# Patient Record
Sex: FEMALE | Race: WHITE | NOT HISPANIC OR LATINO | Employment: OTHER | URBAN - METROPOLITAN AREA
[De-identification: names, ages, dates, MRNs, and addresses within clinical notes are randomized per-mention and may not be internally consistent; named-entity substitution may affect disease eponyms.]

---

## 2022-06-01 ENCOUNTER — TELEPHONE (OUTPATIENT)
Dept: SCHEDULING | Facility: IMAGING CENTER | Age: 67
End: 2022-06-01

## 2022-06-16 ENCOUNTER — OFFICE VISIT (OUTPATIENT)
Dept: MEDICAL GROUP | Facility: PHYSICIAN GROUP | Age: 67
End: 2022-06-16
Payer: MEDICARE

## 2022-06-16 VITALS
RESPIRATION RATE: 17 BRPM | HEART RATE: 94 BPM | DIASTOLIC BLOOD PRESSURE: 68 MMHG | HEIGHT: 60 IN | TEMPERATURE: 98.5 F | WEIGHT: 153 LBS | OXYGEN SATURATION: 96 % | SYSTOLIC BLOOD PRESSURE: 110 MMHG | BODY MASS INDEX: 30.04 KG/M2

## 2022-06-16 DIAGNOSIS — M54.40 CHRONIC MIDLINE LOW BACK PAIN WITH SCIATICA, SCIATICA LATERALITY UNSPECIFIED: ICD-10-CM

## 2022-06-16 DIAGNOSIS — G89.29 CHRONIC MIDLINE LOW BACK PAIN WITH SCIATICA, SCIATICA LATERALITY UNSPECIFIED: ICD-10-CM

## 2022-06-16 DIAGNOSIS — F41.9 ANXIETY: ICD-10-CM

## 2022-06-16 DIAGNOSIS — M54.50 CHRONIC LOW BACK PAIN, UNSPECIFIED BACK PAIN LATERALITY, UNSPECIFIED WHETHER SCIATICA PRESENT: ICD-10-CM

## 2022-06-16 DIAGNOSIS — Z85.850 HISTORY OF THYROID CANCER: ICD-10-CM

## 2022-06-16 DIAGNOSIS — Z13.6 SCREENING FOR CARDIOVASCULAR CONDITION: ICD-10-CM

## 2022-06-16 DIAGNOSIS — F33.0 MILD EPISODE OF RECURRENT MAJOR DEPRESSIVE DISORDER (HCC): ICD-10-CM

## 2022-06-16 DIAGNOSIS — G89.29 CHRONIC LOW BACK PAIN, UNSPECIFIED BACK PAIN LATERALITY, UNSPECIFIED WHETHER SCIATICA PRESENT: ICD-10-CM

## 2022-06-16 DIAGNOSIS — E03.9 ACQUIRED HYPOTHYROIDISM: ICD-10-CM

## 2022-06-16 DIAGNOSIS — I10 ESSENTIAL HYPERTENSION: ICD-10-CM

## 2022-06-16 DIAGNOSIS — Z12.31 ENCOUNTER FOR SCREENING MAMMOGRAM FOR BREAST CANCER: ICD-10-CM

## 2022-06-16 DIAGNOSIS — Z12.11 COLON CANCER SCREENING: ICD-10-CM

## 2022-06-16 DIAGNOSIS — Z23 NEED FOR VACCINATION: ICD-10-CM

## 2022-06-16 DIAGNOSIS — K21.9 GASTROESOPHAGEAL REFLUX DISEASE WITHOUT ESOPHAGITIS: ICD-10-CM

## 2022-06-16 DIAGNOSIS — Z98.890 STATUS POST THYROID SURGERY: ICD-10-CM

## 2022-06-16 DIAGNOSIS — E55.9 VITAMIN D DEFICIENCY: ICD-10-CM

## 2022-06-16 PROBLEM — F33.9 EPISODE OF RECURRENT MAJOR DEPRESSIVE DISORDER (HCC): Status: ACTIVE | Noted: 2022-06-16

## 2022-06-16 PROCEDURE — 99204 OFFICE O/P NEW MOD 45 MIN: CPT | Mod: 25 | Performed by: INTERNAL MEDICINE

## 2022-06-16 PROCEDURE — 90715 TDAP VACCINE 7 YRS/> IM: CPT | Performed by: INTERNAL MEDICINE

## 2022-06-16 PROCEDURE — G0009 ADMIN PNEUMOCOCCAL VACCINE: HCPCS | Performed by: INTERNAL MEDICINE

## 2022-06-16 PROCEDURE — 90732 PPSV23 VACC 2 YRS+ SUBQ/IM: CPT | Performed by: INTERNAL MEDICINE

## 2022-06-16 PROCEDURE — 90472 IMMUNIZATION ADMIN EACH ADD: CPT | Performed by: INTERNAL MEDICINE

## 2022-06-16 RX ORDER — LOSARTAN POTASSIUM 100 MG/1
100 TABLET ORAL DAILY
COMMUNITY
End: 2023-04-17 | Stop reason: SDUPTHER

## 2022-06-16 RX ORDER — AMLODIPINE BESYLATE 10 MG/1
10 TABLET ORAL DAILY
COMMUNITY
End: 2022-08-11 | Stop reason: SDUPTHER

## 2022-06-16 RX ORDER — LORAZEPAM 1 MG/1
1 TABLET ORAL EVERY 4 HOURS PRN
COMMUNITY
End: 2022-06-16

## 2022-06-16 RX ORDER — GABAPENTIN 300 MG/1
300 CAPSULE ORAL 3 TIMES DAILY
COMMUNITY
End: 2023-01-03 | Stop reason: SDUPTHER

## 2022-06-16 RX ORDER — OMEPRAZOLE 20 MG/1
40 CAPSULE, DELAYED RELEASE ORAL DAILY
COMMUNITY
End: 2022-07-13 | Stop reason: SDUPTHER

## 2022-06-16 RX ORDER — BUPROPION HYDROCHLORIDE 300 MG/1
300 TABLET ORAL EVERY MORNING
COMMUNITY
End: 2022-07-13 | Stop reason: SDUPTHER

## 2022-06-16 RX ORDER — NAPROXEN 500 MG/1
500 TABLET ORAL 2 TIMES DAILY WITH MEALS
Qty: 60 TABLET | Refills: 5 | Status: SHIPPED | OUTPATIENT
Start: 2022-06-16 | End: 2022-11-14

## 2022-06-16 RX ORDER — POTASSIUM CHLORIDE 14.9 MG/ML
20 INJECTION INTRAVENOUS ONCE
COMMUNITY
End: 2022-10-10

## 2022-06-16 RX ORDER — LEVOTHYROXINE SODIUM 100 MCG
TABLET ORAL
COMMUNITY
Start: 2022-06-03 | End: 2022-12-20

## 2022-06-16 RX ORDER — LORAZEPAM 0.5 MG/1
0.5 TABLET ORAL
COMMUNITY
End: 2022-11-04 | Stop reason: SDUPTHER

## 2022-06-16 RX ORDER — NAPROXEN 500 MG/1
500 TABLET ORAL 2 TIMES DAILY WITH MEALS
COMMUNITY
End: 2022-06-16 | Stop reason: SDUPTHER

## 2022-06-16 RX ORDER — IBUPROFEN 200 MG
950 CAPSULE ORAL DAILY
COMMUNITY

## 2022-06-16 ASSESSMENT — PATIENT HEALTH QUESTIONNAIRE - PHQ9: CLINICAL INTERPRETATION OF PHQ2 SCORE: 0

## 2022-06-16 NOTE — ASSESSMENT & PLAN NOTE
Chronic condition.  The patient is currently taking omeprazole.  The patient denies nausea vomiting dysphagia or unexplained weight loss

## 2022-06-16 NOTE — ASSESSMENT & PLAN NOTE
This is a chronic condition.  Patient takes lorazepam as needed.  Currently she is asymptomatic.  Patient declined mental health referral

## 2022-06-16 NOTE — ASSESSMENT & PLAN NOTE
This is a chronic condition.  The patient status post spine surgery.  She takes Naprosyn as needed.

## 2022-06-16 NOTE — PROGRESS NOTES
PRIMARY CARE CLINIC VISIT  Chief Complaint   Patient presents with   • New Patient     Establish care  Discuss her medical conditions    History of Present Illness     History of thyroid cancer  Patient is status post thyroidectomy.  Patient currently taking levothyroxine.  Patient requests referral to establish with endocrinologist.    Episode of recurrent major depressive disorder (HCC)  Chronic condition.  The patient is now taking Wellbutrin.  Patient denies SI.    Essential hypertension  Chronic condition.  Patient is taking losartan.  Her blood pressure has been well controlled.     Gastroesophageal reflux disease without esophagitis  Chronic condition.  The patient is currently taking omeprazole.  The patient denies nausea vomiting dysphagia or unexplained weight loss    Chronic midline low back pain with sciatica  This is a chronic condition.  The patient status post spine surgery.  She takes Naprosyn as needed.    Anxiety  This is a chronic condition.  Patient takes lorazepam as needed.  Currently she is asymptomatic.  Patient declined mental health referral      Current Outpatient Medications on File Prior to Visit   Medication Sig Dispense Refill   • SYNTHROID 100 MCG Tab      • omeprazole (PRILOSEC) 20 MG delayed-release capsule Take 40 mg by mouth every day.     • potassium chloride in water (KCL) 20 MEQ/100ML Solution Infuse 20 mEq into a venous catheter one time.     • amLODIPine (NORVASC) 10 MG Tab Take 10 mg by mouth every day.     • losartan (COZAAR) 100 MG Tab Take 100 mg by mouth every day.     • buPROPion (WELLBUTRIN XL) 300 MG XL tablet Take 300 mg by mouth every morning.     • gabapentin (NEURONTIN) 300 MG Cap Take 300 mg by mouth 3 times a day.     • LORazepam (ATIVAN) 0.5 MG Tab Take 0.5 mg by mouth 2 times daily with meals as needed.     • calcium citrate (CALCITRATE) 950 (200 Ca) MG Tab Take 950 mg by mouth every day.     • Multiple Vitamin (MULTI-VITAMINS PO) Take  by mouth.     • aspirin  EC (ECOTRIN) 81 MG Tablet Delayed Response Take 81 mg by mouth every day.     • Riboflavin (VITAMIN B-2 PO) Take  by mouth.       No current facility-administered medications on file prior to visit.        Allergies: Patient has no known allergies.    ROS  As per HPI above. All other systems reviewed and negative.      Past Medical, Social, and Family history reviewed and updated in EPIC     Objective     /68 (BP Location: Left arm, Patient Position: Sitting, BP Cuff Size: Adult)   Pulse 94   Temp 36.9 °C (98.5 °F) (Temporal)   Resp 17   Ht 1.524 m (5')   Wt 69.4 kg (153 lb)   SpO2 96%    Body mass index is 29.88 kg/m².    General: alert and oriented  Cardiovascular: regular rate and rhythm  Pulmonary: lungs : no wheezing   Gastrointestinal: BS present. No obvious mass noted    Neuro nonfocal cranial nerve II to XII grossly intact      Assessment and Plan     1. Essential hypertension  - Basic Metabolic Panel; Future  - CBC WITHOUT DIFFERENTIAL; Future  Chronic condition.  Continue current management.  2. Gastroesophageal reflux disease without esophagitis  Continue with omeprazole.  3. Acquired hypothyroidism  - TSH; Future  Lab tests ordered.  Continue with levothyroxine.  4. Encounter for screening mammogram for breast cancer  - MA-SCREENING MAMMO BILAT W/CAD; Future    5. Need for vaccination  - Tdap Vaccine =>6YO IM  - Pneumovax Vaccine (PPSV23)    6. Vitamin D deficiency  - VITAMIN D,25 HYDROXY; Future    7. Screening for cardiovascular condition  - Lipid Profile; Future    8. Colon cancer screening  - Referral to GI for Colonoscopy    9. Mild episode of recurrent major depressive disorder (HCC)  Chronic stable condition.  Continue with Wellbutrin.  10. Anxiety  Patient currently asymptomatic.  She takes lorazepam as needed  11. Status post thyroid surgery  - Referral to Endocrinology    12. Chronic low back pain, unspecified back pain laterality, unspecified whether sciatica present    13. History  of thyroid cancer  - Referral to Endocrinology    14. Chronic midline low back pain with sciatica, sciatica laterality unspecified  Continue take Naprosyn as needed.  Stable continue to monitor.  Other orders  - SYNTHROID 100 MCG Tab  - omeprazole (PRILOSEC) 20 MG delayed-release capsule; Take 40 mg by mouth every day.  - potassium chloride in water (KCL) 20 MEQ/100ML Solution; Infuse 20 mEq into a venous catheter one time.  - amLODIPine (NORVASC) 10 MG Tab; Take 10 mg by mouth every day.  - losartan (COZAAR) 100 MG Tab; Take 100 mg by mouth every day.  - buPROPion (WELLBUTRIN XL) 300 MG XL tablet; Take 300 mg by mouth every morning.  - calcium citrate (CALCITRATE) 950 (200 Ca) MG Tab; Take 950 mg by mouth every day.  - Multiple Vitamin (MULTI-VITAMINS PO); Take  by mouth.  - gabapentin (NEURONTIN) 300 MG Cap; Take 300 mg by mouth 3 times a day.  - aspirin EC (ECOTRIN) 81 MG Tablet Delayed Response; Take 81 mg by mouth every day.  - Riboflavin (VITAMIN B-2 PO); Take  by mouth.  - LORazepam (ATIVAN) 0.5 MG Tab; Take 0.5 mg by mouth 2 times daily with meals as needed.  - naproxen (NAPROSYN) 500 MG Tab; Take 1 Tablet by mouth 2 times a day with meals.  Dispense: 60 Tablet; Refill: 5                  Commend follow-up in 6 months           Please note that this dictation was created using voice recognition software. I have made every reasonable attempt to correct obvious errors but there may be errors of grammar and content that I may have overlooked prior to finalization of this note.      Reese Pretty MD  Internal Medicine  [facility]

## 2022-06-16 NOTE — ASSESSMENT & PLAN NOTE
Patient is status post thyroidectomy.  Patient currently taking levothyroxine.  Patient requests referral to establish with endocrinologist.

## 2022-06-24 ENCOUNTER — HOSPITAL ENCOUNTER (OUTPATIENT)
Dept: LAB | Facility: MEDICAL CENTER | Age: 67
End: 2022-06-24
Attending: INTERNAL MEDICINE
Payer: MEDICARE

## 2022-06-24 DIAGNOSIS — Z13.6 SCREENING FOR CARDIOVASCULAR CONDITION: ICD-10-CM

## 2022-06-24 DIAGNOSIS — I10 ESSENTIAL HYPERTENSION: ICD-10-CM

## 2022-06-24 DIAGNOSIS — E03.9 ACQUIRED HYPOTHYROIDISM: ICD-10-CM

## 2022-06-24 DIAGNOSIS — E55.9 VITAMIN D DEFICIENCY: ICD-10-CM

## 2022-06-24 LAB
25(OH)D3 SERPL-MCNC: 55 NG/ML (ref 30–100)
ANION GAP SERPL CALC-SCNC: 15 MMOL/L (ref 7–16)
BUN SERPL-MCNC: 19 MG/DL (ref 8–22)
CALCIUM SERPL-MCNC: 10.1 MG/DL (ref 8.5–10.5)
CHLORIDE SERPL-SCNC: 103 MMOL/L (ref 96–112)
CHOLEST SERPL-MCNC: 186 MG/DL (ref 100–199)
CO2 SERPL-SCNC: 23 MMOL/L (ref 20–33)
CREAT SERPL-MCNC: 0.76 MG/DL (ref 0.5–1.4)
ERYTHROCYTE [DISTWIDTH] IN BLOOD BY AUTOMATED COUNT: 46.4 FL (ref 35.9–50)
FASTING STATUS PATIENT QL REPORTED: NORMAL
GFR SERPLBLD CREATININE-BSD FMLA CKD-EPI: 86 ML/MIN/1.73 M 2
GLUCOSE SERPL-MCNC: 85 MG/DL (ref 65–99)
HCT VFR BLD AUTO: 43.4 % (ref 37–47)
HDLC SERPL-MCNC: 43 MG/DL
HGB BLD-MCNC: 14.3 G/DL (ref 12–16)
LDLC SERPL CALC-MCNC: 120 MG/DL
MCH RBC QN AUTO: 29.8 PG (ref 27–33)
MCHC RBC AUTO-ENTMCNC: 32.9 G/DL (ref 33.6–35)
MCV RBC AUTO: 90.4 FL (ref 81.4–97.8)
PLATELET # BLD AUTO: 393 K/UL (ref 164–446)
PMV BLD AUTO: 9 FL (ref 9–12.9)
POTASSIUM SERPL-SCNC: 4.3 MMOL/L (ref 3.6–5.5)
RBC # BLD AUTO: 4.8 M/UL (ref 4.2–5.4)
SODIUM SERPL-SCNC: 141 MMOL/L (ref 135–145)
TRIGL SERPL-MCNC: 113 MG/DL (ref 0–149)
TSH SERPL DL<=0.005 MIU/L-ACNC: 0.61 UIU/ML (ref 0.38–5.33)
WBC # BLD AUTO: 8.9 K/UL (ref 4.8–10.8)

## 2022-06-24 PROCEDURE — 84443 ASSAY THYROID STIM HORMONE: CPT

## 2022-06-24 PROCEDURE — 80061 LIPID PANEL: CPT

## 2022-06-24 PROCEDURE — 82306 VITAMIN D 25 HYDROXY: CPT

## 2022-06-24 PROCEDURE — 85027 COMPLETE CBC AUTOMATED: CPT

## 2022-06-24 PROCEDURE — 80048 BASIC METABOLIC PNL TOTAL CA: CPT

## 2022-06-24 PROCEDURE — 36415 COLL VENOUS BLD VENIPUNCTURE: CPT

## 2022-06-24 SDOH — ECONOMIC STABILITY: FOOD INSECURITY: WITHIN THE PAST 12 MONTHS, THE FOOD YOU BOUGHT JUST DIDN'T LAST AND YOU DIDN'T HAVE MONEY TO GET MORE.: NEVER TRUE

## 2022-06-24 SDOH — HEALTH STABILITY: PHYSICAL HEALTH: ON AVERAGE, HOW MANY MINUTES DO YOU ENGAGE IN EXERCISE AT THIS LEVEL?: 10 MIN

## 2022-06-24 SDOH — HEALTH STABILITY: MENTAL HEALTH
STRESS IS WHEN SOMEONE FEELS TENSE, NERVOUS, ANXIOUS, OR CAN'T SLEEP AT NIGHT BECAUSE THEIR MIND IS TROUBLED. HOW STRESSED ARE YOU?: TO SOME EXTENT

## 2022-06-24 SDOH — ECONOMIC STABILITY: HOUSING INSECURITY: IN THE LAST 12 MONTHS, HOW MANY PLACES HAVE YOU LIVED?: 2

## 2022-06-24 SDOH — ECONOMIC STABILITY: HOUSING INSECURITY
IN THE LAST 12 MONTHS, WAS THERE A TIME WHEN YOU DID NOT HAVE A STEADY PLACE TO SLEEP OR SLEPT IN A SHELTER (INCLUDING NOW)?: NO

## 2022-06-24 SDOH — ECONOMIC STABILITY: INCOME INSECURITY: IN THE LAST 12 MONTHS, WAS THERE A TIME WHEN YOU WERE NOT ABLE TO PAY THE MORTGAGE OR RENT ON TIME?: NO

## 2022-06-24 SDOH — HEALTH STABILITY: PHYSICAL HEALTH: ON AVERAGE, HOW MANY DAYS PER WEEK DO YOU ENGAGE IN MODERATE TO STRENUOUS EXERCISE (LIKE A BRISK WALK)?: 7 DAYS

## 2022-06-24 SDOH — ECONOMIC STABILITY: TRANSPORTATION INSECURITY
IN THE PAST 12 MONTHS, HAS THE LACK OF TRANSPORTATION KEPT YOU FROM MEDICAL APPOINTMENTS OR FROM GETTING MEDICATIONS?: NO

## 2022-06-24 SDOH — ECONOMIC STABILITY: FOOD INSECURITY: WITHIN THE PAST 12 MONTHS, YOU WORRIED THAT YOUR FOOD WOULD RUN OUT BEFORE YOU GOT MONEY TO BUY MORE.: NEVER TRUE

## 2022-06-24 SDOH — ECONOMIC STABILITY: TRANSPORTATION INSECURITY
IN THE PAST 12 MONTHS, HAS LACK OF RELIABLE TRANSPORTATION KEPT YOU FROM MEDICAL APPOINTMENTS, MEETINGS, WORK OR FROM GETTING THINGS NEEDED FOR DAILY LIVING?: NO

## 2022-06-24 SDOH — ECONOMIC STABILITY: INCOME INSECURITY: HOW HARD IS IT FOR YOU TO PAY FOR THE VERY BASICS LIKE FOOD, HOUSING, MEDICAL CARE, AND HEATING?: NOT VERY HARD

## 2022-06-24 SDOH — ECONOMIC STABILITY: TRANSPORTATION INSECURITY
IN THE PAST 12 MONTHS, HAS LACK OF TRANSPORTATION KEPT YOU FROM MEETINGS, WORK, OR FROM GETTING THINGS NEEDED FOR DAILY LIVING?: NO

## 2022-06-24 ASSESSMENT — SOCIAL DETERMINANTS OF HEALTH (SDOH)
HOW OFTEN DO YOU ATTEND CHURCH OR RELIGIOUS SERVICES?: MORE THAN 4 TIMES PER YEAR
HOW OFTEN DO YOU ATTEND CHURCH OR RELIGIOUS SERVICES?: MORE THAN 4 TIMES PER YEAR
HOW MANY DRINKS CONTAINING ALCOHOL DO YOU HAVE ON A TYPICAL DAY WHEN YOU ARE DRINKING: PATIENT DOES NOT DRINK
DO YOU BELONG TO ANY CLUBS OR ORGANIZATIONS SUCH AS CHURCH GROUPS UNIONS, FRATERNAL OR ATHLETIC GROUPS, OR SCHOOL GROUPS?: NO
IN A TYPICAL WEEK, HOW MANY TIMES DO YOU TALK ON THE PHONE WITH FAMILY, FRIENDS, OR NEIGHBORS?: ONCE A WEEK
HOW OFTEN DO YOU HAVE A DRINK CONTAINING ALCOHOL: NEVER
DO YOU BELONG TO ANY CLUBS OR ORGANIZATIONS SUCH AS CHURCH GROUPS UNIONS, FRATERNAL OR ATHLETIC GROUPS, OR SCHOOL GROUPS?: NO
HOW OFTEN DO YOU ATTENT MEETINGS OF THE CLUB OR ORGANIZATION YOU BELONG TO?: PATIENT DECLINED
HOW HARD IS IT FOR YOU TO PAY FOR THE VERY BASICS LIKE FOOD, HOUSING, MEDICAL CARE, AND HEATING?: NOT VERY HARD
HOW OFTEN DO YOU GET TOGETHER WITH FRIENDS OR RELATIVES?: NEVER
IN A TYPICAL WEEK, HOW MANY TIMES DO YOU TALK ON THE PHONE WITH FAMILY, FRIENDS, OR NEIGHBORS?: ONCE A WEEK
HOW OFTEN DO YOU ATTENT MEETINGS OF THE CLUB OR ORGANIZATION YOU BELONG TO?: PATIENT DECLINED
HOW OFTEN DO YOU GET TOGETHER WITH FRIENDS OR RELATIVES?: NEVER
WITHIN THE PAST 12 MONTHS, YOU WORRIED THAT YOUR FOOD WOULD RUN OUT BEFORE YOU GOT THE MONEY TO BUY MORE: NEVER TRUE
HOW OFTEN DO YOU HAVE SIX OR MORE DRINKS ON ONE OCCASION: NEVER

## 2022-06-24 ASSESSMENT — LIFESTYLE VARIABLES
HOW MANY STANDARD DRINKS CONTAINING ALCOHOL DO YOU HAVE ON A TYPICAL DAY: PATIENT DOES NOT DRINK
HOW OFTEN DO YOU HAVE A DRINK CONTAINING ALCOHOL: NEVER
HOW OFTEN DO YOU HAVE SIX OR MORE DRINKS ON ONE OCCASION: NEVER
AUDIT-C TOTAL SCORE: 0
SKIP TO QUESTIONS 9-10: 1

## 2022-06-28 ENCOUNTER — OFFICE VISIT (OUTPATIENT)
Dept: MEDICAL GROUP | Facility: PHYSICIAN GROUP | Age: 67
End: 2022-06-28
Payer: MEDICARE

## 2022-06-28 VITALS
WEIGHT: 154.6 LBS | RESPIRATION RATE: 18 BRPM | OXYGEN SATURATION: 96 % | HEIGHT: 60 IN | HEART RATE: 69 BPM | SYSTOLIC BLOOD PRESSURE: 128 MMHG | BODY MASS INDEX: 30.35 KG/M2 | TEMPERATURE: 97.9 F | DIASTOLIC BLOOD PRESSURE: 76 MMHG

## 2022-06-28 DIAGNOSIS — J44.89 CHRONIC OBSTRUCTIVE AIRWAY DISEASE WITH ASTHMA (HCC): ICD-10-CM

## 2022-06-28 DIAGNOSIS — E66.9 OBESITY (BMI 30.0-34.9): ICD-10-CM

## 2022-06-28 DIAGNOSIS — M54.16 LUMBAR RADICULOPATHY: ICD-10-CM

## 2022-06-28 DIAGNOSIS — M54.41 CHRONIC MIDLINE LOW BACK PAIN WITH RIGHT-SIDED SCIATICA: ICD-10-CM

## 2022-06-28 DIAGNOSIS — G89.29 CHRONIC MIDLINE LOW BACK PAIN WITH RIGHT-SIDED SCIATICA: ICD-10-CM

## 2022-06-28 DIAGNOSIS — Z76.89 ENCOUNTER TO ESTABLISH CARE: ICD-10-CM

## 2022-06-28 DIAGNOSIS — I10 ESSENTIAL HYPERTENSION: Chronic | ICD-10-CM

## 2022-06-28 DIAGNOSIS — M54.2 CERVICAL PAIN: ICD-10-CM

## 2022-06-28 PROBLEM — M75.41 IMPINGEMENT SYNDROME OF RIGHT SHOULDER: Status: ACTIVE | Noted: 2021-07-19

## 2022-06-28 PROBLEM — M85.80 OSTEOPENIA: Status: ACTIVE | Noted: 2021-01-29

## 2022-06-28 PROBLEM — E66.811 OBESITY (BMI 30.0-34.9): Status: ACTIVE | Noted: 2022-06-28

## 2022-06-28 PROBLEM — Z98.890 STATUS POST THYROID SURGERY: Status: RESOLVED | Noted: 2022-06-16 | Resolved: 2022-06-28

## 2022-06-28 PROCEDURE — 99214 OFFICE O/P EST MOD 30 MIN: CPT | Performed by: FAMILY MEDICINE

## 2022-06-28 ASSESSMENT — PATIENT HEALTH QUESTIONNAIRE - PHQ9
6. FEELING BAD ABOUT YOURSELF - OR THAT YOU ARE A FAILURE OR HAVE LET YOURSELF OR YOUR FAMILY DOWN: NOT AL ALL
7. TROUBLE CONCENTRATING ON THINGS, SUCH AS READING THE NEWSPAPER OR WATCHING TELEVISION: SEVERAL DAYS
9. THOUGHTS THAT YOU WOULD BE BETTER OFF DEAD, OR OF HURTING YOURSELF: NOT AT ALL
1. LITTLE INTEREST OR PLEASURE IN DOING THINGS: NOT AT ALL
SUM OF ALL RESPONSES TO PHQ9 QUESTIONS 1 AND 2: 1
4. FEELING TIRED OR HAVING LITTLE ENERGY: SEVERAL DAYS
5. POOR APPETITE OR OVEREATING: NOT AT ALL
3. TROUBLE FALLING OR STAYING ASLEEP OR SLEEPING TOO MUCH: NEARLY EVERY DAY
8. MOVING OR SPEAKING SO SLOWLY THAT OTHER PEOPLE COULD HAVE NOTICED. OR THE OPPOSITE, BEING SO FIGETY OR RESTLESS THAT YOU HAVE BEEN MOVING AROUND A LOT MORE THAN USUAL: NOT AT ALL
2. FEELING DOWN, DEPRESSED, IRRITABLE, OR HOPELESS: SEVERAL DAYS
SUM OF ALL RESPONSES TO PHQ QUESTIONS 1-9: 6

## 2022-06-28 NOTE — ASSESSMENT & PLAN NOTE
This is a chronic problem.  Patient's had previous laminectomy.  However following that she developed a large herniated disc at L1-2.  It is causing pain down on the right side.  She states last year she was to be referred for an injection but due to scheduling issues that never happened.  She like to see about having that done now.

## 2022-06-28 NOTE — PROGRESS NOTES
Subjective:     CC: Here to establish care and discuss her issues.     HPI:   Mathew presents today with the following medical concerns:    Encounter to establish care  Patient is here today to establish care.  She was just recently seen and had an exam done and labs.  Also had some referrals made.  Her main problems now are pain down her right leg and also from the right neck down the right arm.    Chronic midline low back pain with sciatica  This is a chronic problem.  Patient's had previous laminectomy.  However following that she developed a large herniated disc at L1-2.  It is causing pain down on the right side.  She states last year she was to be referred for an injection but due to scheduling issues that never happened.  She like to see about having that done now.    Cervical pain  This is a chronic problem.  Patient has periodic troubles with pain down her right neck.  She has had previous laminectomy   from C4-7.    Essential hypertension  This is a chronic problem under good control.  Continue to follow.    Obesity (BMI 30.0-34.9)  This is a chronic problem.  Patient does try to watch her diet.      History reviewed. No pertinent past medical history.    Social History     Tobacco Use   • Smoking status: Former Smoker     Types: Cigarettes   • Smokeless tobacco: Never Used   Vaping Use   • Vaping Use: Some days   • Substances: Nicotine, CBD   • Devices: Pre-filled or refillable cartridge, Refillable tank   Substance Use Topics   • Alcohol use: Not Currently   • Drug use: Yes     Types: Marijuana     Comment: once in a while       Current Outpatient Medications Ordered in Epic   Medication Sig Dispense Refill   • SYNTHROID 100 MCG Tab      • omeprazole (PRILOSEC) 20 MG delayed-release capsule Take 40 mg by mouth every day.     • potassium chloride in water (KCL) 20 MEQ/100ML Solution Infuse 20 mEq into a venous catheter one time.     • amLODIPine (NORVASC) 10 MG Tab Take 10 mg by mouth every day.     •  losartan (COZAAR) 100 MG Tab Take 100 mg by mouth every day.     • buPROPion (WELLBUTRIN XL) 300 MG XL tablet Take 300 mg by mouth every morning.     • calcium citrate (CALCITRATE) 950 (200 Ca) MG Tab Take 950 mg by mouth every day.     • Multiple Vitamin (MULTI-VITAMINS PO) Take  by mouth.     • gabapentin (NEURONTIN) 300 MG Cap Take 300 mg by mouth 3 times a day.     • aspirin EC (ECOTRIN) 81 MG Tablet Delayed Response Take 81 mg by mouth every day.     • Riboflavin (VITAMIN B-2 PO) Take  by mouth.     • LORazepam (ATIVAN) 0.5 MG Tab Take 0.5 mg by mouth 2 times daily with meals as needed.     • naproxen (NAPROSYN) 500 MG Tab Take 1 Tablet by mouth 2 times a day with meals. 60 Tablet 5     No current Epic-ordered facility-administered medications on file.       Allergies:  Ace inhibitors, Chlorhexidine, Hydrocodone, and Triamterene    Health Maintenance: Completed    ROS:  Gen: no fevers/chills, no changes in weight  Eyes: no changes in vision  ENT: no sore throat, no hearing loss, no bloody nose  Pulm: no sob, no cough  CV: no chest pain, no palpitations  GI: no nausea/vomiting, no diarrhea  : no dysuria  MSk: no myalgias  Skin: no rash  Neuro: no headaches,   Heme/Lymph: no easy bruising      Objective:       Exam:  /76 (BP Location: Right arm, Patient Position: Sitting, BP Cuff Size: Adult)   Pulse 69   Temp 36.6 °C (97.9 °F) (Temporal)   Resp 18   Ht 1.524 m (5')   Wt 70.1 kg (154 lb 9.6 oz)   SpO2 96%   BMI 30.19 kg/m²  Body mass index is 30.19 kg/m².    Gen: Alert and oriented, No apparent distress.  Neck: Neck is supple without lymphadenopathy.  Lungs: Normal effort, CTA bilaterally, no wheezes, rhonchi, or rales  CV: Regular rate and rhythm. No murmurs, rubs, or gallops.  Ext: No clubbing, cyanosis, edema.  Gait is normal        Labs: Reviewed    Assessment & Plan:     67 y.o. female with the following -     1. Lumbar radiculopathy  This is a chronic problem.  Referral to pain management  clinic made.  - Referral to Pain Clinic    2. Chronic midline low back pain with right-sided sciatica  This is a chronic problem.  Referral to pain clinic made.    3. Cervical pain  This is a chronic problem.  Referral to pain clinic made.    4. Encounter to establish care  Patient establish care with me today.  We will recheck her again in about 3 months.  History and labs reviewed.    5. Chronic obstructive airway disease with asthma (HCC)  This is a chronic problem.  Patient uses inhalers as needed.  Currently asymptomatic.    6. Essential hypertension  This is a chronic problem under good control.  Continue to follow.    7. Obesity (BMI 30.0-34.9)  This is a chronic problem.  Continue encourage weight reduction.  - Patient identified as having weight management issue.  Appropriate orders and counseling given.      Return in about 3 months (around 9/28/2022) for Long.  38 minutes spent with the patient.  Please note that this dictation was created using voice recognition software. I have made every reasonable attempt to correct obvious errors, but I expect that there are errors of grammar and possibly content that I did not discover before finalizing the note.

## 2022-06-28 NOTE — ASSESSMENT & PLAN NOTE
Patient is here today to establish care.  She was just recently seen and had an exam done and labs.  Also had some referrals made.  Her main problems now are pain down her right leg and also from the right neck down the right arm.

## 2022-06-28 NOTE — ASSESSMENT & PLAN NOTE
This is a chronic problem.  Patient has periodic troubles with pain down her right neck.  She has had previous laminectomy   from C4-7.

## 2022-06-30 ENCOUNTER — HOSPITAL ENCOUNTER (OUTPATIENT)
Dept: RADIOLOGY | Facility: MEDICAL CENTER | Age: 67
End: 2022-06-30
Payer: MEDICARE

## 2022-07-13 ENCOUNTER — HOSPITAL ENCOUNTER (OUTPATIENT)
Dept: RADIOLOGY | Facility: MEDICAL CENTER | Age: 67
End: 2022-07-13
Attending: FAMILY MEDICINE
Payer: MEDICARE

## 2022-07-13 ENCOUNTER — PATIENT MESSAGE (OUTPATIENT)
Dept: MEDICAL GROUP | Facility: PHYSICIAN GROUP | Age: 67
End: 2022-07-13
Payer: MEDICARE

## 2022-07-13 DIAGNOSIS — Z12.31 VISIT FOR SCREENING MAMMOGRAM: ICD-10-CM

## 2022-07-13 PROCEDURE — 77063 BREAST TOMOSYNTHESIS BI: CPT

## 2022-07-13 RX ORDER — BUPROPION HYDROCHLORIDE 300 MG/1
300 TABLET ORAL EVERY MORNING
Qty: 30 TABLET | Refills: 11 | Status: SHIPPED | OUTPATIENT
Start: 2022-07-13 | End: 2022-08-08

## 2022-07-13 RX ORDER — OMEPRAZOLE 40 MG/1
40 CAPSULE, DELAYED RELEASE ORAL DAILY
Qty: 30 CAPSULE | Refills: 11 | Status: SHIPPED | OUTPATIENT
Start: 2022-07-13 | End: 2022-08-08

## 2022-07-20 ENCOUNTER — APPOINTMENT (OUTPATIENT)
Dept: PHYSICAL MEDICINE AND REHAB | Facility: MEDICAL CENTER | Age: 67
End: 2022-07-20
Payer: MEDICARE

## 2022-08-08 RX ORDER — BUPROPION HYDROCHLORIDE 300 MG/1
TABLET ORAL
Qty: 90 TABLET | Refills: 3 | Status: SHIPPED | OUTPATIENT
Start: 2022-08-08 | End: 2023-08-12 | Stop reason: SDUPTHER

## 2022-08-08 RX ORDER — OMEPRAZOLE 40 MG/1
40 CAPSULE, DELAYED RELEASE ORAL DAILY
Qty: 90 CAPSULE | Refills: 3 | Status: SHIPPED | OUTPATIENT
Start: 2022-08-08 | End: 2023-08-19 | Stop reason: SDUPTHER

## 2022-08-11 RX ORDER — AMLODIPINE BESYLATE 10 MG/1
10 TABLET ORAL DAILY
Qty: 90 TABLET | Refills: 3 | Status: SHIPPED | OUTPATIENT
Start: 2022-08-11 | End: 2023-08-19 | Stop reason: SDUPTHER

## 2022-08-18 NOTE — PROGRESS NOTES
New Patient Note    Interventional Pain and Spine  Physiatry (Physical Medicine and Rehabilitation)     Patient Name: Robin Lynn Zielesch  : 1955  Date of Service: 2022  PCP: Jose Rafael Josue III, M.D.  Referring Provider: Jose Rafael Josue III, M.*    Chief Complaint:   Chief Complaint   Patient presents with    New Patient     Back pain       HPI  HISTORY (2022):  Robin Lynn Zielesch is a 67 y.o. female who presents today with pain radiating from her right posterolateral glute down her right anterolateral and posterior thigh accompanied by numbness/tingling/weakness in this area. This started in Sep 2021 while recovering from a L2-pelvis posterior spinal fusion with TLIF/ PLIF on 21 with Dr. Bates ([facility] which she states she had done for similar radiating pain down her left leg.  Her radiating left leg pain resolved after surgery.    Her pain at its best-worse level during the course of the day is 5-9/10, respectively. Pain right now is 7/10 on the numeric pain scale. Pain worsens with sitting, standing, walking, bending backwards, side bending or twisting, walking upstairs, and walking downstairs and improves with nothing. Her pain interferes somewhat with ADLs. The patient otherwise denies new weakness, numbness, or bladder/bowel incontinence. States she has fallen a few times secondary to pain and possibly weakness. Moved from [facility] in May 2022.    The patient has done physical therapy for this problem with worsening pain.    Patient has tried the following medications with varied success (current meds in bold): Hayes back and body  Gabapentin 300mg TID - no relief. Used to help with left sided pain  Naproxen BID - significant relief    Therapeutic modalities and interventional therapies to date include:  -No injections    Medical history includes HTN, cervical laminectomy, depression, anxiety, thyroid cancer, BMI 30, L2-pelvis posterior spinal fusion with TLIF/ PLIF on  6-4-21    Psychological testing for pain as depression and pain commonly coexist and need to both be treated.     Opioid Risk Score: 8      Interpretation of Opioid Risk Score   Score 0-3 = Low risk of abuse. Do UDS at least once per year.  Score 4-7 = Moderate risk of abuse. Do UDS 1-4 times per year.  Score 8+ = High risk of abuse. Refer to specialist.    PHQ  Depression Screen (PHQ-2/PHQ-9) 6/16/2022 6/28/2022 8/19/2022   PHQ-2 Total Score - 1 -   PHQ-2 Total Score 0 - 1   PHQ-9 Total Score - 6 -   PHQ-9 Total Score - - 8       Interpretation of PHQ-9 Total Score   Score Severity   1-4 No Depression   5-9 Mild Depression   10-14 Moderate Depression   15-19 Moderately Severe Depression   20-27 Severe Depression      Medical records review:  I reviewed the note from the referring provider Jose Rafael Josue III, M.* including the note dated 6/28/22.    ROS:   Red Flags ROS:   Fever, Chills, Sweats: Denies  Involuntary Weight Loss: Denies  Bladder Incontinence: Denies  Bowel Incontinence: denies  Saddle Anesthesia: Denies    All other systems reviewed and negative.     PMHx:   History reviewed. No pertinent past medical history.    PSHx:   Past Surgical History:   Procedure Laterality Date    ABDOMINAL HYSTERECTOMY TOTAL      FOOT SURGERY      [id number]      L tka    LAMINOTOMY      THYROIDECTOMY TOTAL      2019  thyroid cancer       Family Hx:   Family History   Problem Relation Age of Onset    COPD Mother     Cancer Father         pancreatic       Social Hx:  Social History     Socioeconomic History    Marital status:      Spouse name: Not on file    Number of children: Not on file    Years of education: Not on file    Highest education level: Some college, no degree   Occupational History    Not on file   Tobacco Use    Smoking status: Former     Types: Cigarettes    Smokeless tobacco: Never   Vaping Use    Vaping Use: Some days    Substances: Nicotine, CBD    Devices: Pre-filled or refillable cartridge,  [facility]   Substance and Sexual Activity    Alcohol use: Not Currently    Drug use: Yes     Types: Marijuana     Comment: once in a while    Sexual activity: Not on file   Other Topics Concern    Not on file   Social History Narrative    Not on file     Social Determinants of Health     Financial Resource Strain: Low Risk     Difficulty of Paying Living Expenses: Not very hard   Food Insecurity: No Food Insecurity    Worried About Running Out of Food in the Last Year: Never true    Ran Out of Food in the Last Year: Never true   Transportation Needs: No Transportation Needs    Lack of Transportation (Medical): No    Lack of Transportation (Non-Medical): No   Physical Activity: Insufficiently Active    Days of Exercise per Week: 7 days    Minutes of Exercise per Session: 10 min   Stress: Stress Concern Present    Feeling of Stress : To some extent   Social Connections: Moderately Isolated    Frequency of Communication with Friends and Family: Once a week    Frequency of Social Gatherings with Friends and Family: Never    Attends Islam Services: More than 4 times per year    Active Member of Clubs or Organizations: No    Attends Club or Organization Meetings: Patient refused    Marital Status:    Intimate Partner Violence: Not on file   Housing Stability: Low Risk     Unable to Pay for Housing in the Last Year: No    Number of Places Lived in the Last Year: 2    Unstable Housing in the Last Year: No       Allergies:  Allergies   Allergen Reactions    Ace Inhibitors Cough    Chlorhexidine Rash    Hydrocodone Vomiting    Triamterene      Other reaction(s): Nephrotoxicity       Medications: reviewed on epic.   Outpatient Medications Marked as Taking for the 8/19/22 encounter (Office Visit) with Kendal Jeffers M.D.   Medication Sig Dispense Refill    amLODIPine (NORVASC) 10 MG Tab Take 1 Tablet by mouth every day. 90 Tablet 3    omeprazole (PRILOSEC) 40 MG delayed-release capsule TAKE 1 CAPSULE BY MOUTH  EVERY DAY 90 Capsule 3    buPROPion (WELLBUTRIN XL) 300 MG XL tablet TAKE 1 TABLET BY MOUTH EVERY DAY IN THE MORNING 90 Tablet 3    SYNTHROID 100 MCG Tab       potassium chloride in water (KCL) 20 MEQ/100ML Solution Infuse 20 mEq into a venous catheter one time.      losartan (COZAAR) 100 MG Tab Take 100 mg by mouth every day.      calcium citrate (CALCITRATE) 950 (200 Ca) MG Tab Take 950 mg by mouth every day.      Multiple Vitamin (MULTI-VITAMINS PO) Take  by mouth.      gabapentin (NEURONTIN) 300 MG Cap Take 300 mg by mouth 3 times a day.      aspirin EC (ECOTRIN) 81 MG Tablet Delayed Response Take 81 mg by mouth every day.      Riboflavin (VITAMIN B-2 PO) Take  by mouth.      LORazepam (ATIVAN) 0.5 MG Tab Take 0.5 mg by mouth 2 times daily with meals as needed.      naproxen (NAPROSYN) 500 MG Tab Take 1 Tablet by mouth 2 times a day with meals. 60 Tablet 5        Current Outpatient Medications on File Prior to Visit   Medication Sig Dispense Refill    amLODIPine (NORVASC) 10 MG Tab Take 1 Tablet by mouth every day. 90 Tablet 3    omeprazole (PRILOSEC) 40 MG delayed-release capsule TAKE 1 CAPSULE BY MOUTH EVERY DAY 90 Capsule 3    buPROPion (WELLBUTRIN XL) 300 MG XL tablet TAKE 1 TABLET BY MOUTH EVERY DAY IN THE MORNING 90 Tablet 3    SYNTHROID 100 MCG Tab       potassium chloride in water (KCL) 20 MEQ/100ML Solution Infuse 20 mEq into a venous catheter one time.      losartan (COZAAR) 100 MG Tab Take 100 mg by mouth every day.      calcium citrate (CALCITRATE) 950 (200 Ca) MG Tab Take 950 mg by mouth every day.      Multiple Vitamin (MULTI-VITAMINS PO) Take  by mouth.      gabapentin (NEURONTIN) 300 MG Cap Take 300 mg by mouth 3 times a day.      aspirin EC (ECOTRIN) 81 MG Tablet Delayed Response Take 81 mg by mouth every day.      Riboflavin (VITAMIN B-2 PO) Take  by mouth.      LORazepam (ATIVAN) 0.5 MG Tab Take 0.5 mg by mouth 2 times daily with meals as needed.      naproxen (NAPROSYN) 500 MG Tab Take 1  Tablet by mouth 2 times a day with meals. 60 Tablet 5     No current facility-administered medications on file prior to visit.         EXAMINATION     Physical Exam:   /74 (BP Location: Right arm, Patient Position: Sitting, BP Cuff Size: Adult)   Pulse 79   Temp 36.2 °C (97.1 °F) (Temporal)   Ht 1.524 m (5')   Wt 70.9 kg (156 lb 4.9 oz)   SpO2 95%     Constitutional:   Body Habitus: Body mass index is 30.53 kg/m².  Cooperation: Fully cooperates with exam  Appearance: Well-groomed, well-nourished.    Eyes: No scleral icterus to suggest severe liver disease, no proptosis to suggest severe hyperthyroidism    ENT -no obvious auditory deficits, no noticeable facial droop     Skin -no rashes or lesions noted     Respiratory-  breathing comfortably on room air, no audible wheezing    Cardiovascular-distal extremities warm and well perfused.  No lower extremity edema is noted.     Gastrointestinal - no obvious abdominal masses, non-distended    Psychiatric- alert and oriented ×3. Normal affect.     Gait - normal gait, no use of ambulatory device, nonantalgic. Heel walking and toe walking intact.    Musculoskeletal and Neuro -       Thoracic/Lumbar Spine/Sacral Spine/Hips   Inspection: No evidence of atrophy in bilateral lower extremities throughout   There is full active range of motion with lumbar extension    Facet loading maneuver negative bilaterally    Palpation:   Tenderness to palpation over the  right posterolateral glute . No tenderness to palpation elsewhere in the low back/hips including midline of lumbosacral spine, paraspinal muscles bilaterally, lumbar facets bilaterally, sacroiliac joints bilaterally, PSIS bilaterally, and greater trochanters bilaterally.    Lumbar spine /hip provocative exam maneuvers  Straight leg raise negative bilaterally  Slump-sit test negative bilaterally  FADIR test negative bilaterally  Femoral stretch test positive on right, negative on left    SI joint tests  EBEN test  negative bilaterally  Thigh thrust test negative bilaterally    Key points for the international standards for neurological classification of spinal cord injury (ISNCSCI) to light touch.   Dermatome R L   L2 1 2   L3 1 2   L4 1 2   L5 2 2   S1 2 2   S2 2 2       Motor Exam Lower Extremities  ? Myotome R L   Hip flexion L2 5 5   Knee extension L3 5 5   Ankle dorsiflexion L4 5 5   Toe extension L5 5 5   Ankle plantarflexion S1 5 5       Reflexes  ?  R L   Patella  2+ 2+   Achilles   2+ 2+     Clonus of the ankle negative bilaterally       MEDICAL DECISION MAKING    Medical records review: see under HPI section.     DATA    Labs: Personally reviewed at today's visit:     Lab Results   Component Value Date/Time    SODIUM 141 06/24/2022 07:46 AM    POTASSIUM 4.3 06/24/2022 07:46 AM    CHLORIDE 103 06/24/2022 07:46 AM    CO2 23 06/24/2022 07:46 AM    ANION 15.0 06/24/2022 07:46 AM    GLUCOSE 85 06/24/2022 07:46 AM    BUN 19 06/24/2022 07:46 AM    CREATININE 0.76 06/24/2022 07:46 AM    CALCIUM 10.1 06/24/2022 07:46 AM       No results found for: PROTHROMBTM, INR     Lab Results   Component Value Date/Time    WBC 8.9 06/24/2022 07:46 AM    RBC 4.80 06/24/2022 07:46 AM    HEMOGLOBIN 14.3 06/24/2022 07:46 AM    HEMATOCRIT 43.4 06/24/2022 07:46 AM    MCV 90.4 06/24/2022 07:46 AM    MCH 29.8 06/24/2022 07:46 AM    MCHC 32.9 (L) 06/24/2022 07:46 AM    MPV 9.0 06/24/2022 07:46 AM        Lab Results   Component Value Date/Time    HBA1C 5.8 (H) 04/28/2022 10:39 AM        Imaging:   I personally reviewed following images, these are my reads  No pertinent imaging available for review at the time of today's visit        IMAGING radiology reads. I reviewed the following radiology reads   MRI lumbar spine 10/20/21  Lumbar spine:    Alignment: Grade 1 L3-L4 anterolisthesis. Previously seen L4-L5  anterolisthesis is improved compared to MRI prior to surgery.    Vertebral body heights and marrow: Postsurgical changes from L3-S1  posterior  fusion. Multilevel lumbar spondylosis with osteophytes, facet  arthropathy, and endplate marrow degenerative changes are seen. Otherwise  the vertebral body heights and marrow signal are unremarkable.    Conus medullaris: Normal, terminating at L1/L2.    Soft tissues: There is a fluid collection in the paraspinal soft tissues  posterior to the L3-L5 laminectomy sites, likely postoperative seroma  measuring 63 x 28 mm (series 17, image 7). Small right renal cyst.    L1-L2: Persistent disc bulge with worsening superimposed central disc  extrusion. Bilateral facet arthropathy and dorsal epidural fat. The  constellation of findings produces moderate to severe spinal canal  stenosis. Mild to moderate left neural foraminal stenosis is improved  compared to prior.  Ligamentum flavum thickening. Facet hypertrophy, mild.    L2-L3: Disc bulge, ligamentum flavum thickening, facet hypertrophy  resulting in mild spinal canal stenosis. Mild left neural foraminal  stenosis.    L3-L4: Partially uncovered disc. Mild to moderate right neural foraminal  stenosis. Limited evaluation of the left neural foramen. Laminectomy.    L4-L5: No spinal canal stenosis. Limited evaluation of neural foramina due  to spinal hardware. Laminectomy.    L5-S1: Disc bulge without spinal canal stenosis. Limited evaluation of  neural foramina due to spinal hardware.     IMPRESSION:    1. Worsening disc protrusion at L1-L2, resulting in worsening spinal  canal stenosis, now moderate to severe.   2. Multilevel degenerative changes of the cervical spine, similar  compared to prior.  3. Multilevel degenerative changes in thoracic spine, with up to mild  to moderate spinal canal stenosis at T8-T9 secondary to disc bulge.  4. Postsurgical changes . Small postoperative seroma in L3-L5  laminectomy sites.                                                    Diagnosis  Visit Diagnoses     ICD-10-CM   1. Chronic right-sided low back pain with right-sided sciatica   "M54.41    G89.29   2. History of lumbar fusion  Z98.1   3. Numbness and tingling of right leg  R20.0    R20.2   4. At risk for falls  Z91.81         ASSESSMENT AND PLAN:  Robin Lynn Zielesch ( 1955) is a female with history of HTN, cervical laminectomy, depression, anxiety, thyroid cancer, BMI 30, L2-pelvis posterior spinal fusion with TLIF/ PLIF on 21 who presents with pain radiating down her right low back to her right thigh and primarily L2-L4 dermatomal distribution since 2021 which is significantly impacting her ability to perform ADLs.  Pain reproduced with positive right femoral stretch test.  Imaging report 10/2021 findings include \"moderate to severe spinal canal stenosis at L2-3\" and \"mild to moderate right neural foraminal  Stenosis at L3-4.\"  Images unable to be viewed at the time of today's visit. She denies significant change in pain since 10/2021.      Mathew was seen today for new patient.    Diagnoses and all orders for this visit:    Chronic right-sided low back pain with right-sided sciatica  -     MR-LUMBAR SPINE-W/O; Future    History of lumbar fusion  -     MR-LUMBAR SPINE-W/O; Future    Numbness and tingling of right leg  -     MR-LUMBAR SPINE-W/O; Future    At risk for falls  -     Patient identified as fall risk.  Appropriate orders and counseling given.        PLAN  Physical Therapy: Patient has completed formal physical therapy for this issue.  Continue home exercise program as able.    Diagnostic workup: Discussed that I would like to review her images from her MRI 10/2021 if possible.  She is unsure if she will be able to bring MRI CD.  Discussed that I will order a new MRI lumbar spine today which she may schedule if she is unable to find the CD.    Medications:   -Continue gabapentin, naproxen as per PCP  -  reviewed, records do not demonstrate increased risk of opioid abuse.    Interventions:   - discussed possible epidural pending MRI review    Follow-up: " after MRI. Pt to drop off imaging CD if she is able to find it.    Orders Placed This Encounter    MR-LUMBAR SPINE-W/O    Patient identified as fall risk.  Appropriate orders and counseling given.       Kendal Jeffers MD  Interventional Pain and Spine  Physical Medicine and Rehabilitation  [facility]    CC Jose Rafael Josue III, M.*     The above note documents my personal evaluation of this patient. In addition, I have reviewed and confirmed with the patient and MA the supportive information documented in today's Patient Health Questionnaire and Office Note.     Please note that this dictation was created using voice recognition software. I have made every reasonable attempt to correct obvious errors, but I expect that there are errors of grammar and possibly content that I did not discover before finalizing the note.

## 2022-08-19 ENCOUNTER — OFFICE VISIT (OUTPATIENT)
Dept: PHYSICAL MEDICINE AND REHAB | Facility: MEDICAL CENTER | Age: 67
End: 2022-08-19
Payer: MEDICARE

## 2022-08-19 VITALS
OXYGEN SATURATION: 95 % | HEIGHT: 60 IN | WEIGHT: 156.31 LBS | TEMPERATURE: 97.1 F | DIASTOLIC BLOOD PRESSURE: 74 MMHG | BODY MASS INDEX: 30.69 KG/M2 | SYSTOLIC BLOOD PRESSURE: 124 MMHG | HEART RATE: 79 BPM

## 2022-08-19 DIAGNOSIS — M54.41 CHRONIC RIGHT-SIDED LOW BACK PAIN WITH RIGHT-SIDED SCIATICA: ICD-10-CM

## 2022-08-19 DIAGNOSIS — R20.2 NUMBNESS AND TINGLING OF RIGHT LEG: ICD-10-CM

## 2022-08-19 DIAGNOSIS — G89.29 CHRONIC RIGHT-SIDED LOW BACK PAIN WITH RIGHT-SIDED SCIATICA: ICD-10-CM

## 2022-08-19 DIAGNOSIS — Z98.1 HISTORY OF LUMBAR FUSION: ICD-10-CM

## 2022-08-19 DIAGNOSIS — R20.0 NUMBNESS AND TINGLING OF RIGHT LEG: ICD-10-CM

## 2022-08-19 DIAGNOSIS — Z91.81 AT RISK FOR FALLS: ICD-10-CM

## 2022-08-19 PROCEDURE — 99204 OFFICE O/P NEW MOD 45 MIN: CPT | Performed by: STUDENT IN AN ORGANIZED HEALTH CARE EDUCATION/TRAINING PROGRAM

## 2022-08-19 ASSESSMENT — PATIENT HEALTH QUESTIONNAIRE - PHQ9
5. POOR APPETITE OR OVEREATING: 1 - SEVERAL DAYS
SUM OF ALL RESPONSES TO PHQ QUESTIONS 1-9: 8
CLINICAL INTERPRETATION OF PHQ2 SCORE: 1

## 2022-08-19 ASSESSMENT — PAIN SCALES - GENERAL: PAINLEVEL: 7=MODERATE-SEVERE PAIN

## 2022-09-02 ENCOUNTER — HOSPITAL ENCOUNTER (OUTPATIENT)
Dept: RADIOLOGY | Facility: MEDICAL CENTER | Age: 67
End: 2022-09-02
Attending: STUDENT IN AN ORGANIZED HEALTH CARE EDUCATION/TRAINING PROGRAM
Payer: MEDICARE

## 2022-09-02 DIAGNOSIS — R20.0 NUMBNESS AND TINGLING OF RIGHT LEG: ICD-10-CM

## 2022-09-02 DIAGNOSIS — M54.41 CHRONIC RIGHT-SIDED LOW BACK PAIN WITH RIGHT-SIDED SCIATICA: ICD-10-CM

## 2022-09-02 DIAGNOSIS — R20.2 NUMBNESS AND TINGLING OF RIGHT LEG: ICD-10-CM

## 2022-09-02 DIAGNOSIS — Z98.1 HISTORY OF LUMBAR FUSION: ICD-10-CM

## 2022-09-02 DIAGNOSIS — G89.29 CHRONIC RIGHT-SIDED LOW BACK PAIN WITH RIGHT-SIDED SCIATICA: ICD-10-CM

## 2022-09-02 PROCEDURE — 72148 MRI LUMBAR SPINE W/O DYE: CPT

## 2022-09-06 ENCOUNTER — OFFICE VISIT (OUTPATIENT)
Dept: PHYSICAL MEDICINE AND REHAB | Facility: MEDICAL CENTER | Age: 67
End: 2022-09-06
Payer: MEDICARE

## 2022-09-06 VITALS
HEART RATE: 94 BPM | HEIGHT: 60 IN | SYSTOLIC BLOOD PRESSURE: 124 MMHG | WEIGHT: 154.98 LBS | BODY MASS INDEX: 30.43 KG/M2 | TEMPERATURE: 97.8 F | DIASTOLIC BLOOD PRESSURE: 74 MMHG

## 2022-09-06 DIAGNOSIS — Z98.1 HISTORY OF LUMBAR FUSION: ICD-10-CM

## 2022-09-06 DIAGNOSIS — M54.41 CHRONIC RIGHT-SIDED LOW BACK PAIN WITH RIGHT-SIDED SCIATICA: ICD-10-CM

## 2022-09-06 DIAGNOSIS — G89.29 CHRONIC RIGHT-SIDED LOW BACK PAIN WITH RIGHT-SIDED SCIATICA: ICD-10-CM

## 2022-09-06 DIAGNOSIS — M51.26 LUMBAR DISC HERNIATION: ICD-10-CM

## 2022-09-06 DIAGNOSIS — R20.0 NUMBNESS AND TINGLING OF RIGHT LEG: ICD-10-CM

## 2022-09-06 DIAGNOSIS — M54.16 LUMBAR RADICULITIS: ICD-10-CM

## 2022-09-06 DIAGNOSIS — R20.2 NUMBNESS AND TINGLING OF RIGHT LEG: ICD-10-CM

## 2022-09-06 DIAGNOSIS — Z91.81 AT RISK FOR FALLS: ICD-10-CM

## 2022-09-06 PROCEDURE — 99214 OFFICE O/P EST MOD 30 MIN: CPT | Performed by: STUDENT IN AN ORGANIZED HEALTH CARE EDUCATION/TRAINING PROGRAM

## 2022-09-06 ASSESSMENT — PATIENT HEALTH QUESTIONNAIRE - PHQ9
SUM OF ALL RESPONSES TO PHQ QUESTIONS 1-9: 5
5. POOR APPETITE OR OVEREATING: 0 - NOT AT ALL
CLINICAL INTERPRETATION OF PHQ2 SCORE: 2

## 2022-09-06 ASSESSMENT — PAIN SCALES - GENERAL: PAINLEVEL: 7=MODERATE-SEVERE PAIN

## 2022-09-06 NOTE — PROGRESS NOTES
Follow-up patient Note    Interventional Pain and Spine  Physiatry (Physical Medicine and Rehabilitation)     Patient Name: Robin Lynn Zielesch  : 1955  Date of service: 2022    Chief Complaint:   Chief Complaint   Patient presents with    Follow-Up     Chronic right sided low back pain with right-sided sciatica       HISTORY (2022):  Robin Lynn Zielesch is a 67 y.o. female who presents today with pain radiating from her right posterolateral glute down her right anterolateral and posterior thigh accompanied by numbness/tingling/weakness in this area. This started in Sep 2021 while recovering from a L2-pelvis posterior spinal fusion with TLIF/ PLIF on 21 with Dr. Bates ([facility] which she states she had done for similar radiating pain down her left leg.  Her radiating left leg pain resolved after surgery.     Her pain at its best-worse level during the course of the day is 5-9/10, respectively. Pain right now is 7/10 on the numeric pain scale. Pain worsens with sitting, standing, walking, bending backwards, side bending or twisting, walking upstairs, and walking downstairs and improves with nothing. Her pain interferes somewhat with ADLs. The patient otherwise denies new weakness, numbness, or bladder/bowel incontinence. States she has fallen a few times secondary to pain and possibly weakness. Moved from [facility] in May 2022.     The patient has done physical therapy for this problem with worsening pain.     Patient has tried the following medications with varied success (current meds in bold): Haeys back and body  Gabapentin 300mg TID - no relief. Used to help with left sided pain  Naproxen BID - significant relief     Therapeutic modalities and interventional therapies to date include:  -No injections     Medical history includes HTN, cervical laminectomy, depression, anxiety, thyroid cancer, BMI 30, L2-pelvis posterior spinal fusion with TLIF/ PLIF on 21    HPI  Today's visit   Mathew  Lynn Zielesch ( 1955) is a female with Diagnoses of Chronic right-sided low back pain with right-sided sciatica, History of lumbar fusion, Numbness and tingling of right leg, At risk for falls, Lumbar disc herniation, and Lumbar radiculitis were pertinent to this visit.    Ongoing tingling pain radiating from right posterolateral glute down right lateral thigh, not past knee.  Pain feels electric.  Pain limits her ability to perform her ADLs and walk for prolonged period of time.  Currently taking gabapentin for pain.    Pain severity 7/10 currently  Pt denies new numbness, tingling, or weakness.      ROS:   Red Flags ROS:   Fever, Chills, Sweats: Denies  Involuntary Weight Loss: Denies  Bladder Incontinence: Denies  Bowel Incontinence: denies  Saddle Anesthesia: Denies    All other systems reviewed and negative.     PMHx:   History reviewed. No pertinent past medical history.    PSHx:   Past Surgical History:   Procedure Laterality Date    ABDOMINAL HYSTERECTOMY TOTAL      FOOT SURGERY      [id number]      L tka    LAMINOTOMY      THYROIDECTOMY TOTAL      2019  thyroid cancer       Family Hx:   Family History   Problem Relation Age of Onset    COPD Mother     Cancer Father         pancreatic       Social Hx:  Social History     Socioeconomic History    Marital status:      Spouse name: Not on file    Number of children: Not on file    Years of education: Not on file    Highest education level: Some college, no degree   Occupational History    Not on file   Tobacco Use    Smoking status: Former     Types: Cigarettes    Smokeless tobacco: Never   Vaping Use    Vaping Use: Some days    Substances: Nicotine, CBD    Devices: Pre-filled or refillable cartridge, Refillable tank   Substance and Sexual Activity    Alcohol use: Not Currently    Drug use: Yes     Types: Marijuana     Comment: once in a while    Sexual activity: Not on file   Other Topics Concern    Not on file   Social History Narrative    Not on  file     Social Determinants of Health     Financial Resource Strain: Low Risk     Difficulty of Paying Living Expenses: Not very hard   Food Insecurity: No Food Insecurity    Worried About Running Out of Food in the Last Year: Never true    Ran Out of Food in the Last Year: Never true   Transportation Needs: No Transportation Needs    Lack of Transportation (Medical): No    Lack of Transportation (Non-Medical): No   Physical Activity: Insufficiently Active    Days of Exercise per Week: 7 days    Minutes of Exercise per Session: 10 min   Stress: Stress Concern Present    Feeling of Stress : To some extent   Social Connections: Moderately Isolated    Frequency of Communication with Friends and Family: Once a week    Frequency of Social Gatherings with Friends and Family: Never    Attends L[employer] Services: More than 4 times per year    Active Member of Clubs or Organizations: No    Attends Club or Organization Meetings: Patient refused    Marital Status:    Intimate Partner Violence: Not on file   Housing Stability: Low Risk     Unable to Pay for Housing in the Last Year: No    Number of Places Lived in the Last Year: 2    Unstable Housing in the Last Year: No       Allergies:  Allergies   Allergen Reactions    Ace Inhibitors Cough    Chlorhexidine Rash    Hydrocodone Vomiting    Triamterene      Other reaction(s): Nephrotoxicity       Medications: reviewed on epic.   Outpatient Medications Marked as Taking for the 9/6/22 encounter (Office Visit) with Kendal Jeffers M.D.   Medication Sig Dispense Refill    amLODIPine (NORVASC) 10 MG Tab Take 1 Tablet by mouth every day. 90 Tablet 3    omeprazole (PRILOSEC) 40 MG delayed-release capsule TAKE 1 CAPSULE BY MOUTH EVERY DAY 90 Capsule 3    buPROPion (WELLBUTRIN XL) 300 MG XL tablet TAKE 1 TABLET BY MOUTH EVERY DAY IN THE MORNING 90 Tablet 3    SYNTHROID 100 MCG Tab       potassium chloride in water (KCL) 20 MEQ/100ML Solution Infuse 20 mEq into a venous catheter  one time.      losartan (COZAAR) 100 MG Tab Take 100 mg by mouth every day.      calcium citrate (CALCITRATE) 950 (200 Ca) MG Tab Take 950 mg by mouth every day.      Multiple Vitamin (MULTI-VITAMINS PO) Take  by mouth.      gabapentin (NEURONTIN) 300 MG Cap Take 300 mg by mouth 3 times a day.      aspirin EC (ECOTRIN) 81 MG Tablet Delayed Response Take 81 mg by mouth every day.      Riboflavin (VITAMIN B-2 PO) Take  by mouth.      LORazepam (ATIVAN) 0.5 MG Tab Take 0.5 mg by mouth 2 times daily with meals as needed.      naproxen (NAPROSYN) 500 MG Tab Take 1 Tablet by mouth 2 times a day with meals. 60 Tablet 5        Current Outpatient Medications on File Prior to Visit   Medication Sig Dispense Refill    amLODIPine (NORVASC) 10 MG Tab Take 1 Tablet by mouth every day. 90 Tablet 3    omeprazole (PRILOSEC) 40 MG delayed-release capsule TAKE 1 CAPSULE BY MOUTH EVERY DAY 90 Capsule 3    buPROPion (WELLBUTRIN XL) 300 MG XL tablet TAKE 1 TABLET BY MOUTH EVERY DAY IN THE MORNING 90 Tablet 3    SYNTHROID 100 MCG Tab       potassium chloride in water (KCL) 20 MEQ/100ML Solution Infuse 20 mEq into a venous catheter one time.      losartan (COZAAR) 100 MG Tab Take 100 mg by mouth every day.      calcium citrate (CALCITRATE) 950 (200 Ca) MG Tab Take 950 mg by mouth every day.      Multiple Vitamin (MULTI-VITAMINS PO) Take  by mouth.      gabapentin (NEURONTIN) 300 MG Cap Take 300 mg by mouth 3 times a day.      aspirin EC (ECOTRIN) 81 MG Tablet Delayed Response Take 81 mg by mouth every day.      Riboflavin (VITAMIN B-2 PO) Take  by mouth.      LORazepam (ATIVAN) 0.5 MG Tab Take 0.5 mg by mouth 2 times daily with meals as needed.      naproxen (NAPROSYN) 500 MG Tab Take 1 Tablet by mouth 2 times a day with meals. 60 Tablet 5     No current facility-administered medications on file prior to visit.         EXAMINATION     Physical Exam:   /74 (BP Location: Right arm, Patient Position: Sitting, BP Cuff Size: Adult)    Pulse 94   Temp 36.6 °C (97.8 °F) (Temporal)   Ht 1.524 m (5')   Wt 70.3 kg (154 lb 15.7 oz)     Constitutional:   Body Habitus: Body mass index is 30.27 kg/m².  Cooperation: Fully cooperates with exam  Appearance: Well-groomed, well-nourished.    Eyes: No scleral icterus to suggest severe liver disease, no proptosis to suggest severe hyperthyroidism    ENT -no obvious auditory deficits, no noticeable facial droop     Skin -no rashes or lesions noted     Respiratory-  breathing comfortably on room air, no audible wheezing    Cardiovascular-distal extremities warm and well perfused.  No lower extremity edema is noted.     Gastrointestinal - no obvious abdominal masses, non-distended    Psychiatric- alert and oriented ×3. Normal affect.     Gait - normal gait, no use of ambulatory device, nonantalgic.   Musculoskeletal and Neuro -         Thoracic/Lumbar Spine/Sacral Spine/Hips   Inspection: No evidence of atrophy in bilateral lower extremities throughout   There is full active range of motion with lumbar extension     Facet loading maneuver negative bilaterally     Palpation:   Tenderness to palpation over the  right posterolateral glute . No tenderness to palpation elsewhere in the low back/hips including midline of lumbosacral spine, paraspinal muscles bilaterally, lumbar facets bilaterally, sacroiliac joints bilaterally, PSIS bilaterally, and greater trochanters bilaterally.     Lumbar spine /hip provocative exam maneuvers  Straight leg raise positive on right, negative on left  FADIR test negative bilaterally  Femoral stretch test positive on right, negative on left     SI joint tests  EBEN test negative bilaterally  Thigh thrust test negative bilaterally     Key points for the international standards for neurological classification of spinal cord injury (ISNCSCI) to light touch.   Dermatome R L   L2 1 2   L3 1 2   L4 1 2   L5 2 2   S1 2 2   S2 2 2         Motor Exam Lower Extremities  ? Myotome R L   Hip  flexion L2 5 5   Knee extension L3 5 5   Ankle dorsiflexion L4 5 5   Toe extension L5 5 5   Ankle plantarflexion S1 5 5      Previous exam  Heel walking and toe walking intact.       Reflexes  ?   R L   Patella   2+ 2+   Achilles    2+ 2+      Clonus of the ankle negative bilaterally         MEDICAL DECISION MAKING     Medical records review: see under HPI section.       MEDICAL DECISION MAKING    Medical records review: see under HPI section.     DATA    Labs: No new labs available for review since last visit.    Lab Results   Component Value Date/Time    SODIUM 141 06/24/2022 07:46 AM    POTASSIUM 4.3 06/24/2022 07:46 AM    CHLORIDE 103 06/24/2022 07:46 AM    CO2 23 06/24/2022 07:46 AM    ANION 15.0 06/24/2022 07:46 AM    GLUCOSE 85 06/24/2022 07:46 AM    BUN 19 06/24/2022 07:46 AM    CREATININE 0.76 06/24/2022 07:46 AM    CALCIUM 10.1 06/24/2022 07:46 AM       No results found for: PROTHROMBTM, INR     Lab Results   Component Value Date/Time    WBC 8.9 06/24/2022 07:46 AM    RBC 4.80 06/24/2022 07:46 AM    HEMOGLOBIN 14.3 06/24/2022 07:46 AM    HEMATOCRIT 43.4 06/24/2022 07:46 AM    MCV 90.4 06/24/2022 07:46 AM    MCH 29.8 06/24/2022 07:46 AM    MCHC 32.9 (L) 06/24/2022 07:46 AM    MPV 9.0 06/24/2022 07:46 AM        Lab Results   Component Value Date/Time    HBA1C 5.8 (H) 04/28/2022 10:39 AM        Imaging:   I personally reviewed following images, these are my reads  MRI lumbar spine 9/2/2022  Evidence of lumbar laminectomy at L4-S1, interbody fusion and posterior fusion at L3-S1.  Broad-based disc bulge at L1-L2 resulting in severe central canal stenosis and compression of descending bilateral L2 nerve roots and possibly bilateral L3 nerve roots and mild impingement of exiting L1 nerve roots bilaterally.  Mild right neuroforaminal stenosis at T12-L1.  Possible mild bilateral neuroforaminal stenosis at L5-S1, quality of images impaired due to metallic artifact. Appearance of chronic postoperative seroma  posterior to L4 and L5 vertebral bodies.      IMAGING radiology reads. I reviewed the following radiology reads                      Results for orders placed during the hospital encounter of 09/02/22    MR-LUMBAR SPINE-W/O    Impression  1.  L3-4 slight anterolisthesis and L5-S1 slight retrolisthesis.  2.  Postoperative changes with lumbar laminectomy L4-L5-S1, interbody fusion L3-L4, L4-L5, L5-S1, and posterior fusion with transpedicular screw fixation at L3-L4-L5-S1.  3.  Chronic postoperative seroma occupying the laminectomy interval without dorsal epidural mass effect.  4.  The study is most notable for L1-L2 large disc protrusion-extrusion resulting in severe central stenosis.  5.  Additional degenerative changes detailed for each level above in the body of report.        MRI lumbar spine 10/20/21  Lumbar spine:    Alignment: Grade 1 L3-L4 anterolisthesis. Previously seen L4-L5  anterolisthesis is improved compared to MRI prior to surgery.    Vertebral body heights and marrow: Postsurgical changes from L3-S1  posterior fusion. Multilevel lumbar spondylosis with osteophytes, facet  arthropathy, and endplate marrow degenerative changes are seen. Otherwise  the vertebral body heights and marrow signal are unremarkable.    Conus medullaris: Normal, terminating at L1/L2.    Soft tissues: There is a fluid collection in the paraspinal soft tissues  posterior to the L3-L5 laminectomy sites, likely postoperative seroma  measuring 63 x 28 mm (series 17, image 7). Small right renal cyst.    L1-L2: Persistent disc bulge with worsening superimposed central disc  extrusion. Bilateral facet arthropathy and dorsal epidural fat. The  constellation of findings produces moderate to severe spinal canal  stenosis. Mild to moderate left neural foraminal stenosis is improved  compared to prior.  Ligamentum flavum thickening. Facet hypertrophy, mild.    L2-L3: Disc bulge, ligamentum flavum thickening, facet hypertrophy  resulting in  mild spinal canal stenosis. Mild left neural foraminal  stenosis.    L3-L4: Partially uncovered disc. Mild to moderate right neural foraminal  stenosis. Limited evaluation of the left neural foramen. Laminectomy.    L4-L5: No spinal canal stenosis. Limited evaluation of neural foramina due  to spinal hardware. Laminectomy.    L5-S1: Disc bulge without spinal canal stenosis. Limited evaluation of  neural foramina due to spinal hardware.     IMPRESSION:    1. Worsening disc protrusion at L1-L2, resulting in worsening spinal  canal stenosis, now moderate to severe.   2. Multilevel degenerative changes of the cervical spine, similar  compared to prior.  3. Multilevel degenerative changes in thoracic spine, with up to mild  to moderate spinal canal stenosis at T8-T9 secondary to disc bulge.  4. Postsurgical changes . Small postoperative seroma in L3-L5  laminectomy sites.                                                             Diagnosis  Visit Diagnoses     ICD-10-CM   1. Chronic right-sided low back pain with right-sided sciatica  M54.41    G89.29   2. History of lumbar fusion  Z98.1   3. Numbness and tingling of right leg  R20.0    R20.2   4. At risk for falls  Z91.81   5. Lumbar disc herniation  M51.26   6. Lumbar radiculitis  M54.16         ASSESSMENT AND PLAN:  Robin Lynn Zielesch (: 1955) is a female with history of HTN, cervical laminectomy, depression, anxiety, thyroid cancer, BMI 30, L3-pelvis posterior spinal fusion with TLIF/ PLIF on 21 who presents with pain radiating down her right low back to her right thigh in primarily L2-L4 dermatomal distribution since 2021 which is significantly impacting her ability to perform ADLs.  Pain reproduced with positive right femoral stretch test.  MRI shows severe central canal stenosis at L1-2 from broad-based disc bulge at this level which is likely a main etiology of patient's symptoms.           Mathew was seen today for follow-up.    Diagnoses  and all orders for this visit:    Chronic right-sided low back pain with right-sided sciatica    History of lumbar fusion    Numbness and tingling of right leg    At risk for falls    Lumbar disc herniation    Lumbar radiculitis  -     Referral to Pain Clinic        PLAN  Physical Therapy: Patient has completed formal physical therapy for this issue.  Continue home exercise program as able.     Diagnostic workup: Personally reviewed at today's visit:  MRI lumbar spine 9/2/2022     Medications:   -Continue gabapentin, naproxen as per PCP  -  reviewed, records do not demonstrate increased risk of opioid abuse.     Interventions:   - discussed right L2-3 interlaminar epidural steroid injection. The risks, benefits, and alternatives to this procedure were discussed and the patient wishes to proceed with the procedure. Risks include but are not limited to damage to surrounding structures, infection, bleeding, worsening of pain which can be permanent, and weakness which can be permanent. Benefits include pain relief and improved function. Alternatives include not doing the procedure.    -Discussed that the patient will have to hold naproxen for 5 days prior to the procedure for safety reasons to minimize excess bleeding which could contribute to nerve compression after the injection    Other  -Discussed possible neurosurgical referral for evaluation and management of disc herniation at L1-2 that appears to be causing severe central canal stenosis and symptoms of lumbar radiculopathy.  Patient would like to start with conservative management first prior to considering a surgical evaluation which I think is reasonable.    Follow-up: 3-4 weeks after procedure above    Orders Placed This Encounter    Referral to Pain Clinic         Kendal Jeffers MD  Interventional Pain and Spine  Physical Medicine and Rehabilitation  [facility]      The above note documents my personal evaluation of this patient. In addition, I  have reviewed and confirmed with the patient and MA the supportive information documented in today's Patient Health Questionnaire and Office Note.     Please note that this dictation was created using voice recognition software. I have made every reasonable attempt to correct obvious errors, but I expect that there are errors of grammar and possibly content that I did not discover before finalizing the note.

## 2022-09-07 NOTE — PATIENT INSTRUCTIONS
Your procedure will be at the [facility] special procedure suite.    [facility] [place] [address]       PRE-PROCEDURE INSTRUCTIONS  You may take your regular medications except:   No Anti-inflammatories 5 days prior to your procedure. Anti-inflammatories include medicines such as  ibuprofen (Motrin, Advil), Excedrin, Naproxen (Aleve, Anaprox, Naprelan, Naprosyn), Celecoxib (Celebrex), Diclofenac (Voltaren-XR tab), and Meloxicam (Mobic).   You can take the remainder of your pain medications as prescribed.   If you are having a diagnostic procedure such as a medial branch block, do not use her pain meds on the day of the procedure  No Glucophage or Metformin 24 hours before your procedure. You may resume next day after your procedure.  Call the physiatry office if you are taking or prescribed anti-biotics within five days of procedure.  Please ask provider if you are taking any new diabetes medication.  CONTINUE TAKING BLOOD PRESSURE MEDICATIONS AS PRESCRIBED.  Pain medications will not be prescribed on the procedure day. Procedural pain medication may be used by your provider   Call your doctor's office performing the procedure if you have a fever, chills, rash or new illness prior to your procedure    Anticoagulation/antiplatelet medications  No Blood thinning medications such as Coumadin, Xarelto, aspirin or Plavix 5 days prior to procedure unless your doctor said to continue these medications. Call your doctor if a new medication is prescribed in this class.     Restrictions for eating before procedure:   If you are getting procedural sedation, then do not eat to for 8 hours prior to procedure appointment time. Do not drink fluids for four hours prior to your procedure time.   If you are not having procedural sedation, then Skip the meal prior to your procedure. If you have a morning procedure then skip breakfast. If you have an afternoon procedure then skip lunch.   You may drink clear liquids  up to 2 hours prior to your procedure  You must have a  the day of procedure to accompany you home.      POST PROCEDURE INSTRUCTIONS   No heavy lifting, strenuous bending or strenuous exercise for 3 days after your procedure.  No hot tubs, baths, swimming for 3 days after your procedure  You can remove the bandage the day after the procedure.  IF YOU RECEIVED A STEROID INJECTION. PLEASE NOTE THAT THERE MAY BE A DELAY FOR THE INJECTION TO START WORKING, THE DELAY MAY BE UP TO TWO WEEKS. IF YOU HAVE DIABETES, PLEASE NOTE THAT YOUR SUGAR LEVELS MAY BE ELEVATED FOR 1-2 DAYS AFTER A STEROID INJECTION.  THE STEROID MAY CAUSE TEMPORARY SYMPTOMS WHICH USUALLY RESOLVE ON THEIR OWN WITHIN 1 TO 2 DAYS INCLUDING FACIAL FLUSHING OR A FEELING OF WARMTH ON THE FACE, TEMPORARY INCREASES IN BLOOD SUGAR, INSOMNIA, INCREASED HUNGER  IF YOU EXPERIENCE PROLONGED WEAKNESS LONGER THAN ONE DAY, BOWEL OR BLADDER INCONTINENCE THEN PLEASE CALL THE PHYSIATRY OFFICE.  Your leg may feel heavy, weak and numb for up to 1-2 days. Be very careful walking.    You may resume normal activities 3 days after procedure.     no

## 2022-09-13 ENCOUNTER — HOSPITAL ENCOUNTER (OUTPATIENT)
Facility: REHABILITATION | Age: 67
End: 2022-09-13
Attending: STUDENT IN AN ORGANIZED HEALTH CARE EDUCATION/TRAINING PROGRAM | Admitting: STUDENT IN AN ORGANIZED HEALTH CARE EDUCATION/TRAINING PROGRAM
Payer: MEDICARE

## 2022-10-03 ENCOUNTER — PATIENT MESSAGE (OUTPATIENT)
Dept: PHYSICAL MEDICINE AND REHAB | Facility: MEDICAL CENTER | Age: 67
End: 2022-10-03
Payer: MEDICARE

## 2022-10-09 ENCOUNTER — PATIENT MESSAGE (OUTPATIENT)
Dept: MEDICAL GROUP | Facility: PHYSICIAN GROUP | Age: 67
End: 2022-10-09
Payer: MEDICARE

## 2022-10-10 RX ORDER — POTASSIUM CHLORIDE 20 MEQ/1
20 TABLET, EXTENDED RELEASE ORAL DAILY
COMMUNITY
End: 2022-10-10 | Stop reason: SDUPTHER

## 2022-10-10 RX ORDER — POTASSIUM CHLORIDE 20 MEQ/1
20 TABLET, EXTENDED RELEASE ORAL DAILY
Qty: 90 TABLET | Refills: 1 | Status: SHIPPED | OUTPATIENT
Start: 2022-10-10 | End: 2023-03-30

## 2022-10-10 RX ORDER — POTASSIUM CHLORIDE 750 MG/1
10 TABLET, FILM COATED, EXTENDED RELEASE ORAL 2 TIMES DAILY
COMMUNITY
End: 2022-10-10

## 2022-11-01 ENCOUNTER — APPOINTMENT (OUTPATIENT)
Dept: RADIOLOGY | Facility: REHABILITATION | Age: 67
End: 2022-11-01
Attending: STUDENT IN AN ORGANIZED HEALTH CARE EDUCATION/TRAINING PROGRAM
Payer: MEDICARE

## 2022-11-01 ENCOUNTER — HOSPITAL ENCOUNTER (OUTPATIENT)
Facility: REHABILITATION | Age: 67
End: 2022-11-01
Attending: STUDENT IN AN ORGANIZED HEALTH CARE EDUCATION/TRAINING PROGRAM | Admitting: STUDENT IN AN ORGANIZED HEALTH CARE EDUCATION/TRAINING PROGRAM
Payer: MEDICARE

## 2022-11-01 VITALS
RESPIRATION RATE: 16 BRPM | HEART RATE: 78 BPM | HEIGHT: 60 IN | SYSTOLIC BLOOD PRESSURE: 131 MMHG | TEMPERATURE: 97.2 F | BODY MASS INDEX: 30.6 KG/M2 | OXYGEN SATURATION: 94 % | WEIGHT: 155.87 LBS | DIASTOLIC BLOOD PRESSURE: 83 MMHG

## 2022-11-01 PROCEDURE — 700111 HCHG RX REV CODE 636 W/ 250 OVERRIDE (IP)

## 2022-11-01 PROCEDURE — 62323 NJX INTERLAMINAR LMBR/SAC: CPT

## 2022-11-01 PROCEDURE — 700117 HCHG RX CONTRAST REV CODE 255

## 2022-11-01 RX ORDER — LIDOCAINE HYDROCHLORIDE 10 MG/ML
INJECTION, SOLUTION EPIDURAL; INFILTRATION; INTRACAUDAL; PERINEURAL
Status: COMPLETED
Start: 2022-11-01 | End: 2022-11-01

## 2022-11-01 RX ORDER — METHYLPREDNISOLONE ACETATE 80 MG/ML
INJECTION, SUSPENSION INTRA-ARTICULAR; INTRALESIONAL; INTRAMUSCULAR; SOFT TISSUE
Status: COMPLETED
Start: 2022-11-01 | End: 2022-11-01

## 2022-11-01 RX ADMIN — LIDOCAINE HYDROCHLORIDE 10 ML: 10 INJECTION, SOLUTION EPIDURAL; INFILTRATION; INTRACAUDAL; PERINEURAL at 15:08

## 2022-11-01 RX ADMIN — IOHEXOL 10 ML: 240 INJECTION, SOLUTION INTRATHECAL; INTRAVASCULAR; INTRAVENOUS; ORAL at 15:07

## 2022-11-01 RX ADMIN — METHYLPREDNISOLONE ACETATE 80 MG: 80 INJECTION, SUSPENSION INTRA-ARTICULAR; INTRALESIONAL; INTRAMUSCULAR; SOFT TISSUE at 15:08

## 2022-11-01 ASSESSMENT — PAIN DESCRIPTION - PAIN TYPE: TYPE: CHRONIC PAIN

## 2022-11-01 NOTE — H&P
Physical Medicine & Rehab History & Physical Note    Date  11/1/2022    Primary Care Physician  Jose Rafael Josue III, M.D.    CC  Pre-Op Diagnosis Codes:     * Lumbar radiculitis [M54.16]    HPI  This is a 67 y.o. female who presented with pain radiating from right glute down right leg. Same location as at last evaluation in clinic. No new numbness, tingling, or weakness.       History reviewed. No pertinent past medical history.    Past Surgical History:   Procedure Laterality Date    ABDOMINAL HYSTERECTOMY TOTAL      FOOT SURGERY      [id number]      L tka    LAMINOTOMY      THYROIDECTOMY TOTAL      2019  thyroid cancer       Current Facility-Administered Medications   Medication Dose Route Frequency Provider Last Rate Last Admin    METHYLPREDNISOLONE ACETATE 80 MG/ML INJ SUSP             IOHEXOL 240 MG/ML INJ SOLN             LIDOCAINE HCL (PF) 1 % INJ SOLN                Social History     Socioeconomic History    Marital status:      Spouse name: Not on file    Number of children: Not on file    Years of education: Not on file    Highest education level: Some college, no degree   Occupational History    Not on file   Tobacco Use    Smoking status: Former     Types: Cigarettes    Smokeless tobacco: Never   Vaping Use    Vaping Use: Some days    Substances: Nicotine, CBD    Devices: Pre-filled or refillable cartridge, Refillable tank   Substance and Sexual Activity    Alcohol use: Not Currently    Drug use: Yes     Types: Marijuana     Comment: once in a while    Sexual activity: Not on file   Other Topics Concern    Not on file   Social History Narrative    Not on file     Social Determinants of Health     Financial Resource Strain: Low Risk     Difficulty of Paying Living Expenses: Not very hard   Food Insecurity: No Food Insecurity    Worried About Running Out of Food in the Last Year: Never true    Ran Out of Food in the Last Year: Never true   Transportation Needs: No Transportation Needs    Lack of  Transportation (Medical): No    Lack of Transportation (Non-Medical): No   Physical Activity: Insufficiently Active    Days of Exercise per Week: 7 days    Minutes of Exercise per Session: 10 min   Stress: Stress Concern Present    Feeling of Stress : To some extent   Social Connections: Moderately Isolated    Frequency of Communication with Friends and Family: Once a week    Frequency of Social Gatherings with Friends and Family: Never    Attends [employer] Services: More than 4 times per year    Active Member of Clubs or Organizations: No    Attends Club or Organization Meetings: Patient refused    Marital Status:    Intimate Partner Violence: Not on file   Housing Stability: Low Risk     Unable to Pay for Housing in the Last Year: No    Number of Places Lived in the Last Year: 2    Unstable Housing in the Last Year: No       Family History   Problem Relation Age of Onset    COPD Mother     Cancer Father         pancreatic       Allergies  Ace inhibitors, Chlorhexidine, and Triamterene    Review of Systems  Negative    Physical Exam  Gen: no acute distress  HEENT: NCAT, EOMI  Resp: breathing comfortably on RA  CV: Distal extremities warm and well perfused  Ab: nondistended  Neuro: A+O x 4  MSK: moving all extremities spontaneously    Vital Signs  Blood Pressure : 121/80   Temperature: 36.2 °C (97.2 °F)   Pulse: 88   Respiration: 16   Pulse Oximetry: 93 %       Labs:                    Radiology:  DX-PORTABLE FLUOROSCOPY < 1 HOUR    (Results Pending)         Assessment/Plan:  Pre-Op Diagnosis Codes:     * Lumbar radiculitis [M54.16]  Procedure(s):  RIGHT lumbar L2-3 interlaminar epidural steroid injection    Robin Lynn Zielesch is a 67 y.o. female with pain radiating from right glute down right leg. No new numbness, tingling, or weakness. OK to proceed with procedure above.    Kendal Jeffers MD  Interventional Pain and Spine  Physical Medicine and Rehabilitation  [facility]

## 2022-11-01 NOTE — PROGRESS NOTES
1531: Rec'd pt from procedure room, pt ambulatory to chair, steady on feet, tolerating liquids. Dressing CDI.  Ice pack placed to incision site.    1535: Dr. Jeffers in to see patient, meets D/C criteria.    1543: Patient d/c'd to designated , placed in passenger seat.

## 2022-11-01 NOTE — OP REPORT
Date of Service: 11/1/2022     Patient: Robin Lynn Zielesch 67 y.o. female     MRN: [id number]     Physician/s: Kendal Jeffers MD    Pre-operative Diagnosis: Lumbar radiculopathy    Post-operative Diagnosis: Lumbar radiculopathy    Procedure: interlaminar Lumbar Epidural Steroid Injection at the right L2-L3 level.     Description of procedure:    The risks, benefits, and alternatives of the procedure were reviewed and discussed with the patient.  Written informed consent was freely obtained. A pre-procedural time-out was conducted by the physician verifying patient’s identity, procedure to be performed, procedure site and side, and allergy verification. Appropriate equipment was determined to be in place for the procedure.     The patient's vital signs were carefully monitored before, throughout, and after the procedure.     The patient was placed in the prone position, and fluoroscopy was used to identify the L2-L3 level.  The lumbar area was prepped with chlorhexidine solution and draped with sterile drape.  Sterile technique was used throughout.  At the needle entry point, the skin and subcutaneous tissues were infiltrated with 1% lidocaine.  A 20-gauge Tuohy needle was advanced towards the epidural space, using the loss of resistance technique. A contralateral oblique view and lateral view were obtained but the needle tip was obscured by the presence of hardware. The needle was removed intact and a separate target for entry was identified. At the needle entry point, the skin and subcutaneous tissues were infiltrated with 1% lidocaine.  A 20-gauge Tuohy needle was advanced to the epidural space, using the loss of resistance technique in a contralateral oblique fluoroscopic view with the needle tip well visualized. In the AP and lateral views, contrast dye was used to highlight the epidural space spread while the fluoroscope was running live. With the needle in the epidural space, aspiration was negative for blood or  other fluid.  Methylprednisolone, 80 mg., lidocaine, 1mg, and 1cc of 0.9% normal saline (total volume 3 ml.) were injected through the Tuohy needle.  The injected local anesthetic and steroid resulted in dispersion of the previously injected contrast. The needle was removed intact. The patient's back was covered with a 4x4 gauze, the area was cleansed with sterile normal saline, and a dressing was applied. There were no complications noted.     The procedure was well tolerated, and there were no apparent complications.      The patient was then evaluated post-procedure, and was hemodynamically stable prior to leaving the post-operative care unit.     Follow-up as scheduled    Kendal Jeffers MD  Interventional Pain and Spine  Physical Medicine and Rehabilitation  [facility]      CPT codes  Interlaminar epidural injection - lumbar or sacral (caudal): 21676

## 2022-11-01 NOTE — PROGRESS NOTES
1450: : Dr. Jeffers into see patient, questions answered, d/c instructions given with understanding.

## 2022-11-03 ENCOUNTER — PATIENT MESSAGE (OUTPATIENT)
Dept: HEALTH INFORMATION MANAGEMENT | Facility: OTHER | Age: 67
End: 2022-11-03

## 2022-11-04 ENCOUNTER — PATIENT MESSAGE (OUTPATIENT)
Dept: MEDICAL GROUP | Facility: PHYSICIAN GROUP | Age: 67
End: 2022-11-04
Payer: MEDICARE

## 2022-11-04 DIAGNOSIS — F41.9 ANXIETY: ICD-10-CM

## 2022-11-04 RX ORDER — LORAZEPAM 0.5 MG/1
0.5 TABLET ORAL
Qty: 7 TABLET | Refills: 0 | Status: SHIPPED | OUTPATIENT
Start: 2022-11-04 | End: 2022-11-11

## 2022-11-14 ENCOUNTER — OFFICE VISIT (OUTPATIENT)
Dept: MEDICAL GROUP | Facility: PHYSICIAN GROUP | Age: 67
End: 2022-11-14
Payer: MEDICARE

## 2022-11-14 VITALS
BODY MASS INDEX: 30.94 KG/M2 | WEIGHT: 157.6 LBS | TEMPERATURE: 97.8 F | SYSTOLIC BLOOD PRESSURE: 132 MMHG | HEIGHT: 60 IN | DIASTOLIC BLOOD PRESSURE: 74 MMHG | OXYGEN SATURATION: 96 % | HEART RATE: 64 BPM | RESPIRATION RATE: 18 BRPM

## 2022-11-14 DIAGNOSIS — Z23 NEED FOR VACCINATION: ICD-10-CM

## 2022-11-14 DIAGNOSIS — F41.9 ANXIETY: ICD-10-CM

## 2022-11-14 PROCEDURE — G0008 ADMIN INFLUENZA VIRUS VAC: HCPCS | Performed by: FAMILY MEDICINE

## 2022-11-14 PROCEDURE — 90662 IIV NO PRSV INCREASED AG IM: CPT | Performed by: FAMILY MEDICINE

## 2022-11-14 PROCEDURE — 99213 OFFICE O/P EST LOW 20 MIN: CPT | Mod: 25 | Performed by: FAMILY MEDICINE

## 2022-11-14 RX ORDER — MELOXICAM 15 MG/1
15 TABLET ORAL DAILY
Qty: 30 TABLET | Refills: 1 | Status: SHIPPED | OUTPATIENT
Start: 2022-11-14 | End: 2023-01-09 | Stop reason: SDUPTHER

## 2022-11-15 NOTE — ASSESSMENT & PLAN NOTE
This is a chronic problem.  Patient uses lorazepam sparingly due to stress in her home.  Her  has a lot of health issues and at times she needs something for anxiety attacks.  She is here today to sign a substance agreement.

## 2022-11-15 NOTE — PROGRESS NOTES
Subjective:     CC: Here for follow-up on her anxiety.    HPI:   Mathew presents today with the following medical concerns:    Anxiety  This is a chronic problem.  Patient uses lorazepam sparingly due to stress in her home.  Her  has a lot of health issues and at times she needs something for anxiety attacks.  She is here today to sign a substance agreement.    History reviewed. No pertinent past medical history.    Social History     Tobacco Use    Smoking status: Former     Types: Cigarettes    Smokeless tobacco: Never   Vaping Use    Vaping Use: Some days    Substances: Nicotine, CBD    Devices: Pre-filled or refillable cartridge, Refillable tank   Substance Use Topics    Alcohol use: Not Currently    Drug use: Yes     Types: Marijuana, Inhaled     Comment: once in a while       Current Outpatient Medications Ordered in Epic   Medication Sig Dispense Refill    meloxicam (MOBIC) 15 MG tablet Take 1 Tablet by mouth every day. 30 Tablet 1    potassium chloride SA (KDUR) 20 MEQ Tab CR Take 1 Tablet by mouth every day. 90 Tablet 1    amLODIPine (NORVASC) 10 MG Tab Take 1 Tablet by mouth every day. 90 Tablet 3    omeprazole (PRILOSEC) 40 MG delayed-release capsule TAKE 1 CAPSULE BY MOUTH EVERY DAY 90 Capsule 3    buPROPion (WELLBUTRIN XL) 300 MG XL tablet TAKE 1 TABLET BY MOUTH EVERY DAY IN THE MORNING 90 Tablet 3    SYNTHROID 100 MCG Tab       losartan (COZAAR) 100 MG Tab Take 1 Tablet by mouth every day.      calcium citrate (CALCITRATE) 950 (200 Ca) MG Tab Take 1 Tablet by mouth every day.      Multiple Vitamin (MULTI-VITAMINS PO) Take  by mouth.      gabapentin (NEURONTIN) 300 MG Cap Take 1 Capsule by mouth 3 times a day.      aspirin EC (ECOTRIN) 81 MG Tablet Delayed Response Take 1 Tablet by mouth every day.      Riboflavin (VITAMIN B-2 PO) Take  by mouth.       No current Epic-ordered facility-administered medications on file.       Allergies:  Ace inhibitors, Chlorhexidine, and Triamterene    Health  Maintenance: Completed    ROS:  Gen: no fevers/chills, no changes in weight  Eyes: no changes in vision  ENT: no sore throat, no hearing loss, no bloody nose  Pulm: no sob, no cough  CV: no chest pain, no palpitations  GI: no nausea/vomiting, no diarrhea  : no dysuria  MSk: no myalgias  Skin: no rash  Neuro: no headaches, no numbness/tingling  Heme/Lymph: no easy bruising      Objective:       Exam:  /74 (BP Location: Left arm, Patient Position: Sitting, BP Cuff Size: Adult)   Pulse 64   Temp 36.6 °C (97.8 °F) (Temporal)   Resp 18   Ht 1.524 m (5')   Wt 71.5 kg (157 lb 9.6 oz)   SpO2 96%   BMI 30.78 kg/m²  Body mass index is 30.78 kg/m².    Gen: Alert and oriented, No apparent distress.  Psych: Patient is alert and oriented x3.  No unusual thought Anatoly expressed.  Insight and judgment is good.        Assessment & Plan:     67 y.o. female with the following -     1. Need for vaccination  Flu vaccine given today.  - Influenza Vaccine, High Dose (65+ Only)    2. Anxiety  This is a chronic problem.  We discussed the agreement and it was signed.  I did give her a recent small prescription and she states that she last a little while.  She was told in 90 days if she needs more she will have to come back in for an appointment to discuss her issues.  - Controlled Substance Treatment Agreement      Return in about 6 months (around 5/14/2023) for Long.    Please note that this dictation was created using voice recognition software. I have made every reasonable attempt to correct obvious errors, but I expect that there are errors of grammar and possibly content that I did not discover before finalizing the note.

## 2022-11-22 ENCOUNTER — OFFICE VISIT (OUTPATIENT)
Dept: PHYSICAL MEDICINE AND REHAB | Facility: MEDICAL CENTER | Age: 67
End: 2022-11-22
Payer: MEDICARE

## 2022-11-22 ENCOUNTER — HOSPITAL ENCOUNTER (OUTPATIENT)
Dept: RADIOLOGY | Facility: MEDICAL CENTER | Age: 67
End: 2022-11-22
Attending: STUDENT IN AN ORGANIZED HEALTH CARE EDUCATION/TRAINING PROGRAM
Payer: MEDICARE

## 2022-11-22 VITALS
TEMPERATURE: 97.4 F | HEART RATE: 88 BPM | HEIGHT: 60 IN | BODY MASS INDEX: 30.51 KG/M2 | OXYGEN SATURATION: 95 % | DIASTOLIC BLOOD PRESSURE: 90 MMHG | SYSTOLIC BLOOD PRESSURE: 132 MMHG | WEIGHT: 155.42 LBS

## 2022-11-22 DIAGNOSIS — M54.16 LUMBAR RADICULITIS: ICD-10-CM

## 2022-11-22 DIAGNOSIS — M18.0 OSTEOARTHRITIS OF CARPOMETACARPAL (CMC) JOINTS OF BOTH THUMBS, UNSPECIFIED OSTEOARTHRITIS TYPE: Primary | ICD-10-CM

## 2022-11-22 DIAGNOSIS — M51.26 LUMBAR DISC HERNIATION: ICD-10-CM

## 2022-11-22 DIAGNOSIS — M79.645 BILATERAL THUMB PAIN: ICD-10-CM

## 2022-11-22 DIAGNOSIS — M54.41 CHRONIC RIGHT-SIDED LOW BACK PAIN WITH RIGHT-SIDED SCIATICA: ICD-10-CM

## 2022-11-22 DIAGNOSIS — R20.2 NUMBNESS AND TINGLING OF RIGHT LEG: ICD-10-CM

## 2022-11-22 DIAGNOSIS — Z98.1 HISTORY OF LUMBAR FUSION: ICD-10-CM

## 2022-11-22 DIAGNOSIS — G89.29 CHRONIC RIGHT-SIDED LOW BACK PAIN WITH RIGHT-SIDED SCIATICA: ICD-10-CM

## 2022-11-22 DIAGNOSIS — M79.644 BILATERAL THUMB PAIN: ICD-10-CM

## 2022-11-22 DIAGNOSIS — R20.0 NUMBNESS AND TINGLING OF RIGHT LEG: ICD-10-CM

## 2022-11-22 DIAGNOSIS — Z91.81 AT RISK FOR FALLS: ICD-10-CM

## 2022-11-22 PROCEDURE — 99214 OFFICE O/P EST MOD 30 MIN: CPT | Performed by: STUDENT IN AN ORGANIZED HEALTH CARE EDUCATION/TRAINING PROGRAM

## 2022-11-22 ASSESSMENT — PATIENT HEALTH QUESTIONNAIRE - PHQ9: CLINICAL INTERPRETATION OF PHQ2 SCORE: 0

## 2022-11-22 ASSESSMENT — PAIN SCALES - GENERAL: PAINLEVEL: 3=SLIGHT PAIN

## 2022-11-22 NOTE — PROGRESS NOTES
Follow-up patient Note    Interventional Pain and Spine  Physiatry (Physical Medicine and Rehabilitation)     Patient Name: Robin Lynn Zielesch  : 1955  Date of service: 2022    Chief Complaint:   Chief Complaint   Patient presents with    Follow-Up     Chronic right-sided low back pain with right-sided sciatica         HISTORY (2022):  Robin Lynn Zielesch is a 67 y.o. female who presents today with pain radiating from her right posterolateral glute down her right anterolateral and posterior thigh accompanied by numbness/tingling/weakness in this area. This started in Sep 2021 while recovering from a L2-pelvis posterior spinal fusion with TLIF/ PLIF on 21 with Dr. Bates ([facility] which she states she had done for similar radiating pain down her left leg.  Her radiating left leg pain resolved after surgery.     Her pain at its best-worse level during the course of the day is 5-9/10, respectively. Pain right now is 7/10 on the numeric pain scale. Pain worsens with sitting, standing, walking, bending backwards, side bending or twisting, walking upstairs, and walking downstairs and improves with nothing. Her pain interferes somewhat with ADLs. The patient otherwise denies new weakness, numbness, or bladder/bowel incontinence. States she has fallen a few times secondary to pain and possibly weakness. Moved from [facility] in May 2022.     The patient has done physical therapy for this problem with worsening pain.     Patient has tried the following medications with varied success (current meds in bold): Hayes back and body  Gabapentin 300mg TID - no relief. Used to help with left sided pain  Naproxen BID - significant relief     Therapeutic modalities and interventional therapies to date include:  -No injections     Medical history includes HTN, cervical laminectomy, depression, anxiety, thyroid cancer, BMI 30, L2-pelvis posterior spinal fusion with TLIF/ PLIF on 21    HPI  Today's visit  "  Robin Lynn Zielesch ( 1955) is a female with The primary encounter diagnosis was Osteoarthritis of carpometacarpal (CMC) joints of both thumbs, unspecified osteoarthritis type. Diagnoses of Bilateral thumb pain, Chronic right-sided low back pain with right-sided sciatica, History of lumbar fusion, Numbness and tingling of right leg, At risk for falls, Lumbar disc herniation, and Lumbar radiculitis were also pertinent to this visit.    Presents today after 22 right L2-3 interlaminar epidural steroid injection - 90% improvement.  Radiating pain has resolved.  After the injection she noticed that \"all her other aches and pains came back.\" Notes pain at her right shoulder, right knee, right foot, and bilateral thumbs.  Bilateral thumb pain is worst.  Notes that she previously received bilateral thumb injections with significant provement, which appear to be CMC joint injections at [facility].  She would like to repeat these injections if possible, as her thumb pain has been impairing her ability to perform her ADLs.    Recently changed naproxen to meloxicam as per PCP recommendation.  Notes this does not seem to be helping as well.    Pain severity 3/10 currently  Pt denies new numbness, tingling, or weakness.    Procedure history:  - 22 right L2-3 interlaminar epidural steroid injection - 90% improvement in pain, resolution of radiating pain.      ROS:   Red Flags ROS:   Fever, Chills, Sweats: Denies  Involuntary Weight Loss: Denies  Bladder Incontinence: Denies  Bowel Incontinence: denies  Saddle Anesthesia: Denies    All other systems reviewed and negative.     PMHx:   History reviewed. No pertinent past medical history.    PSHx:   Past Surgical History:   Procedure Laterality Date    [place] INJ LUMBAR/SACRAL,W/ IMAGING Right 2022    Procedure: RIGHT lumbar L2-3 interlaminar epidural steroid injection;  Surgeon: Kendal Jeffers M.D.;  [facility];  Service: Pain " Management    ABDOMINAL HYSTERECTOMY TOTAL      FOOT SURGERY      [id number]      L tka    LAMINOTOMY      THYROIDECTOMY TOTAL      2019  thyroid cancer       Family Hx:   Family History   Problem Relation Age of Onset    COPD Mother     Cancer Father         pancreatic       Social Hx:  Social History     Socioeconomic History    Marital status:      Spouse name: Not on file    Number of children: Not on file    Years of education: Not on file    Highest education level: Some college, no degree   Occupational History    Not on file   Tobacco Use    Smoking status: Former     Types: Cigarettes    Smokeless tobacco: Never   Vaping Use    Vaping Use: Some days    Substances: Nicotine, CBD    Devices: Pre-filled or refillable cartridge, Refillable tank   Substance and Sexual Activity    Alcohol use: Not Currently    Drug use: Yes     Types: Marijuana, Inhaled     Comment: once in a while    Sexual activity: Not on file   Other Topics Concern    Not on file   Social History Narrative    Not on file     Social Determinants of Health     Financial Resource Strain: Low Risk     Difficulty of Paying Living Expenses: Not very hard   Food Insecurity: No Food Insecurity    Worried About Running Out of Food in the Last Year: Never true    Ran Out of Food in the Last Year: Never true   Transportation Needs: No Transportation Needs    Lack of Transportation (Medical): No    Lack of Transportation (Non-Medical): No   Physical Activity: Insufficiently Active    Days of Exercise per Week: 7 days    Minutes of Exercise per Session: 10 min   Stress: Stress Concern Present    Feeling of Stress : To some extent   Social Connections: Moderately Isolated    Frequency of Communication with Friends and Family: Once a week    Frequency of Social Gatherings with Friends and Family: Never    Attends Hindu Services: More than 4 times per year    Active Member of Clubs or Organizations: No    Attends Club or Organization Meetings:  Patient refused    Marital Status:    Intimate Partner Violence: Not on file   Housing Stability: Low Risk     Unable to Pay for Housing in the Last Year: No    Number of Places Lived in the Last Year: 2    Unstable Housing in the Last Year: No       Allergies:  Allergies   Allergen Reactions    Ace Inhibitors Cough    Chlorhexidine Rash    Triamterene      Other reaction(s): Nephrotoxicity       Medications: reviewed on epic.   Outpatient Medications Marked as Taking for the 11/22/22 encounter (Office Visit) with Kendal Jeffers M.D.   Medication Sig Dispense Refill    meloxicam (MOBIC) 15 MG tablet Take 1 Tablet by mouth every day. 30 Tablet 1    potassium chloride SA (KDUR) 20 MEQ Tab CR Take 1 Tablet by mouth every day. 90 Tablet 1    amLODIPine (NORVASC) 10 MG Tab Take 1 Tablet by mouth every day. 90 Tablet 3    omeprazole (PRILOSEC) 40 MG delayed-release capsule TAKE 1 CAPSULE BY MOUTH EVERY DAY 90 Capsule 3    buPROPion (WELLBUTRIN XL) 300 MG XL tablet TAKE 1 TABLET BY MOUTH EVERY DAY IN THE MORNING 90 Tablet 3    SYNTHROID 100 MCG Tab       losartan (COZAAR) 100 MG Tab Take 1 Tablet by mouth every day.      calcium citrate (CALCITRATE) 950 (200 Ca) MG Tab Take 1 Tablet by mouth every day.      Multiple Vitamin (MULTI-VITAMINS PO) Take  by mouth.      gabapentin (NEURONTIN) 300 MG Cap Take 1 Capsule by mouth 3 times a day.      aspirin EC (ECOTRIN) 81 MG Tablet Delayed Response Take 1 Tablet by mouth every day.      Riboflavin (VITAMIN B-2 PO) Take  by mouth.          Current Outpatient Medications on File Prior to Visit   Medication Sig Dispense Refill    meloxicam (MOBIC) 15 MG tablet Take 1 Tablet by mouth every day. 30 Tablet 1    potassium chloride SA (KDUR) 20 MEQ Tab CR Take 1 Tablet by mouth every day. 90 Tablet 1    amLODIPine (NORVASC) 10 MG Tab Take 1 Tablet by mouth every day. 90 Tablet 3    omeprazole (PRILOSEC) 40 MG delayed-release capsule TAKE 1 CAPSULE BY MOUTH EVERY DAY 90 Capsule 3     buPROPion (WELLBUTRIN XL) 300 MG XL tablet TAKE 1 TABLET BY MOUTH EVERY DAY IN THE MORNING 90 Tablet 3    SYNTHROID 100 MCG Tab       losartan (COZAAR) 100 MG Tab Take 1 Tablet by mouth every day.      calcium citrate (CALCITRATE) 950 (200 Ca) MG Tab Take 1 Tablet by mouth every day.      Multiple Vitamin (MULTI-VITAMINS PO) Take  by mouth.      gabapentin (NEURONTIN) 300 MG Cap Take 1 Capsule by mouth 3 times a day.      aspirin EC (ECOTRIN) 81 MG Tablet Delayed Response Take 1 Tablet by mouth every day.      Riboflavin (VITAMIN B-2 PO) Take  by mouth.       No current facility-administered medications on file prior to visit.         EXAMINATION     Physical Exam:   BP (!) 132/90 (BP Location: Left arm, Patient Position: Sitting, BP Cuff Size: Adult)   Pulse 88   Temp 36.3 °C (97.4 °F) (Temporal)   Ht 1.524 m (5')   Wt 70.5 kg (155 lb 6.8 oz)   SpO2 95%     Constitutional:   Body Habitus: Body mass index is 30.35 kg/m².  Cooperation: Fully cooperates with exam  Appearance: Well-groomed, well-nourished.    Eyes: No scleral icterus to suggest severe liver disease, no proptosis to suggest severe hyperthyroidism    ENT -no obvious auditory deficits, no noticeable facial droop     Skin -no rashes or lesions noted     Respiratory-  breathing comfortably on room air, no audible wheezing    Cardiovascular-distal extremities warm and well perfused.  No lower extremity edema is noted.     Gastrointestinal - no obvious abdominal masses, non-distended    Psychiatric- alert and oriented ×3. Normal affect.     Gait - normal gait, no use of ambulatory device, nonantalgic.     Positive bilateral CMC grind test    Musculoskeletal and Neuro -         Thoracic/Lumbar Spine/Sacral Spine/Hips   Inspection: No evidence of atrophy in bilateral lower extremities throughout   There is full active range of motion with lumbar extension     Facet loading maneuver negative bilaterally     Palpation:   No tenderness to palpation in the  low back/hips including midline of lumbosacral spine, paraspinal muscles bilaterally, lumbar facets bilaterally, sacroiliac joints bilaterally, PSIS bilaterally, and greater trochanters bilaterally.     Lumbar spine /hip provocative exam maneuvers  Straight leg raise negative bilaterally  FADIR test negative bilaterally  Femoral stretch test negative bilaterally     SI joint tests  EBEN test negative bilaterally  Thigh thrust test negative bilaterally     Key points for the international standards for neurological classification of spinal cord injury (ISNCSCI) to light touch.   Dermatome R L   L2 2 2   L3 2 2   L4 2 2   L5 2 2   S1 2 2   S2 2 2         Motor Exam Lower Extremities  ? Myotome R L   Hip flexion L2 5 5   Knee extension L3 5 5   Ankle dorsiflexion L4 5 5   Toe extension L5 5 5   Ankle plantarflexion S1 5 5      Previous exam  Heel walking and toe walking intact.       Reflexes  ?   R L   Patella   2+ 2+   Achilles    2+ 2+      Clonus of the ankle negative bilaterally         MEDICAL DECISION MAKING     Medical records review: see under HPI section.       MEDICAL DECISION MAKING    Medical records review: see under HPI section.     DATA    Labs: No new labs available for review since last visit.    Lab Results   Component Value Date/Time    SODIUM 141 06/24/2022 07:46 AM    POTASSIUM 4.3 06/24/2022 07:46 AM    CHLORIDE 103 06/24/2022 07:46 AM    CO2 23 06/24/2022 07:46 AM    ANION 15.0 06/24/2022 07:46 AM    GLUCOSE 85 06/24/2022 07:46 AM    BUN 19 06/24/2022 07:46 AM    CREATININE 0.76 06/24/2022 07:46 AM    CALCIUM 10.1 06/24/2022 07:46 AM       No results found for: PROTHROMBTM, INR     Lab Results   Component Value Date/Time    WBC 8.9 06/24/2022 07:46 AM    RBC 4.80 06/24/2022 07:46 AM    HEMOGLOBIN 14.3 06/24/2022 07:46 AM    HEMATOCRIT 43.4 06/24/2022 07:46 AM    MCV 90.4 06/24/2022 07:46 AM    MCH 29.8 06/24/2022 07:46 AM    MCHC 32.9 (L) 06/24/2022 07:46 AM    MPV 9.0 06/24/2022 07:46 AM         Lab Results   Component Value Date/Time    HBA1C 5.8 (H) 04/28/2022 10:39 AM        Imaging:   I personally reviewed following images, these are my reads  MRI lumbar spine 9/2/2022  Evidence of lumbar laminectomy at L4-S1, interbody fusion and posterior fusion at L3-S1.  Broad-based disc bulge at L1-L2 resulting in severe central canal stenosis and compression of descending bilateral L2 nerve roots and possibly bilateral L3 nerve roots and mild impingement of exiting L1 nerve roots bilaterally.  Mild right neuroforaminal stenosis at T12-L1.  Possible mild bilateral neuroforaminal stenosis at L5-S1, quality of images impaired due to metallic artifact. Appearance of chronic postoperative seroma posterior to L4 and L5 vertebral bodies.      IMAGING radiology reads. I reviewed the following radiology reads                      Results for orders placed during the hospital encounter of 09/02/22    MR-LUMBAR SPINE-W/O    Impression  1.  L3-4 slight anterolisthesis and L5-S1 slight retrolisthesis.  2.  Postoperative changes with lumbar laminectomy L4-L5-S1, interbody fusion L3-L4, L4-L5, L5-S1, and posterior fusion with transpedicular screw fixation at L3-L4-L5-S1.  3.  Chronic postoperative seroma occupying the laminectomy interval without dorsal epidural mass effect.  4.  The study is most notable for L1-L2 large disc protrusion-extrusion resulting in severe central stenosis.  5.  Additional degenerative changes detailed for each level above in the body of report.        MRI lumbar spine 10/20/21  Lumbar spine:    Alignment: Grade 1 L3-L4 anterolisthesis. Previously seen L4-L5  anterolisthesis is improved compared to MRI prior to surgery.    Vertebral body heights and marrow: Postsurgical changes from L3-S1  posterior fusion. Multilevel lumbar spondylosis with osteophytes, facet  arthropathy, and endplate marrow degenerative changes are seen. Otherwise  the vertebral body heights and marrow signal are  unremarkable.    Conus medullaris: Normal, terminating at L1/L2.    Soft tissues: There is a fluid collection in the paraspinal soft tissues  posterior to the L3-L5 laminectomy sites, likely postoperative seroma  measuring 63 x 28 mm (series 17, image 7). Small right renal cyst.    L1-L2: Persistent disc bulge with worsening superimposed central disc  extrusion. Bilateral facet arthropathy and dorsal epidural fat. The  constellation of findings produces moderate to severe spinal canal  stenosis. Mild to moderate left neural foraminal stenosis is improved  compared to prior.  Ligamentum flavum thickening. Facet hypertrophy, mild.    L2-L3: Disc bulge, ligamentum flavum thickening, facet hypertrophy  resulting in mild spinal canal stenosis. Mild left neural foraminal  stenosis.    L3-L4: Partially uncovered disc. Mild to moderate right neural foraminal  stenosis. Limited evaluation of the left neural foramen. Laminectomy.    L4-L5: No spinal canal stenosis. Limited evaluation of neural foramina due  to spinal hardware. Laminectomy.    L5-S1: Disc bulge without spinal canal stenosis. Limited evaluation of  neural foramina due to spinal hardware.     IMPRESSION:    1. Worsening disc protrusion at L1-L2, resulting in worsening spinal  canal stenosis, now moderate to severe.   2. Multilevel degenerative changes of the cervical spine, similar  compared to prior.  3. Multilevel degenerative changes in thoracic spine, with up to mild  to moderate spinal canal stenosis at T8-T9 secondary to disc bulge.  4. Postsurgical changes . Small postoperative seroma in L3-L5  laminectomy sites.        X-ray left hand 3/19/2019  FINDINGS:   There is no acute fracture or dislocation.   Advanced osteoarthrosis of the first CMC joint, characterized by joint   space narrowing, subchondral sclerosis, osteophyte formation, and radial   subluxation. Mild osteoarthrosis of the STT joint. Osteoarthrosis of   scattered IP joints. No focal soft  tissue swelling.     IMPRESSION:   Advanced osteoarthrosis of the first CMC joint.                                                     Diagnosis  Visit Diagnoses     ICD-10-CM   1. Osteoarthritis of carpometacarpal (CMC) joints of both thumbs, unspecified osteoarthritis type  M18.0   2. Bilateral thumb pain  M79.644    M79.645   3. Chronic right-sided low back pain with right-sided sciatica  M54.41    G89.29   4. History of lumbar fusion  Z98.1   5. Numbness and tingling of right leg  R20.0    R20.2   6. At risk for falls  Z91.81   7. Lumbar disc herniation  M51.26   8. Lumbar radiculitis  M54.16           ASSESSMENT AND PLAN:  Robin Lynn Zielesch (: 1955) is a female with history of HTN, cervical laminectomy, depression, anxiety, thyroid cancer, BMI 30, L3-pelvis posterior spinal fusion with TLIF/ PLIF on 21 who presents with significant improvement in pain radiating down her right low back to her right thigh in primarily L2-L4 dermatomal distribution after L2-3 interlaminar epidural steroid injection.     Presenting with worsening pain at bilateral thumbs likely 2/2 CMC OA     Mathew was seen today for follow-up.    Diagnoses and all orders for this visit:    Osteoarthritis of carpometacarpal (CMC) joints of both thumbs, unspecified osteoarthritis type  -     Referral to Pain Clinic  -     DX-HAND 3+ RIGHT    Bilateral thumb pain  -     Referral to Pain Clinic  -     DX-HAND 3+ RIGHT    Chronic right-sided low back pain with right-sided sciatica    History of lumbar fusion    Numbness and tingling of right leg    At risk for falls    Lumbar disc herniation    Lumbar radiculitis          PLAN  Physical Therapy: Patient has completed formal physical therapy for this issue.  Continue home exercise program as able.  Advised her to wear CMC splints as needed, which she has at home     Diagnostic workup: XR Right hand to assess for CMC joint OA.  Per chart review, x-ray of left hand showed severe CMC OA on  3/19/2019    Medications:   -Continue gabapentin, naproxen as per PCP  -  reviewed, records do not demonstrate increased risk of opioid abuse.     Interventions:   - right L2-3 interlaminar epidural steroid injection PRN given significant improvement in pain with this procedure in the past  -Bilateral CMC joint steroid injections under ultrasound guidance. The risks, benefits, and alternatives to this procedure were discussed and the patient wishes to proceed with the procedure. Risks include but are not limited to damage to surrounding structures, infection, bleeding, worsening of pain which can be permanent, and weakness which can be permanent. Benefits include pain relief and improved function. Alternatives include not doing the procedure.      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  Given significant improvement in pain after our epidural steroid injection, I believe it is reasonable to defer this at a neurosurgery referral at this time    Follow-up: Next available for injections above    Orders Placed This Encounter    DX-HAND 3+ RIGHT    Referral to Pain Clinic           Kendal Jeffers MD  Interventional Pain and Spine  Physical Medicine and Rehabilitation  [facility]      The above note documents my personal evaluation of this patient. In addition, I have reviewed and confirmed with the patient and MA the supportive information documented in today's Patient Health Questionnaire and Office Note.     Please note that this dictation was created using voice recognition software. I have made every reasonable attempt to correct obvious errors, but I expect that there are errors of grammar and possibly content that I did not discover before finalizing the note.

## 2022-11-23 ENCOUNTER — HOSPITAL ENCOUNTER (OUTPATIENT)
Dept: RADIOLOGY | Facility: MEDICAL CENTER | Age: 67
End: 2022-11-23
Attending: STUDENT IN AN ORGANIZED HEALTH CARE EDUCATION/TRAINING PROGRAM
Payer: MEDICARE

## 2022-11-23 PROCEDURE — 73130 X-RAY EXAM OF HAND: CPT | Mod: RT

## 2022-11-28 ENCOUNTER — OFFICE VISIT (OUTPATIENT)
Dept: PHYSICAL MEDICINE AND REHAB | Facility: MEDICAL CENTER | Age: 67
End: 2022-11-28
Payer: MEDICARE

## 2022-11-28 VITALS
DIASTOLIC BLOOD PRESSURE: 84 MMHG | HEIGHT: 60 IN | TEMPERATURE: 96.7 F | HEART RATE: 95 BPM | BODY MASS INDEX: 30.69 KG/M2 | OXYGEN SATURATION: 95 % | SYSTOLIC BLOOD PRESSURE: 128 MMHG | WEIGHT: 156.31 LBS

## 2022-11-28 DIAGNOSIS — M18.0 OSTEOARTHRITIS OF CARPOMETACARPAL (CMC) JOINTS OF BOTH THUMBS, UNSPECIFIED OSTEOARTHRITIS TYPE: Primary | ICD-10-CM

## 2022-11-28 DIAGNOSIS — M79.645 BILATERAL THUMB PAIN: ICD-10-CM

## 2022-11-28 DIAGNOSIS — M79.644 BILATERAL THUMB PAIN: ICD-10-CM

## 2022-11-28 PROCEDURE — 20604 DRAIN/INJ JOINT/BURSA W/US: CPT | Mod: 50 | Performed by: STUDENT IN AN ORGANIZED HEALTH CARE EDUCATION/TRAINING PROGRAM

## 2022-11-28 RX ORDER — DEXAMETHASONE SODIUM PHOSPHATE 4 MG/ML
4 INJECTION, SOLUTION INTRA-ARTICULAR; INTRALESIONAL; INTRAMUSCULAR; INTRAVENOUS; SOFT TISSUE ONCE
Status: COMPLETED | OUTPATIENT
Start: 2022-11-28 | End: 2022-11-28

## 2022-11-28 RX ADMIN — DEXAMETHASONE SODIUM PHOSPHATE 4 MG: 4 INJECTION, SOLUTION INTRA-ARTICULAR; INTRALESIONAL; INTRAMUSCULAR; INTRAVENOUS; SOFT TISSUE at 11:19

## 2022-11-28 RX ADMIN — DEXAMETHASONE SODIUM PHOSPHATE 4 MG: 4 INJECTION, SOLUTION INTRA-ARTICULAR; INTRALESIONAL; INTRAMUSCULAR; INTRAVENOUS; SOFT TISSUE at 11:18

## 2022-11-28 ASSESSMENT — PATIENT HEALTH QUESTIONNAIRE - PHQ9: CLINICAL INTERPRETATION OF PHQ2 SCORE: 0

## 2022-11-28 ASSESSMENT — PAIN SCALES - GENERAL: PAINLEVEL: 6=MODERATE PAIN

## 2022-11-28 NOTE — PROCEDURES
Patient Name: Robin Lynn Zielesch  : 1955  Date of Service: 2022    Physician/s: Kendal Jeffers MD    Pre-operative Diagnosis: BILATERAL 1st carpometacarpal arthritis    Post-operative Diagnosis: BILATERAL 1st carpometacarpal arthritis      Procedure: BILATERAL ultrasound-guided 1st carpometacarpal joint injection    Description of procedure:    The risks, benefits, and alternatives of the procedure were reviewed and discussed with the patient.  Written informed consent was freely obtained. A pre-procedural time-out was conducted by the physician verifying patient’s identity, procedure to be performed, procedure site and side, and allergy verification. Appropriate equipment was determined to be in place for the procedure.     No sedation was used for this procedure.     In the office suite the patient was placed in a supine position, and her RIGHT hand was rested with the radial side of the palm up, and the skin was prepped and draped in the usual sterile fashion. The 1st carpometacarpal joint was identified under ultrasound guidance over the area of the anatomic snuffbox. The area was also investigated for adjacent nerves and vessels to confirm that the needle path and injection would not be in a nerve or vessel. With an out of plane approach, a 27g 1.5 inch needle was placed into skin and advanced until the needle tip was seen within the carpometacarpal joint. Following negative aspiration, a total of 1 mL 1% lidocaine and 1 mL of 4 mg/mL dexamethasone was injected under live ultrasound guidance and joint distension was confirmed. The patient's skin was wiped with a 4x4 gauze, the area was cleansed with alcohol prep, and a bandaid was applied. There were no complications noted.     Then attention was turned to the left hand. The hand was rested with the radial side of the palm up, and the skin was prepped and draped in the usual sterile fashion. The 1st carpometacarpal joint was identified under  ultrasound guidance over the area of the anatomic snuffbox. The area was also investigated for adjacent nerves and vessels to confirm that the needle path and injection would not be in a nerve or vessel. With an out of plane approach, a 27g 1.5 inch needle was placed into skin and advanced until the needle tip was seen within the carpometacarpal joint. Following negative aspiration, a total of 1 mL 1% lidocaine and 1 mL of 4 mg/mL dexamethasone was injected under live ultrasound guidance and joint distension was confirmed. The patient's skin was wiped with a 4x4 gauze, the area was cleansed with alcohol prep, and a bandaid was applied. There were no complications noted.     The images were uploaded to our secure system for permanent storage.     Kendal Jeffers MD  Interventional Pain and Spine  Physical Medicine and Rehabilitation  [facility]

## 2022-11-28 NOTE — PROGRESS NOTES
Follow-up patient Note    Interventional Pain and Spine  Physiatry (Physical Medicine and Rehabilitation)     Patient Name: Robin Lynn Zielesch  : 1955  Date of service: 2022    Chief Complaint:   Chief Complaint   Patient presents with    Follow-Up     Osteoarthritis of carpometacarpal (CMC) joints of both thumbs, unspecified osteoarthritis type         HISTORY (2022):  Robin Lynn Zielesch is a 67 y.o. female who presents today with pain radiating from her right posterolateral glute down her right anterolateral and posterior thigh accompanied by numbness/tingling/weakness in this area. This started in Sep 2021 while recovering from a L2-pelvis posterior spinal fusion with TLIF/ PLIF on 21 with Dr. Bates ([facility] which she states she had done for similar radiating pain down her left leg.  Her radiating left leg pain resolved after surgery.     Her pain at its best-worse level during the course of the day is 5-9/10, respectively. Pain right now is 7/10 on the numeric pain scale. Pain worsens with sitting, standing, walking, bending backwards, side bending or twisting, walking upstairs, and walking downstairs and improves with nothing. Her pain interferes somewhat with ADLs. The patient otherwise denies new weakness, numbness, or bladder/bowel incontinence. States she has fallen a few times secondary to pain and possibly weakness. Moved from [facility] in May 2022.     The patient has done physical therapy for this problem with worsening pain.     Patient has tried the following medications with varied success (current meds in bold): Hayes back and body  Gabapentin 300mg TID - no relief. Used to help with left sided pain  Naproxen BID - significant relief     Therapeutic modalities and interventional therapies to date include:  -No injections     Medical history includes HTN, cervical laminectomy, depression, anxiety, thyroid cancer, BMI 30, L2-pelvis posterior spinal fusion with TLIF/ PLIF  on 21    HPI  Today's visit   Robin Lynn Zielesch ( 1955) is a female with The primary encounter diagnosis was Osteoarthritis of carpometacarpal (CMC) joints of both thumbs, unspecified osteoarthritis type. A diagnosis of Bilateral thumb pain was also pertinent to this visit.    Presents today for bilateral CMC joint injections. Ongoing pain at bilateral CMC joints. XR right hand showed mod-severe OA of CMC joint.    Notes that she previously received bilateral thumb injections with significant improvement, which appear to be CMC joint injections at [facility].  She would like to repeat these injections if possible, as her thumb pain has been impairing her ability to perform her ADLs.    Recently changed naproxen to meloxicam as per PCP recommendation.  Notes this does not seem to be helping as well.    Pain severity 10 currently  Pt denies new numbness, tingling, or weakness.    Procedure history:  - 22 right L2-3 interlaminar epidural steroid injection - 90% improvement in pain, resolution of radiating pain.  - 22 bilateral CMC joint injections    ROS:   Red Flags ROS:   Fever, Chills, Sweats: Denies  Involuntary Weight Loss: Denies  Bladder Incontinence: Denies  Bowel Incontinence: denies  Saddle Anesthesia: Denies    All other systems reviewed and negative.     PMHx:   History reviewed. No pertinent past medical history.    PSHx:   Past Surgical History:   Procedure Laterality Date    [place] INJ LUMBAR/SACRAL,W/ IMAGING Right 2022    Procedure: RIGHT lumbar L2-3 interlaminar epidural steroid injection;  Surgeon: Kendal Jeffers M.D.;  Location[facility];  Service: Pain Management    ABDOMINAL HYSTERECTOMY TOTAL      FOOT SURGERY      [id number]      L tka    LAMINOTOMY      THYROIDECTOMY TOTAL      2019  thyroid cancer       Family Hx:   Family History   Problem Relation Age of Onset    COPD Mother     Cancer Father         pancreatic       Social Hx:  Social History      Socioeconomic History    Marital status:      Spouse name: Not on file    Number of children: Not on file    Years of education: Not on file    Highest education level: Some college, no degree   Occupational History    Not on file   Tobacco Use    Smoking status: Former     Types: Cigarettes    Smokeless tobacco: Never   Vaping Use    Vaping Use: Some days    Substances: Nicotine, CBD    Devices: Pre-filled or refillable cartridge, Refillable tank   Substance and Sexual Activity    Alcohol use: Not Currently    Drug use: Yes     Types: Marijuana, Inhaled     Comment: once in a while    Sexual activity: Not on file   Other Topics Concern    Not on file   Social History Narrative    Not on file     Social Determinants of Health     Financial Resource Strain: Low Risk     Difficulty of Paying Living Expenses: Not very hard   Food Insecurity: No Food Insecurity    Worried About Running Out of Food in the Last Year: Never true    Ran Out of Food in the Last Year: Never true   Transportation Needs: No Transportation Needs    Lack of Transportation (Medical): No    Lack of Transportation (Non-Medical): No   Physical Activity: Insufficiently Active    Days of Exercise per Week: 7 days    Minutes of Exercise per Session: 10 min   Stress: Stress Concern Present    Feeling of Stress : To some extent   Social Connections: Moderately Isolated    Frequency of Communication with Friends and Family: Once a week    Frequency of Social Gatherings with Friends and Family: Never    Attends Buddhist Services: More than 4 times per year    Active Member of Clubs or Organizations: No    Attends Club or Organization Meetings: Patient refused    Marital Status:    Intimate Partner Violence: Not on file   Housing Stability: Low Risk     Unable to Pay for Housing in the Last Year: No    Number of Places Lived in the Last Year: 2    Unstable Housing in the Last Year: No       Allergies:  Allergies   Allergen Reactions    Ace  Inhibitors Cough    Chlorhexidine Rash    Triamterene      Other reaction(s): Nephrotoxicity       Medications: reviewed on epic.   Outpatient Medications Marked as Taking for the 11/28/22 encounter (Office Visit) with Kendal Jeffers M.D.   Medication Sig Dispense Refill    meloxicam (MOBIC) 15 MG tablet Take 1 Tablet by mouth every day. 30 Tablet 1    potassium chloride SA (KDUR) 20 MEQ Tab CR Take 1 Tablet by mouth every day. 90 Tablet 1    amLODIPine (NORVASC) 10 MG Tab Take 1 Tablet by mouth every day. 90 Tablet 3    omeprazole (PRILOSEC) 40 MG delayed-release capsule TAKE 1 CAPSULE BY MOUTH EVERY DAY 90 Capsule 3    buPROPion (WELLBUTRIN XL) 300 MG XL tablet TAKE 1 TABLET BY MOUTH EVERY DAY IN THE MORNING 90 Tablet 3    SYNTHROID 100 MCG Tab       losartan (COZAAR) 100 MG Tab Take 1 Tablet by mouth every day.      calcium citrate (CALCITRATE) 950 (200 Ca) MG Tab Take 1 Tablet by mouth every day.      Multiple Vitamin (MULTI-VITAMINS PO) Take  by mouth.      gabapentin (NEURONTIN) 300 MG Cap Take 1 Capsule by mouth 3 times a day.      aspirin EC (ECOTRIN) 81 MG Tablet Delayed Response Take 1 Tablet by mouth every day.      Riboflavin (VITAMIN B-2 PO) Take  by mouth.       Current Facility-Administered Medications for the 11/28/22 encounter (Office Visit) with Kendal Jeffers M.D.   Medication Dose Route Frequency Provider Last Rate Last Admin    dexamethasone (DECADRON) injection 4 mg  4 mg Other Once Kendal Jeffers M.D.        dexamethasone (DECADRON) injection 4 mg  4 mg Other Once Kendal Jeffers M.D.            Current Outpatient Medications on File Prior to Visit   Medication Sig Dispense Refill    meloxicam (MOBIC) 15 MG tablet Take 1 Tablet by mouth every day. 30 Tablet 1    potassium chloride SA (KDUR) 20 MEQ Tab CR Take 1 Tablet by mouth every day. 90 Tablet 1    amLODIPine (NORVASC) 10 MG Tab Take 1 Tablet by mouth every day. 90 Tablet 3    omeprazole (PRILOSEC) 40 MG delayed-release capsule TAKE 1  CAPSULE BY MOUTH EVERY DAY 90 Capsule 3    buPROPion (WELLBUTRIN XL) 300 MG XL tablet TAKE 1 TABLET BY MOUTH EVERY DAY IN THE MORNING 90 Tablet 3    SYNTHROID 100 MCG Tab       losartan (COZAAR) 100 MG Tab Take 1 Tablet by mouth every day.      calcium citrate (CALCITRATE) 950 (200 Ca) MG Tab Take 1 Tablet by mouth every day.      Multiple Vitamin (MULTI-VITAMINS PO) Take  by mouth.      gabapentin (NEURONTIN) 300 MG Cap Take 1 Capsule by mouth 3 times a day.      aspirin EC (ECOTRIN) 81 MG Tablet Delayed Response Take 1 Tablet by mouth every day.      Riboflavin (VITAMIN B-2 PO) Take  by mouth.       No current facility-administered medications on file prior to visit.         EXAMINATION     Physical Exam:   /84 (BP Location: Right arm, Patient Position: Sitting, BP Cuff Size: Adult)   Pulse 95   Temp 35.9 °C (96.7 °F) (Temporal)   Ht 1.524 m (5')   Wt 70.9 kg (156 lb 4.9 oz)   SpO2 95%     Constitutional:   Body Habitus: Body mass index is 30.53 kg/m².  Cooperation: Fully cooperates with exam  Appearance: Well-groomed, well-nourished.    Eyes: No scleral icterus to suggest severe liver disease, no proptosis to suggest severe hyperthyroidism    ENT -no obvious auditory deficits, no noticeable facial droop     Skin -no rashes or lesions noted     Respiratory-  breathing comfortably on room air, no audible wheezing    Cardiovascular-distal extremities warm and well perfused.  No lower extremity edema is noted.     Gastrointestinal - no obvious abdominal masses, non-distended    Psychiatric- alert and oriented ×3. Normal affect.     Gait - normal gait, no use of ambulatory device, nonantalgic.     Positive bilateral CMC grind test. tenderness to palpation at bilateral CMC joints.    Sensation intact to LT and at least antigravity strength in bilateral UE    Musculoskeletal and Neuro -      Previous exam     Thoracic/Lumbar Spine/Sacral Spine/Hips   Inspection: No evidence of atrophy in bilateral lower  extremities throughout   There is full active range of motion with lumbar extension     Facet loading maneuver negative bilaterally     Palpation:   No tenderness to palpation in the low back/hips including midline of lumbosacral spine, paraspinal muscles bilaterally, lumbar facets bilaterally, sacroiliac joints bilaterally, PSIS bilaterally, and greater trochanters bilaterally.     Lumbar spine /hip provocative exam maneuvers  Straight leg raise negative bilaterally  FADIR test negative bilaterally  Femoral stretch test negative bilaterally     SI joint tests  EBEN test negative bilaterally  Thigh thrust test negative bilaterally     Key points for the international standards for neurological classification of spinal cord injury (ISNCSCI) to light touch.   Dermatome R L   L2 2 2   L3 2 2   L4 2 2   L5 2 2   S1 2 2   S2 2 2         Motor Exam Lower Extremities  ? Myotome R L   Hip flexion L2 5 5   Knee extension L3 5 5   Ankle dorsiflexion L4 5 5   Toe extension L5 5 5   Ankle plantarflexion S1 5 5      Heel walking and toe walking intact.       Reflexes  ?   R L   Patella   2+ 2+   Achilles    2+ 2+      Clonus of the ankle negative bilaterally         MEDICAL DECISION MAKING     Medical records review: see under HPI section.       MEDICAL DECISION MAKING    Medical records review: see under HPI section.     DATA    Labs: No new labs available for review since last visit.    Lab Results   Component Value Date/Time    SODIUM 141 06/24/2022 07:46 AM    POTASSIUM 4.3 06/24/2022 07:46 AM    CHLORIDE 103 06/24/2022 07:46 AM    CO2 23 06/24/2022 07:46 AM    ANION 15.0 06/24/2022 07:46 AM    GLUCOSE 85 06/24/2022 07:46 AM    BUN 19 06/24/2022 07:46 AM    CREATININE 0.76 06/24/2022 07:46 AM    CALCIUM 10.1 06/24/2022 07:46 AM       No results found for: PROTHROMBTM, INR     Lab Results   Component Value Date/Time    WBC 8.9 06/24/2022 07:46 AM    RBC 4.80 06/24/2022 07:46 AM    HEMOGLOBIN 14.3 06/24/2022 07:46 AM     HEMATOCRIT 43.4 06/24/2022 07:46 AM    MCV 90.4 06/24/2022 07:46 AM    MCH 29.8 06/24/2022 07:46 AM    MCHC 32.9 (L) 06/24/2022 07:46 AM    MPV 9.0 06/24/2022 07:46 AM        Lab Results   Component Value Date/Time    HBA1C 5.8 (H) 04/28/2022 10:39 AM        Imaging:   I personally reviewed following images, these are my reads  MRI lumbar spine 9/2/2022  Evidence of lumbar laminectomy at L4-S1, interbody fusion and posterior fusion at L3-S1.  Broad-based disc bulge at L1-L2 resulting in severe central canal stenosis and compression of descending bilateral L2 nerve roots and possibly bilateral L3 nerve roots and mild impingement of exiting L1 nerve roots bilaterally.  Mild right neuroforaminal stenosis at T12-L1.  Possible mild bilateral neuroforaminal stenosis at L5-S1, quality of images impaired due to metallic artifact. Appearance of chronic postoperative seroma posterior to L4 and L5 vertebral bodies.    XR Right hand 11/22/22  Moderate to severe CMC joint OA. See formal radiology report for further details.    IMAGING radiology reads. I reviewed the following radiology reads                      Results for orders placed during the hospital encounter of 09/02/22    MR-LUMBAR SPINE-W/O    Impression  1.  L3-4 slight anterolisthesis and L5-S1 slight retrolisthesis.  2.  Postoperative changes with lumbar laminectomy L4-L5-S1, interbody fusion L3-L4, L4-L5, L5-S1, and posterior fusion with transpedicular screw fixation at L3-L4-L5-S1.  3.  Chronic postoperative seroma occupying the laminectomy interval without dorsal epidural mass effect.  4.  The study is most notable for L1-L2 large disc protrusion-extrusion resulting in severe central stenosis.  5.  Additional degenerative changes detailed for each level above in the body of report.        MRI lumbar spine 10/20/21  Lumbar spine:    Alignment: Grade 1 L3-L4 anterolisthesis. Previously seen L4-L5  anterolisthesis is improved compared to MRI prior to  surgery.    Vertebral body heights and marrow: Postsurgical changes from L3-S1  posterior fusion. Multilevel lumbar spondylosis with osteophytes, facet  arthropathy, and endplate marrow degenerative changes are seen. Otherwise  the vertebral body heights and marrow signal are unremarkable.    Conus medullaris: Normal, terminating at L1/L2.    Soft tissues: There is a fluid collection in the paraspinal soft tissues  posterior to the L3-L5 laminectomy sites, likely postoperative seroma  measuring 63 x 28 mm (series 17, image 7). Small right renal cyst.    L1-L2: Persistent disc bulge with worsening superimposed central disc  extrusion. Bilateral facet arthropathy and dorsal epidural fat. The  constellation of findings produces moderate to severe spinal canal  stenosis. Mild to moderate left neural foraminal stenosis is improved  compared to prior.  Ligamentum flavum thickening. Facet hypertrophy, mild.    L2-L3: Disc bulge, ligamentum flavum thickening, facet hypertrophy  resulting in mild spinal canal stenosis. Mild left neural foraminal  stenosis.    L3-L4: Partially uncovered disc. Mild to moderate right neural foraminal  stenosis. Limited evaluation of the left neural foramen. Laminectomy.    L4-L5: No spinal canal stenosis. Limited evaluation of neural foramina due  to spinal hardware. Laminectomy.    L5-S1: Disc bulge without spinal canal stenosis. Limited evaluation of  neural foramina due to spinal hardware.     IMPRESSION:    1. Worsening disc protrusion at L1-L2, resulting in worsening spinal  canal stenosis, now moderate to severe.   2. Multilevel degenerative changes of the cervical spine, similar  compared to prior.  3. Multilevel degenerative changes in thoracic spine, with up to mild  to moderate spinal canal stenosis at T8-T9 secondary to disc bulge.  4. Postsurgical changes . Small postoperative seroma in L3-L5  laminectomy sites.        X-ray left hand 3/19/2019  FINDINGS:   There is no acute  fracture or dislocation.   Advanced osteoarthrosis of the first CMC joint, characterized by joint   space narrowing, subchondral sclerosis, osteophyte formation, and radial   subluxation. Mild osteoarthrosis of the STT joint. Osteoarthrosis of   scattered IP joints. No focal soft tissue swelling.     IMPRESSION:   Advanced osteoarthrosis of the first CMC joint.     XR Right hand 22  FINDINGS:     BONE MINERALIZATION: Normal.  JOINTS: Moderate to severe first carpometacarpal joint osteoarthrosis. Mild triscaphe joint osteoarthrosis. Mild multifocal osteoarthrosis otherwise. No erosions.  FRACTURE: None.  DISLOCATION: None.  SOFT TISSUES: No mass.     IMPRESSION:     1.  Moderate to severe first carpometacarpal joint osteoarthrosis.  2.  Mild multifocal osteoarthrosis otherwise.                                                  Diagnosis  Visit Diagnoses     ICD-10-CM   1. Osteoarthritis of carpometacarpal (CMC) joints of both thumbs, unspecified osteoarthritis type  M18.0   2. Bilateral thumb pain  M79.644    M79.645           ASSESSMENT AND PLAN:  Robin Lynn Zielesch (: 1955) is a female with history of HTN, cervical laminectomy, depression, anxiety, thyroid cancer, BMI 30, L3-pelvis posterior spinal fusion with TLIF/ PLIF on 21 who presents with significant improvement in pain radiating down her right low back to her right thigh in primarily L2-L4 dermatomal distribution after L2-3 interlaminar epidural steroid injection.     Presenting with worsening pain at bilateral thumbs likely 2/2 CMC OA     Mathew was seen today for follow-up.    Diagnoses and all orders for this visit:    Osteoarthritis of carpometacarpal (CMC) joints of both thumbs, unspecified osteoarthritis type  -     Consent for all Surgical, Special Diagnostic or Therapeutic Procedures    Bilateral thumb pain    Other orders  -     dexamethasone (DECADRON) injection 4 mg  -     dexamethasone (DECADRON) injection 4  mg          PLAN  Physical Therapy: Patient has completed formal physical therapy for this issue.  Continue home exercise program as able.  Advised her to wear CMC splints as needed, which she has at home     Diagnostic workup: Personally reviewed at today's visit:  XR Right hand 11/22/22    Medications:   -Continue gabapentin, naproxen as per PCP  -  reviewed, records do not demonstrate increased risk of opioid abuse.     Interventions:   - right L2-3 interlaminar epidural steroid injection PRN given significant improvement in pain with this procedure in the past  -Bilateral CMC joint steroid injections under ultrasound guidance today. The risks, benefits, and alternatives to this procedure were discussed and the patient wishes to proceed with the procedure. Risks include but are not limited to damage to surrounding structures, infection, bleeding, worsening of pain which can be permanent, and weakness which can be permanent. Benefits include pain relief and improved function. Alternatives include not doing the procedure.      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  Given significant improvement in pain after our epidural steroid injection, I believe it is reasonable to defer this at a neurosurgery referral at this time    Follow-up: as needed    Orders Placed This Encounter    dexamethasone (DECADRON) injection 4 mg    dexamethasone (DECADRON) injection 4 mg    Consent for all Surgical, Special Diagnostic or Therapeutic Procedures           Kendal Jeffers MD  Interventional Pain and Spine  Physical Medicine and Rehabilitation  [facility]      The above note documents my personal evaluation of this patient. In addition, I have reviewed and confirmed with the patient and MA the supportive information documented in today's Patient Health  Questionnaire and Office Note.     Please note that this dictation was created using voice recognition software. I have made every reasonable attempt to correct obvious errors, but I expect that there are errors of grammar and possibly content that I did not discover before finalizing the note.

## 2022-12-05 ENCOUNTER — OFFICE VISIT (OUTPATIENT)
Dept: MEDICAL GROUP | Facility: PHYSICIAN GROUP | Age: 67
End: 2022-12-05
Payer: MEDICARE

## 2022-12-05 VITALS
HEART RATE: 86 BPM | RESPIRATION RATE: 18 BRPM | OXYGEN SATURATION: 96 % | TEMPERATURE: 98.3 F | DIASTOLIC BLOOD PRESSURE: 82 MMHG | BODY MASS INDEX: 30.55 KG/M2 | WEIGHT: 155.6 LBS | HEIGHT: 60 IN | SYSTOLIC BLOOD PRESSURE: 126 MMHG

## 2022-12-05 DIAGNOSIS — F41.9 ANXIETY: ICD-10-CM

## 2022-12-05 DIAGNOSIS — M15.9 PRIMARY OSTEOARTHRITIS INVOLVING MULTIPLE JOINTS: ICD-10-CM

## 2022-12-05 PROBLEM — M19.90 OSTEOARTHRITIS: Status: ACTIVE | Noted: 2022-12-05

## 2022-12-05 PROBLEM — Z76.89 ENCOUNTER TO ESTABLISH CARE: Status: RESOLVED | Noted: 2022-06-28 | Resolved: 2022-12-05

## 2022-12-05 PROCEDURE — 99213 OFFICE O/P EST LOW 20 MIN: CPT | Performed by: FAMILY MEDICINE

## 2022-12-05 RX ORDER — LORAZEPAM 0.5 MG/1
0.5 TABLET ORAL EVERY 8 HOURS PRN
Qty: 30 TABLET | Refills: 0 | Status: SHIPPED | OUTPATIENT
Start: 2022-12-05 | End: 2023-04-17 | Stop reason: SDUPTHER

## 2022-12-05 NOTE — ASSESSMENT & PLAN NOTE
This is a chronic problem.  Patient is here for follow-up on her anxiety.  She is almost out of her medication and is asking for refill.  She is working to deal with her  who has lung cancer and is going through a variety of treatments.

## 2022-12-05 NOTE — PROGRESS NOTES
Subjective:     CC: Here for follow-up on several things.    HPI:   Mathew presents today with the following medical concerns:    Anxiety  This is a chronic problem.  Patient is here for follow-up on her anxiety.  She is almost out of her medication and is asking for refill.  She is working to deal with her  who has lung cancer and is going through a variety of treatments.    Osteoarthritis  This is a chronic problem.  Patient's recently had treatment for her back and her thumbs.  She states she is feeling much better and able to be much more active.      History reviewed. No pertinent past medical history.    Social History     Tobacco Use    Smoking status: Former     Types: Cigarettes    Smokeless tobacco: Never   Vaping Use    Vaping Use: Some days    Substances: Nicotine, CBD    Devices: Pre-filled or refillable cartridge, Refillable tank   Substance Use Topics    Alcohol use: Not Currently    Drug use: Yes     Types: Marijuana, Inhaled     Comment: once in a while       Current Outpatient Medications Ordered in Epic   Medication Sig Dispense Refill    LORazepam (ATIVAN) 0.5 MG Tab Take 1 Tablet by mouth every 8 hours as needed for Anxiety for up to 30 days. 30 Tablet 0    meloxicam (MOBIC) 15 MG tablet Take 1 Tablet by mouth every day. 30 Tablet 1    potassium chloride SA (KDUR) 20 MEQ Tab CR Take 1 Tablet by mouth every day. 90 Tablet 1    amLODIPine (NORVASC) 10 MG Tab Take 1 Tablet by mouth every day. 90 Tablet 3    omeprazole (PRILOSEC) 40 MG delayed-release capsule TAKE 1 CAPSULE BY MOUTH EVERY DAY 90 Capsule 3    buPROPion (WELLBUTRIN XL) 300 MG XL tablet TAKE 1 TABLET BY MOUTH EVERY DAY IN THE MORNING 90 Tablet 3    SYNTHROID 100 MCG Tab       losartan (COZAAR) 100 MG Tab Take 1 Tablet by mouth every day.      calcium citrate (CALCITRATE) 950 (200 Ca) MG Tab Take 1 Tablet by mouth every day.      Multiple Vitamin (MULTI-VITAMINS PO) Take  by mouth.      gabapentin (NEURONTIN) 300 MG Cap Take 1  Capsule by mouth 3 times a day.      aspirin EC (ECOTRIN) 81 MG Tablet Delayed Response Take 1 Tablet by mouth every day.      Riboflavin (VITAMIN B-2 PO) Take  by mouth.       No current Epic-ordered facility-administered medications on file.       Allergies:  Ace inhibitors, Chlorhexidine, and Triamterene    Health Maintenance: Completed    ROS:  Gen: no fevers/chills, no changes in weight  Eyes: no changes in vision  ENT: no sore throat, no hearing loss, no bloody nose  Pulm: no sob, no cough  CV: no chest pain, no palpitations  GI: no nausea/vomiting, no diarrhea  : no dysuria  MSk: no myalgias  Skin: no rash  Neuro: no headaches, no numbness/tingling  Heme/Lymph: no easy bruising      Objective:       Exam:  /82 (BP Location: Left arm, Patient Position: Sitting, BP Cuff Size: Adult)   Pulse 86   Temp 36.8 °C (98.3 °F) (Temporal)   Resp 18   Ht 1.524 m (5')   Wt 70.6 kg (155 lb 9.6 oz)   SpO2 96%   BMI 30.39 kg/m²  Body mass index is 30.39 kg/m².    Gen: Alert and oriented, No apparent distress.  Ext: No clubbing, cyanosis, edema.  Psych: Patient is alert and oriented x3.  No unusual topics expressed.  Insight and judgment is good.  She does not appear to overtly depressed or anxious on today's visit.        Assessment & Plan:     67 y.o. female with the following -     1. Anxiety  This is a chronic problem.  Patient does use her medication judiciously.  A renewal was given.  Continue to follow.  We discussed her 's issues.  - LORazepam (ATIVAN) 0.5 MG Tab; Take 1 Tablet by mouth every 8 hours as needed for Anxiety for up to 30 days.  Dispense: 30 Tablet; Refill: 0    2. Primary osteoarthritis involving multiple joints  This is a chronic problem.  Is much improved with current treatment.  Continue to follow.      Return in about 3 months (around 3/5/2023).    Please note that this dictation was created using voice recognition software. I have made every reasonable attempt to correct obvious  errors, but I expect that there are errors of grammar and possibly content that I did not discover before finalizing the note.

## 2022-12-05 NOTE — ASSESSMENT & PLAN NOTE
This is a chronic problem.  Patient's recently had treatment for her back and her thumbs.  She states she is feeling much better and able to be much more active.

## 2022-12-19 DIAGNOSIS — R23.2 HOT FLASHES: ICD-10-CM

## 2022-12-20 ENCOUNTER — HOSPITAL ENCOUNTER (OUTPATIENT)
Dept: LAB | Facility: MEDICAL CENTER | Age: 67
End: 2022-12-20
Attending: FAMILY MEDICINE
Payer: MEDICARE

## 2022-12-20 DIAGNOSIS — R74.8 ALKALINE PHOSPHATASE ELEVATION: ICD-10-CM

## 2022-12-20 DIAGNOSIS — R23.2 HOT FLASHES: ICD-10-CM

## 2022-12-20 DIAGNOSIS — E03.9 ACQUIRED HYPOTHYROIDISM: ICD-10-CM

## 2022-12-20 LAB
ALBUMIN SERPL BCP-MCNC: 4.5 G/DL (ref 3.2–4.9)
ALBUMIN/GLOB SERPL: 1.7 G/DL
ALP SERPL-CCNC: 146 U/L (ref 30–99)
ALT SERPL-CCNC: 15 U/L (ref 2–50)
ANION GAP SERPL CALC-SCNC: 12 MMOL/L (ref 7–16)
AST SERPL-CCNC: 14 U/L (ref 12–45)
BASOPHILS # BLD AUTO: 0.6 % (ref 0–1.8)
BASOPHILS # BLD: 0.05 K/UL (ref 0–0.12)
BILIRUB SERPL-MCNC: 0.5 MG/DL (ref 0.1–1.5)
BUN SERPL-MCNC: 20 MG/DL (ref 8–22)
CALCIUM ALBUM COR SERPL-MCNC: 9.9 MG/DL (ref 8.5–10.5)
CALCIUM SERPL-MCNC: 10.3 MG/DL (ref 8.5–10.5)
CHLORIDE SERPL-SCNC: 103 MMOL/L (ref 96–112)
CO2 SERPL-SCNC: 26 MMOL/L (ref 20–33)
CREAT SERPL-MCNC: 0.75 MG/DL (ref 0.5–1.4)
EOSINOPHIL # BLD AUTO: 0.16 K/UL (ref 0–0.51)
EOSINOPHIL NFR BLD: 2 % (ref 0–6.9)
ERYTHROCYTE [DISTWIDTH] IN BLOOD BY AUTOMATED COUNT: 44.4 FL (ref 35.9–50)
GFR SERPLBLD CREATININE-BSD FMLA CKD-EPI: 87 ML/MIN/1.73 M 2
GLOBULIN SER CALC-MCNC: 2.7 G/DL (ref 1.9–3.5)
GLUCOSE SERPL-MCNC: 109 MG/DL (ref 65–99)
HCT VFR BLD AUTO: 45.9 % (ref 37–47)
HGB BLD-MCNC: 15.3 G/DL (ref 12–16)
IMM GRANULOCYTES # BLD AUTO: 0.03 K/UL (ref 0–0.11)
IMM GRANULOCYTES NFR BLD AUTO: 0.4 % (ref 0–0.9)
LYMPHOCYTES # BLD AUTO: 1.63 K/UL (ref 1–4.8)
LYMPHOCYTES NFR BLD: 20.8 % (ref 22–41)
MCH RBC QN AUTO: 29.6 PG (ref 27–33)
MCHC RBC AUTO-ENTMCNC: 33.3 G/DL (ref 33.6–35)
MCV RBC AUTO: 88.8 FL (ref 81.4–97.8)
MONOCYTES # BLD AUTO: 0.82 K/UL (ref 0–0.85)
MONOCYTES NFR BLD AUTO: 10.5 % (ref 0–13.4)
NEUTROPHILS # BLD AUTO: 5.15 K/UL (ref 2–7.15)
NEUTROPHILS NFR BLD: 65.7 % (ref 44–72)
NRBC # BLD AUTO: 0 K/UL
NRBC BLD-RTO: 0 /100 WBC
PLATELET # BLD AUTO: 331 K/UL (ref 164–446)
PMV BLD AUTO: 9 FL (ref 9–12.9)
POTASSIUM SERPL-SCNC: 3.9 MMOL/L (ref 3.6–5.5)
PROT SERPL-MCNC: 7.2 G/DL (ref 6–8.2)
RBC # BLD AUTO: 5.17 M/UL (ref 4.2–5.4)
SODIUM SERPL-SCNC: 141 MMOL/L (ref 135–145)
T4 FREE SERPL-MCNC: 1.56 NG/DL (ref 0.93–1.7)
TSH SERPL DL<=0.005 MIU/L-ACNC: 0.12 UIU/ML (ref 0.38–5.33)
WBC # BLD AUTO: 7.8 K/UL (ref 4.8–10.8)

## 2022-12-20 PROCEDURE — 36415 COLL VENOUS BLD VENIPUNCTURE: CPT

## 2022-12-20 PROCEDURE — 80053 COMPREHEN METABOLIC PANEL: CPT

## 2022-12-20 PROCEDURE — 85025 COMPLETE CBC W/AUTO DIFF WBC: CPT

## 2022-12-20 PROCEDURE — 84439 ASSAY OF FREE THYROXINE: CPT

## 2022-12-20 PROCEDURE — 84443 ASSAY THYROID STIM HORMONE: CPT | Mod: GA

## 2022-12-20 RX ORDER — LEVOTHYROXINE SODIUM 88 MCG
88 TABLET ORAL
Qty: 30 TABLET | Refills: 2 | Status: SHIPPED | OUTPATIENT
Start: 2022-12-20 | End: 2023-03-17

## 2023-01-03 RX ORDER — GABAPENTIN 300 MG/1
300 CAPSULE ORAL 3 TIMES DAILY
Qty: 270 CAPSULE | Refills: 1 | Status: SHIPPED | OUTPATIENT
Start: 2023-01-03 | End: 2023-01-09 | Stop reason: SDUPTHER

## 2023-01-03 NOTE — TELEPHONE ENCOUNTER
Received request via: Patient    Was the patient seen in the last year in this department? Yes    Does the patient have an active prescription (recently filled or refills available) for medication(s) requested? No    Does the patient have assisted Plus and need 100 day supply (blood pressure, diabetes and cholesterol meds only)? Patient does not have SCP

## 2023-01-09 RX ORDER — MELOXICAM 15 MG/1
15 TABLET ORAL DAILY
Qty: 90 TABLET | Refills: 3 | Status: SHIPPED | OUTPATIENT
Start: 2023-01-09 | End: 2023-07-25 | Stop reason: SDUPTHER

## 2023-01-09 RX ORDER — GABAPENTIN 300 MG/1
900 CAPSULE ORAL 3 TIMES DAILY
Qty: 810 CAPSULE | Refills: 1 | Status: SHIPPED | OUTPATIENT
Start: 2023-01-09 | End: 2023-08-12 | Stop reason: SDUPTHER

## 2023-01-30 ENCOUNTER — APPOINTMENT (OUTPATIENT)
Dept: LAB | Facility: MEDICAL CENTER | Age: 68
End: 2023-01-30
Payer: COMMERCIAL

## 2023-02-25 NOTE — H&P (VIEW-ONLY)
Follow-up patient Note    Interventional Pain and Spine  Physiatry (Physical Medicine and Rehabilitation)     Patient Name: Robin Lynn Zielesch  : 1955  Date of service: 2023    Chief Complaint:   Chief Complaint   Patient presents with    Follow-Up     Osteoarthritis of carpometacarpal (CMC) joints of both thumbs         HISTORY (2022):  Robin Lynn Zielesch is a 67 y.o. female who presents today with pain radiating from her right posterolateral glute down her right anterolateral and posterior thigh accompanied by numbness/tingling/weakness in this area. This started in Sep 2021 while recovering from a L2-pelvis posterior spinal fusion with TLIF/ PLIF on 21 with Dr. Bates ([facility] which she states she had done for similar radiating pain down her left leg.  Her radiating left leg pain resolved after surgery.     Her pain at its best-worse level during the course of the day is 5-9/10, respectively. Pain right now is 7/10 on the numeric pain scale. Pain worsens with sitting, standing, walking, bending backwards, side bending or twisting, walking upstairs, and walking downstairs and improves with nothing. Her pain interferes somewhat with ADLs. The patient otherwise denies new weakness, numbness, or bladder/bowel incontinence. States she has fallen a few times secondary to pain and possibly weakness. Moved from [facility] in May 2022.     The patient has done physical therapy for this problem with worsening pain.     Patient has tried the following medications with varied success (current meds in bold): Hayes back and body  Gabapentin 300mg TID - no relief. Used to help with left sided pain  Naproxen BID - significant relief     Therapeutic modalities and interventional therapies to date include:  -No injections     Medical history includes HTN, cervical laminectomy, depression, anxiety, thyroid cancer, BMI 30, L2-pelvis posterior spinal fusion with TLIF/ PLIF on 21    HPI  Today's visit    Robin Lynn Zielesch ( 1955) is a female with Diagnoses of Chronic right-sided low back pain with right-sided sciatica, History of lumbar fusion, Numbness and tingling of right leg, Lumbar disc herniation, Lumbar radiculitis, Osteoarthritis of carpometacarpal (CMC) joints of both thumbs, unspecified osteoarthritis type, Bilateral thumb pain, and Risk for falls were pertinent to this visit.    Today she notes pain at her right low back radiating down her anteromedial thigh feeling like a burning tension, also with numbness/tingling and weakness in this distribution. Impairs her ability to walk and do ADLs. Started about 2-3 weeks ago. Pain is constant. Feels similar to the pain that resolved after right L2-3 interlaminar epidural steroid injection. This injection ultimately lasted for 3 months.    Notes resolution of thumb pain after 22 bilateral CMC joint injections     Taking Hayes back and body and ASA for the pain. Also taking meloxicam and gabapentin as prescribed by PCP.    Pain severity 8/10 currently    Procedure history:  - 22 right L2-3 interlaminar epidural steroid injection - 90% improvement in pain, resolution of radiating pain.  - 22 bilateral CMC joint injections - 100% improvement in thumb pain bilaterally    ROS:   Red Flags ROS:   Fever, Chills, Sweats: Denies  Involuntary Weight Loss: Denies  Bladder Incontinence: Denies  Bowel Incontinence: denies  Saddle Anesthesia: Denies    All other systems reviewed and negative.     PMHx:   History reviewed. No pertinent past medical history.    PSHx:   Past Surgical History:   Procedure Laterality Date    NM INJ LUMBAR/SACRAL,W/ IMAGING Right 2022    Procedure: RIGHT lumbar L2-3 interlaminar epidural steroid injection;  Surgeon: Kendal Jeffers M.D.;  [facility];  Service: Pain Management    ABDOMINAL HYSTERECTOMY TOTAL      FOOT SURGERY      [id number]      L tka    LAMINOTOMY      THYROIDECTOMY  TOTAL      2019  thyroid cancer       Family Hx:   Family History   Problem Relation Age of Onset    COPD Mother     Cancer Father         pancreatic       Social Hx:  Social History     Socioeconomic History    Marital status:      Spouse name: Not on file    Number of children: Not on file    Years of education: Not on file    Highest education level: Some college, no degree   Occupational History    Not on file   Tobacco Use    Smoking status: Former     Types: Cigarettes    Smokeless tobacco: Never   Vaping Use    Vaping Use: Some days    Substances: Nicotine, CBD    Devices: Pre-filled or refillable cartridge, Refillable tank   Substance and Sexual Activity    Alcohol use: Not Currently    Drug use: Yes     Types: Marijuana, Inhaled     Comment: once in a while    Sexual activity: Not on file   Other Topics Concern    Not on file   Social History Narrative    Not on file     Social Determinants of Health     Financial Resource Strain: Low Risk     Difficulty of Paying Living Expenses: Not very hard   Food Insecurity: No Food Insecurity    Worried About Running Out of Food in the Last Year: Never true    Ran Out of Food in the Last Year: Never true   Transportation Needs: No Transportation Needs    Lack of Transportation (Medical): No    Lack of Transportation (Non-Medical): No   Physical Activity: Insufficiently Active    Days of Exercise per Week: 7 days    Minutes of Exercise per Session: 10 min   Stress: Stress Concern Present    Feeling of Stress : To some extent   Social Connections: Moderately Isolated    Frequency of Communication with Friends and Family: Once a week    Frequency of Social Gatherings with Friends and Family: Never    Attends Hinduism Services: More than 4 times per year    Active Member of Clubs or Organizations: No    Attends Club or Organization Meetings: Patient refused    Marital Status:    Intimate Partner Violence: Not on file   Housing Stability: Low Risk     Unable  to Pay for Housing in the Last Year: No    Number of Places Lived in the Last Year: 2    Unstable Housing in the Last Year: No       Allergies:  Allergies   Allergen Reactions    Ace Inhibitors Cough    Chlorhexidine Rash    Triamterene      Other reaction(s): Nephrotoxicity       Medications: reviewed on epic.   Outpatient Medications Marked as Taking for the 2/27/23 encounter (Office Visit) with Kendal Jeffers M.D.   Medication Sig Dispense Refill    gabapentin (NEURONTIN) 300 MG Cap Take 3 Capsules by mouth 3 times a day. 810 Capsule 1    meloxicam (MOBIC) 15 MG tablet Take 1 Tablet by mouth every day. 90 Tablet 3    SYNTHROID 88 MCG Tab Take 1 Tablet by mouth every morning on an empty stomach. 30 Tablet 2    potassium chloride SA (KDUR) 20 MEQ Tab CR Take 1 Tablet by mouth every day. 90 Tablet 1    amLODIPine (NORVASC) 10 MG Tab Take 1 Tablet by mouth every day. 90 Tablet 3    omeprazole (PRILOSEC) 40 MG delayed-release capsule TAKE 1 CAPSULE BY MOUTH EVERY DAY 90 Capsule 3    buPROPion (WELLBUTRIN XL) 300 MG XL tablet TAKE 1 TABLET BY MOUTH EVERY DAY IN THE MORNING 90 Tablet 3    losartan (COZAAR) 100 MG Tab Take 1 Tablet by mouth every day.      calcium citrate (CALCITRATE) 950 (200 Ca) MG Tab Take 1 Tablet by mouth every day.      Multiple Vitamin (MULTI-VITAMINS PO) Take  by mouth.      aspirin EC (ECOTRIN) 81 MG Tablet Delayed Response Take 1 Tablet by mouth every day.      Riboflavin (VITAMIN B-2 PO) Take  by mouth.          Current Outpatient Medications on File Prior to Visit   Medication Sig Dispense Refill    gabapentin (NEURONTIN) 300 MG Cap Take 3 Capsules by mouth 3 times a day. 810 Capsule 1    meloxicam (MOBIC) 15 MG tablet Take 1 Tablet by mouth every day. 90 Tablet 3    SYNTHROID 88 MCG Tab Take 1 Tablet by mouth every morning on an empty stomach. 30 Tablet 2    potassium chloride SA (KDUR) 20 MEQ Tab CR Take 1 Tablet by mouth every day. 90 Tablet 1    amLODIPine (NORVASC) 10 MG Tab Take 1  Tablet by mouth every day. 90 Tablet 3    omeprazole (PRILOSEC) 40 MG delayed-release capsule TAKE 1 CAPSULE BY MOUTH EVERY DAY 90 Capsule 3    buPROPion (WELLBUTRIN XL) 300 MG XL tablet TAKE 1 TABLET BY MOUTH EVERY DAY IN THE MORNING 90 Tablet 3    losartan (COZAAR) 100 MG Tab Take 1 Tablet by mouth every day.      calcium citrate (CALCITRATE) 950 (200 Ca) MG Tab Take 1 Tablet by mouth every day.      Multiple Vitamin (MULTI-VITAMINS PO) Take  by mouth.      aspirin EC (ECOTRIN) 81 MG Tablet Delayed Response Take 1 Tablet by mouth every day.      Riboflavin (VITAMIN B-2 PO) Take  by mouth.       No current facility-administered medications on file prior to visit.         EXAMINATION     Physical Exam:   BP (!) 132/90 (BP Location: Left arm, Patient Position: Sitting, BP Cuff Size: Adult)   Pulse 88   Temp 36.1 °C (97 °F) (Temporal)   Ht 1.524 m (5')   Wt 72.7 kg (160 lb 4.4 oz)   SpO2 95%     Constitutional:   Body Habitus: Body mass index is 31.3 kg/m².  Cooperation: Fully cooperates with exam  Appearance: Well-groomed, well-nourished.    Eyes: No scleral icterus to suggest severe liver disease, no proptosis to suggest severe hyperthyroidism    ENT -no obvious auditory deficits, no noticeable facial droop     Skin -no rashes or lesions noted     Respiratory-  breathing comfortably on room air, no audible wheezing    Cardiovascular-distal extremities warm and well perfused.  No lower extremity edema is noted.     Gastrointestinal - no obvious abdominal masses, non-distended    Psychiatric- alert and oriented ×3. Normal affect.     Gait - normal gait, no use of ambulatory device, nonantalgic.     Musculoskeletal and Neuro -       Thoracic/Lumbar Spine/Sacral Spine/Hips   Inspection: No evidence of atrophy in bilateral lower extremities throughout        Lumbar spine /hip provocative exam maneuvers  Straight leg raise positive on right, negative on left  FADIR test negative bilaterally  Femoral stretch test  positive on right, negative on left     SI joint tests  EBEN test negative bilaterally  Thigh thrust test negative bilaterally     Key points for the international standards for neurological classification of spinal cord injury (ISNCSCI) to light touch.   Dermatome R L   L2 1 2   L3 1 2   L4 1 2   L5 2 2   S1 2 2   S2 2 2         Motor Exam Lower Extremities  ? Myotome R L   Hip flexion L2 5 5   Knee extension L3 5 5   Ankle dorsiflexion L4 5 5   Toe extension L5 5 5   Ankle plantarflexion S1 5 5      Heel walking and toe walking intact.       Reflexes  ?   R L   Patella   2+ 2+   Achilles    2+ 2+      Clonus of the ankle negative bilaterally      Previous exam  There is full active range of motion with lumbar extension     Facet loading maneuver negative bilaterally     Palpation:   No tenderness to palpation in the low back/hips including midline of lumbosacral spine, paraspinal muscles bilaterally, lumbar facets bilaterally, sacroiliac joints bilaterally, PSIS bilaterally, and greater trochanters bilaterally.     MEDICAL DECISION MAKING     Medical records review: see under HPI section.       MEDICAL DECISION MAKING    Medical records review: see under HPI section.     DATA    Labs: No new labs available for review since last visit.    Lab Results   Component Value Date/Time    SODIUM 141 12/20/2022 10:30 AM    POTASSIUM 3.9 12/20/2022 10:30 AM    CHLORIDE 103 12/20/2022 10:30 AM    CO2 26 12/20/2022 10:30 AM    ANION 12.0 12/20/2022 10:30 AM    GLUCOSE 109 (H) 12/20/2022 10:30 AM    BUN 20 12/20/2022 10:30 AM    CREATININE 0.75 12/20/2022 10:30 AM    CALCIUM 10.3 12/20/2022 10:30 AM    ASTSGOT 14 12/20/2022 10:30 AM    ALTSGPT 15 12/20/2022 10:30 AM    TBILIRUBIN 0.5 12/20/2022 10:30 AM    ALBUMIN 4.5 12/20/2022 10:30 AM    TOTPROTEIN 7.2 12/20/2022 10:30 AM    GLOBULIN 2.7 12/20/2022 10:30 AM    AGRATIO 1.7 12/20/2022 10:30 AM       No results found for: PROTHROMBTM, INR     Lab Results   Component Value  Date/Time    WBC 7.8 12/20/2022 10:30 AM    RBC 5.17 12/20/2022 10:30 AM    HEMOGLOBIN 15.3 12/20/2022 10:30 AM    HEMATOCRIT 45.9 12/20/2022 10:30 AM    MCV 88.8 12/20/2022 10:30 AM    MCH 29.6 12/20/2022 10:30 AM    MCHC 33.3 (L) 12/20/2022 10:30 AM    MPV 9.0 12/20/2022 10:30 AM    NEUTSPOLYS 65.70 12/20/2022 10:30 AM    LYMPHOCYTES 20.80 (L) 12/20/2022 10:30 AM    MONOCYTES 10.50 12/20/2022 10:30 AM    EOSINOPHILS 2.00 12/20/2022 10:30 AM    BASOPHILS 0.60 12/20/2022 10:30 AM        Lab Results   Component Value Date/Time    HBA1C 5.8 (H) 04/28/2022 10:39 AM        Imaging:   I personally reviewed following images, these are my reads  MRI lumbar spine 9/2/2022  Evidence of lumbar laminectomy at L4-S1, interbody fusion and posterior fusion at L3-S1.  Broad-based disc bulge at L1-L2 resulting in severe central canal stenosis and compression of descending bilateral L2 nerve roots and possibly bilateral L3 nerve roots and mild impingement of exiting L1 nerve roots bilaterally.  Mild right neuroforaminal stenosis at T12-L1.  Possible mild bilateral neuroforaminal stenosis at L5-S1, quality of images impaired due to metallic artifact. Appearance of chronic postoperative seroma posterior to L4 and L5 vertebral bodies.    XR Right hand 11/22/22  Moderate to severe CMC joint OA. See formal radiology report for further details.    IMAGING radiology reads. I reviewed the following radiology reads                      Results for orders placed during the hospital encounter of 09/02/22    MR-LUMBAR SPINE-W/O    Impression  1.  L3-4 slight anterolisthesis and L5-S1 slight retrolisthesis.  2.  Postoperative changes with lumbar laminectomy L4-L5-S1, interbody fusion L3-L4, L4-L5, L5-S1, and posterior fusion with transpedicular screw fixation at L3-L4-L5-S1.  3.  Chronic postoperative seroma occupying the laminectomy interval without dorsal epidural mass effect.  4.  The study is most notable for L1-L2 large disc  protrusion-extrusion resulting in severe central stenosis.  5.  Additional degenerative changes detailed for each level above in the body of report.        MRI lumbar spine 10/20/21  Lumbar spine:    Alignment: Grade 1 L3-L4 anterolisthesis. Previously seen L4-L5  anterolisthesis is improved compared to MRI prior to surgery.    Vertebral body heights and marrow: Postsurgical changes from L3-S1  posterior fusion. Multilevel lumbar spondylosis with osteophytes, facet  arthropathy, and endplate marrow degenerative changes are seen. Otherwise  the vertebral body heights and marrow signal are unremarkable.    Conus medullaris: Normal, terminating at L1/L2.    Soft tissues: There is a fluid collection in the paraspinal soft tissues  posterior to the L3-L5 laminectomy sites, likely postoperative seroma  measuring 63 x 28 mm (series 17, image 7). Small right renal cyst.    L1-L2: Persistent disc bulge with worsening superimposed central disc  extrusion. Bilateral facet arthropathy and dorsal epidural fat. The  constellation of findings produces moderate to severe spinal canal  stenosis. Mild to moderate left neural foraminal stenosis is improved  compared to prior.  Ligamentum flavum thickening. Facet hypertrophy, mild.    L2-L3: Disc bulge, ligamentum flavum thickening, facet hypertrophy  resulting in mild spinal canal stenosis. Mild left neural foraminal  stenosis.    L3-L4: Partially uncovered disc. Mild to moderate right neural foraminal  stenosis. Limited evaluation of the left neural foramen. Laminectomy.    L4-L5: No spinal canal stenosis. Limited evaluation of neural foramina due  to spinal hardware. Laminectomy.    L5-S1: Disc bulge without spinal canal stenosis. Limited evaluation of  neural foramina due to spinal hardware.     IMPRESSION:    1. Worsening disc protrusion at L1-L2, resulting in worsening spinal  canal stenosis, now moderate to severe.   2. Multilevel degenerative changes of the cervical spine,  similar  compared to prior.  3. Multilevel degenerative changes in thoracic spine, with up to mild  to moderate spinal canal stenosis at T8-T9 secondary to disc bulge.  4. Postsurgical changes . Small postoperative seroma in L3-L5  laminectomy sites.        X-ray left hand 3/19/2019  FINDINGS:   There is no acute fracture or dislocation.   Advanced osteoarthrosis of the first CMC joint, characterized by joint   space narrowing, subchondral sclerosis, osteophyte formation, and radial   subluxation. Mild osteoarthrosis of the STT joint. Osteoarthrosis of   scattered IP joints. No focal soft tissue swelling.     IMPRESSION:   Advanced osteoarthrosis of the first CMC joint.     XR Right hand 22  FINDINGS:     BONE MINERALIZATION: Normal.  JOINTS: Moderate to severe first carpometacarpal joint osteoarthrosis. Mild triscaphe joint osteoarthrosis. Mild multifocal osteoarthrosis otherwise. No erosions.  FRACTURE: None.  DISLOCATION: None.  SOFT TISSUES: No mass.     IMPRESSION:     1.  Moderate to severe first carpometacarpal joint osteoarthrosis.  2.  Mild multifocal osteoarthrosis otherwise.                                                  Diagnosis  Visit Diagnoses     ICD-10-CM   1. Chronic right-sided low back pain with right-sided sciatica  M54.41    G89.29   2. History of lumbar fusion  Z98.1   3. Numbness and tingling of right leg  R20.0    R20.2   4. Lumbar disc herniation  M51.26   5. Lumbar radiculitis  M54.16   6. Osteoarthritis of carpometacarpal (CMC) joints of both thumbs, unspecified osteoarthritis type  M18.0   7. Bilateral thumb pain  M79.644    M79.645   8. Risk for falls  Z91.81             ASSESSMENT AND PLAN:  Robin Lynn Zielesch (: 1955) is a female with history of HTN, cervical laminectomy, depression, anxiety, thyroid cancer, BMI 30, L3-pelvis posterior spinal fusion with TLIF/ PLIF on 21 who presents with recurrence of pain radiating down her right low back to her right thigh in  primarily L2-L4 dermatomal distribution that had previously resolved after L2-3 interlaminar epidural steroid injection.      Mathew was seen today for follow-up.    Diagnoses and all orders for this visit:    Chronic right-sided low back pain with right-sided sciatica    History of lumbar fusion    Numbness and tingling of right leg    Lumbar disc herniation    Lumbar radiculitis  -     Referral to Pain Clinic    Osteoarthritis of carpometacarpal (CMC) joints of both thumbs, unspecified osteoarthritis type    Bilateral thumb pain    Risk for falls  -     Patient identified as fall risk.  Appropriate orders and counseling given.              PLAN  Physical Therapy: Patient has completed formal physical therapy for this issue.  Continue home exercise program as able.  I have advised her to wear CMC splints as needed, which she has at home     Diagnostic workup: no new imaging needed at this time    Medications:   -Continue gabapentin, Mobic as per PCP  -  reviewed, records do not demonstrate increased risk of opioid abuse.     Interventions:   -Repeat right L2-3 interlaminar epidural steroid injection given significant improvement in pain with this procedure in the past. The risks, benefits, and alternatives to this procedure were discussed and the patient wishes to proceed with the procedure. Risks include but are not limited to damage to surrounding structures, infection, bleeding, worsening of pain which can be permanent, and weakness which can be permanent. Benefits include pain relief and improved function. Alternatives include not doing the procedure.    -Discussed increased risk of complications including epidural hematoma if patient continues ASA/meloxicam.  Discussed that to minimize this risk, pt needs to hold ASA/meloxicam for 5 days prior to procedure.    -Bilateral CMC joint steroid injections under ultrasound guidance as needed given significant improvement in pain with this procedure.     Other  -I  previously discussed neurosurgical referral for evaluation and management of disc herniation at L1-2 that appears to be causing severe central canal stenosis and symptoms of lumbar radiculopathy.  Given significant improvement in pain after our previous epidural steroid injection and no neurologic deficits on exam today, I believe it is reasonable to defer a neurosurgery referral at this time    Follow-up: 1 month after procedure above    Orders Placed This Encounter    Referral to Pain Clinic    Patient identified as fall risk.  Appropriate orders and counseling given.       Kendal Jeffers MD  Interventional Pain and Spine  Physical Medicine and Rehabilitation  [facility] of the [facility]      The above note documents my personal evaluation of this patient. In addition, I have reviewed and confirmed with the patient and MA the supportive information documented in today's Patient Health Questionnaire and Office Note.     Please note that this dictation was created using voice recognition software. I have made every reasonable attempt to correct obvious errors, but I expect that there are errors of grammar and possibly content that I did not discover before finalizing the note.

## 2023-02-25 NOTE — PROGRESS NOTES
Follow-up patient Note    Interventional Pain and Spine  Physiatry (Physical Medicine and Rehabilitation)     Patient Name: Robin Lynn Zielesch  : 1955  Date of service: 2023    Chief Complaint:   Chief Complaint   Patient presents with    Follow-Up     Osteoarthritis of carpometacarpal (CMC) joints of both thumbs         HISTORY (2022):  Robin Lynn Zielesch is a 67 y.o. female who presents today with pain radiating from her right posterolateral glute down her right anterolateral and posterior thigh accompanied by numbness/tingling/weakness in this area. This started in Sep 2021 while recovering from a L2-pelvis posterior spinal fusion with TLIF/ PLIF on 21 with Dr. Bates ([facility] which she states she had done for similar radiating pain down her left leg.  Her radiating left leg pain resolved after surgery.     Her pain at its best-worse level during the course of the day is 5-9/10, respectively. Pain right now is 7/10 on the numeric pain scale. Pain worsens with sitting, standing, walking, bending backwards, side bending or twisting, walking upstairs, and walking downstairs and improves with nothing. Her pain interferes somewhat with ADLs. The patient otherwise denies new weakness, numbness, or bladder/bowel incontinence. States she has fallen a few times secondary to pain and possibly weakness. Moved from [facility] in May 2022.     The patient has done physical therapy for this problem with worsening pain.     Patient has tried the following medications with varied success (current meds in bold): Hayes back and body  Gabapentin 300mg TID - no relief. Used to help with left sided pain  Naproxen BID - significant relief     Therapeutic modalities and interventional therapies to date include:  -No injections     Medical history includes HTN, cervical laminectomy, depression, anxiety, thyroid cancer, BMI 30, L2-pelvis posterior spinal fusion with TLIF/ PLIF on 21    HPI  Today's visit    Robin Lynn Zielesch ( 1955) is a female with Diagnoses of Chronic right-sided low back pain with right-sided sciatica, History of lumbar fusion, Numbness and tingling of right leg, Lumbar disc herniation, Lumbar radiculitis, Osteoarthritis of carpometacarpal (CMC) joints of both thumbs, unspecified osteoarthritis type, Bilateral thumb pain, and Risk for falls were pertinent to this visit.    Today she notes pain at her right low back radiating down her anteromedial thigh feeling like a burning tension, also with numbness/tingling and weakness in this distribution. Impairs her ability to walk and do ADLs. Started about 2-3 weeks ago. Pain is constant. Feels similar to the pain that resolved after right L2-3 interlaminar epidural steroid injection. This injection ultimately lasted for 3 months.    Notes resolution of thumb pain after 22 bilateral CMC joint injections     Taking Hayes back and body and ASA for the pain. Also taking meloxicam and gabapentin as prescribed by PCP.    Pain severity 8/10 currently    Procedure history:  - 22 right L2-3 interlaminar epidural steroid injection - 90% improvement in pain, resolution of radiating pain.  - 22 bilateral CMC joint injections - 100% improvement in thumb pain bilaterally    ROS:   Red Flags ROS:   Fever, Chills, Sweats: Denies  Involuntary Weight Loss: Denies  Bladder Incontinence: Denies  Bowel Incontinence: denies  Saddle Anesthesia: Denies    All other systems reviewed and negative.     PMHx:   History reviewed. No pertinent past medical history.    PSHx:   Past Surgical History:   Procedure Laterality Date    AR INJ LUMBAR/SACRAL,W/ IMAGING Right 2022    Procedure: RIGHT lumbar L2-3 interlaminar epidural steroid injection;  Surgeon: Kendal Jeffers M.D.;  [facility];  Service: Pain Management    ABDOMINAL HYSTERECTOMY TOTAL      FOOT SURGERY      [id number]      L tka    LAMINOTOMY      THYROIDECTOMY  TOTAL      2019  thyroid cancer       Family Hx:   Family History   Problem Relation Age of Onset    COPD Mother     Cancer Father         pancreatic       Social Hx:  Social History     Socioeconomic History    Marital status:      Spouse name: Not on file    Number of children: Not on file    Years of education: Not on file    Highest education level: Some college, no degree   Occupational History    Not on file   Tobacco Use    Smoking status: Former     Types: Cigarettes    Smokeless tobacco: Never   Vaping Use    Vaping Use: Some days    Substances: Nicotine, CBD    Devices: Pre-filled or refillable cartridge, Refillable tank   Substance and Sexual Activity    Alcohol use: Not Currently    Drug use: Yes     Types: Marijuana, Inhaled     Comment: once in a while    Sexual activity: Not on file   Other Topics Concern    Not on file   Social History Narrative    Not on file     Social Determinants of Health     Financial Resource Strain: Low Risk     Difficulty of Paying Living Expenses: Not very hard   Food Insecurity: No Food Insecurity    Worried About Running Out of Food in the Last Year: Never true    Ran Out of Food in the Last Year: Never true   Transportation Needs: No Transportation Needs    Lack of Transportation (Medical): No    Lack of Transportation (Non-Medical): No   Physical Activity: Insufficiently Active    Days of Exercise per Week: 7 days    Minutes of Exercise per Session: 10 min   Stress: Stress Concern Present    Feeling of Stress : To some extent   Social Connections: Moderately Isolated    Frequency of Communication with Friends and Family: Once a week    Frequency of Social Gatherings with Friends and Family: Never    Attends Jehovah's witness Services: More than 4 times per year    Active Member of Clubs or Organizations: No    Attends Club or Organization Meetings: Patient refused    Marital Status:    Intimate Partner Violence: Not on file   Housing Stability: Low Risk     Unable  to Pay for Housing in the Last Year: No    Number of Places Lived in the Last Year: 2    Unstable Housing in the Last Year: No       Allergies:  Allergies   Allergen Reactions    Ace Inhibitors Cough    Chlorhexidine Rash    Triamterene      Other reaction(s): Nephrotoxicity       Medications: reviewed on epic.   Outpatient Medications Marked as Taking for the 2/27/23 encounter (Office Visit) with Kendal Jeffers M.D.   Medication Sig Dispense Refill    gabapentin (NEURONTIN) 300 MG Cap Take 3 Capsules by mouth 3 times a day. 810 Capsule 1    meloxicam (MOBIC) 15 MG tablet Take 1 Tablet by mouth every day. 90 Tablet 3    SYNTHROID 88 MCG Tab Take 1 Tablet by mouth every morning on an empty stomach. 30 Tablet 2    potassium chloride SA (KDUR) 20 MEQ Tab CR Take 1 Tablet by mouth every day. 90 Tablet 1    amLODIPine (NORVASC) 10 MG Tab Take 1 Tablet by mouth every day. 90 Tablet 3    omeprazole (PRILOSEC) 40 MG delayed-release capsule TAKE 1 CAPSULE BY MOUTH EVERY DAY 90 Capsule 3    buPROPion (WELLBUTRIN XL) 300 MG XL tablet TAKE 1 TABLET BY MOUTH EVERY DAY IN THE MORNING 90 Tablet 3    losartan (COZAAR) 100 MG Tab Take 1 Tablet by mouth every day.      calcium citrate (CALCITRATE) 950 (200 Ca) MG Tab Take 1 Tablet by mouth every day.      Multiple Vitamin (MULTI-VITAMINS PO) Take  by mouth.      aspirin EC (ECOTRIN) 81 MG Tablet Delayed Response Take 1 Tablet by mouth every day.      Riboflavin (VITAMIN B-2 PO) Take  by mouth.          Current Outpatient Medications on File Prior to Visit   Medication Sig Dispense Refill    gabapentin (NEURONTIN) 300 MG Cap Take 3 Capsules by mouth 3 times a day. 810 Capsule 1    meloxicam (MOBIC) 15 MG tablet Take 1 Tablet by mouth every day. 90 Tablet 3    SYNTHROID 88 MCG Tab Take 1 Tablet by mouth every morning on an empty stomach. 30 Tablet 2    potassium chloride SA (KDUR) 20 MEQ Tab CR Take 1 Tablet by mouth every day. 90 Tablet 1    amLODIPine (NORVASC) 10 MG Tab Take 1  Tablet by mouth every day. 90 Tablet 3    omeprazole (PRILOSEC) 40 MG delayed-release capsule TAKE 1 CAPSULE BY MOUTH EVERY DAY 90 Capsule 3    buPROPion (WELLBUTRIN XL) 300 MG XL tablet TAKE 1 TABLET BY MOUTH EVERY DAY IN THE MORNING 90 Tablet 3    losartan (COZAAR) 100 MG Tab Take 1 Tablet by mouth every day.      calcium citrate (CALCITRATE) 950 (200 Ca) MG Tab Take 1 Tablet by mouth every day.      Multiple Vitamin (MULTI-VITAMINS PO) Take  by mouth.      aspirin EC (ECOTRIN) 81 MG Tablet Delayed Response Take 1 Tablet by mouth every day.      Riboflavin (VITAMIN B-2 PO) Take  by mouth.       No current facility-administered medications on file prior to visit.         EXAMINATION     Physical Exam:   BP (!) 132/90 (BP Location: Left arm, Patient Position: Sitting, BP Cuff Size: Adult)   Pulse 88   Temp 36.1 °C (97 °F) (Temporal)   Ht 1.524 m (5')   Wt 72.7 kg (160 lb 4.4 oz)   SpO2 95%     Constitutional:   Body Habitus: Body mass index is 31.3 kg/m².  Cooperation: Fully cooperates with exam  Appearance: Well-groomed, well-nourished.    Eyes: No scleral icterus to suggest severe liver disease, no proptosis to suggest severe hyperthyroidism    ENT -no obvious auditory deficits, no noticeable facial droop     Skin -no rashes or lesions noted     Respiratory-  breathing comfortably on room air, no audible wheezing    Cardiovascular-distal extremities warm and well perfused.  No lower extremity edema is noted.     Gastrointestinal - no obvious abdominal masses, non-distended    Psychiatric- alert and oriented ×3. Normal affect.     Gait - normal gait, no use of ambulatory device, nonantalgic.     Musculoskeletal and Neuro -       Thoracic/Lumbar Spine/Sacral Spine/Hips   Inspection: No evidence of atrophy in bilateral lower extremities throughout        Lumbar spine /hip provocative exam maneuvers  Straight leg raise positive on right, negative on left  FADIR test negative bilaterally  Femoral stretch test  positive on right, negative on left     SI joint tests  EBEN test negative bilaterally  Thigh thrust test negative bilaterally     Key points for the international standards for neurological classification of spinal cord injury (ISNCSCI) to light touch.   Dermatome R L   L2 1 2   L3 1 2   L4 1 2   L5 2 2   S1 2 2   S2 2 2         Motor Exam Lower Extremities  ? Myotome R L   Hip flexion L2 5 5   Knee extension L3 5 5   Ankle dorsiflexion L4 5 5   Toe extension L5 5 5   Ankle plantarflexion S1 5 5      Heel walking and toe walking intact.       Reflexes  ?   R L   Patella   2+ 2+   Achilles    2+ 2+      Clonus of the ankle negative bilaterally      Previous exam  There is full active range of motion with lumbar extension     Facet loading maneuver negative bilaterally     Palpation:   No tenderness to palpation in the low back/hips including midline of lumbosacral spine, paraspinal muscles bilaterally, lumbar facets bilaterally, sacroiliac joints bilaterally, PSIS bilaterally, and greater trochanters bilaterally.     MEDICAL DECISION MAKING     Medical records review: see under HPI section.       MEDICAL DECISION MAKING    Medical records review: see under HPI section.     DATA    Labs: No new labs available for review since last visit.    Lab Results   Component Value Date/Time    SODIUM 141 12/20/2022 10:30 AM    POTASSIUM 3.9 12/20/2022 10:30 AM    CHLORIDE 103 12/20/2022 10:30 AM    CO2 26 12/20/2022 10:30 AM    ANION 12.0 12/20/2022 10:30 AM    GLUCOSE 109 (H) 12/20/2022 10:30 AM    BUN 20 12/20/2022 10:30 AM    CREATININE 0.75 12/20/2022 10:30 AM    CALCIUM 10.3 12/20/2022 10:30 AM    ASTSGOT 14 12/20/2022 10:30 AM    ALTSGPT 15 12/20/2022 10:30 AM    TBILIRUBIN 0.5 12/20/2022 10:30 AM    ALBUMIN 4.5 12/20/2022 10:30 AM    TOTPROTEIN 7.2 12/20/2022 10:30 AM    GLOBULIN 2.7 12/20/2022 10:30 AM    AGRATIO 1.7 12/20/2022 10:30 AM       No results found for: PROTHROMBTM, INR     Lab Results   Component Value  Date/Time    WBC 7.8 12/20/2022 10:30 AM    RBC 5.17 12/20/2022 10:30 AM    HEMOGLOBIN 15.3 12/20/2022 10:30 AM    HEMATOCRIT 45.9 12/20/2022 10:30 AM    MCV 88.8 12/20/2022 10:30 AM    MCH 29.6 12/20/2022 10:30 AM    MCHC 33.3 (L) 12/20/2022 10:30 AM    MPV 9.0 12/20/2022 10:30 AM    NEUTSPOLYS 65.70 12/20/2022 10:30 AM    LYMPHOCYTES 20.80 (L) 12/20/2022 10:30 AM    MONOCYTES 10.50 12/20/2022 10:30 AM    EOSINOPHILS 2.00 12/20/2022 10:30 AM    BASOPHILS 0.60 12/20/2022 10:30 AM        Lab Results   Component Value Date/Time    HBA1C 5.8 (H) 04/28/2022 10:39 AM        Imaging:   I personally reviewed following images, these are my reads  MRI lumbar spine 9/2/2022  Evidence of lumbar laminectomy at L4-S1, interbody fusion and posterior fusion at L3-S1.  Broad-based disc bulge at L1-L2 resulting in severe central canal stenosis and compression of descending bilateral L2 nerve roots and possibly bilateral L3 nerve roots and mild impingement of exiting L1 nerve roots bilaterally.  Mild right neuroforaminal stenosis at T12-L1.  Possible mild bilateral neuroforaminal stenosis at L5-S1, quality of images impaired due to metallic artifact. Appearance of chronic postoperative seroma posterior to L4 and L5 vertebral bodies.    XR Right hand 11/22/22  Moderate to severe CMC joint OA. See formal radiology report for further details.    IMAGING radiology reads. I reviewed the following radiology reads                      Results for orders placed during the hospital encounter of 09/02/22    MR-LUMBAR SPINE-W/O    Impression  1.  L3-4 slight anterolisthesis and L5-S1 slight retrolisthesis.  2.  Postoperative changes with lumbar laminectomy L4-L5-S1, interbody fusion L3-L4, L4-L5, L5-S1, and posterior fusion with transpedicular screw fixation at L3-L4-L5-S1.  3.  Chronic postoperative seroma occupying the laminectomy interval without dorsal epidural mass effect.  4.  The study is most notable for L1-L2 large disc  protrusion-extrusion resulting in severe central stenosis.  5.  Additional degenerative changes detailed for each level above in the body of report.        MRI lumbar spine 10/20/21  Lumbar spine:    Alignment: Grade 1 L3-L4 anterolisthesis. Previously seen L4-L5  anterolisthesis is improved compared to MRI prior to surgery.    Vertebral body heights and marrow: Postsurgical changes from L3-S1  posterior fusion. Multilevel lumbar spondylosis with osteophytes, facet  arthropathy, and endplate marrow degenerative changes are seen. Otherwise  the vertebral body heights and marrow signal are unremarkable.    Conus medullaris: Normal, terminating at L1/L2.    Soft tissues: There is a fluid collection in the paraspinal soft tissues  posterior to the L3-L5 laminectomy sites, likely postoperative seroma  measuring 63 x 28 mm (series 17, image 7). Small right renal cyst.    L1-L2: Persistent disc bulge with worsening superimposed central disc  extrusion. Bilateral facet arthropathy and dorsal epidural fat. The  constellation of findings produces moderate to severe spinal canal  stenosis. Mild to moderate left neural foraminal stenosis is improved  compared to prior.  Ligamentum flavum thickening. Facet hypertrophy, mild.    L2-L3: Disc bulge, ligamentum flavum thickening, facet hypertrophy  resulting in mild spinal canal stenosis. Mild left neural foraminal  stenosis.    L3-L4: Partially uncovered disc. Mild to moderate right neural foraminal  stenosis. Limited evaluation of the left neural foramen. Laminectomy.    L4-L5: No spinal canal stenosis. Limited evaluation of neural foramina due  to spinal hardware. Laminectomy.    L5-S1: Disc bulge without spinal canal stenosis. Limited evaluation of  neural foramina due to spinal hardware.     IMPRESSION:    1. Worsening disc protrusion at L1-L2, resulting in worsening spinal  canal stenosis, now moderate to severe.   2. Multilevel degenerative changes of the cervical spine,  similar  compared to prior.  3. Multilevel degenerative changes in thoracic spine, with up to mild  to moderate spinal canal stenosis at T8-T9 secondary to disc bulge.  4. Postsurgical changes . Small postoperative seroma in L3-L5  laminectomy sites.        X-ray left hand 3/19/2019  FINDINGS:   There is no acute fracture or dislocation.   Advanced osteoarthrosis of the first CMC joint, characterized by joint   space narrowing, subchondral sclerosis, osteophyte formation, and radial   subluxation. Mild osteoarthrosis of the STT joint. Osteoarthrosis of   scattered IP joints. No focal soft tissue swelling.     IMPRESSION:   Advanced osteoarthrosis of the first CMC joint.     XR Right hand 22  FINDINGS:     BONE MINERALIZATION: Normal.  JOINTS: Moderate to severe first carpometacarpal joint osteoarthrosis. Mild triscaphe joint osteoarthrosis. Mild multifocal osteoarthrosis otherwise. No erosions.  FRACTURE: None.  DISLOCATION: None.  SOFT TISSUES: No mass.     IMPRESSION:     1.  Moderate to severe first carpometacarpal joint osteoarthrosis.  2.  Mild multifocal osteoarthrosis otherwise.                                                  Diagnosis  Visit Diagnoses     ICD-10-CM   1. Chronic right-sided low back pain with right-sided sciatica  M54.41    G89.29   2. History of lumbar fusion  Z98.1   3. Numbness and tingling of right leg  R20.0    R20.2   4. Lumbar disc herniation  M51.26   5. Lumbar radiculitis  M54.16   6. Osteoarthritis of carpometacarpal (CMC) joints of both thumbs, unspecified osteoarthritis type  M18.0   7. Bilateral thumb pain  M79.644    M79.645   8. Risk for falls  Z91.81             ASSESSMENT AND PLAN:  Robin Lynn Zielesch (: 1955) is a female with history of HTN, cervical laminectomy, depression, anxiety, thyroid cancer, BMI 30, L3-pelvis posterior spinal fusion with TLIF/ PLIF on 21 who presents with recurrence of pain radiating down her right low back to her right thigh in  primarily L2-L4 dermatomal distribution that had previously resolved after L2-3 interlaminar epidural steroid injection.      Mathew was seen today for follow-up.    Diagnoses and all orders for this visit:    Chronic right-sided low back pain with right-sided sciatica    History of lumbar fusion    Numbness and tingling of right leg    Lumbar disc herniation    Lumbar radiculitis  -     Referral to Pain Clinic    Osteoarthritis of carpometacarpal (CMC) joints of both thumbs, unspecified osteoarthritis type    Bilateral thumb pain    Risk for falls  -     Patient identified as fall risk.  Appropriate orders and counseling given.              PLAN  Physical Therapy: Patient has completed formal physical therapy for this issue.  Continue home exercise program as able.  I have advised her to wear CMC splints as needed, which she has at home     Diagnostic workup: no new imaging needed at this time    Medications:   -Continue gabapentin, Mobic as per PCP  -  reviewed, records do not demonstrate increased risk of opioid abuse.     Interventions:   -Repeat right L2-3 interlaminar epidural steroid injection given significant improvement in pain with this procedure in the past. The risks, benefits, and alternatives to this procedure were discussed and the patient wishes to proceed with the procedure. Risks include but are not limited to damage to surrounding structures, infection, bleeding, worsening of pain which can be permanent, and weakness which can be permanent. Benefits include pain relief and improved function. Alternatives include not doing the procedure.    -Discussed increased risk of complications including epidural hematoma if patient continues ASA/meloxicam.  Discussed that to minimize this risk, pt needs to hold ASA/meloxicam for 5 days prior to procedure.    -Bilateral CMC joint steroid injections under ultrasound guidance as needed given significant improvement in pain with this procedure.     Other  -I  previously discussed neurosurgical referral for evaluation and management of disc herniation at L1-2 that appears to be causing severe central canal stenosis and symptoms of lumbar radiculopathy.  Given significant improvement in pain after our previous epidural steroid injection and no neurologic deficits on exam today, I believe it is reasonable to defer a neurosurgery referral at this time    Follow-up: 1 month after procedure above    Orders Placed This Encounter    Referral to Pain Clinic    Patient identified as fall risk.  Appropriate orders and counseling given.       Kendal Jeffers MD  Interventional Pain and Spine  Physical Medicine and Rehabilitation  [facility]      The above note documents my personal evaluation of this patient. In addition, I have reviewed and confirmed with the patient and MA the supportive information documented in today's Patient Health Questionnaire and Office Note.     Please note that this dictation was created using voice recognition software. I have made every reasonable attempt to correct obvious errors, but I expect that there are errors of grammar and possibly content that I did not discover before finalizing the note.

## 2023-02-27 ENCOUNTER — OFFICE VISIT (OUTPATIENT)
Dept: PHYSICAL MEDICINE AND REHAB | Facility: MEDICAL CENTER | Age: 68
End: 2023-02-27
Payer: MEDICARE

## 2023-02-27 VITALS
TEMPERATURE: 97 F | SYSTOLIC BLOOD PRESSURE: 132 MMHG | DIASTOLIC BLOOD PRESSURE: 90 MMHG | BODY MASS INDEX: 31.47 KG/M2 | HEIGHT: 60 IN | WEIGHT: 160.27 LBS | HEART RATE: 88 BPM | OXYGEN SATURATION: 95 %

## 2023-02-27 DIAGNOSIS — Z98.1 HISTORY OF LUMBAR FUSION: ICD-10-CM

## 2023-02-27 DIAGNOSIS — M79.645 BILATERAL THUMB PAIN: ICD-10-CM

## 2023-02-27 DIAGNOSIS — M51.26 LUMBAR DISC HERNIATION: ICD-10-CM

## 2023-02-27 DIAGNOSIS — R20.2 NUMBNESS AND TINGLING OF RIGHT LEG: ICD-10-CM

## 2023-02-27 DIAGNOSIS — R20.0 NUMBNESS AND TINGLING OF RIGHT LEG: ICD-10-CM

## 2023-02-27 DIAGNOSIS — M54.16 LUMBAR RADICULITIS: ICD-10-CM

## 2023-02-27 DIAGNOSIS — G89.29 CHRONIC RIGHT-SIDED LOW BACK PAIN WITH RIGHT-SIDED SCIATICA: ICD-10-CM

## 2023-02-27 DIAGNOSIS — M54.41 CHRONIC RIGHT-SIDED LOW BACK PAIN WITH RIGHT-SIDED SCIATICA: ICD-10-CM

## 2023-02-27 DIAGNOSIS — Z91.81 RISK FOR FALLS: ICD-10-CM

## 2023-02-27 DIAGNOSIS — M18.0 OSTEOARTHRITIS OF CARPOMETACARPAL (CMC) JOINTS OF BOTH THUMBS, UNSPECIFIED OSTEOARTHRITIS TYPE: ICD-10-CM

## 2023-02-27 DIAGNOSIS — M79.644 BILATERAL THUMB PAIN: ICD-10-CM

## 2023-02-27 PROCEDURE — 99214 OFFICE O/P EST MOD 30 MIN: CPT | Performed by: STUDENT IN AN ORGANIZED HEALTH CARE EDUCATION/TRAINING PROGRAM

## 2023-02-27 ASSESSMENT — PATIENT HEALTH QUESTIONNAIRE - PHQ9
5. POOR APPETITE OR OVEREATING: 0 - NOT AT ALL
CLINICAL INTERPRETATION OF PHQ2 SCORE: 1
SUM OF ALL RESPONSES TO PHQ QUESTIONS 1-9: 2

## 2023-02-27 ASSESSMENT — FIBROSIS 4 INDEX: FIB4 SCORE: 0.73

## 2023-02-27 ASSESSMENT — PAIN SCALES - GENERAL: PAINLEVEL: 8=MODERATE-SEVERE PAIN

## 2023-03-01 ENCOUNTER — HOSPITAL ENCOUNTER (OUTPATIENT)
Dept: LAB | Facility: MEDICAL CENTER | Age: 68
End: 2023-03-01
Attending: FAMILY MEDICINE
Payer: MEDICARE

## 2023-03-01 DIAGNOSIS — R74.8 ALKALINE PHOSPHATASE ELEVATION: ICD-10-CM

## 2023-03-01 DIAGNOSIS — E03.9 ACQUIRED HYPOTHYROIDISM: ICD-10-CM

## 2023-03-01 LAB
ALBUMIN SERPL BCP-MCNC: 4.5 G/DL (ref 3.2–4.9)
ALP SERPL-CCNC: 138 U/L (ref 30–99)
ALT SERPL-CCNC: 11 U/L (ref 2–50)
AST SERPL-CCNC: 16 U/L (ref 12–45)
BILIRUB CONJ SERPL-MCNC: <0.2 MG/DL (ref 0.1–0.5)
BILIRUB INDIRECT SERPL-MCNC: ABNORMAL MG/DL (ref 0–1)
BILIRUB SERPL-MCNC: 0.5 MG/DL (ref 0.1–1.5)
PROT SERPL-MCNC: 7 G/DL (ref 6–8.2)
TSH SERPL DL<=0.005 MIU/L-ACNC: 0.61 UIU/ML (ref 0.38–5.33)

## 2023-03-01 PROCEDURE — 36415 COLL VENOUS BLD VENIPUNCTURE: CPT

## 2023-03-01 PROCEDURE — 84443 ASSAY THYROID STIM HORMONE: CPT

## 2023-03-01 PROCEDURE — 80076 HEPATIC FUNCTION PANEL: CPT

## 2023-03-07 ENCOUNTER — HOSPITAL ENCOUNTER (OUTPATIENT)
Facility: REHABILITATION | Age: 68
End: 2023-03-07
Attending: STUDENT IN AN ORGANIZED HEALTH CARE EDUCATION/TRAINING PROGRAM | Admitting: STUDENT IN AN ORGANIZED HEALTH CARE EDUCATION/TRAINING PROGRAM
Payer: MEDICARE

## 2023-03-07 ENCOUNTER — APPOINTMENT (OUTPATIENT)
Dept: RADIOLOGY | Facility: REHABILITATION | Age: 68
End: 2023-03-07
Attending: STUDENT IN AN ORGANIZED HEALTH CARE EDUCATION/TRAINING PROGRAM
Payer: MEDICARE

## 2023-03-07 VITALS
WEIGHT: 160.05 LBS | HEART RATE: 84 BPM | BODY MASS INDEX: 31.42 KG/M2 | HEIGHT: 60 IN | RESPIRATION RATE: 16 BRPM | TEMPERATURE: 97.6 F | DIASTOLIC BLOOD PRESSURE: 88 MMHG | SYSTOLIC BLOOD PRESSURE: 143 MMHG | OXYGEN SATURATION: 94 %

## 2023-03-07 PROCEDURE — 700117 HCHG RX CONTRAST REV CODE 255

## 2023-03-07 PROCEDURE — 700111 HCHG RX REV CODE 636 W/ 250 OVERRIDE (IP)

## 2023-03-07 PROCEDURE — 62323 NJX INTERLAMINAR LMBR/SAC: CPT

## 2023-03-07 RX ORDER — DEXAMETHASONE SODIUM PHOSPHATE 10 MG/ML
INJECTION, SOLUTION INTRAMUSCULAR; INTRAVENOUS
Status: COMPLETED
Start: 2023-03-07 | End: 2023-03-07

## 2023-03-07 RX ORDER — LIDOCAINE HYDROCHLORIDE 10 MG/ML
INJECTION, SOLUTION EPIDURAL; INFILTRATION; INTRACAUDAL; PERINEURAL
Status: COMPLETED
Start: 2023-03-07 | End: 2023-03-07

## 2023-03-07 RX ADMIN — LIDOCAINE HYDROCHLORIDE 10 ML: 10 INJECTION, SOLUTION EPIDURAL; INFILTRATION; INTRACAUDAL; PERINEURAL at 14:42

## 2023-03-07 RX ADMIN — DEXAMETHASONE SODIUM PHOSPHATE 10 MG: 10 INJECTION INTRAMUSCULAR; INTRAVENOUS at 14:44

## 2023-03-07 RX ADMIN — IOHEXOL 10 ML: 240 INJECTION, SOLUTION INTRATHECAL; INTRAVASCULAR; INTRAVENOUS; ORAL at 14:43

## 2023-03-07 ASSESSMENT — FIBROSIS 4 INDEX: FIB4 SCORE: 0.98

## 2023-03-07 ASSESSMENT — PAIN DESCRIPTION - PAIN TYPE: TYPE: CHRONIC PAIN

## 2023-03-07 NOTE — OP REPORT
Date of Service: 03/07/23    Patient: Robin Lynn Zielesch 68 y.o. female     MRN: [id number]     Physician/s: Kendal Jeffers MD    Pre-operative Diagnosis: Lumbar radiculopathy    Post-operative Diagnosis: Lumbar radiculopathy    Procedure: interlaminar Lumbar Epidural Steroid Injection at the right L2-L3 level.     Description of procedure:    The risks, benefits, and alternatives of the procedure were reviewed and discussed with the patient.  Written informed consent was freely obtained. A pre-procedural time-out was conducted by the physician verifying patient’s identity, procedure to be performed, procedure site and side, and allergy verification. Appropriate equipment was determined to be in place for the procedure.     The patient's vital signs were carefully monitored before, throughout, and after the procedure.     The patient was placed in the prone position, and fluoroscopy was used to identify the L2-L3 level.  For annotation purposes, the L2-3 level was defined as the L2-L3 level noted on MRI which had also noted transpedicular screws at L3, L4, L5, and S1.     The lumbar area was prepped with iodine solution and draped with sterile drape.  Sterile technique was used throughout.  At the needle entry point, the skin and subcutaneous tissues were infiltrated with 1% lidocaine.  A 20-gauge Tuohy needle was advanced towards the epidural space, using the loss of resistance technique. A contralateral oblique view and lateral view were obtained but the needle tip was obscured by the presence of hardware. The needle was removed intact and a separate target for entry was identified. At the needle entry point, the skin and subcutaneous tissues were infiltrated with 1% lidocaine.  A 20-gauge Tuohy needle was advanced to the epidural space, using the loss of resistance technique in a contralateral oblique fluoroscopic view with the needle tip well visualized. In the AP and lateral views, contrast dye was used to  highlight the epidural space spread while the fluoroscope was running live. With the needle in the epidural space, aspiration was negative for blood or other fluid.  Dexamethasone, 10 mg., lidocaine, 1mg, and 1cc of 0.9% normal saline (total volume 3 ml.) were injected through the Tuohy needle.  The injected local anesthetic and steroid resulted in dispersion of the previously injected contrast. The needle was removed intact. The patient's back was covered with a 4x4 gauze, the area was cleansed with sterile normal saline, and a dressing was applied. There were no complications noted.     The procedure was well tolerated, and there were no apparent complications.      The patient was then evaluated post-procedure, and was hemodynamically stable prior to leaving the post-operative care unit.     Follow-up as scheduled    Kendal Jeffers MD  Interventional Pain and Spine  Physical Medicine and Rehabilitation  [facility]      CPT codes  Interlaminar epidural injection - lumbar or sacral (caudal): 44783

## 2023-03-07 NOTE — INTERVAL H&P NOTE
H&P reviewed. The patient was examined and there are no changes to the H&P    Kendal Jeffers MD  Interventional Pain and Spine  Physical Medicine and Rehabilitation  [facility]

## 2023-03-07 NOTE — PROGRESS NOTES
1318 Pt admitted to Pre Procedure area.  Consent signed and post op instructions reviewed with pt, all questions answered.   1345 Dr. Jeffers in to see pt.      1459 Pt received to Post Procedure area with updates from procedure RN.  Snack and drink tolerated without C/O N/V.  Dressing clear, dry, no swelling noted.    1508 Pt seen by Dr. Jeffers for post procedure evaluation.     1515 Pt ambulated without difficulty & meets criteria for DC, accompanied to car and placed in passenger seat.  Discharged to designated .

## 2023-03-10 ENCOUNTER — TELEPHONE (OUTPATIENT)
Dept: PHYSICAL MEDICINE AND REHAB | Facility: MEDICAL CENTER | Age: 68
End: 2023-03-10
Payer: MEDICARE

## 2023-03-11 NOTE — TELEPHONE ENCOUNTER
"Called for post-sp check-up. Pt reported the following regarding the procedure site: Right L2-3 interlaminar epidural     Change in pain?: \"feels a lot better\"    Concerns?: none    Confirmed [facility] appt?: yes, 4/6  "

## 2023-03-17 RX ORDER — LEVOTHYROXINE SODIUM 88 MCG
TABLET ORAL
Qty: 90 TABLET | Refills: 1 | Status: SHIPPED | OUTPATIENT
Start: 2023-03-17 | End: 2023-09-16 | Stop reason: SDUPTHER

## 2023-03-17 NOTE — TELEPHONE ENCOUNTER
Received request via: Pharmacy    Was the patient seen in the last year in this department? Yes    Does the patient have an active prescription (recently filled or refills available) for medication(s) requested? Yes. Pharacy requests for 90 day rx  Does the patient have group home Plus and need 100 day supply (blood pressure, diabetes and cholesterol meds only)? Patient does not have SCP

## 2023-03-30 RX ORDER — POTASSIUM CHLORIDE 20 MEQ/1
20 TABLET, EXTENDED RELEASE ORAL DAILY
Qty: 90 TABLET | Refills: 1 | Status: SHIPPED | OUTPATIENT
Start: 2023-03-30 | End: 2023-07-08 | Stop reason: SDUPTHER

## 2023-04-06 ENCOUNTER — OFFICE VISIT (OUTPATIENT)
Dept: PHYSICAL MEDICINE AND REHAB | Facility: MEDICAL CENTER | Age: 68
End: 2023-04-06
Payer: MEDICARE

## 2023-04-06 VITALS
HEIGHT: 60 IN | RESPIRATION RATE: 18 BRPM | BODY MASS INDEX: 32.12 KG/M2 | HEART RATE: 85 BPM | TEMPERATURE: 96.8 F | WEIGHT: 163.58 LBS | SYSTOLIC BLOOD PRESSURE: 118 MMHG | OXYGEN SATURATION: 97 % | DIASTOLIC BLOOD PRESSURE: 82 MMHG

## 2023-04-06 DIAGNOSIS — Z98.1 HISTORY OF LUMBAR FUSION: ICD-10-CM

## 2023-04-06 DIAGNOSIS — M54.16 LUMBAR RADICULITIS: ICD-10-CM

## 2023-04-06 DIAGNOSIS — R20.2 NUMBNESS AND TINGLING OF RIGHT LEG: ICD-10-CM

## 2023-04-06 DIAGNOSIS — R20.0 NUMBNESS AND TINGLING OF RIGHT LEG: ICD-10-CM

## 2023-04-06 DIAGNOSIS — M51.26 LUMBAR DISC HERNIATION: ICD-10-CM

## 2023-04-06 DIAGNOSIS — G89.29 CHRONIC RIGHT-SIDED LOW BACK PAIN WITH RIGHT-SIDED SCIATICA: ICD-10-CM

## 2023-04-06 DIAGNOSIS — M54.41 CHRONIC RIGHT-SIDED LOW BACK PAIN WITH RIGHT-SIDED SCIATICA: ICD-10-CM

## 2023-04-06 PROCEDURE — 99213 OFFICE O/P EST LOW 20 MIN: CPT | Performed by: STUDENT IN AN ORGANIZED HEALTH CARE EDUCATION/TRAINING PROGRAM

## 2023-04-06 ASSESSMENT — PAIN SCALES - GENERAL: PAINLEVEL: 6=MODERATE PAIN

## 2023-04-06 ASSESSMENT — FIBROSIS 4 INDEX: FIB4 SCORE: 0.98

## 2023-04-06 NOTE — PROGRESS NOTES
Follow-up patient Note    Interventional Pain and Spine  Physiatry (Physical Medicine and Rehabilitation)     Patient Name: Robin Lynn Zielesch  : 1955  Date of service: 2023    Chief Complaint:   Chief Complaint   Patient presents with    Follow-Up     4 week F/V SP         HISTORY (2022):  Robin Lynn Zielesch is a 67 y.o. female who presents today with pain radiating from her right posterolateral glute down her right anterolateral and posterior thigh accompanied by numbness/tingling/weakness in this area. This started in Sep 2021 while recovering from a L2-pelvis posterior spinal fusion with TLIF/ PLIF on 21 with Dr. Bates ([facility] which she states she had done for similar radiating pain down her left leg.  Her radiating left leg pain resolved after surgery.     Her pain at its best-worse level during the course of the day is 5-9/10, respectively. Pain right now is 7/10 on the numeric pain scale. Pain worsens with sitting, standing, walking, bending backwards, side bending or twisting, walking upstairs, and walking downstairs and improves with nothing. Her pain interferes somewhat with ADLs. The patient otherwise denies new weakness, numbness, or bladder/bowel incontinence. States she has fallen a few times secondary to pain and possibly weakness. Moved from [facility] in May 2022.     The patient has done physical therapy for this problem with worsening pain.     Patient has tried the following medications with varied success (current meds in bold): Hayes back and body  Gabapentin 300mg TID - no relief. Used to help with left sided pain  Naproxen BID - significant relief     Therapeutic modalities and interventional therapies to date include:  -No injections     Medical history includes HTN, cervical laminectomy, depression, anxiety, thyroid cancer, BMI 30, L2-pelvis posterior spinal fusion with TLIF/ PLIF on 21    HPI  Today's visit   Robin Lynn Zielesch ( 1955) is a female  with Diagnoses of Chronic right-sided low back pain with right-sided sciatica, History of lumbar fusion, Numbness and tingling of right leg, Lumbar disc herniation, and Lumbar radiculitis were pertinent to this visit.    Presents today after  3/7/23 right L2-3 interlaminar epidural steroid injection - resolution of back pain and radiating pain.  Occasionally has rare twinges of radiating pain that are manageable.  Ongoing tightness in her right thigh. Doing deep myofascial release techniques for her right thigh. Denies numbness or tingling or weakness in her right thigh.      Notes ongoing resolution of thumb pain after 11/28/22 bilateral CMC joint injections     Taking Hayes back and body and ASA for the pain. Also taking meloxicam and gabapentin as prescribed by PCP.    Pain severity 5/10 currently    Procedure history:  - 11/1/22 right L2-3 interlaminar epidural steroid injection - 90% improvement in pain, resolution of radiating pain.  - 11/28/22 bilateral CMC joint injections - 100% improvement in thumb pain bilaterally  - 3/7/23 right L2-3 interlaminar epidural steroid injection -100% improvement in back pain and radiating pain, ongoing tightness in her right thigh    ROS:   Red Flags ROS:   Fever, Chills, Sweats: Denies  Involuntary Weight Loss: Denies  Bladder Incontinence: Denies  Bowel Incontinence: denies  Saddle Anesthesia: Denies    All other systems reviewed and negative.     PMHx:   No past medical history on file.    PSHx:   Past Surgical History:   Procedure Laterality Date    TN INJ LUMBAR/SACRAL,W/ IMAGING Right 3/7/2023    Procedure: RIGHT Lumbar L2-3 interlaminar epidural steroid injection;  Surgeon: Kendal Jeffers M.D.;  [facility];  Service: Pain Management    TN INJ LUMBAR/SACRAL,W/ IMAGING Right 11/1/2022    Procedure: RIGHT lumbar L2-3 interlaminar epidural steroid injection;  Surgeon: Kendal Jeffers M.D.;  Location: [facility];  Service:  Pain Management    ABDOMINAL HYSTERECTOMY TOTAL      FOOT SURGERY      [id number]      L tka    LAMINOTOMY      THYROIDECTOMY TOTAL      2019  thyroid cancer       Family Hx:   Family History   Problem Relation Age of Onset    COPD Mother     Cancer Father         pancreatic       Social Hx:  Social History     Socioeconomic History    Marital status:      Spouse name: Not on file    Number of children: Not on file    Years of education: Not on file    Highest education level: Some college, no degree   Occupational History    Not on file   Tobacco Use    Smoking status: Former     Types: Cigarettes    Smokeless tobacco: Never   Vaping Use    Vaping Use: Some days    Substances: Nicotine, CBD    Devices: Pre-filled or refillable cartridge, Refillable tank   Substance and Sexual Activity    Alcohol use: Not Currently    Drug use: Yes     Types: Marijuana, Inhaled     Comment: once in a while    Sexual activity: Not on file   Other Topics Concern    Not on file   Social History Narrative    Not on file     Social Determinants of Health     Financial Resource Strain: Low Risk     Difficulty of Paying Living Expenses: Not very hard   Food Insecurity: No Food Insecurity    Worried About Running Out of Food in the Last Year: Never true    Ran Out of Food in the Last Year: Never true   Transportation Needs: No Transportation Needs    Lack of Transportation (Medical): No    Lack of Transportation (Non-Medical): No   Physical Activity: Insufficiently Active    Days of Exercise per Week: 7 days    Minutes of Exercise per Session: 10 min   Stress: Stress Concern Present    Feeling of Stress : To some extent   Social Connections: Moderately Isolated    Frequency of Communication with Friends and Family: Once a week    Frequency of Social Gatherings with Friends and Family: Never    Attends Bahai Services: More than 4 times per year    Active Member of Clubs or Organizations: No    Attends Club or Organization Meetings:  Patient refused    Marital Status:    Intimate Partner Violence: Not on file   Housing Stability: Low Risk     Unable to Pay for Housing in the Last Year: No    Number of Places Lived in the Last Year: 2    Unstable Housing in the Last Year: No       Allergies:  Allergies   Allergen Reactions    Ace Inhibitors Cough    Chlorhexidine Rash    Triamterene      Other reaction(s): Nephrotoxicity       Medications: reviewed on epic.   Outpatient Medications Marked as Taking for the 4/6/23 encounter (Office Visit) with Kendal Jeffers M.D.   Medication Sig Dispense Refill    potassium chloride SA (KDUR) 20 MEQ Tab CR TAKE 1 TABLET BY MOUTH EVERY DAY 90 Tablet 1    SYNTHROID 88 MCG Tab TAKE 1 TABLET BY MOUTH EVERY DAY IN THE MORNING ON AN EMPTY STOMACH 90 Tablet 1    gabapentin (NEURONTIN) 300 MG Cap Take 3 Capsules by mouth 3 times a day. 810 Capsule 1    meloxicam (MOBIC) 15 MG tablet Take 1 Tablet by mouth every day. 90 Tablet 3    amLODIPine (NORVASC) 10 MG Tab Take 1 Tablet by mouth every day. 90 Tablet 3    omeprazole (PRILOSEC) 40 MG delayed-release capsule TAKE 1 CAPSULE BY MOUTH EVERY DAY 90 Capsule 3    buPROPion (WELLBUTRIN XL) 300 MG XL tablet TAKE 1 TABLET BY MOUTH EVERY DAY IN THE MORNING 90 Tablet 3    losartan (COZAAR) 100 MG Tab Take 1 Tablet by mouth every day.      calcium citrate (CALCITRATE) 950 (200 Ca) MG Tab Take 1 Tablet by mouth every day.      Multiple Vitamin (MULTI-VITAMINS PO) Take  by mouth.      aspirin EC (ECOTRIN) 81 MG Tablet Delayed Response Take 1 Tablet by mouth every day.      Riboflavin (VITAMIN B-2 PO) Take  by mouth.          Current Outpatient Medications on File Prior to Visit   Medication Sig Dispense Refill    potassium chloride SA (KDUR) 20 MEQ Tab CR TAKE 1 TABLET BY MOUTH EVERY DAY 90 Tablet 1    SYNTHROID 88 MCG Tab TAKE 1 TABLET BY MOUTH EVERY DAY IN THE MORNING ON AN EMPTY STOMACH 90 Tablet 1    gabapentin (NEURONTIN) 300 MG Cap Take 3 Capsules by mouth 3 times a  day. 810 Capsule 1    meloxicam (MOBIC) 15 MG tablet Take 1 Tablet by mouth every day. 90 Tablet 3    amLODIPine (NORVASC) 10 MG Tab Take 1 Tablet by mouth every day. 90 Tablet 3    omeprazole (PRILOSEC) 40 MG delayed-release capsule TAKE 1 CAPSULE BY MOUTH EVERY DAY 90 Capsule 3    buPROPion (WELLBUTRIN XL) 300 MG XL tablet TAKE 1 TABLET BY MOUTH EVERY DAY IN THE MORNING 90 Tablet 3    losartan (COZAAR) 100 MG Tab Take 1 Tablet by mouth every day.      calcium citrate (CALCITRATE) 950 (200 Ca) MG Tab Take 1 Tablet by mouth every day.      Multiple Vitamin (MULTI-VITAMINS PO) Take  by mouth.      aspirin EC (ECOTRIN) 81 MG Tablet Delayed Response Take 1 Tablet by mouth every day.      Riboflavin (VITAMIN B-2 PO) Take  by mouth.       No current facility-administered medications on file prior to visit.         EXAMINATION     Physical Exam:   /82 (BP Location: Right arm, Patient Position: Sitting, BP Cuff Size: Adult)   Pulse 85   Temp 36 °C (96.8 °F) (Temporal)   Resp 18   Ht 1.524 m (5')   Wt 74.2 kg (163 lb 9.3 oz)   SpO2 97%     Constitutional:   Body Habitus: Body mass index is 31.95 kg/m².  Cooperation: Fully cooperates with exam  Appearance: Well-groomed, well-nourished.    Eyes: No scleral icterus to suggest severe liver disease, no proptosis to suggest severe hyperthyroidism    ENT -no obvious auditory deficits, no noticeable facial droop     Skin -no rashes or lesions noted     Respiratory-  breathing comfortably on room air, no audible wheezing    Cardiovascular-distal extremities warm and well perfused.  No lower extremity edema is noted.     Gastrointestinal - no obvious abdominal masses, non-distended    Psychiatric- alert and oriented ×3. Normal affect.     Gait - normal gait, no use of ambulatory device, nonantalgic.     Musculoskeletal and Neuro -       Thoracic/Lumbar Spine/Sacral Spine/Hips   Inspection: No evidence of atrophy in bilateral lower extremities throughout      Lumbar spine  /hip provocative exam maneuvers  Straight leg raise positive on right for right thigh tightness, negative on left  FADIR test negative bilaterally  Femoral stretch test positive on right for right thigh tightness, negative on left     SI joint tests  EBEN test negative bilaterally  Thigh thrust test negative bilaterally     Key points for the international standards for neurological classification of spinal cord injury (ISNCSCI) to light touch.   Dermatome R L   L2 2 2   L3 2 2   L4 2 2   L5 2 2   S1 2 2   S2 2 2         Motor Exam Lower Extremities  ? Myotome R L   Hip flexion L2 5 5   Knee extension L3 5 5   Ankle dorsiflexion L4 5 5   Toe extension L5 5 5   Ankle plantarflexion S1 5 5        There is full active range of motion with lumbar extension     Facet loading maneuver negative bilaterally     Palpation:   No tenderness to palpation in the low back/hips including midline of lumbosacral spine, paraspinal muscles bilaterally, lumbar facets bilaterally, sacroiliac joints bilaterally, PSIS bilaterally, and greater trochanters bilaterally.    Previous exam  Heel walking and toe walking intact.       Reflexes  ?   R L   Patella   2+ 2+   Achilles    2+ 2+      Clonus of the ankle negative bilaterally        MEDICAL DECISION MAKING     Medical records review: see under HPI section.       MEDICAL DECISION MAKING    Medical records review: see under HPI section.     DATA    Labs: No new labs available for review since last visit.    Lab Results   Component Value Date/Time    SODIUM 141 12/20/2022 10:30 AM    POTASSIUM 3.9 12/20/2022 10:30 AM    CHLORIDE 103 12/20/2022 10:30 AM    CO2 26 12/20/2022 10:30 AM    ANION 12.0 12/20/2022 10:30 AM    GLUCOSE 109 (H) 12/20/2022 10:30 AM    BUN 20 12/20/2022 10:30 AM    CREATININE 0.75 12/20/2022 10:30 AM    CALCIUM 10.3 12/20/2022 10:30 AM    ASTSGOT 16 03/01/2023 01:09 PM    ALTSGPT 11 03/01/2023 01:09 PM    TBILIRUBIN 0.5 03/01/2023 01:09 PM    ALBUMIN 4.5 03/01/2023  01:09 PM    TOTPROTEIN 7.0 03/01/2023 01:09 PM    GLOBULIN 2.7 12/20/2022 10:30 AM    AGRATIO 1.7 12/20/2022 10:30 AM       No results found for: PROTHROMBTM, INR     Lab Results   Component Value Date/Time    WBC 7.8 12/20/2022 10:30 AM    RBC 5.17 12/20/2022 10:30 AM    HEMOGLOBIN 15.3 12/20/2022 10:30 AM    HEMATOCRIT 45.9 12/20/2022 10:30 AM    MCV 88.8 12/20/2022 10:30 AM    MCH 29.6 12/20/2022 10:30 AM    MCHC 33.3 (L) 12/20/2022 10:30 AM    MPV 9.0 12/20/2022 10:30 AM    NEUTSPOLYS 65.70 12/20/2022 10:30 AM    LYMPHOCYTES 20.80 (L) 12/20/2022 10:30 AM    MONOCYTES 10.50 12/20/2022 10:30 AM    EOSINOPHILS 2.00 12/20/2022 10:30 AM    BASOPHILS 0.60 12/20/2022 10:30 AM        Lab Results   Component Value Date/Time    HBA1C 5.8 (H) 04/28/2022 10:39 AM        Imaging:   I personally reviewed following images, these are my reads  MRI lumbar spine 9/2/2022  Evidence of lumbar laminectomy at L4-S1, interbody fusion and posterior fusion at L3-S1.  Broad-based disc bulge at L1-L2 resulting in severe central canal stenosis and compression of descending bilateral L2 nerve roots and possibly bilateral L3 nerve roots and mild impingement of exiting L1 nerve roots bilaterally.  Mild right neuroforaminal stenosis at T12-L1.  Possible mild bilateral neuroforaminal stenosis at L5-S1, quality of images impaired due to metallic artifact. Appearance of chronic postoperative seroma posterior to L4 and L5 vertebral bodies.    XR Right hand 11/22/22  Moderate to severe CMC joint OA. See formal radiology report for further details.    IMAGING radiology reads. I reviewed the following radiology reads                      Results for orders placed during the hospital encounter of 09/02/22    MR-LUMBAR SPINE-W/O    Impression  1.  L3-4 slight anterolisthesis and L5-S1 slight retrolisthesis.  2.  Postoperative changes with lumbar laminectomy L4-L5-S1, interbody fusion L3-L4, L4-L5, L5-S1, and posterior fusion with transpedicular screw  fixation at L3-L4-L5-S1.  3.  Chronic postoperative seroma occupying the laminectomy interval without dorsal epidural mass effect.  4.  The study is most notable for L1-L2 large disc protrusion-extrusion resulting in severe central stenosis.  5.  Additional degenerative changes detailed for each level above in the body of report.        MRI lumbar spine 10/20/21  Lumbar spine:    Alignment: Grade 1 L3-L4 anterolisthesis. Previously seen L4-L5  anterolisthesis is improved compared to MRI prior to surgery.    Vertebral body heights and marrow: Postsurgical changes from L3-S1  posterior fusion. Multilevel lumbar spondylosis with osteophytes, facet  arthropathy, and endplate marrow degenerative changes are seen. Otherwise  the vertebral body heights and marrow signal are unremarkable.    Conus medullaris: Normal, terminating at L1/L2.    Soft tissues: There is a fluid collection in the paraspinal soft tissues  posterior to the L3-L5 laminectomy sites, likely postoperative seroma  measuring 63 x 28 mm (series 17, image 7). Small right renal cyst.    L1-L2: Persistent disc bulge with worsening superimposed central disc  extrusion. Bilateral facet arthropathy and dorsal epidural fat. The  constellation of findings produces moderate to severe spinal canal  stenosis. Mild to moderate left neural foraminal stenosis is improved  compared to prior.  Ligamentum flavum thickening. Facet hypertrophy, mild.    L2-L3: Disc bulge, ligamentum flavum thickening, facet hypertrophy  resulting in mild spinal canal stenosis. Mild left neural foraminal  stenosis.    L3-L4: Partially uncovered disc. Mild to moderate right neural foraminal  stenosis. Limited evaluation of the left neural foramen. Laminectomy.    L4-L5: No spinal canal stenosis. Limited evaluation of neural foramina due  to spinal hardware. Laminectomy.    L5-S1: Disc bulge without spinal canal stenosis. Limited evaluation of  neural foramina due to spinal hardware.      IMPRESSION:    1. Worsening disc protrusion at L1-L2, resulting in worsening spinal  canal stenosis, now moderate to severe.   2. Multilevel degenerative changes of the cervical spine, similar  compared to prior.  3. Multilevel degenerative changes in thoracic spine, with up to mild  to moderate spinal canal stenosis at T8-T9 secondary to disc bulge.  4. Postsurgical changes . Small postoperative seroma in L3-L5  laminectomy sites.        X-ray left hand 3/19/2019  FINDINGS:   There is no acute fracture or dislocation.   Advanced osteoarthrosis of the first CMC joint, characterized by joint   space narrowing, subchondral sclerosis, osteophyte formation, and radial   subluxation. Mild osteoarthrosis of the STT joint. Osteoarthrosis of   scattered IP joints. No focal soft tissue swelling.     IMPRESSION:   Advanced osteoarthrosis of the first CMC joint.     XR Right hand 22  FINDINGS:     BONE MINERALIZATION: Normal.  JOINTS: Moderate to severe first carpometacarpal joint osteoarthrosis. Mild triscaphe joint osteoarthrosis. Mild multifocal osteoarthrosis otherwise. No erosions.  FRACTURE: None.  DISLOCATION: None.  SOFT TISSUES: No mass.     IMPRESSION:     1.  Moderate to severe first carpometacarpal joint osteoarthrosis.  2.  Mild multifocal osteoarthrosis otherwise.                                                  Diagnosis  Visit Diagnoses     ICD-10-CM   1. Chronic right-sided low back pain with right-sided sciatica  M54.41    G89.29   2. History of lumbar fusion  Z98.1   3. Numbness and tingling of right leg  R20.0    R20.2   4. Lumbar disc herniation  M51.26   5. Lumbar radiculitis  M54.16               ASSESSMENT AND PLAN:  Robin Lynn Zielesch (: 1955) is a female with history of HTN, cervical laminectomy, depression, anxiety, thyroid cancer, BMI 30, L3-pelvis posterior spinal fusion with TLIF/ PLIF on 21 who presents with resolution of pain radiating down her right low back to her right  thigh in primarily L2-L4 dermatomal distribution after L2-3 interlaminar epidural steroid injection.      Mathew was seen today for follow-up.    Diagnoses and all orders for this visit:    Chronic right-sided low back pain with right-sided sciatica    History of lumbar fusion    Numbness and tingling of right leg    Lumbar disc herniation    Lumbar radiculitis            PLAN  Physical Therapy: Discussed possible referral to physical therapy to evaluate and manage right thigh tightness.  It seems her right thigh tightness is primarily muscular in etiology with possible component of nerve impingement from her low back.  She declined a physical therapy referral today as she has done extensive physical therapy in the past which worsened her symptoms.  Continue home exercise program as able.  I have advised her to wear CMC splints as needed, which she has at home     Diagnostic workup: no new imaging needed at this time    Medications:   -Continue gabapentin, Mobic as per PCP  -  reviewed, records do not demonstrate increased risk of opioid abuse.     Interventions:   -right L2-3 interlaminar epidural steroid injection PRN given significant improvement in pain with this procedure in the past.   -Discussed increased risk of complications including epidural hematoma if patient continues ASA/meloxicam.  Discussed that to minimize this risk, pt needs to hold ASA/meloxicam for 5 days prior to procedure.    -Bilateral CMC joint steroid injections under ultrasound guidance as needed given significant improvement in pain with this procedure.     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  Given significant improvement in pain after our previous epidural steroid injection and no neurologic deficits on exam today, I believe it is reasonable to defer a neurosurgery  referral at this time  -Discussed that she could consider deep myofascial release techniques including a foam roller for her right thigh    Follow-up: as needed    No orders of the defined types were placed in this encounter.      Kendal Jeffers MD  Interventional Pain and Spine  Physical Medicine and Rehabilitation  [facility]      The above note documents my personal evaluation of this patient. In addition, I have reviewed and confirmed with the patient and MA the supportive information documented in today's Patient Health Questionnaire and Office Note.     Please note that this dictation was created using voice recognition software. I have made every reasonable attempt to correct obvious errors, but I expect that there are errors of grammar and possibly content that I did not discover before finalizing the note.

## 2023-04-15 ENCOUNTER — PATIENT MESSAGE (OUTPATIENT)
Dept: MEDICAL GROUP | Facility: PHYSICIAN GROUP | Age: 68
End: 2023-04-15
Payer: MEDICARE

## 2023-04-15 DIAGNOSIS — F41.9 ANXIETY: ICD-10-CM

## 2023-04-18 RX ORDER — LORAZEPAM 0.5 MG/1
0.5 TABLET ORAL EVERY 8 HOURS PRN
Qty: 30 TABLET | Refills: 0 | Status: SHIPPED | OUTPATIENT
Start: 2023-04-18 | End: 2023-07-05 | Stop reason: SDUPTHER

## 2023-04-18 RX ORDER — LOSARTAN POTASSIUM 100 MG/1
100 TABLET ORAL DAILY
Qty: 90 TABLET | Refills: 0 | Status: SHIPPED | OUTPATIENT
Start: 2023-04-18 | End: 2023-07-18

## 2023-05-18 ENCOUNTER — PHARMACY VISIT (OUTPATIENT)
Dept: PHARMACY | Facility: MEDICAL CENTER | Age: 68
End: 2023-05-18
Payer: COMMERCIAL

## 2023-05-18 ENCOUNTER — OFFICE VISIT (OUTPATIENT)
Dept: MEDICAL GROUP | Facility: PHYSICIAN GROUP | Age: 68
End: 2023-05-18
Payer: MEDICARE

## 2023-05-18 VITALS
WEIGHT: 164.8 LBS | RESPIRATION RATE: 16 BRPM | OXYGEN SATURATION: 95 % | SYSTOLIC BLOOD PRESSURE: 130 MMHG | HEART RATE: 86 BPM | BODY MASS INDEX: 32.35 KG/M2 | DIASTOLIC BLOOD PRESSURE: 74 MMHG | HEIGHT: 60 IN | TEMPERATURE: 97.6 F

## 2023-05-18 DIAGNOSIS — H61.21 RIGHT EAR IMPACTED CERUMEN: ICD-10-CM

## 2023-05-18 DIAGNOSIS — E66.9 OBESITY (BMI 30.0-34.9): ICD-10-CM

## 2023-05-18 DIAGNOSIS — M75.51 ACUTE BURSITIS OF RIGHT SHOULDER: ICD-10-CM

## 2023-05-18 DIAGNOSIS — M79.651 RIGHT THIGH PAIN: ICD-10-CM

## 2023-05-18 PROCEDURE — RXMED WILLOW AMBULATORY MEDICATION CHARGE: Performed by: FAMILY MEDICINE

## 2023-05-18 PROCEDURE — 99214 OFFICE O/P EST MOD 30 MIN: CPT | Performed by: FAMILY MEDICINE

## 2023-05-18 PROCEDURE — 3075F SYST BP GE 130 - 139MM HG: CPT | Performed by: FAMILY MEDICINE

## 2023-05-18 PROCEDURE — 3078F DIAST BP <80 MM HG: CPT | Performed by: FAMILY MEDICINE

## 2023-05-18 RX ORDER — METHYLPREDNISOLONE 4 MG/1
TABLET ORAL
Qty: 21 TABLET | Refills: 0 | Status: SHIPPED | OUTPATIENT
Start: 2023-05-18 | End: 2023-07-13

## 2023-05-18 ASSESSMENT — FIBROSIS 4 INDEX: FIB4 SCORE: 0.98

## 2023-05-18 NOTE — PROGRESS NOTES
Subjective:     CC: Here for several issues.    HPI:   Mathew presents today with the following medical concerns:    Acute bursitis of right shoulder  This is a new problem.  Patient been having to do a lot more work around the house as well as lift her 's water and oxygen tanks.  As result she has been getting pain in her right shoulder.  There are times when she cannot lift it very high either.  No known injury to it.  It does not radiate down her arm.    Right ear impacted cerumen  This is a new problem.  Patient's been feeling like her right ear is plugged.  No pain.    Right thigh pain  This is a chronic problem.  Patient's been having pain in her right anterior thigh.  She did have a lumbar epidural injection which helped a lot of her sciatic pain but still feels the discomfort in her thigh.  Her other physician recommended physical therapy but she does not want to do that.  She is already on an NSAID.    Obesity (BMI 30.0-34.9)  This is a chronic problem.  Continue to encourage weight reduction.    History reviewed. No pertinent past medical history.    Social History     Tobacco Use    Smoking status: Former     Types: Cigarettes    Smokeless tobacco: Never   Vaping Use    Vaping Use: Some days    Substances: Nicotine, CBD    Devices: Pre-filled or refillable cartridge, Refillable tank   Substance Use Topics    Alcohol use: Not Currently    Drug use: Yes     Types: Marijuana, Inhaled     Comment: once in a while       Current Outpatient Medications Ordered in Epic   Medication Sig Dispense Refill    methylPREDNISolone (MEDROL DOSEPAK) 4 MG Tablet Therapy Pack As directed on the packaging label. 21 Tablet 0    losartan (COZAAR) 100 MG Tab Take 1 Tablet by mouth every day. 90 Tablet 0    LORazepam (ATIVAN) 0.5 MG Tab Take 1 Tablet by mouth every 8 hours as needed for Anxiety for up to 30 days. 30 Tablet 0    potassium chloride SA (KDUR) 20 MEQ Tab CR TAKE 1 TABLET BY MOUTH EVERY DAY 90 Tablet 1     SYNTHROID 88 MCG Tab TAKE 1 TABLET BY MOUTH EVERY DAY IN THE MORNING ON AN EMPTY STOMACH 90 Tablet 1    gabapentin (NEURONTIN) 300 MG Cap Take 3 Capsules by mouth 3 times a day. 810 Capsule 1    meloxicam (MOBIC) 15 MG tablet Take 1 Tablet by mouth every day. 90 Tablet 3    amLODIPine (NORVASC) 10 MG Tab Take 1 Tablet by mouth every day. 90 Tablet 3    omeprazole (PRILOSEC) 40 MG delayed-release capsule TAKE 1 CAPSULE BY MOUTH EVERY DAY 90 Capsule 3    buPROPion (WELLBUTRIN XL) 300 MG XL tablet TAKE 1 TABLET BY MOUTH EVERY DAY IN THE MORNING 90 Tablet 3    calcium citrate (CALCITRATE) 950 (200 Ca) MG Tab Take 1 Tablet by mouth every day.      Multiple Vitamin (MULTI-VITAMINS PO) Take  by mouth.      aspirin EC (ECOTRIN) 81 MG Tablet Delayed Response Take 1 Tablet by mouth every day.      Riboflavin (VITAMIN B-2 PO) Take  by mouth.       No current Epic-ordered facility-administered medications on file.       Allergies:  Ace inhibitors, Chlorhexidine, Flexeril [cyclobenzaprine], and Triamterene    Health Maintenance: Completed    ROS:  Gen: no fevers/chills, no changes in weight  Eyes: no changes in vision  ENT: no sore throat, no hearing loss, no bloody nose  Pulm: no sob, no cough  CV: no chest pain, no palpitations  GI: no nausea/vomiting, no diarrhea  : no dysuria  Skin: no rash  Neuro: no headaches, no numbness/tingling  Heme/Lymph: no easy bruising      Objective:       Exam:  /74 (BP Location: Left arm, Patient Position: Sitting, BP Cuff Size: Adult)   Pulse 86   Temp 36.4 °C (97.6 °F) (Temporal)   Resp 16   Ht 1.524 m (5')   Wt 74.8 kg (164 lb 12.8 oz)   SpO2 95%   BMI 32.19 kg/m²  Body mass index is 32.19 kg/m².    Gen: Alert and oriented, No apparent distress.  Ears:   Right ear canal is occluded with cerumen.  It was removed by the medical assistant using water irrigation.  Left ear canal has a slight amount of cerumen but not enough to clean.  TM is normal.  Neck: Neck is supple without  lymphadenopathy.  Ext: No clubbing, cyanosis, edema.  Right arm has pain with abduction past 80 degrees.  I can lift it higher with passive movement but it is very painful for her to hold it in that position.  Normal forward and backward range of motion.  Examination of the right thigh reveals mild discomfort on palpation.  No spasms felt.  No abnormalities or lumps felt.  Gait is normal.      Assessment & Plan:     68 y.o. female with the following -     1. Right ear impacted cerumen  This is an acute resolved problem.  Ear was cleaned by the medical assistant.  - Ear Cerumen Removal    2. Acute bursitis of right shoulder  This is an acute problem.  Patient told she most likely has bursitis and tendinitis.  We talked about referring her for an injection but she wants to try pills first.  A prescription for Medrol Dosepak will be sent in.  She also should use heat to the area and try to avoid lifting heavy objects as much as possible.    3. Right thigh pain  This is a chronic problem.  Is under the physician noted it is probably partially related to musculoskeletal issues along with the lumbar spinal stenosis she has.  We will see if the Medrol does any good for this.    4. Obesity (BMI 30.0-34.9)  This is a chronic problem.  Continue to work on weight reduction.  - Patient identified as having weight management issue.  Appropriate orders and counseling given.      Return if symptoms worsen or fail to improve.    Please note that this dictation was created using voice recognition software. I have made every reasonable attempt to correct obvious errors, but I expect that there are errors of grammar and possibly content that I did not discover before finalizing the note.

## 2023-05-18 NOTE — ASSESSMENT & PLAN NOTE
This is a chronic problem.  Patient's been having pain in her right anterior thigh.  She did have a lumbar epidural injection which helped a lot of her sciatic pain but still feels the discomfort in her thigh.  Her other physician recommended physical therapy but she does not want to do that.  She is already on an NSAID.

## 2023-05-18 NOTE — ASSESSMENT & PLAN NOTE
This is a new problem.  Patient been having to do a lot more work around the house as well as lift her 's water and oxygen tanks.  As result she has been getting pain in her right shoulder.  There are times when she cannot lift it very high either.  No known injury to it.  It does not radiate down her arm.

## 2023-06-27 ENCOUNTER — OFFICE VISIT (OUTPATIENT)
Dept: PHYSICAL MEDICINE AND REHAB | Facility: MEDICAL CENTER | Age: 68
End: 2023-06-27
Payer: MEDICARE

## 2023-06-27 VITALS
BODY MASS INDEX: 31.42 KG/M2 | TEMPERATURE: 97.3 F | SYSTOLIC BLOOD PRESSURE: 126 MMHG | DIASTOLIC BLOOD PRESSURE: 82 MMHG | WEIGHT: 160.05 LBS | HEART RATE: 88 BPM | HEIGHT: 60 IN | OXYGEN SATURATION: 93 %

## 2023-06-27 DIAGNOSIS — M54.16 LUMBAR RADICULITIS: ICD-10-CM

## 2023-06-27 DIAGNOSIS — M79.10 MYALGIA: ICD-10-CM

## 2023-06-27 DIAGNOSIS — M79.645 BILATERAL THUMB PAIN: ICD-10-CM

## 2023-06-27 DIAGNOSIS — Z91.81 RISK FOR FALLS: ICD-10-CM

## 2023-06-27 DIAGNOSIS — M79.644 BILATERAL THUMB PAIN: ICD-10-CM

## 2023-06-27 DIAGNOSIS — R20.0 NUMBNESS AND TINGLING OF RIGHT LEG: ICD-10-CM

## 2023-06-27 DIAGNOSIS — M51.26 LUMBAR DISC HERNIATION: ICD-10-CM

## 2023-06-27 DIAGNOSIS — G89.29 CHRONIC RIGHT-SIDED LOW BACK PAIN WITH RIGHT-SIDED SCIATICA: ICD-10-CM

## 2023-06-27 DIAGNOSIS — M18.0 OSTEOARTHRITIS OF CARPOMETACARPAL (CMC) JOINTS OF BOTH THUMBS, UNSPECIFIED OSTEOARTHRITIS TYPE: ICD-10-CM

## 2023-06-27 DIAGNOSIS — Z98.1 HISTORY OF LUMBAR FUSION: ICD-10-CM

## 2023-06-27 DIAGNOSIS — R20.2 NUMBNESS AND TINGLING OF RIGHT LEG: ICD-10-CM

## 2023-06-27 DIAGNOSIS — M54.41 CHRONIC RIGHT-SIDED LOW BACK PAIN WITH RIGHT-SIDED SCIATICA: ICD-10-CM

## 2023-06-27 PROCEDURE — 1125F AMNT PAIN NOTED PAIN PRSNT: CPT | Performed by: STUDENT IN AN ORGANIZED HEALTH CARE EDUCATION/TRAINING PROGRAM

## 2023-06-27 PROCEDURE — 76942 ECHO GUIDE FOR BIOPSY: CPT | Performed by: STUDENT IN AN ORGANIZED HEALTH CARE EDUCATION/TRAINING PROGRAM

## 2023-06-27 PROCEDURE — 99214 OFFICE O/P EST MOD 30 MIN: CPT | Mod: 25 | Performed by: STUDENT IN AN ORGANIZED HEALTH CARE EDUCATION/TRAINING PROGRAM

## 2023-06-27 PROCEDURE — 20552 NJX 1/MLT TRIGGER POINT 1/2: CPT | Performed by: STUDENT IN AN ORGANIZED HEALTH CARE EDUCATION/TRAINING PROGRAM

## 2023-06-27 PROCEDURE — 3074F SYST BP LT 130 MM HG: CPT | Performed by: STUDENT IN AN ORGANIZED HEALTH CARE EDUCATION/TRAINING PROGRAM

## 2023-06-27 PROCEDURE — 3079F DIAST BP 80-89 MM HG: CPT | Performed by: STUDENT IN AN ORGANIZED HEALTH CARE EDUCATION/TRAINING PROGRAM

## 2023-06-27 ASSESSMENT — FIBROSIS 4 INDEX: FIB4 SCORE: 0.98

## 2023-06-27 ASSESSMENT — PAIN SCALES - GENERAL: PAINLEVEL: 7=MODERATE-SEVERE PAIN

## 2023-06-27 ASSESSMENT — PATIENT HEALTH QUESTIONNAIRE - PHQ9: CLINICAL INTERPRETATION OF PHQ2 SCORE: 0

## 2023-06-27 NOTE — PROGRESS NOTES
Follow-up patient Note    Interventional Pain and Spine  Physiatry (Physical Medicine and Rehabilitation)     Patient Name: Robin Lynn Zielesch  : 1955  Date of service: 2023    Chief Complaint:   Chief Complaint   Patient presents with    Follow-Up     Chronic right-sided low back pain with right sided sciatica         HISTORY (2022):  Robin Lynn Zielesch is a 67 y.o. female who presents today with pain radiating from her right posterolateral glute down her right anterolateral and posterior thigh accompanied by numbness/tingling/weakness in this area. This started in Sep 2021 while recovering from a L2-pelvis posterior spinal fusion with TLIF/ PLIF on 21 with Dr. Bates ([facility] which she states she had done for similar radiating pain down her left leg.  Her radiating left leg pain resolved after surgery.     Her pain at its best-worse level during the course of the day is 5-9/10, respectively. Pain right now is 7/10 on the numeric pain scale. Pain worsens with sitting, standing, walking, bending backwards, side bending or twisting, walking upstairs, and walking downstairs and improves with nothing. Her pain interferes somewhat with ADLs. The patient otherwise denies new weakness, numbness, or bladder/bowel incontinence. States she has fallen a few times secondary to pain and possibly weakness. Moved from [facility] in May 2022.     The patient has done physical therapy for this problem with worsening pain.     Patient has tried the following medications with varied success (current meds in bold): Hayes back and body  Gabapentin 300mg TID - no relief. Used to help with left sided pain  Naproxen BID - significant relief     Therapeutic modalities and interventional therapies to date include:  -No injections     Medical history includes HTN, cervical laminectomy, depression, anxiety, thyroid cancer, BMI 30, L2-pelvis posterior spinal fusion with TLIF/ PLIF on 21    HPI  Today's visit    Robin Lynn Zielesch ( 1955) is a female with Diagnoses of Myalgia, Chronic right-sided low back pain with right-sided sciatica, History of lumbar fusion, Numbness and tingling of right leg, Lumbar disc herniation, Lumbar radiculitis, Osteoarthritis of carpometacarpal (CMC) joints of both thumbs, unspecified osteoarthritis type, Bilateral thumb pain, and Risk for falls were pertinent to this visit.    Presents today with worsened pain across the bilateral upper glutes that she attributes to lifting her 's heavy oxygen tanks and his walker. Doesn't radiate. Denies muscle spasms. Notes pain limited weakness in her right leg. Denies numbness or tingling.      Notes ongoing resolution of thumb pain after 22 bilateral CMC joint injections     Taking Hayes back and body for the pain.     Pain severity 7/10 currently    Procedure history:  - 22 right L2-3 interlaminar epidural steroid injection - 90% improvement in pain, resolution of radiating pain.  - 22 bilateral CMC joint injections - 100% improvement in thumb pain bilaterally  - 3/7/23 right L2-3 interlaminar epidural steroid injection -100% improvement in back pain and radiating pain, ongoing tightness in her right thigh  - 23 trigger point injections     ROS:   Red Flags ROS:   Fever, Chills, Sweats: Denies  Involuntary Weight Loss: Denies  Bladder Incontinence: Denies  Bowel Incontinence: denies  Saddle Anesthesia: Denies    All other systems reviewed and negative.     PMHx:   History reviewed. No pertinent past medical history.    PSHx:   Past Surgical History:   Procedure Laterality Date    OH INJ LUMBAR/SACRAL,W/ IMAGING Right 3/7/2023    Procedure: RIGHT Lumbar L2-3 interlaminar epidural steroid injection;  Surgeon: Kendal Jeffers M.D.;  [facility];  Service: Pain Management    OH INJ LUMBAR/SACRAL,W/ IMAGING Right 2022    Procedure: RIGHT lumbar L2-3 interlaminar epidural steroid  injection;  Surgeon: Kendal Jeffers M.D.;  Location: [facility];  Service: Pain Management    ABDOMINAL HYSTERECTOMY TOTAL      FOOT SURGERY      [id number]      L tka    LAMINOTOMY      THYROIDECTOMY TOTAL      2019  thyroid cancer       Family Hx:   Family History   Problem Relation Age of Onset    COPD Mother     Cancer Father         pancreatic       Social Hx:  Social History     Socioeconomic History    Marital status:      Spouse name: Not on file    Number of children: Not on file    Years of education: Not on file    Highest education level: Some college, no degree   Occupational History    Not on file   Tobacco Use    Smoking status: Former     Types: Cigarettes    Smokeless tobacco: Never   Vaping Use    Vaping Use: Some days    Substances: Nicotine, CBD    Devices: Pre-filled or refillable cartridge, Refillable tank   Substance and Sexual Activity    Alcohol use: Not Currently    Drug use: Yes     Types: Marijuana, Inhaled     Comment: once in a while    Sexual activity: Not on file   Other Topics Concern    Not on file   Social History Narrative    Not on file     Social Determinants of Health     Financial Resource Strain: Low Risk  (6/24/2022)    Overall Financial Resource Strain (CARDIA)     Difficulty of Paying Living Expenses: Not very hard   Food Insecurity: No Food Insecurity (6/24/2022)    Hunger Vital Sign     Worried About Running Out of Food in the Last Year: Never true     Ran Out of Food in the Last Year: Never true   Transportation Needs: No Transportation Needs (6/24/2022)    PRAPARE - Transportation     Lack of Transportation (Medical): No     Lack of Transportation (Non-Medical): No   Physical Activity: Insufficiently Active (6/24/2022)    Exercise Vital Sign     Days of Exercise per Week: 7 days     Minutes of Exercise per Session: 10 min   Stress: Stress Concern Present (6/24/2022)    [employer] - Occupational Stress Questionnaire      Feeling of Stress : To some extent   Social Connections: Moderately Isolated (6/24/2022)    Social Connection and Isolation Panel [NHANES]     Frequency of Communication with Friends and Family: Once a week     Frequency of Social Gatherings with Friends and Family: Never     Attends Gnosticism Services: More than 4 times per year     Active Member of Clubs or Organizations: No     Attends Club or Organization Meetings: Patient refused     Marital Status:    Intimate Partner Violence: Not on file   Housing Stability: Low Risk  (6/24/2022)    Housing Stability Vital Sign     Unable to Pay for Housing in the Last Year: No     Number of Places Lived in the Last Year: 2     Unstable Housing in the Last Year: No       Allergies:  Allergies   Allergen Reactions    Ace Inhibitors Cough    Chlorhexidine Rash    Flexeril [Cyclobenzaprine] Unspecified     Irritability     Triamterene      Other reaction(s): Nephrotoxicity       Medications: reviewed on epic.   Outpatient Medications Marked as Taking for the 6/27/23 encounter (Office Visit) with Kendal Jeffers M.D.   Medication Sig Dispense Refill    losartan (COZAAR) 100 MG Tab Take 1 Tablet by mouth every day. 90 Tablet 0    potassium chloride SA (KDUR) 20 MEQ Tab CR TAKE 1 TABLET BY MOUTH EVERY DAY 90 Tablet 1    SYNTHROID 88 MCG Tab TAKE 1 TABLET BY MOUTH EVERY DAY IN THE MORNING ON AN EMPTY STOMACH 90 Tablet 1    gabapentin (NEURONTIN) 300 MG Cap Take 3 Capsules by mouth 3 times a day. 810 Capsule 1    meloxicam (MOBIC) 15 MG tablet Take 1 Tablet by mouth every day. 90 Tablet 3    amLODIPine (NORVASC) 10 MG Tab Take 1 Tablet by mouth every day. 90 Tablet 3    omeprazole (PRILOSEC) 40 MG delayed-release capsule TAKE 1 CAPSULE BY MOUTH EVERY DAY 90 Capsule 3    buPROPion (WELLBUTRIN XL) 300 MG XL tablet TAKE 1 TABLET BY MOUTH EVERY DAY IN THE MORNING 90 Tablet 3    calcium citrate (CALCITRATE) 950 (200 Ca) MG Tab Take 1 Tablet by mouth every day.      Multiple  Vitamin (MULTI-VITAMINS PO) Take  by mouth.      aspirin EC (ECOTRIN) 81 MG Tablet Delayed Response Take 1 Tablet by mouth every day.      Riboflavin (VITAMIN B-2 PO) Take  by mouth.          Current Outpatient Medications on File Prior to Visit   Medication Sig Dispense Refill    losartan (COZAAR) 100 MG Tab Take 1 Tablet by mouth every day. 90 Tablet 0    potassium chloride SA (KDUR) 20 MEQ Tab CR TAKE 1 TABLET BY MOUTH EVERY DAY 90 Tablet 1    SYNTHROID 88 MCG Tab TAKE 1 TABLET BY MOUTH EVERY DAY IN THE MORNING ON AN EMPTY STOMACH 90 Tablet 1    gabapentin (NEURONTIN) 300 MG Cap Take 3 Capsules by mouth 3 times a day. 810 Capsule 1    meloxicam (MOBIC) 15 MG tablet Take 1 Tablet by mouth every day. 90 Tablet 3    amLODIPine (NORVASC) 10 MG Tab Take 1 Tablet by mouth every day. 90 Tablet 3    omeprazole (PRILOSEC) 40 MG delayed-release capsule TAKE 1 CAPSULE BY MOUTH EVERY DAY 90 Capsule 3    buPROPion (WELLBUTRIN XL) 300 MG XL tablet TAKE 1 TABLET BY MOUTH EVERY DAY IN THE MORNING 90 Tablet 3    calcium citrate (CALCITRATE) 950 (200 Ca) MG Tab Take 1 Tablet by mouth every day.      Multiple Vitamin (MULTI-VITAMINS PO) Take  by mouth.      aspirin EC (ECOTRIN) 81 MG Tablet Delayed Response Take 1 Tablet by mouth every day.      Riboflavin (VITAMIN B-2 PO) Take  by mouth.      methylPREDNISolone (MEDROL DOSEPAK) 4 MG Tablet Therapy Pack As directed on the packaging label. (Patient not taking: Reported on 6/27/2023) 21 Tablet 0     No current facility-administered medications on file prior to visit.         EXAMINATION     Physical Exam:   /82 (BP Location: Left arm, Patient Position: Sitting, BP Cuff Size: Adult)   Pulse 88   Temp 36.3 °C (97.3 °F) (Temporal)   Ht 1.524 m (5')   Wt 72.6 kg (160 lb 0.9 oz)   SpO2 93%     Constitutional:   Body Habitus: Body mass index is 31.26 kg/m².  Cooperation: Fully cooperates with exam  Appearance: Well-groomed, well-nourished.    Eyes: No scleral icterus to suggest  severe liver disease, no proptosis to suggest severe hyperthyroidism    ENT -no obvious auditory deficits, no noticeable facial droop     Skin -no rashes or lesions noted     Respiratory-  breathing comfortably on room air, no audible wheezing    Cardiovascular-distal extremities warm and well perfused.  No lower extremity edema is noted.     Gastrointestinal - no obvious abdominal masses, non-distended    Psychiatric- alert and oriented ×3. Normal affect.     Gait - normal gait, no use of ambulatory device, nonantalgic.     Musculoskeletal and Neuro -       Thoracic/Lumbar Spine/Sacral Spine/Hips   Palpation:   Tenderness to palpation across bilateral upper glutes.  No tenderness to palpation in the low back/hips including midline of lumbosacral spine, paraspinal muscles bilaterally, lumbar facets bilaterally, sacroiliac joints bilaterally, PSIS bilaterally, and greater trochanters bilaterally.    Inspection: No evidence of atrophy in bilateral lower extremities throughout      Lumbar spine /hip provocative exam maneuvers  Straight leg raise negative bilaterally  FADIR test negative bilaterally    SI joint tests  EBEN test negative bilaterally  Thigh thrust test negative bilaterally  Sacral compression test, sacral distraction, sacral thrust test negative bilaterally     Key points for the international standards for neurological classification of spinal cord injury (ISNCSCI) to light touch.   Dermatome R L   L2 2 2   L3 2 2   L4 2 2   L5 2 2   S1 2 2   S2 2 2         Motor Exam Lower Extremities  ? Myotome R L   Hip flexion L2 5 5   Knee extension L3 5 5   Ankle dorsiflexion L4 5 5   Toe extension L5 5 5   Ankle plantarflexion S1 5 5      There is full active range of motion with lumbar extension     Facet loading maneuver negative bilaterally     Previous exam  Heel walking and toe walking intact.       Reflexes  ?   R L   Patella   2+ 2+   Achilles    2+ 2+      Clonus of the ankle negative bilaterally         MEDICAL DECISION MAKING     Medical records review: see under HPI section.       MEDICAL DECISION MAKING    Medical records review: see under HPI section.     DATA    Labs: No new labs available for review since last visit.    Lab Results   Component Value Date/Time    SODIUM 141 12/20/2022 10:30 AM    POTASSIUM 3.9 12/20/2022 10:30 AM    CHLORIDE 103 12/20/2022 10:30 AM    CO2 26 12/20/2022 10:30 AM    ANION 12.0 12/20/2022 10:30 AM    GLUCOSE 109 (H) 12/20/2022 10:30 AM    BUN 20 12/20/2022 10:30 AM    CREATININE 0.75 12/20/2022 10:30 AM    CALCIUM 10.3 12/20/2022 10:30 AM    ASTSGOT 16 03/01/2023 01:09 PM    ALTSGPT 11 03/01/2023 01:09 PM    TBILIRUBIN 0.5 03/01/2023 01:09 PM    ALBUMIN 4.5 03/01/2023 01:09 PM    TOTPROTEIN 7.0 03/01/2023 01:09 PM    GLOBULIN 2.7 12/20/2022 10:30 AM    AGRATIO 1.7 12/20/2022 10:30 AM       No results found for: PROTHROMBTM, INR     Lab Results   Component Value Date/Time    WBC 7.8 12/20/2022 10:30 AM    RBC 5.17 12/20/2022 10:30 AM    HEMOGLOBIN 15.3 12/20/2022 10:30 AM    HEMATOCRIT 45.9 12/20/2022 10:30 AM    MCV 88.8 12/20/2022 10:30 AM    MCH 29.6 12/20/2022 10:30 AM    MCHC 33.3 (L) 12/20/2022 10:30 AM    MPV 9.0 12/20/2022 10:30 AM    NEUTSPOLYS 65.70 12/20/2022 10:30 AM    LYMPHOCYTES 20.80 (L) 12/20/2022 10:30 AM    MONOCYTES 10.50 12/20/2022 10:30 AM    EOSINOPHILS 2.00 12/20/2022 10:30 AM    BASOPHILS 0.60 12/20/2022 10:30 AM        Lab Results   Component Value Date/Time    HBA1C 5.8 (H) 04/28/2022 10:39 AM        Imaging:   I personally reviewed following images, these are my reads  MRI lumbar spine 9/2/2022  Evidence of lumbar laminectomy at L4-S1, interbody fusion and posterior fusion at L3-S1.  Broad-based disc bulge at L1-L2 resulting in severe central canal stenosis and compression of descending bilateral L2 nerve roots and possibly bilateral L3 nerve roots and mild impingement of exiting L1 nerve roots bilaterally.  Mild right neuroforaminal stenosis at  T12-L1.  Possible mild bilateral neuroforaminal stenosis at L5-S1, quality of images impaired due to metallic artifact. Appearance of chronic postoperative seroma posterior to L4 and L5 vertebral bodies.    XR Right hand 11/22/22  Moderate to severe CMC joint OA. See formal radiology report for further details.    IMAGING radiology reads. I reviewed the following radiology reads                      Results for orders placed during the hospital encounter of 09/02/22    MR-LUMBAR SPINE-W/O    Impression  1.  L3-4 slight anterolisthesis and L5-S1 slight retrolisthesis.  2.  Postoperative changes with lumbar laminectomy L4-L5-S1, interbody fusion L3-L4, L4-L5, L5-S1, and posterior fusion with transpedicular screw fixation at L3-L4-L5-S1.  3.  Chronic postoperative seroma occupying the laminectomy interval without dorsal epidural mass effect.  4.  The study is most notable for L1-L2 large disc protrusion-extrusion resulting in severe central stenosis.  5.  Additional degenerative changes detailed for each level above in the body of report.        MRI lumbar spine 10/20/21  Lumbar spine:    Alignment: Grade 1 L3-L4 anterolisthesis. Previously seen L4-L5  anterolisthesis is improved compared to MRI prior to surgery.    Vertebral body heights and marrow: Postsurgical changes from L3-S1  posterior fusion. Multilevel lumbar spondylosis with osteophytes, facet  arthropathy, and endplate marrow degenerative changes are seen. Otherwise  the vertebral body heights and marrow signal are unremarkable.    Conus medullaris: Normal, terminating at L1/L2.    Soft tissues: There is a fluid collection in the paraspinal soft tissues  posterior to the L3-L5 laminectomy sites, likely postoperative seroma  measuring 63 x 28 mm (series 17, image 7). Small right renal cyst.    L1-L2: Persistent disc bulge with worsening superimposed central disc  extrusion. Bilateral facet arthropathy and dorsal epidural fat. The  constellation of findings  produces moderate to severe spinal canal  stenosis. Mild to moderate left neural foraminal stenosis is improved  compared to prior.  Ligamentum flavum thickening. Facet hypertrophy, mild.    L2-L3: Disc bulge, ligamentum flavum thickening, facet hypertrophy  resulting in mild spinal canal stenosis. Mild left neural foraminal  stenosis.    L3-L4: Partially uncovered disc. Mild to moderate right neural foraminal  stenosis. Limited evaluation of the left neural foramen. Laminectomy.    L4-L5: No spinal canal stenosis. Limited evaluation of neural foramina due  to spinal hardware. Laminectomy.    L5-S1: Disc bulge without spinal canal stenosis. Limited evaluation of  neural foramina due to spinal hardware.     IMPRESSION:    1. Worsening disc protrusion at L1-L2, resulting in worsening spinal  canal stenosis, now moderate to severe.   2. Multilevel degenerative changes of the cervical spine, similar  compared to prior.  3. Multilevel degenerative changes in thoracic spine, with up to mild  to moderate spinal canal stenosis at T8-T9 secondary to disc bulge.  4. Postsurgical changes . Small postoperative seroma in L3-L5  laminectomy sites.        X-ray left hand 3/19/2019  FINDINGS:   There is no acute fracture or dislocation.   Advanced osteoarthrosis of the first CMC joint, characterized by joint   space narrowing, subchondral sclerosis, osteophyte formation, and radial   subluxation. Mild osteoarthrosis of the STT joint. Osteoarthrosis of   scattered IP joints. No focal soft tissue swelling.     IMPRESSION:   Advanced osteoarthrosis of the first CMC joint.     XR Right hand 11/22/22  FINDINGS:     BONE MINERALIZATION: Normal.  JOINTS: Moderate to severe first carpometacarpal joint osteoarthrosis. Mild triscaphe joint osteoarthrosis. Mild multifocal osteoarthrosis otherwise. No erosions.  FRACTURE: None.  DISLOCATION: None.  SOFT TISSUES: No mass.     IMPRESSION:     1.  Moderate to severe first carpometacarpal joint  osteoarthrosis.  2.  Mild multifocal osteoarthrosis otherwise.                                                  Diagnosis  Visit Diagnoses     ICD-10-CM   1. Myalgia  M79.10   2. Chronic right-sided low back pain with right-sided sciatica  M54.41    G89.29   3. History of lumbar fusion  Z98.1   4. Numbness and tingling of right leg  R20.0    R20.2   5. Lumbar disc herniation  M51.26   6. Lumbar radiculitis  M54.16   7. Osteoarthritis of carpometacarpal (CMC) joints of both thumbs, unspecified osteoarthritis type  M18.0   8. Bilateral thumb pain  M79.644    M79.645   9. Risk for falls  Z91.81                 ASSESSMENT AND PLAN:  Robin Lynn Zielesch (: 1955) is a female with history of HTN, cervical laminectomy, depression, anxiety, thyroid cancer, BMI 30, L3-pelvis posterior spinal fusion with TLIF/ PLIF on 21 who presents with resolution of pain radiating down her right low back to her right thigh in primarily L2-L4 dermatomal distribution after L2-3 interlaminar epidural steroid injection.    Now with nonradiating pain across her axial upper glutes that appears to be consistent with myalgia pain       Mathew was seen today for follow-up.    Diagnoses and all orders for this visit:    Myalgia  -     Consent for all Surgical, Special Diagnostic or Therapeutic Procedures    Chronic right-sided low back pain with right-sided sciatica    History of lumbar fusion    Numbness and tingling of right leg    Lumbar disc herniation    Lumbar radiculitis    Osteoarthritis of carpometacarpal (CMC) joints of both thumbs, unspecified osteoarthritis type    Bilateral thumb pain    Risk for falls  -     Patient identified as fall risk.  Appropriate orders and counseling given.          PLAN  Physical Therapy: Discussed possible referral to physical therapy.  She declined a physical therapy referral today as she has done extensive physical therapy in the past which worsened her symptoms and is currently too busy caring  for her .       Diagnostic workup: no new imaging needed at this time    Medications:   -Continue gabapentin, Mobic as per PCP  -  reviewed, records do not demonstrate increased risk of opioid abuse.     Interventions:   - Trigger point injections under ultrasound guidance today. The risks, benefits, and alternatives to this procedure were discussed and the patient wishes to proceed with the procedure. Risks include but are not limited to damage to surrounding structures, infection, bleeding, worsening of pain which can be permanent. Benefits include pain relief and improved function. Alternatives include not doing the procedure.  -right L2-3 interlaminar epidural steroid injection PRN given significant improvement in pain with this procedure in the past.   -Bilateral CMC joint steroid injections under ultrasound guidance as needed given significant improvement in pain with this procedure.     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  Given significant improvement in pain after our previous epidural steroid injection and no neurologic deficits on exam today, I believe it is reasonable to defer a neurosurgery referral at this time  -Discussed that she could consider deep myofascial release techniques including a foam roller for her right thigh    Follow-up: as needed    Orders Placed This Encounter    Patient identified as fall risk.  Appropriate orders and counseling given.    Consent for all Surgical, Special Diagnostic or Therapeutic Procedures       Kendal Jeffers MD  Interventional Pain and Spine  Physical Medicine and Rehabilitation  [facility]      The above note documents my personal evaluation of this patient. In addition, I have reviewed and confirmed with the patient and MA the supportive information documented in today's  Patient Health Questionnaire and Office Note.     Please note that this dictation was created using voice recognition software. I have made every reasonable attempt to correct obvious errors, but I expect that there are errors of grammar and possibly content that I did not discover before finalizing the note.

## 2023-06-28 NOTE — PROCEDURES
Patient Name: Robin Lynn Zielesch  : 1955  Date of Service: 2023    Physician/s: Kendal Jeffers MD    Pre-operative Diagnosis: Myalgia (M79.1)    Post-operative Diagnosis: Myalgia (M79.1)    Procedure: trigger point injections of the following muscles:    Site R L   Splenius capitis     Splenius cervicis     Sternocleidomastoid     Rhomboids     Levator scapulae     Pectoralis minor     Pectoralis major     Serratus anterior     Teres major/minor     Quadratus lumborum     Paravertebral, cervical     Paravertebral, thoracic     Paravertebral, lumbar     Gluteus silvano x x   Gluteus medius     Gluteus minimus     Tensor fascia emily     Vastus lateralis     Adductor jesusiat     Adductor longus     Occipitalis     Cervical paraspinal     Trapezius, upper     Trapezius, mid     Trapezius, lower     Latissimus dorsi       Description of procedure:    The risks, benefits, and alternatives of the procedure were reviewed and discussed with the patient.  Written informed consent was freely obtained. A pre-procedural time-out was conducted by the physician verifying patient’s identity, procedure to be performed, procedure site and side, and allergy verification. Appropriate equipment was determined to be in place for the procedure.     In the office suite exam room the patient was placed in a prone position and the skin areas for injection over the above muscles were marked. A total of 10 areas of pain were identified for injection. The areas of pain were then prepped and draped in the usual sterile fashion. A solution was prepared with 5 mL of 1% lidocaine and 5 mL of 0.5% bupivacaine. Ultrasound was confirmed to view the adjacent structures for blood vessels and nerves and to confirm the needle path was not within the structures. A 27g needle was placed into each of the markings at the areas above under ultrasound guidance with an out of plane approach. After negative aspiration, approximately 1 mL of the above  solution was injected. The needle was removed intact after each trigger point injection, and the patient's back was covered with a 4x4 gauze, the area was cleansed with sterile normal saline, and a dressing was applied. There were no complications noted. The images were uploaded to our media tab for permanent storage.    Patient noted improvement in pain with the procedure.    Kendal Jeffers MD  Interventional Pain and Spine  Physical Medicine and Rehabilitation  [facility]

## 2023-07-04 ENCOUNTER — PATIENT MESSAGE (OUTPATIENT)
Dept: MEDICAL GROUP | Facility: PHYSICIAN GROUP | Age: 68
End: 2023-07-04
Payer: MEDICARE

## 2023-07-04 DIAGNOSIS — F41.9 ANXIETY: ICD-10-CM

## 2023-07-05 PROCEDURE — RXMED WILLOW AMBULATORY MEDICATION CHARGE: Performed by: FAMILY MEDICINE

## 2023-07-05 RX ORDER — LORAZEPAM 0.5 MG/1
0.5 TABLET ORAL EVERY 8 HOURS PRN
Qty: 30 TABLET | Refills: 0 | Status: SHIPPED | OUTPATIENT
Start: 2023-07-05 | End: 2023-09-01 | Stop reason: SDUPTHER

## 2023-07-08 ENCOUNTER — PHARMACY VISIT (OUTPATIENT)
Dept: PHARMACY | Facility: MEDICAL CENTER | Age: 68
End: 2023-07-08
Payer: COMMERCIAL

## 2023-07-10 PROCEDURE — RXMED WILLOW AMBULATORY MEDICATION CHARGE: Performed by: FAMILY MEDICINE

## 2023-07-10 RX ORDER — POTASSIUM CHLORIDE 20 MEQ/1
20 TABLET, EXTENDED RELEASE ORAL DAILY
Qty: 90 TABLET | Refills: 1 | Status: SHIPPED | OUTPATIENT
Start: 2023-07-10 | End: 2024-01-06 | Stop reason: SDUPTHER

## 2023-07-10 SDOH — ECONOMIC STABILITY: FOOD INSECURITY: WITHIN THE PAST 12 MONTHS, YOU WORRIED THAT YOUR FOOD WOULD RUN OUT BEFORE YOU GOT MONEY TO BUY MORE.: NEVER TRUE

## 2023-07-10 SDOH — ECONOMIC STABILITY: INCOME INSECURITY: HOW HARD IS IT FOR YOU TO PAY FOR THE VERY BASICS LIKE FOOD, HOUSING, MEDICAL CARE, AND HEATING?: SOMEWHAT HARD

## 2023-07-10 SDOH — HEALTH STABILITY: PHYSICAL HEALTH: ON AVERAGE, HOW MANY MINUTES DO YOU ENGAGE IN EXERCISE AT THIS LEVEL?: 20 MIN

## 2023-07-10 SDOH — ECONOMIC STABILITY: FOOD INSECURITY: WITHIN THE PAST 12 MONTHS, THE FOOD YOU BOUGHT JUST DIDN'T LAST AND YOU DIDN'T HAVE MONEY TO GET MORE.: NEVER TRUE

## 2023-07-10 SDOH — ECONOMIC STABILITY: HOUSING INSECURITY: IN THE LAST 12 MONTHS, HOW MANY PLACES HAVE YOU LIVED?: 1

## 2023-07-10 SDOH — HEALTH STABILITY: PHYSICAL HEALTH: ON AVERAGE, HOW MANY DAYS PER WEEK DO YOU ENGAGE IN MODERATE TO STRENUOUS EXERCISE (LIKE A BRISK WALK)?: 5 DAYS

## 2023-07-10 SDOH — HEALTH STABILITY: MENTAL HEALTH
STRESS IS WHEN SOMEONE FEELS TENSE, NERVOUS, ANXIOUS, OR CAN'T SLEEP AT NIGHT BECAUSE THEIR MIND IS TROUBLED. HOW STRESSED ARE YOU?: ONLY A LITTLE

## 2023-07-10 SDOH — ECONOMIC STABILITY: INCOME INSECURITY: IN THE LAST 12 MONTHS, WAS THERE A TIME WHEN YOU WERE NOT ABLE TO PAY THE MORTGAGE OR RENT ON TIME?: NO

## 2023-07-10 ASSESSMENT — SOCIAL DETERMINANTS OF HEALTH (SDOH)
WITHIN THE PAST 12 MONTHS, YOU WORRIED THAT YOUR FOOD WOULD RUN OUT BEFORE YOU GOT THE MONEY TO BUY MORE: NEVER TRUE
HOW OFTEN DO YOU ATTEND CHURCH OR RELIGIOUS SERVICES?: NEVER
DO YOU BELONG TO ANY CLUBS OR ORGANIZATIONS SUCH AS CHURCH GROUPS UNIONS, FRATERNAL OR ATHLETIC GROUPS, OR SCHOOL GROUPS?: YES
IN A TYPICAL WEEK, HOW MANY TIMES DO YOU TALK ON THE PHONE WITH FAMILY, FRIENDS, OR NEIGHBORS?: NEVER
HOW OFTEN DO YOU GET TOGETHER WITH FRIENDS OR RELATIVES?: NEVER
HOW OFTEN DO YOU HAVE SIX OR MORE DRINKS ON ONE OCCASION: NEVER
HOW OFTEN DO YOU ATTEND CHURCH OR RELIGIOUS SERVICES?: NEVER
HOW HARD IS IT FOR YOU TO PAY FOR THE VERY BASICS LIKE FOOD, HOUSING, MEDICAL CARE, AND HEATING?: SOMEWHAT HARD
HOW OFTEN DO YOU ATTENT MEETINGS OF THE CLUB OR ORGANIZATION YOU BELONG TO?: NEVER
IN A TYPICAL WEEK, HOW MANY TIMES DO YOU TALK ON THE PHONE WITH FAMILY, FRIENDS, OR NEIGHBORS?: NEVER
HOW MANY DRINKS CONTAINING ALCOHOL DO YOU HAVE ON A TYPICAL DAY WHEN YOU ARE DRINKING: PATIENT DOES NOT DRINK
DO YOU BELONG TO ANY CLUBS OR ORGANIZATIONS SUCH AS CHURCH GROUPS UNIONS, FRATERNAL OR ATHLETIC GROUPS, OR SCHOOL GROUPS?: YES
HOW OFTEN DO YOU GET TOGETHER WITH FRIENDS OR RELATIVES?: NEVER
HOW OFTEN DO YOU HAVE A DRINK CONTAINING ALCOHOL: NEVER
HOW OFTEN DO YOU ATTENT MEETINGS OF THE CLUB OR ORGANIZATION YOU BELONG TO?: NEVER

## 2023-07-10 ASSESSMENT — LIFESTYLE VARIABLES
SKIP TO QUESTIONS 9-10: 1
HOW MANY STANDARD DRINKS CONTAINING ALCOHOL DO YOU HAVE ON A TYPICAL DAY: PATIENT DOES NOT DRINK
AUDIT-C TOTAL SCORE: 0
HOW OFTEN DO YOU HAVE A DRINK CONTAINING ALCOHOL: NEVER
HOW OFTEN DO YOU HAVE SIX OR MORE DRINKS ON ONE OCCASION: NEVER

## 2023-07-12 ENCOUNTER — PHARMACY VISIT (OUTPATIENT)
Dept: PHARMACY | Facility: MEDICAL CENTER | Age: 68
End: 2023-07-12
Payer: COMMERCIAL

## 2023-07-13 ENCOUNTER — OFFICE VISIT (OUTPATIENT)
Dept: MEDICAL GROUP | Facility: PHYSICIAN GROUP | Age: 68
End: 2023-07-13
Payer: MEDICARE

## 2023-07-13 VITALS
TEMPERATURE: 97.6 F | DIASTOLIC BLOOD PRESSURE: 74 MMHG | BODY MASS INDEX: 31.84 KG/M2 | RESPIRATION RATE: 18 BRPM | OXYGEN SATURATION: 93 % | HEIGHT: 60 IN | SYSTOLIC BLOOD PRESSURE: 126 MMHG | HEART RATE: 84 BPM | WEIGHT: 162.2 LBS

## 2023-07-13 DIAGNOSIS — F33.0 MILD EPISODE OF RECURRENT MAJOR DEPRESSIVE DISORDER (HCC): ICD-10-CM

## 2023-07-13 DIAGNOSIS — E03.9 ACQUIRED HYPOTHYROIDISM: ICD-10-CM

## 2023-07-13 DIAGNOSIS — I10 ESSENTIAL HYPERTENSION: Chronic | ICD-10-CM

## 2023-07-13 DIAGNOSIS — J30.1 SEASONAL ALLERGIC RHINITIS DUE TO POLLEN: ICD-10-CM

## 2023-07-13 PROBLEM — H61.21 RIGHT EAR IMPACTED CERUMEN: Status: RESOLVED | Noted: 2023-05-18 | Resolved: 2023-07-13

## 2023-07-13 PROBLEM — M75.51 ACUTE BURSITIS OF RIGHT SHOULDER: Status: RESOLVED | Noted: 2023-05-18 | Resolved: 2023-07-13

## 2023-07-13 PROBLEM — M79.651 RIGHT THIGH PAIN: Status: RESOLVED | Noted: 2023-05-18 | Resolved: 2023-07-13

## 2023-07-13 PROCEDURE — 3078F DIAST BP <80 MM HG: CPT | Performed by: FAMILY MEDICINE

## 2023-07-13 PROCEDURE — 3074F SYST BP LT 130 MM HG: CPT | Performed by: FAMILY MEDICINE

## 2023-07-13 PROCEDURE — 99214 OFFICE O/P EST MOD 30 MIN: CPT | Performed by: FAMILY MEDICINE

## 2023-07-13 PROCEDURE — RXMED WILLOW AMBULATORY MEDICATION CHARGE: Performed by: FAMILY MEDICINE

## 2023-07-13 RX ORDER — ALBUTEROL SULFATE 90 UG/1
2 AEROSOL, METERED RESPIRATORY (INHALATION) EVERY 6 HOURS PRN
Qty: 8.5 G | Refills: 3 | Status: SHIPPED | OUTPATIENT
Start: 2023-07-13 | End: 2024-02-23 | Stop reason: SDUPTHER

## 2023-07-13 RX ORDER — MONTELUKAST SODIUM 10 MG/1
10 TABLET ORAL DAILY
Qty: 30 TABLET | Refills: 3 | Status: SHIPPED | OUTPATIENT
Start: 2023-07-13 | End: 2023-10-24

## 2023-07-13 ASSESSMENT — FIBROSIS 4 INDEX: FIB4 SCORE: 0.98

## 2023-07-13 NOTE — PROGRESS NOTES
Subjective:     CC: Here for follow-up and discuss her medical issues.    HPI:   Mathew presents today with the following medical concerns:    Essential hypertension  This is a chronic problem.  Is under very control on current medications.    Episode of recurrent major depressive disorder (HCC)  This is a chronic problem.  Patient is doing good on her current medications.  She does have a lot of stress in her life helping  her  deal with all his medical issues.    Allergic rhinitis  This is a chronic problem.  Patient has been having a lot of troubles with sinus congestion and runny eyes secondary to her allergies.  She states the Allegra and Claritin sometimes bother her heart rate so she would like something else to try.    Acquired hypothyroidism    This is a chronic problem.  Patient's labs are currently within normal limits.  She states her hair seems to gotten a little thinner and not sure if it is due to her thyroid.  I told her since we just adjusted her dose a month ago we have to give that a little time to see if it improves.    Past Medical History:   Diagnosis Date    Anxiety     Arthritis     Cancer (HCC)     COPD (chronic obstructive pulmonary disease) (HCC)     GERD (gastroesophageal reflux disease)     Hypertension     Migraine     Thyroid disease        Social History     Tobacco Use    Smoking status: Former     Packs/day: 1.50     Years: 50.00     Pack years: 75.00     Types: Cigarettes     Quit date: 2018     Years since quittin.9    Smokeless tobacco: Never   Vaping Use    Vaping Use: Some days    Substances: Nicotine, CBD    Devices: Pre-filled or refillable cartridge, Refillable tank   Substance Use Topics    Alcohol use: Never    Drug use: Yes     Types: Marijuana, Methamphetamines     Comment: once in a while       Current Outpatient Medications Ordered in Epic   Medication Sig Dispense Refill    montelukast (SINGULAIR) 10 MG Tab Take 1 Tablet by mouth every day. 30 Tablet 3     albuterol 108 (90 Base) MCG/ACT Aero Soln inhalation aerosol Inhale 2 Puffs every 6 hours as needed for Shortness of Breath. 8.5 g 3    potassium chloride SA (KDUR) 20 MEQ Tab CR Take 1 Tablet by mouth every day. 90 Tablet 1    LORazepam (ATIVAN) 0.5 MG Tab Take 1 Tablet by mouth every 8 hours as needed for Anxiety for up to 30 days. 30 Tablet 0    losartan (COZAAR) 100 MG Tab Take 1 Tablet by mouth every day. 90 Tablet 0    SYNTHROID 88 MCG Tab TAKE 1 TABLET BY MOUTH EVERY DAY IN THE MORNING ON AN EMPTY STOMACH 90 Tablet 1    gabapentin (NEURONTIN) 300 MG Cap Take 3 Capsules by mouth 3 times a day. 810 Capsule 1    meloxicam (MOBIC) 15 MG tablet Take 1 Tablet by mouth every day. 90 Tablet 3    amLODIPine (NORVASC) 10 MG Tab Take 1 Tablet by mouth every day. 90 Tablet 3    omeprazole (PRILOSEC) 40 MG delayed-release capsule TAKE 1 CAPSULE BY MOUTH EVERY DAY 90 Capsule 3    buPROPion (WELLBUTRIN XL) 300 MG XL tablet TAKE 1 TABLET BY MOUTH EVERY DAY IN THE MORNING 90 Tablet 3    calcium citrate (CALCITRATE) 950 (200 Ca) MG Tab Take 1 Tablet by mouth every day.      Multiple Vitamin (MULTI-VITAMINS PO) Take  by mouth.      aspirin EC (ECOTRIN) 81 MG Tablet Delayed Response Take 1 Tablet by mouth every day.      Riboflavin (VITAMIN B-2 PO) Take  by mouth.       No current Epic-ordered facility-administered medications on file.       Allergies:  Ace inhibitors, Chlorhexidine, Flexeril [cyclobenzaprine], and Triamterene    Health Maintenance: Completed    ROS:  Gen: no fevers/chills, no changes in weight  Eyes: no changes in vision  ENT: no sore throat, no hearing loss, no bloody nose  Pulm: no sob, no cough  CV: no chest pain, no palpitations  GI: no nausea/vomiting, no diarrhea  : no dysuria  MSk: no myalgias  Skin: no rash  Neuro: no headaches, no numbness/tingling  Heme/Lymph: no easy bruising      Objective:       Exam:  /74 (BP Location: Left arm, Patient Position: Sitting, BP Cuff Size: Adult)   Pulse  84   Temp 36.4 °C (97.6 °F) (Temporal)   Resp 18   Ht 1.524 m (5')   Wt 73.6 kg (162 lb 3.2 oz)   SpO2 93%   BMI 31.68 kg/m²  Body mass index is 31.68 kg/m².    Gen: Alert and oriented, No apparent distress.  Neck: Neck is supple without lymphadenopathy.  Lungs: Normal effort, CTA bilaterally, no wheezes, rhonchi, or rales  CV: Regular rate and rhythm. No murmurs, rubs, or gallops.  Abdomen: Soft, nontender, no organomegaly or masses.  Normal bowel sounds.  Ext: No clubbing, cyanosis, edema.  Neuro: Cranial nerves II through VIII are grossly intact.  No lateralizing signs are seen.  Gait is normal.  Psych: Patient is alert and cooperative.  No unusual thought process expressed.  Insight and judgment is good.  Does not appear to be anxious or depressed on today's visit.      Labs: Reviewed    Assessment & Plan:     68 y.o. female with the following -     1. Essential hypertension  This is a chronic stable condition.  Continue current medications.    2. Mild episode of recurrent major depressive disorder (HCC)  This is a chronic problem.  Continue current medications.    3. Seasonal allergic rhinitis due to pollen  This is a chronic problem.  We will try her on Singulair to see if that helps.  She also asked for albuterol inhaler if she is getting a bit of wheezing sometimes at night and this is worked for her in the past.  Prescription sent.    4. Acquired hypothyroidism  This is a chronic problem.  Continue on current dose.      Return in about 6 months (around 1/13/2024) for Long.    Please note that this dictation was created using voice recognition software. I have made every reasonable attempt to correct obvious errors, but I expect that there are errors of grammar and possibly content that I did not discover before finalizing the note.

## 2023-07-13 NOTE — ASSESSMENT & PLAN NOTE
This is a chronic problem.  Patient is doing good on her current medications.  She does have a lot of stress in her life helping  her  deal with all his medical issues.

## 2023-07-13 NOTE — ASSESSMENT & PLAN NOTE
This is a chronic problem.  Patient's labs are currently within normal limits.  She states her hair seems to gotten a little thinner and not sure if it is due to her thyroid.  I told her since we just adjusted her dose a month ago we have to give that a little time to see if it improves.

## 2023-07-13 NOTE — ASSESSMENT & PLAN NOTE
This is a chronic problem.  Patient has been having a lot of troubles with sinus congestion and runny eyes secondary to her allergies.  She states the Allegra and Claritin sometimes bother her heart rate so she would like something else to try.

## 2023-07-14 ENCOUNTER — PHARMACY VISIT (OUTPATIENT)
Dept: PHARMACY | Facility: MEDICAL CENTER | Age: 68
End: 2023-07-14
Payer: COMMERCIAL

## 2023-07-18 RX ORDER — LOSARTAN POTASSIUM 100 MG/1
100 TABLET ORAL DAILY
Qty: 90 TABLET | Refills: 3 | Status: SHIPPED | OUTPATIENT
Start: 2023-07-18 | End: 2023-10-24 | Stop reason: SDUPTHER

## 2023-07-25 PROCEDURE — RXMED WILLOW AMBULATORY MEDICATION CHARGE: Performed by: FAMILY MEDICINE

## 2023-07-25 RX ORDER — MELOXICAM 15 MG/1
15 TABLET ORAL DAILY
Qty: 90 TABLET | Refills: 3 | Status: SHIPPED | OUTPATIENT
Start: 2023-07-25

## 2023-07-25 NOTE — TELEPHONE ENCOUNTER
Received request via: Patient    Was the patient seen in the last year in this department? Yes    Does the patient have an active prescription (recently filled or refills available) for medication(s) requested? No    Does the patient have [facility] and need 100 day supply (blood pressure, diabetes and cholesterol meds only)? Patient does not have [facility]    Patient requests medication be sent to [facility]

## 2023-07-26 ENCOUNTER — PHARMACY VISIT (OUTPATIENT)
Dept: PHARMACY | Facility: MEDICAL CENTER | Age: 68
End: 2023-07-26
Payer: COMMERCIAL

## 2023-08-06 PROCEDURE — RXMED WILLOW AMBULATORY MEDICATION CHARGE: Performed by: FAMILY MEDICINE

## 2023-08-07 ENCOUNTER — PHARMACY VISIT (OUTPATIENT)
Dept: PHARMACY | Facility: MEDICAL CENTER | Age: 68
End: 2023-08-07
Payer: COMMERCIAL

## 2023-08-16 PROCEDURE — RXMED WILLOW AMBULATORY MEDICATION CHARGE: Performed by: INTERNAL MEDICINE

## 2023-08-16 RX ORDER — BUPROPION HYDROCHLORIDE 300 MG/1
300 TABLET ORAL EVERY MORNING
Qty: 90 TABLET | Refills: 0 | Status: SHIPPED | OUTPATIENT
Start: 2023-08-16 | End: 2023-11-14 | Stop reason: SDUPTHER

## 2023-08-16 RX ORDER — GABAPENTIN 300 MG/1
900 CAPSULE ORAL 3 TIMES DAILY
Qty: 810 CAPSULE | Refills: 0 | Status: SHIPPED | OUTPATIENT
Start: 2023-08-16 | End: 2023-11-14 | Stop reason: SDUPTHER

## 2023-08-19 ENCOUNTER — PHARMACY VISIT (OUTPATIENT)
Dept: PHARMACY | Facility: MEDICAL CENTER | Age: 68
End: 2023-08-19
Payer: COMMERCIAL

## 2023-08-21 PROCEDURE — RXMED WILLOW AMBULATORY MEDICATION CHARGE: Performed by: FAMILY MEDICINE

## 2023-08-21 RX ORDER — AMLODIPINE BESYLATE 10 MG/1
10 TABLET ORAL DAILY
Qty: 90 TABLET | Refills: 3 | Status: SHIPPED | OUTPATIENT
Start: 2023-08-21

## 2023-08-21 RX ORDER — OMEPRAZOLE 40 MG/1
40 CAPSULE, DELAYED RELEASE ORAL DAILY
Qty: 90 CAPSULE | Refills: 3 | Status: SHIPPED | OUTPATIENT
Start: 2023-08-21

## 2023-08-23 ENCOUNTER — PHARMACY VISIT (OUTPATIENT)
Dept: PHARMACY | Facility: MEDICAL CENTER | Age: 68
End: 2023-08-23
Payer: COMMERCIAL

## 2023-09-01 ENCOUNTER — PATIENT MESSAGE (OUTPATIENT)
Dept: MEDICAL GROUP | Facility: PHYSICIAN GROUP | Age: 68
End: 2023-09-01
Payer: MEDICARE

## 2023-09-01 DIAGNOSIS — F41.9 ANXIETY: ICD-10-CM

## 2023-09-01 PROCEDURE — RXMED WILLOW AMBULATORY MEDICATION CHARGE: Performed by: FAMILY MEDICINE

## 2023-09-01 RX ORDER — LORAZEPAM 0.5 MG/1
0.5 TABLET ORAL EVERY 8 HOURS PRN
Qty: 30 TABLET | Refills: 0 | Status: SHIPPED | OUTPATIENT
Start: 2023-09-01 | End: 2023-10-02

## 2023-09-02 ENCOUNTER — PHARMACY VISIT (OUTPATIENT)
Dept: PHARMACY | Facility: MEDICAL CENTER | Age: 68
End: 2023-09-02
Payer: COMMERCIAL

## 2023-09-02 PROCEDURE — RXMED WILLOW AMBULATORY MEDICATION CHARGE: Performed by: FAMILY MEDICINE

## 2023-09-09 ENCOUNTER — PHARMACY VISIT (OUTPATIENT)
Dept: PHARMACY | Facility: MEDICAL CENTER | Age: 68
End: 2023-09-09
Payer: COMMERCIAL

## 2023-09-19 PROCEDURE — RXMED WILLOW AMBULATORY MEDICATION CHARGE: Performed by: FAMILY MEDICINE

## 2023-09-19 RX ORDER — LEVOTHYROXINE SODIUM 88 MCG
88 TABLET ORAL
Qty: 90 TABLET | Refills: 1 | Status: SHIPPED | OUTPATIENT
Start: 2023-09-19 | End: 2024-03-17 | Stop reason: SDUPTHER

## 2023-09-22 ENCOUNTER — PHARMACY VISIT (OUTPATIENT)
Dept: PHARMACY | Facility: MEDICAL CENTER | Age: 68
End: 2023-09-22
Payer: COMMERCIAL

## 2023-10-12 PROCEDURE — RXMED WILLOW AMBULATORY MEDICATION CHARGE: Performed by: FAMILY MEDICINE

## 2023-10-13 ENCOUNTER — PHARMACY VISIT (OUTPATIENT)
Dept: PHARMACY | Facility: MEDICAL CENTER | Age: 68
End: 2023-10-13
Payer: COMMERCIAL

## 2023-10-24 ENCOUNTER — OFFICE VISIT (OUTPATIENT)
Dept: MEDICAL GROUP | Facility: PHYSICIAN GROUP | Age: 68
End: 2023-10-24
Payer: MEDICARE

## 2023-10-24 VITALS
OXYGEN SATURATION: 96 % | HEART RATE: 90 BPM | HEIGHT: 60 IN | WEIGHT: 159 LBS | BODY MASS INDEX: 31.22 KG/M2 | TEMPERATURE: 98.2 F | RESPIRATION RATE: 18 BRPM | DIASTOLIC BLOOD PRESSURE: 74 MMHG | SYSTOLIC BLOOD PRESSURE: 128 MMHG

## 2023-10-24 DIAGNOSIS — Z87.891 HISTORY OF SMOKING 30 OR MORE PACK YEARS: ICD-10-CM

## 2023-10-24 DIAGNOSIS — Z23 NEED FOR VACCINATION: ICD-10-CM

## 2023-10-24 DIAGNOSIS — F41.9 ANXIETY: ICD-10-CM

## 2023-10-24 DIAGNOSIS — L29.9 ITCHING: ICD-10-CM

## 2023-10-24 PROCEDURE — RXMED WILLOW AMBULATORY MEDICATION CHARGE: Performed by: FAMILY MEDICINE

## 2023-10-24 PROCEDURE — 99214 OFFICE O/P EST MOD 30 MIN: CPT | Mod: 25 | Performed by: FAMILY MEDICINE

## 2023-10-24 PROCEDURE — 3074F SYST BP LT 130 MM HG: CPT | Performed by: FAMILY MEDICINE

## 2023-10-24 PROCEDURE — G0008 ADMIN INFLUENZA VIRUS VAC: HCPCS | Performed by: FAMILY MEDICINE

## 2023-10-24 PROCEDURE — 3078F DIAST BP <80 MM HG: CPT | Performed by: FAMILY MEDICINE

## 2023-10-24 PROCEDURE — 90662 IIV NO PRSV INCREASED AG IM: CPT | Performed by: FAMILY MEDICINE

## 2023-10-24 RX ORDER — LORAZEPAM 0.5 MG/1
0.5 TABLET ORAL EVERY 8 HOURS PRN
Qty: 60 TABLET | Refills: 0 | Status: SHIPPED | OUTPATIENT
Start: 2023-10-24 | End: 2023-11-24

## 2023-10-24 ASSESSMENT — FIBROSIS 4 INDEX: FIB4 SCORE: 0.98

## 2023-10-24 NOTE — ASSESSMENT & PLAN NOTE
This is a chronic problem.  Patient has troubles with itching to the skin on her forearms.  She is tried over-the-counter hydrocortisone cream and Aveeno but that has not helped.

## 2023-10-24 NOTE — ASSESSMENT & PLAN NOTE
This is a chronic problem.  Patient is here for refill on her medications.  She still uses that only about once a day and sometimes twice a day.  She has not found a replacement for her dog as of yet.  They recently bought a car that she does not really think they can afford so that is causing her stress.  Her  is almost done with his chemotherapy so that is a good thing.

## 2023-10-24 NOTE — PROGRESS NOTES
Subjective:     CC: Here for several issues.    HPI:   Mathew presents today with the following medical concerns:    Anxiety  This is a chronic problem.  Patient is here for refill on her medications.  She still uses that only about once a day and sometimes twice a day.  She has not found a replacement for her dog as of yet.  They recently bought a car that she does not really think they can afford so that is causing her stress.  Her  is almost done with his chemotherapy so that is a good thing.    Itching  This is a chronic problem.  Patient has troubles with itching to the skin on her forearms.  She is tried over-the-counter hydrocortisone cream and Aveeno but that has not helped.    Past Medical History:   Diagnosis Date    Anxiety     Arthritis     Cancer (HCC)     COPD (chronic obstructive pulmonary disease) (HCC)     GERD (gastroesophageal reflux disease)     Hypertension     Migraine     Thyroid disease        Social History     Tobacco Use    Smoking status: Former     Current packs/day: 0.00     Average packs/day: 1.5 packs/day for 50.0 years (75.0 ttl pk-yrs)     Types: Cigarettes     Start date: 1968     Quit date: 2018     Years since quittin.2    Smokeless tobacco: Never   Vaping Use    Vaping Use: Some days    Substances: Nicotine, CBD    Devices: Pre-filled or refillable cartridge, Refillable tank   Substance Use Topics    Alcohol use: Never    Drug use: Yes     Types: Marijuana, Methamphetamines     Comment: once in a while       Current Outpatient Medications Ordered in Epic   Medication Sig Dispense Refill    LORazepam (ATIVAN) 0.5 MG Tab Take 1 Tablet by mouth every 8 hours as needed for Anxiety for up to 30 days. 60 Tablet 0    SYNTHROID 88 MCG Tab Take 1 Tablet by mouth every morning on an empty stomach. 90 Tablet 1    omeprazole (PRILOSEC) 40 MG delayed-release capsule Take 1 Capsule by mouth every day. 90 Capsule 3    amLODIPine (NORVASC) 10 MG Tab Take 1 Tablet by mouth  every day. 90 Tablet 3    buPROPion (WELLBUTRIN XL) 300 MG XL tablet Take 1 Tablet by mouth every morning. 90 Tablet 0    gabapentin (NEURONTIN) 300 MG Cap Take 3 Capsules by mouth 3 times a day. 810 Capsule 0    meloxicam (MOBIC) 15 MG tablet Take 1 Tablet by mouth every day. 90 Tablet 3    losartan (COZAAR) 100 MG Tab TAKE 1 TABLET BY MOUTH EVERY DAY 90 Tablet 3    albuterol 108 (90 Base) MCG/ACT Aero Soln inhalation aerosol Inhale 2 Puffs every 6 hours as needed for Shortness of Breath. 8.5 g 3    potassium chloride SA (KDUR) 20 MEQ Tab CR Take 1 Tablet by mouth every day. 90 Tablet 1    calcium citrate (CALCITRATE) 950 (200 Ca) MG Tab Take 1 Tablet by mouth every day.      Multiple Vitamin (MULTI-VITAMINS PO) Take  by mouth.      aspirin EC (ECOTRIN) 81 MG Tablet Delayed Response Take 1 Tablet by mouth every day.      Riboflavin (VITAMIN B-2 PO) Take  by mouth.       No current Epic-ordered facility-administered medications on file.       Allergies:  Ace inhibitors, Chlorhexidine, Flexeril [cyclobenzaprine], and Triamterene    Health Maintenance: Completed    ROS:  Gen: no fevers/chills, no changes in weight  Eyes: no changes in vision  ENT: no sore throat, no hearing loss, no bloody nose  Pulm: no sob, no cough  CV: no chest pain, no palpitations  GI: no nausea/vomiting, no diarrhea  : no dysuria  MSk: no myalgias  Skin: no rash  Neuro: no headaches, no numbness/tingling  Heme/Lymph: no easy bruising      Objective:       Exam:  /74 (BP Location: Left arm, Patient Position: Sitting, BP Cuff Size: Adult)   Pulse 90   Temp 36.8 °C (98.2 °F) (Temporal)   Resp 18   Ht 1.524 m (5')   Wt 72.1 kg (159 lb)   SpO2 96%   BMI 31.05 kg/m²  Body mass index is 31.05 kg/m².    Gen: Alert and oriented, No apparent distress.  Ext: No clubbing, cyanosis, edema.  Skin:    Patient has a few excoriated areas on her forearms and also scars from previous scratching.  No redness, vesicles or induration noted.  Psych:  Patient is alert and cooperative.  No unusual thought Anatoly expressed.  Insight and judgment is good.  Does not appear to be overtly anxious or depressed on today's visit.      Assessment & Plan:     68 y.o. female with the following -     1. Need for vaccination  Flu vaccine given today at her request.  - Influenza Vaccine, High Dose (65+ Only)    2. Anxiety  This is a chronic problem.  Continue to use sparingly.  Follow-up every 90 days as needed.  - LORazepam (ATIVAN) 0.5 MG Tab; Take 1 Tablet by mouth every 8 hours as needed for Anxiety for up to 30 days.  Dispense: 60 Tablet; Refill: 0    3. Itching  This is a chronic problem.  I will have her try over-the-counter Benadryl cream.  If that does not help she could try Lidoderm gel and if that is not helpful then we can do a prescription cortisone cream.    4. History of smoking 30 or more pack years  This is a chronic problem.  Her last screening test was 2 years ago and she would like to get back into the program.  Referral sent.  - REFERRAL TO LUNG CANCER SCREENING PROGRAM      Return in about 3 months (around 1/24/2024) for Long.    Please note that this dictation was created using voice recognition software. I have made every reasonable attempt to correct obvious errors, but I expect that there are errors of grammar and possibly content that I did not discover before finalizing the note.

## 2023-10-25 ENCOUNTER — PHARMACY VISIT (OUTPATIENT)
Dept: PHARMACY | Facility: MEDICAL CENTER | Age: 68
End: 2023-10-25
Payer: COMMERCIAL

## 2023-10-26 RX ORDER — LOSARTAN POTASSIUM 100 MG/1
100 TABLET ORAL DAILY
Qty: 90 TABLET | Refills: 3 | Status: SHIPPED | OUTPATIENT
Start: 2023-10-26

## 2023-10-31 ENCOUNTER — PATIENT MESSAGE (OUTPATIENT)
Dept: PHYSICAL MEDICINE AND REHAB | Facility: MEDICAL CENTER | Age: 68
End: 2023-10-31
Payer: MEDICARE

## 2023-11-03 ENCOUNTER — TELEPHONE (OUTPATIENT)
Dept: PHYSICAL MEDICINE AND REHAB | Facility: MEDICAL CENTER | Age: 68
End: 2023-11-03
Payer: MEDICARE

## 2023-11-03 NOTE — TELEPHONE ENCOUNTER
Phone Number Called: [phone]    Call outcome: Left detailed message for patient. Informed to call back with any additional questions.    Message: Called and LVM to call us back and schedule an appointment with Dr. Jeffers

## 2023-11-07 ENCOUNTER — TELEPHONE (OUTPATIENT)
Dept: PHYSICAL MEDICINE AND REHAB | Facility: MEDICAL CENTER | Age: 68
End: 2023-11-07
Payer: MEDICARE

## 2023-11-07 NOTE — TELEPHONE ENCOUNTER
Phone Number Called: [phone]    Call outcome: Left detailed message for patient. Informed to call back with any additional questions.    Message: Called and LVM to call us and get an appointment scheduled with Dr. Jeffers

## 2023-11-09 ENCOUNTER — PHARMACY VISIT (OUTPATIENT)
Dept: PHARMACY | Facility: MEDICAL CENTER | Age: 68
End: 2023-11-09
Payer: COMMERCIAL

## 2023-11-09 PROCEDURE — RXMED WILLOW AMBULATORY MEDICATION CHARGE: Performed by: FAMILY MEDICINE

## 2023-11-14 ENCOUNTER — OFFICE VISIT (OUTPATIENT)
Dept: PHYSICAL MEDICINE AND REHAB | Facility: MEDICAL CENTER | Age: 68
End: 2023-11-14
Payer: MEDICARE

## 2023-11-14 VITALS
DIASTOLIC BLOOD PRESSURE: 60 MMHG | TEMPERATURE: 97 F | SYSTOLIC BLOOD PRESSURE: 110 MMHG | HEART RATE: 95 BPM | OXYGEN SATURATION: 94 % | BODY MASS INDEX: 30.57 KG/M2 | WEIGHT: 156.53 LBS

## 2023-11-14 DIAGNOSIS — M79.644 BILATERAL THUMB PAIN: ICD-10-CM

## 2023-11-14 DIAGNOSIS — M51.26 LUMBAR DISC HERNIATION: ICD-10-CM

## 2023-11-14 DIAGNOSIS — Z98.1 HISTORY OF LUMBAR FUSION: ICD-10-CM

## 2023-11-14 DIAGNOSIS — M54.16 LUMBAR RADICULITIS: ICD-10-CM

## 2023-11-14 DIAGNOSIS — M79.10 MYALGIA: ICD-10-CM

## 2023-11-14 DIAGNOSIS — R20.0 NUMBNESS AND TINGLING OF RIGHT LEG: ICD-10-CM

## 2023-11-14 DIAGNOSIS — M79.645 BILATERAL THUMB PAIN: ICD-10-CM

## 2023-11-14 DIAGNOSIS — R20.0 NUMBNESS AND TINGLING IN LEFT ARM: ICD-10-CM

## 2023-11-14 DIAGNOSIS — R20.2 NUMBNESS AND TINGLING OF RIGHT LEG: ICD-10-CM

## 2023-11-14 DIAGNOSIS — M18.0 OSTEOARTHRITIS OF CARPOMETACARPAL (CMC) JOINTS OF BOTH THUMBS, UNSPECIFIED OSTEOARTHRITIS TYPE: ICD-10-CM

## 2023-11-14 DIAGNOSIS — M54.41 CHRONIC RIGHT-SIDED LOW BACK PAIN WITH RIGHT-SIDED SCIATICA: ICD-10-CM

## 2023-11-14 DIAGNOSIS — R20.2 NUMBNESS AND TINGLING IN LEFT ARM: ICD-10-CM

## 2023-11-14 DIAGNOSIS — G89.29 CHRONIC RIGHT-SIDED LOW BACK PAIN WITH RIGHT-SIDED SCIATICA: ICD-10-CM

## 2023-11-14 PROCEDURE — 1125F AMNT PAIN NOTED PAIN PRSNT: CPT | Performed by: STUDENT IN AN ORGANIZED HEALTH CARE EDUCATION/TRAINING PROGRAM

## 2023-11-14 PROCEDURE — 99213 OFFICE O/P EST LOW 20 MIN: CPT | Performed by: STUDENT IN AN ORGANIZED HEALTH CARE EDUCATION/TRAINING PROGRAM

## 2023-11-14 PROCEDURE — 3078F DIAST BP <80 MM HG: CPT | Performed by: STUDENT IN AN ORGANIZED HEALTH CARE EDUCATION/TRAINING PROGRAM

## 2023-11-14 PROCEDURE — 3074F SYST BP LT 130 MM HG: CPT | Performed by: STUDENT IN AN ORGANIZED HEALTH CARE EDUCATION/TRAINING PROGRAM

## 2023-11-14 PROCEDURE — RXMED WILLOW AMBULATORY MEDICATION CHARGE: Performed by: FAMILY MEDICINE

## 2023-11-14 RX ORDER — BUPROPION HYDROCHLORIDE 300 MG/1
300 TABLET ORAL EVERY MORNING
Qty: 90 TABLET | Refills: 3 | Status: SHIPPED | OUTPATIENT
Start: 2023-11-14

## 2023-11-14 RX ORDER — GABAPENTIN 300 MG/1
900 CAPSULE ORAL 3 TIMES DAILY
Qty: 810 CAPSULE | Refills: 1 | Status: SHIPPED | OUTPATIENT
Start: 2023-11-14

## 2023-11-14 ASSESSMENT — PAIN SCALES - GENERAL: PAINLEVEL: 6=MODERATE PAIN

## 2023-11-14 ASSESSMENT — PATIENT HEALTH QUESTIONNAIRE - PHQ9: CLINICAL INTERPRETATION OF PHQ2 SCORE: 0

## 2023-11-14 ASSESSMENT — FIBROSIS 4 INDEX: FIB4 SCORE: 0.98

## 2023-11-14 NOTE — TELEPHONE ENCOUNTER
Received request via: Pharmacy    Was the patient seen in the last year in this department? Yes    Does the patient have an active prescription (recently filled or refills available) for medication(s) requested? No    Does the patient have long-term Plus and need 100 day supply (blood pressure, diabetes and cholesterol meds only)? Patient does not have [facility]   Subjective:     Kika Root is a 5 m.o. female here with mother and father. Patient brought in for Well Child      History of Present Illness:  History given by parents    Concerns  - arms and legs sometimes blue.     Well Child Exam  Diet - WNL - Diet includes breast milk   Growth, Elimination, Sleep - WNL - Growth chart normal, stooling normal, sleeping normal and voiding normal  Physical Activity - WNL - active play time  Behavior - WNL -  Development - WNL -  School - normal -home with family member  Household/Safety - WNL - safe environment, support present for parents and appropriate carseat/belt use    Well Child Development 2022   Put things in his or her mouth? Yes   Grab for toys using two hands? No    a toy with one hand and transfer to other hand? No   Try to  things by using the thumb and all fingers in a raking motion ? No   Roll over? Yes   Sit briefly? No   Straighten his or her arms out to lift chest off the floor when lying on the tummy? No   Babble using sounds like da, ba, ga, and ka? No   Turn his or her head towards loud noises? Yes   Like to play with you? Yes   Watch you walk around the room? Yes   Smile at people he or she knows? Yes   Rash? No   [facility] PEQ MCHAT SCORE Incomplete   Some recent data might be hidden         Review of Systems   Constitutional: Negative for activity change, appetite change, fever and irritability.   HENT: Negative for congestion, ear discharge, mouth sores and rhinorrhea.    Eyes: Negative for discharge and redness.   Respiratory: Negative for cough, choking and wheezing.    Cardiovascular: Negative for leg swelling, fatigue with feeds, sweating with feeds and cyanosis.   Gastrointestinal: Negative for abdominal distention, constipation, diarrhea and vomiting.   Genitourinary: Negative for decreased urine volume, hematuria and vaginal discharge.   Musculoskeletal: Negative for extremity weakness.   Skin: Negative for color change,  pallor, rash and wound.   Neurological: Negative for seizures and facial asymmetry.   Hematological: Negative for adenopathy. Does not bruise/bleed easily.       Objective:     Physical Exam  Vitals and nursing note reviewed.   Constitutional:       General: She is active.      Appearance: She is well-developed. She is not toxic-appearing.   HENT:      Head: Normocephalic and atraumatic. No swelling. Anterior fontanelle is flat.      Right Ear: Tympanic membrane and external ear normal. No drainage. Tympanic membrane is not erythematous.      Left Ear: Tympanic membrane and external ear normal. No drainage. Tympanic membrane is not erythematous.      Nose: Nose normal. No mucosal edema, congestion or rhinorrhea.      Mouth/Throat:      Mouth: Mucous membranes are moist.      Pharynx: Oropharynx is clear. No oropharyngeal exudate.      Tonsils: No tonsillar exudate.   Eyes:      General: Red reflex is present bilaterally. Visual tracking is normal. Lids are normal.      Conjunctiva/sclera: Conjunctivae normal.      Pupils: Pupils are equal, round, and reactive to light.   Cardiovascular:      Rate and Rhythm: Normal rate and regular rhythm.      Pulses:           Brachial pulses are 2+ on the right side and 2+ on the left side.       Femoral pulses are 2+ on the right side and 2+ on the left side.     Heart sounds: S1 normal and S2 normal.   Pulmonary:      Effort: Pulmonary effort is normal. No respiratory distress or nasal flaring.      Breath sounds: No stridor. No wheezing, rhonchi or rales.   Chest:      Chest wall: No deformity.   Abdominal:      General: Bowel sounds are normal. There is no distension or abnormal umbilicus.      Palpations: Abdomen is soft. There is no mass.      Tenderness: There is no abdominal tenderness.      Hernia: No hernia is present. There is no hernia in the left inguinal area.   Genitourinary:     Labia: No labial fusion. No rash.        Vagina: No vaginal discharge or erythema.       Rectum: Normal.   Musculoskeletal:         General: Normal range of motion.      Cervical back: Full passive range of motion without pain and neck supple.   Lymphadenopathy:      Cervical: No cervical adenopathy.   Skin:     General: Skin is warm.      Capillary Refill: Capillary refill takes less than 2 seconds.      Turgor: Normal.      Coloration: Skin is not pale.      Findings: No rash.   Neurological:      Mental Status: She is alert.      Cranial Nerves: No cranial nerve deficit.      Sensory: No sensory deficit.      Primitive Reflexes: Primitive reflexes normal.         Assessment:     1. Encounter for well child check without abnormal findings    2. Need for vaccination    3. Encounter for screening for developmental delay    4. 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 At risk for developmental delay    6. Change of skin color        Plan:     Kika was seen today for well child.    Diagnoses and all orders for this visit:    Encounter for well child check without abnormal findings  -     DTaP HepB IPV combined vaccine IM (PEDIARIX)  -     HiB PRP-T conjugate vaccine 4 dose IM  -     Pneumococcal conjugate vaccine 13-valent less than 6yo IM  -     Rotavirus vaccine pentavalent 3 dose oral  -     SWYC-Developmental Test    Need for vaccination  -     DTaP HepB IPV combined vaccine IM (PEDIARIX)  -     HiB PRP-T conjugate vaccine 4 dose IM  -     Pneumococcal conjugate vaccine 13-valent less than 6yo IM  -     Rotavirus vaccine pentavalent 3 dose oral    Encounter for screening for developmental delay  -     SWYC-Developmental Test    Prematurity, 2,000-2,499 grams, 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  - followed by [facility]  - scheduled with high risk NICU clinic    At risk for developmental delay    Change of skin color  -     Ambulatory referral/consult to Pediatric Cardiology; Future          Anticipatory guidance reviewed: Sunscreen, to sleep on back, never leave unattended around water or in  high places, childproof home, keep poison center number handy, No walkers, hand hygeine, Introduce solids, avoid choke foods, supervise eating, start cup for water, Talk sing read and play with baby, bedtime routine, Separation/Stranger anxiety, Take time for self/partner/other sibs, Brush teeth with water only   Follow up for 9 month well visit and as needed

## 2023-11-14 NOTE — PROGRESS NOTES
Follow-up patient Note    Interventional Pain and Spine  Physiatry (Physical Medicine and Rehabilitation)     Patient Name: Robin Lynn Zielesch  : 1955  Date of service: 2023    Chief Complaint:   Chief Complaint   Patient presents with    Follow-Up     Myalgia         HISTORY (2022):  Robin Lynn Zielesch is a 67 y.o. female who presents today with pain radiating from her right posterolateral glute down her right anterolateral and posterior thigh accompanied by numbness/tingling/weakness in this area. This started in Sep 2021 while recovering from a L2-pelvis posterior spinal fusion with TLIF/ PLIF on 21 with Dr. Bates ([facility] which she states she had done for similar radiating pain down her left leg.  Her radiating left leg pain resolved after surgery.     Her pain at its best-worse level during the course of the day is 5-9/10, respectively. Pain right now is 7/10 on the numeric pain scale. Pain worsens with sitting, standing, walking, bending backwards, side bending or twisting, walking upstairs, and walking downstairs and improves with nothing. Her pain interferes somewhat with ADLs. The patient otherwise denies new weakness, numbness, or bladder/bowel incontinence. States she has fallen a few times secondary to pain and possibly weakness. Moved from [facility] in May 2022.     The patient has done physical therapy for this problem with worsening pain.     Patient has tried the following medications with varied success (current meds in bold): Hayes back and body  Gabapentin 300mg TID - no relief. Used to help with left sided pain  Naproxen BID - significant relief     Therapeutic modalities and interventional therapies to date include:  -No injections     Medical history includes HTN, cervical laminectomy, depression, anxiety, thyroid cancer, BMI 30, L2-pelvis posterior spinal fusion with TLIF/ PLIF on 21    HPI  Today's visit   Robin Lynn Zielesch ( 1955) is a female with  Diagnoses of Chronic right-sided low back pain with right-sided sciatica, History of lumbar fusion, Numbness and tingling of right leg, Lumbar disc herniation, Myalgia, Lumbar radiculitis, Osteoarthritis of carpometacarpal (CMC) joints of both thumbs, unspecified osteoarthritis type, Bilateral thumb pain, and Numbness and tingling in left arm were pertinent to this visit.    Recurrence of pain in bilateral thumbs and in low back radiating down her right leg. Would like to repeat injections which improved this pain before. Thumb pain worse with movement better with rest. Back pain started after she wore a walking boot for her right foot, affects her ability to walk easily. Also with new tingling in her left neck and upper arm, intermittent. Uncertain provoking cause. Worsening. Has not had this before.     Takes Hayes back and body for the pain.     Pain severity 6/10 currently    Procedure history:  - 11/1/22 right L2-3 interlaminar epidural steroid injection - 90% improvement in pain, resolution of radiating pain.  - 11/28/22 bilateral CMC joint injections - 100% improvement in thumb pain bilaterally  - 3/7/23 right L2-3 interlaminar epidural steroid injection -100% improvement in back pain and radiating pain, ongoing tightness in her right thigh  - 06/27/23 trigger point injections     ROS:   Red Flags ROS:   Fever, Chills, Sweats: Denies  Involuntary Weight Loss: Denies  Bladder Incontinence: Denies  Bowel Incontinence: denies  Saddle Anesthesia: Denies    All other systems reviewed and negative.     PMHx:   Past Medical History:   Diagnosis Date    Anxiety     Arthritis     Cancer (HCC)     COPD (chronic obstructive pulmonary disease) (HCC)     GERD (gastroesophageal reflux disease)     Hypertension     Migraine     Thyroid disease        PSHx:   Past Surgical History:   Procedure Laterality Date    MN INJ LUMBAR/SACRAL,W/ IMAGING Right 03/07/2023    Procedure: RIGHT Lumbar L2-3 interlaminar epidural steroid  injection;  Surgeon: Kendal Jeffers M.D.;  [facility];  Service: Pain Management    SC INJ LUMBAR/SACRAL,W/ IMAGING Right 2022    Procedure: RIGHT lumbar L2-3 interlaminar epidural steroid injection;  Surgeon: Kendal Jeffers M.D.;  Location: [facility];  Service: Pain Management    ABDOMINAL HYSTERECTOMY TOTAL      ARTHROPLASTY      EYE SURGERY      FOOT SURGERY      HERNIA REPAIR      [id number]      L tka    LAMINOTOMY      LUMPECTOMY      PRIMARY C SECTION      THYROIDECTOMY TOTAL      2019  thyroid cancer       Family Hx:   Family History   Problem Relation Age of Onset    COPD Mother     Cancer Father         pancreatic    Hypertension Father     Hyperlipidemia Father     Alcohol abuse Father     Drug abuse Brother        Social Hx:  Social History     Socioeconomic History    Marital status:      Spouse name: Not on file    Number of children: Not on file    Years of education: Not on file    Highest education level: Some college, no degree   Occupational History    Not on file   Tobacco Use    Smoking status: Former     Current packs/day: 0.00     Average packs/day: 1.5 packs/day for 50.0 years (75.0 ttl pk-yrs)     Types: Cigarettes     Start date: 1968     Quit date: 2018     Years since quittin.2    Smokeless tobacco: Never   Vaping Use    Vaping Use: Some days    Substances: Nicotine, CBD    Devices: Pre-filled or refillable cartridge, Refillable tank   Substance and Sexual Activity    Alcohol use: Never    Drug use: Yes     Types: Marijuana, Methamphetamines     Comment: once in a while    Sexual activity: Yes     Partners: Male     Birth control/protection: Female Sterilization   Other Topics Concern    Not on file   Social History Narrative    Not on file     Social Determinants of Health     Financial Resource Strain: Medium Risk (7/10/2023)    Overall Financial Resource Strain (CARDIA)     Difficulty of Paying Living Expenses:  Somewhat hard   Food Insecurity: No Food Insecurity (7/10/2023)    Hunger Vital Sign     Worried About Running Out of Food in the Last Year: Never true     Ran Out of Food in the Last Year: Never true   Transportation Needs: No Transportation Needs (7/10/2023)    PRAPARE - Transportation     Lack of Transportation (Medical): No     Lack of Transportation (Non-Medical): No   Physical Activity: Insufficiently Active (7/10/2023)    Exercise Vital Sign     Days of Exercise per Week: 5 days     Minutes of Exercise per Session: 20 min   Stress: No Stress Concern Present (7/10/2023)    [employer] - Occupational Stress Questionnaire     Feeling of Stress : Only a little   Social Connections: Moderately Isolated (7/10/2023)    Social Connection and Isolation Panel [NHANES]     Frequency of Communication with Friends and Family: Never     Frequency of Social Gatherings with Friends and Family: Never     Attends Muslim Services: Never     Active Member of Clubs or Organizations: Yes     Attends Club or Organization Meetings: Never     Marital Status:    Intimate Partner Violence: Not on file   Housing Stability: Low Risk  (7/10/2023)    Housing Stability Vital Sign     Unable to Pay for Housing in the Last Year: No     Number of Places Lived in the Last Year: 1     Unstable Housing in the Last Year: No       Allergies:  Allergies   Allergen Reactions    Ace Inhibitors Cough    Chlorhexidine Rash    Flexeril [Cyclobenzaprine] Unspecified     Irritability     Triamterene      Other reaction(s): Nephrotoxicity       Medications: reviewed on epic.   Outpatient Medications Marked as Taking for the 11/14/23 encounter (Office Visit) with Kendal Jeffers M.D.   Medication Sig Dispense Refill    losartan (COZAAR) 100 MG Tab Take 1 Tablet by mouth every day. 90 Tablet 3    LORazepam (ATIVAN) 0.5 MG Tab Take 1 Tablet by mouth every 8 hours as needed for Anxiety for up to 30 days. 60 Tablet 0     SYNTHROID 88 MCG Tab Take 1 Tablet by mouth every morning on an empty stomach. 90 Tablet 1    omeprazole (PRILOSEC) 40 MG delayed-release capsule Take 1 Capsule by mouth every day. 90 Capsule 3    amLODIPine (NORVASC) 10 MG Tab Take 1 Tablet by mouth every day. 90 Tablet 3    buPROPion (WELLBUTRIN XL) 300 MG XL tablet Take 1 Tablet by mouth every morning. 90 Tablet 0    gabapentin (NEURONTIN) 300 MG Cap Take 3 Capsules by mouth 3 times a day. 810 Capsule 0    meloxicam (MOBIC) 15 MG tablet Take 1 Tablet by mouth every day. 90 Tablet 3    albuterol 108 (90 Base) MCG/ACT Aero Soln inhalation aerosol Inhale 2 Puffs every 6 hours as needed for Shortness of Breath. 8.5 g 3    potassium chloride SA (KDUR) 20 MEQ Tab CR Take 1 Tablet by mouth every day. 90 Tablet 1    calcium citrate (CALCITRATE) 950 (200 Ca) MG Tab Take 1 Tablet by mouth every day.      Multiple Vitamin (MULTI-VITAMINS PO) Take  by mouth.      aspirin EC (ECOTRIN) 81 MG Tablet Delayed Response Take 1 Tablet by mouth every day.      Riboflavin (VITAMIN B-2 PO) Take  by mouth.          Current Outpatient Medications on File Prior to Visit   Medication Sig Dispense Refill    losartan (COZAAR) 100 MG Tab Take 1 Tablet by mouth every day. 90 Tablet 3    LORazepam (ATIVAN) 0.5 MG Tab Take 1 Tablet by mouth every 8 hours as needed for Anxiety for up to 30 days. 60 Tablet 0    SYNTHROID 88 MCG Tab Take 1 Tablet by mouth every morning on an empty stomach. 90 Tablet 1    omeprazole (PRILOSEC) 40 MG delayed-release capsule Take 1 Capsule by mouth every day. 90 Capsule 3    amLODIPine (NORVASC) 10 MG Tab Take 1 Tablet by mouth every day. 90 Tablet 3    buPROPion (WELLBUTRIN XL) 300 MG XL tablet Take 1 Tablet by mouth every morning. 90 Tablet 0    gabapentin (NEURONTIN) 300 MG Cap Take 3 Capsules by mouth 3 times a day. 810 Capsule 0    meloxicam (MOBIC) 15 MG tablet Take 1 Tablet by mouth every day. 90 Tablet 3    albuterol 108 (90 Base) MCG/ACT Aero Soln inhalation  aerosol Inhale 2 Puffs every 6 hours as needed for Shortness of Breath. 8.5 g 3    potassium chloride SA (KDUR) 20 MEQ Tab CR Take 1 Tablet by mouth every day. 90 Tablet 1    calcium citrate (CALCITRATE) 950 (200 Ca) MG Tab Take 1 Tablet by mouth every day.      Multiple Vitamin (MULTI-VITAMINS PO) Take  by mouth.      aspirin EC (ECOTRIN) 81 MG Tablet Delayed Response Take 1 Tablet by mouth every day.      Riboflavin (VITAMIN B-2 PO) Take  by mouth.       No current facility-administered medications on file prior to visit.         EXAMINATION     Physical Exam:   /60 (BP Location: Right arm, Patient Position: Sitting, BP Cuff Size: Adult)   Pulse 95   Temp 36.1 °C (97 °F) (Temporal)   Wt 71 kg (156 lb 8.4 oz)   SpO2 94%     Constitutional:   Body Habitus: Body mass index is 30.57 kg/m².  Cooperation: Fully cooperates with exam  Appearance: Well-groomed, well-nourished.    Eyes: No scleral icterus to suggest severe liver disease, no proptosis to suggest severe hyperthyroidism    ENT -no obvious auditory deficits, no noticeable facial droop     Skin -no rashes or lesions noted     Respiratory-  breathing comfortably on room air, no audible wheezing    Cardiovascular-distal extremities warm and well perfused.  No lower extremity edema is noted.     Gastrointestinal - no obvious abdominal masses, non-distended    Psychiatric- alert and oriented ×3. Normal affect.     Gait - normal gait, no use of ambulatory device, nonantalgic.     Musculoskeletal and Neuro -       Cervical spine   Inspection: No deformities of the skin over the cervical spine. No rashes or lesions.    limited active range of motion in all directions    Spurling's sign  negative bilaterally  Cervical facet loading maneuver  negative bilaterally    No signs of muscular atrophy in bilateral upper extremities     Tenderness to palpation at paracervical muscles on left. No tenderness to palpation elsewhere including midline of cervical spine and  paracervical muscles on right.      Key points for the international standards for neurological classification of spinal cord injury (ISNCSCI) to light touch.     Dermatome R L   C4 2 2   C5 2 2   C6 2 2   C7 2 2   C8 2 2   T1 2 2   T2 2 2       Motor Exam Upper Extremities   ? Myotome R L   Shoulder abduction C5 5 5   Elbow flexion C5 5 5   Wrist extension C6 5 5   Elbow extension C7 5 5   Finger flexion C8 5 5   Finger abduction T1 5 5     Full AROM of bilateral shoulders    Reflexes  Thoracic/Lumbar Spine/Sacral Spine/Hips   Palpation:   Tenderness to palpation across bilateral upper glutes.  No tenderness to palpation in the low back/hips including midline of lumbosacral spine, paraspinal muscles bilaterally, lumbar facets bilaterally, sacroiliac joints bilaterally, PSIS bilaterally, and greater trochanters bilaterally.    Inspection: No evidence of atrophy in bilateral lower extremities throughout      Lumbar spine /hip provocative exam maneuvers  Straight leg raise negative bilaterally  FADIR test negative bilaterally    SI joint tests  EBEN test negative bilaterally  Thigh thrust test negative bilaterally     Key points for the international standards for neurological classification of spinal cord injury (ISNCSCI) to light touch.   Dermatome R L   L2 2 2   L3 2 2   L4 2 2   L5 2 2   S1 2 2   S2 2 2         Motor Exam Lower Extremities  ? Myotome R L   Hip flexion L2 5 5   Knee extension L3 5 5   Ankle dorsiflexion L4 5 5   Toe extension L5 5 5   Ankle plantarflexion S1 5 5      There is full active range of motion with lumbar extension     Facet loading maneuver negative bilaterally     Previous exam  Heel walking and toe walking intact.       Reflexes  ?   R L   Patella   2+ 2+   Achilles    2+ 2+      Clonus of the ankle negative bilaterally        MEDICAL DECISION MAKING     Medical records review: see under HPI section.       MEDICAL DECISION MAKING    Medical records review: see under HPI section.  "    DATA    Labs: No new labs available for review since last visit.    Lab Results   Component Value Date/Time    SODIUM 141 12/20/2022 10:30 AM    POTASSIUM 3.9 12/20/2022 10:30 AM    CHLORIDE 103 12/20/2022 10:30 AM    CO2 26 12/20/2022 10:30 AM    ANION 12.0 12/20/2022 10:30 AM    GLUCOSE 109 (H) 12/20/2022 10:30 AM    BUN 20 12/20/2022 10:30 AM    CREATININE 0.75 12/20/2022 10:30 AM    CALCIUM 10.3 12/20/2022 10:30 AM    ASTSGOT 16 03/01/2023 01:09 PM    ALTSGPT 11 03/01/2023 01:09 PM    TBILIRUBIN 0.5 03/01/2023 01:09 PM    ALBUMIN 4.5 03/01/2023 01:09 PM    TOTPROTEIN 7.0 03/01/2023 01:09 PM    GLOBULIN 2.7 12/20/2022 10:30 AM    AGRATIO 1.7 12/20/2022 10:30 AM       No results found for: \"PROTHROMBTM\", \"INR\"     Lab Results   Component Value Date/Time    WBC 7.8 12/20/2022 10:30 AM    RBC 5.17 12/20/2022 10:30 AM    HEMOGLOBIN 15.3 12/20/2022 10:30 AM    HEMATOCRIT 45.9 12/20/2022 10:30 AM    MCV 88.8 12/20/2022 10:30 AM    MCH 29.6 12/20/2022 10:30 AM    MCHC 33.3 (L) 12/20/2022 10:30 AM    MPV 9.0 12/20/2022 10:30 AM    NEUTSPOLYS 65.70 12/20/2022 10:30 AM    LYMPHOCYTES 20.80 (L) 12/20/2022 10:30 AM    MONOCYTES 10.50 12/20/2022 10:30 AM    EOSINOPHILS 2.00 12/20/2022 10:30 AM    BASOPHILS 0.60 12/20/2022 10:30 AM        Lab Results   Component Value Date/Time    HBA1C 5.8 (H) 04/28/2022 10:39 AM        Imaging:   I personally reviewed following images, these are my reads  MRI lumbar spine 9/2/2022  Evidence of lumbar laminectomy at L4-S1, interbody fusion and posterior fusion at L3-S1.  Broad-based disc bulge at L1-L2 resulting in severe central canal stenosis and compression of descending bilateral L2 nerve roots and possibly bilateral L3 nerve roots and mild impingement of exiting L1 nerve roots bilaterally.  Mild right neuroforaminal stenosis at T12-L1.  Possible mild bilateral neuroforaminal stenosis at L5-S1, quality of images impaired due to metallic artifact. Appearance of chronic postoperative " seroma posterior to L4 and L5 vertebral bodies.    XR Right hand 11/22/22  Moderate to severe CMC joint OA. See formal radiology report for further details.    IMAGING radiology reads. I reviewed the following radiology reads                      Results for orders placed during the hospital encounter of 09/02/22    MR-LUMBAR SPINE-W/O    Impression  1.  L3-4 slight anterolisthesis and L5-S1 slight retrolisthesis.  2.  Postoperative changes with lumbar laminectomy L4-L5-S1, interbody fusion L3-L4, L4-L5, L5-S1, and posterior fusion with transpedicular screw fixation at L3-L4-L5-S1.  3.  Chronic postoperative seroma occupying the laminectomy interval without dorsal epidural mass effect.  4.  The study is most notable for L1-L2 large disc protrusion-extrusion resulting in severe central stenosis.  5.  Additional degenerative changes detailed for each level above in the body of report.        MRI lumbar spine 10/20/21  Lumbar spine:    Alignment: Grade 1 L3-L4 anterolisthesis. Previously seen L4-L5  anterolisthesis is improved compared to MRI prior to surgery.    Vertebral body heights and marrow: Postsurgical changes from L3-S1  posterior fusion. Multilevel lumbar spondylosis with osteophytes, facet  arthropathy, and endplate marrow degenerative changes are seen. Otherwise  the vertebral body heights and marrow signal are unremarkable.    Conus medullaris: Normal, terminating at L1/L2.    Soft tissues: There is a fluid collection in the paraspinal soft tissues  posterior to the L3-L5 laminectomy sites, likely postoperative seroma  measuring 63 x 28 mm (series 17, image 7). Small right renal cyst.    L1-L2: Persistent disc bulge with worsening superimposed central disc  extrusion. Bilateral facet arthropathy and dorsal epidural fat. The  constellation of findings produces moderate to severe spinal canal  stenosis. Mild to moderate left neural foraminal stenosis is improved  compared to prior.  Ligamentum flavum  thickening. Facet hypertrophy, mild.    L2-L3: Disc bulge, ligamentum flavum thickening, facet hypertrophy  resulting in mild spinal canal stenosis. Mild left neural foraminal  stenosis.    L3-L4: Partially uncovered disc. Mild to moderate right neural foraminal  stenosis. Limited evaluation of the left neural foramen. Laminectomy.    L4-L5: No spinal canal stenosis. Limited evaluation of neural foramina due  to spinal hardware. Laminectomy.    L5-S1: Disc bulge without spinal canal stenosis. Limited evaluation of  neural foramina due to spinal hardware.     IMPRESSION:    1. Worsening disc protrusion at L1-L2, resulting in worsening spinal  canal stenosis, now moderate to severe.   2. Multilevel degenerative changes of the cervical spine, similar  compared to prior.  3. Multilevel degenerative changes in thoracic spine, with up to mild  to moderate spinal canal stenosis at T8-T9 secondary to disc bulge.  4. Postsurgical changes . Small postoperative seroma in L3-L5  laminectomy sites.        X-ray left hand 3/19/2019  FINDINGS:   There is no acute fracture or dislocation.   Advanced osteoarthrosis of the first CMC joint, characterized by joint   space narrowing, subchondral sclerosis, osteophyte formation, and radial   subluxation. Mild osteoarthrosis of the STT joint. Osteoarthrosis of   scattered IP joints. No focal soft tissue swelling.     IMPRESSION:   Advanced osteoarthrosis of the first CMC joint.     XR Right hand 11/22/22  FINDINGS:     BONE MINERALIZATION: Normal.  JOINTS: Moderate to severe first carpometacarpal joint osteoarthrosis. Mild triscaphe joint osteoarthrosis. Mild multifocal osteoarthrosis otherwise. No erosions.  FRACTURE: None.  DISLOCATION: None.  SOFT TISSUES: No mass.     IMPRESSION:     1.  Moderate to severe first carpometacarpal joint osteoarthrosis.  2.  Mild multifocal osteoarthrosis otherwise.                                                  Diagnosis  Visit Diagnoses     ICD-10-CM    1. Chronic right-sided low back pain with right-sided sciatica  M54.41    G89.29   2. History of lumbar fusion  Z98.1   3. Numbness and tingling of right leg  R20.0    R20.2   4. Lumbar disc herniation  M51.26   5. Myalgia  M79.10   6. Lumbar radiculitis  M54.16   7. Osteoarthritis of carpometacarpal (CMC) joints of both thumbs, unspecified osteoarthritis type  M18.0   8. Bilateral thumb pain  M79.644    M79.645   9. Numbness and tingling in left arm  R20.0    R20.2                   ASSESSMENT AND PLAN:  Robin Lynn Zielesch (: 1955) is a female with history of HTN, cervical laminectomy, depression, anxiety, thyroid cancer, BMI 30, L3-pelvis posterior spinal fusion with TLIF/ PLIF on 21 who presents with recurrence of pain radiating down her right low back to her right thigh in primarily L2-L4 dermatomal distribution which previously improved after L2-3 interlaminar epidural steroid injection.      Mathew was seen today for follow-up.    Diagnoses and all orders for this visit:    Chronic right-sided low back pain with right-sided sciatica    History of lumbar fusion    Numbness and tingling of right leg    Lumbar disc herniation    Myalgia    Lumbar radiculitis    Osteoarthritis of carpometacarpal (CMC) joints of both thumbs, unspecified osteoarthritis type  -     Referral to Pain Clinic    Bilateral thumb pain  -     Referral to Pain Clinic    Numbness and tingling in left arm  -     MR-CERVICAL SPINE-W/O; Future            PLAN  Physical Therapy: I have discussed possible referral to physical therapy.  She has declined a physical therapy referral as she has done extensive physical therapy in the past which worsened her symptoms and is currently too busy caring for her .       Diagnostic workup: cervical MRI as above, possible cervical nerve root impingement    Medications:   -Continue gabapentin, Mobic as per PCP  -  reviewed, records do not demonstrate increased risk of opioid abuse.      Interventions:   -right L2-3 interlaminar epidural steroid injection PRN given significant improvement in pain with this procedure in the past. Given recurrence of pain, tentatively plan for this injection 1 month after CMC joint injections to minimize systemic side effects of steroids  -Bilateral CMC joint steroid injections under ultrasound guidance at next visit given significant improvement in pain with this procedure in the past. The risks, benefits, and alternatives to this procedure were discussed and the patient wishes to proceed with the procedure. Risks include but are not limited to damage to surrounding structures, infection, bleeding, worsening of pain which can be permanent, and weakness which can be permanent. Benefits include pain relief and improved function. Alternatives include not doing the procedure.        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  Given her hx of significant improvement in pain after our previous epidural steroid injection and no neurologic deficits on exam today, I believe it is reasonable to defer a neurosurgery referral at this time  -Discussed that she could consider deep myofascial release techniques including a foam roller for her right thigh    Follow-up: next available for bilateral CMC joint injections, then plan for epidural 1 month afterwards. Plan to review cervical MRI results at next office visit after she has this done    Orders Placed This Encounter    MR-CERVICAL SPINE-W/O    Referral to Pain Clinic       Kendal Jeffers MD  Interventional Pain and Spine  Physical Medicine and Rehabilitation  [facility]      The above note documents my personal evaluation of this patient. In addition, I have reviewed and confirmed with the patient and MA the supportive information documented in today's  Patient Health Questionnaire and Office Note.     Please note that this dictation was created using voice recognition software. I have made every reasonable attempt to correct obvious errors, but I expect that there are errors of grammar and possibly content that I did not discover before finalizing the note.

## 2023-11-15 ENCOUNTER — OFFICE VISIT (OUTPATIENT)
Dept: PHYSICAL MEDICINE AND REHAB | Facility: MEDICAL CENTER | Age: 68
End: 2023-11-15
Payer: MEDICARE

## 2023-11-15 VITALS
TEMPERATURE: 98 F | HEART RATE: 84 BPM | WEIGHT: 154.32 LBS | SYSTOLIC BLOOD PRESSURE: 119 MMHG | BODY MASS INDEX: 30.14 KG/M2 | OXYGEN SATURATION: 95 % | DIASTOLIC BLOOD PRESSURE: 81 MMHG

## 2023-11-15 DIAGNOSIS — M18.0 OSTEOARTHRITIS OF CARPOMETACARPAL (CMC) JOINTS OF BOTH THUMBS, UNSPECIFIED OSTEOARTHRITIS TYPE: ICD-10-CM

## 2023-11-15 PROCEDURE — 3079F DIAST BP 80-89 MM HG: CPT | Performed by: STUDENT IN AN ORGANIZED HEALTH CARE EDUCATION/TRAINING PROGRAM

## 2023-11-15 PROCEDURE — 20604 DRAIN/INJ JOINT/BURSA W/US: CPT | Mod: 50 | Performed by: STUDENT IN AN ORGANIZED HEALTH CARE EDUCATION/TRAINING PROGRAM

## 2023-11-15 PROCEDURE — 3074F SYST BP LT 130 MM HG: CPT | Performed by: STUDENT IN AN ORGANIZED HEALTH CARE EDUCATION/TRAINING PROGRAM

## 2023-11-15 RX ORDER — DEXAMETHASONE SODIUM PHOSPHATE 4 MG/ML
4 INJECTION, SOLUTION INTRA-ARTICULAR; INTRALESIONAL; INTRAMUSCULAR; INTRAVENOUS; SOFT TISSUE ONCE
Status: COMPLETED | OUTPATIENT
Start: 2023-11-15 | End: 2023-11-15

## 2023-11-15 RX ADMIN — DEXAMETHASONE SODIUM PHOSPHATE 4 MG: 4 INJECTION, SOLUTION INTRA-ARTICULAR; INTRALESIONAL; INTRAMUSCULAR; INTRAVENOUS; SOFT TISSUE at 13:24

## 2023-11-15 RX ADMIN — DEXAMETHASONE SODIUM PHOSPHATE 4 MG: 4 INJECTION, SOLUTION INTRA-ARTICULAR; INTRALESIONAL; INTRAMUSCULAR; INTRAVENOUS; SOFT TISSUE at 13:19

## 2023-11-15 ASSESSMENT — FIBROSIS 4 INDEX: FIB4 SCORE: 0.98

## 2023-11-15 NOTE — PROCEDURES
Patient Name: Robin Lynn Zielesch  : 1955  Date of Service: 11/15/23    Physician/s: Kendal Jeffers MD    Pre-operative Diagnosis: BILATERAL 1st carpometacarpal arthritis    Post-operative Diagnosis: BILATERAL 1st carpometacarpal arthritis      Procedure: BILATERAL ultrasound-guided 1st carpometacarpal joint injection    Description of procedure:    The risks, benefits, and alternatives of the procedure were reviewed and discussed with the patient.  Written informed consent was freely obtained. A pre-procedural time-out was conducted by the physician verifying patient’s identity, procedure to be performed, procedure site and side, and allergy verification. Appropriate equipment was determined to be in place for the procedure.     No sedation was used for this procedure.     In the office suite the patient was placed in a supine position, and her left hand was rested with the radial side of the palm up, and the skin was prepped and draped in the usual sterile fashion. The 1st carpometacarpal joint was identified under ultrasound guidance over the area of the anatomic snuffbox. The area was also investigated for adjacent nerves and vessels to confirm that the needle path and injection would not be in a nerve or vessel. With an out of plane approach, a 27g 1.5 inch needle was placed into skin and advanced until the needle tip was seen within the carpometacarpal joint. Following negative aspiration, a total of 1 mL 1% lidocaine and 1 mL of 4 mg/mL dexamethasone was injected under live ultrasound guidance and joint distension was confirmed. The patient's skin was wiped with a 4x4 gauze, the area was cleansed with alcohol prep, and a bandaid was applied. There were no complications noted.     Then attention was turned to the right hand. The hand was rested with the radial side of the palm up, and the skin was prepped and draped in the usual sterile fashion. The 1st carpometacarpal joint was identified under  ultrasound guidance over the area of the anatomic snuffbox. The area was also investigated for adjacent nerves and vessels to confirm that the needle path and injection would not be in a nerve or vessel. With an out of plane approach, a 27g 1.5 inch needle was placed into skin and advanced until the needle tip was seen within the carpometacarpal joint. Following negative aspiration, a total of 1 mL 1% lidocaine and 1 mL of 4 mg/mL dexamethasone was injected under live ultrasound guidance and joint distension was confirmed. The patient's skin was wiped with a 4x4 gauze, the area was cleansed with alcohol prep, and a bandaid was applied. There were no complications noted.     The images were uploaded to our secure system for permanent storage.     Kendal Jeffers MD  Interventional Pain and Spine  Physical Medicine and Rehabilitation  [facility]

## 2023-11-17 ENCOUNTER — PHARMACY VISIT (OUTPATIENT)
Dept: PHARMACY | Facility: MEDICAL CENTER | Age: 68
End: 2023-11-17
Payer: COMMERCIAL

## 2023-11-23 ENCOUNTER — HOSPITAL ENCOUNTER (OUTPATIENT)
Dept: RADIOLOGY | Facility: MEDICAL CENTER | Age: 68
End: 2023-11-23
Attending: STUDENT IN AN ORGANIZED HEALTH CARE EDUCATION/TRAINING PROGRAM
Payer: MEDICARE

## 2023-11-23 DIAGNOSIS — R20.2 NUMBNESS AND TINGLING IN LEFT ARM: ICD-10-CM

## 2023-11-23 DIAGNOSIS — R20.0 NUMBNESS AND TINGLING IN LEFT ARM: ICD-10-CM

## 2023-11-23 PROCEDURE — 72141 MRI NECK SPINE W/O DYE: CPT

## 2023-12-07 ENCOUNTER — OFFICE VISIT (OUTPATIENT)
Dept: PHYSICAL MEDICINE AND REHAB | Facility: MEDICAL CENTER | Age: 68
End: 2023-12-07
Payer: MEDICARE

## 2023-12-07 DIAGNOSIS — M79.645 BILATERAL THUMB PAIN: ICD-10-CM

## 2023-12-07 DIAGNOSIS — M54.41 CHRONIC RIGHT-SIDED LOW BACK PAIN WITH RIGHT-SIDED SCIATICA: ICD-10-CM

## 2023-12-07 DIAGNOSIS — R20.2 NUMBNESS AND TINGLING IN LEFT ARM: ICD-10-CM

## 2023-12-07 DIAGNOSIS — M18.0 OSTEOARTHRITIS OF CARPOMETACARPAL (CMC) JOINTS OF BOTH THUMBS, UNSPECIFIED OSTEOARTHRITIS TYPE: ICD-10-CM

## 2023-12-07 DIAGNOSIS — M48.02 NEUROFORAMINAL STENOSIS OF CERVICAL SPINE: ICD-10-CM

## 2023-12-07 DIAGNOSIS — M51.26 LUMBAR DISC HERNIATION: ICD-10-CM

## 2023-12-07 DIAGNOSIS — M54.12 CERVICAL RADICULOPATHY: ICD-10-CM

## 2023-12-07 DIAGNOSIS — G89.29 CHRONIC RIGHT-SIDED LOW BACK PAIN WITH RIGHT-SIDED SCIATICA: ICD-10-CM

## 2023-12-07 DIAGNOSIS — R20.2 NUMBNESS AND TINGLING OF RIGHT LEG: ICD-10-CM

## 2023-12-07 DIAGNOSIS — R20.0 NUMBNESS AND TINGLING OF RIGHT LEG: ICD-10-CM

## 2023-12-07 DIAGNOSIS — Z98.1 S/P CERVICAL SPINAL FUSION: ICD-10-CM

## 2023-12-07 DIAGNOSIS — M79.10 MYALGIA: ICD-10-CM

## 2023-12-07 DIAGNOSIS — Z98.1 HISTORY OF LUMBAR FUSION: ICD-10-CM

## 2023-12-07 DIAGNOSIS — R20.0 NUMBNESS AND TINGLING IN LEFT ARM: ICD-10-CM

## 2023-12-07 DIAGNOSIS — M79.644 BILATERAL THUMB PAIN: ICD-10-CM

## 2023-12-07 DIAGNOSIS — M54.16 LUMBAR RADICULITIS: ICD-10-CM

## 2023-12-07 PROCEDURE — 99214 OFFICE O/P EST MOD 30 MIN: CPT | Performed by: STUDENT IN AN ORGANIZED HEALTH CARE EDUCATION/TRAINING PROGRAM

## 2023-12-07 ASSESSMENT — PATIENT HEALTH QUESTIONNAIRE - PHQ9: CLINICAL INTERPRETATION OF PHQ2 SCORE: 0

## 2023-12-07 NOTE — PROGRESS NOTES
Follow-up patient Note    Interventional Pain and Spine  Physiatry (Physical Medicine and Rehabilitation)     Patient Name: Robin Lynn Zielesch  : 1955  Date of service: 2023    Chief Complaint:   Chief Complaint   Patient presents with    Follow-Up     Osteoarthritis of carpometacarpal (CMC) joints of both thumbs, unspecified osteoarthritis type         HISTORY (2022):  Robin Lynn Zielesch is a 67 y.o. female who presents today with pain radiating from her right posterolateral glute down her right anterolateral and posterior thigh accompanied by numbness/tingling/weakness in this area. This started in Sep 2021 while recovering from a L2-pelvis posterior spinal fusion with TLIF/ PLIF on 21 with Dr. Bates ([facility] which she states she had done for similar radiating pain down her left leg.  Her radiating left leg pain resolved after surgery.     Her pain at its best-worse level during the course of the day is 5-9/10, respectively. Pain right now is 7/10 on the numeric pain scale. Pain worsens with sitting, standing, walking, bending backwards, side bending or twisting, walking upstairs, and walking downstairs and improves with nothing. Her pain interferes somewhat with ADLs. The patient otherwise denies new weakness, numbness, or bladder/bowel incontinence. States she has fallen a few times secondary to pain and possibly weakness. Moved from [facility] in May 2022.     The patient has done physical therapy for this problem with worsening pain.     Patient has tried the following medications with varied success (current meds in bold): Hayes back and body  Gabapentin 300mg TID - no relief. Used to help with left sided pain  Naproxen BID - significant relief     Therapeutic modalities and interventional therapies to date include:  -No injections     Medical history includes HTN, cervical laminectomy, depression, anxiety, thyroid cancer, BMI 30, L2-pelvis posterior spinal fusion with TLIF/ PLIF  "on 21    HPI  Today's visit   Robin Lynn Zielesch ( 1955) is a female with Diagnoses of Cervical radiculopathy, S/P cervical spinal fusion, Neuroforaminal stenosis of cervical spine, Osteoarthritis of carpometacarpal (CMC) joints of both thumbs, unspecified osteoarthritis type, Chronic right-sided low back pain with right-sided sciatica, History of lumbar fusion, Numbness and tingling of right leg, Lumbar disc herniation, Myalgia, Lumbar radiculitis, Bilateral thumb pain, and Numbness and tingling in left arm were pertinent to this visit.    Notes ongoing radiating pain from her left neck down her left arm feeling like \"someone is stabbing me from the inside.\"  She is unsure what worsens her pain.  The pain can come on randomly. Pain improves with tilting her head to the right and down. Also notes LUE weakness noticeable with stirring.  Pain ratsd 6/10 on NRS.  Notes numbness and tingling in distribution of pain.    Neck hurts more than her leg.  Would like to prioritize treating her neck first.    Thumb pain is resolved after 11/15/23 BILATERAL ultrasound-guided 1st carpometacarpal joint injection    Procedure history:  - 22 right L2-3 interlaminar epidural steroid injection - 90% improvement in pain, resolution of radiating pain.  - 22 bilateral CMC joint injections - 100% improvement in thumb pain bilaterally  - 3/7/23 right L2-3 interlaminar epidural steroid injection -100% improvement in back pain and radiating pain, ongoing tightness in her right thigh  - 23 trigger point injections   - 11/15/23 BILATERAL ultrasound-guided 1st carpometacarpal joint injection - 100% improvement in thumb pain bilaterally    ROS:   Red Flags ROS:   Fever, Chills, Sweats: Denies  Involuntary Weight Loss: Denies  Bladder Incontinence: Denies  Bowel Incontinence: denies  Saddle Anesthesia: Denies    All other systems reviewed and negative.     PMHx:   Past Medical History:   Diagnosis Date    Anxiety     " Arthritis     Cancer (HCC)     COPD (chronic obstructive pulmonary disease) (HCC)     GERD (gastroesophageal reflux disease)     Hypertension     Migraine     Thyroid disease        PSHx:   Past Surgical History:   Procedure Laterality Date    AR INJ LUMBAR/SACRAL,W/ IMAGING Right 2023    Procedure: RIGHT Lumbar L2-3 interlaminar epidural steroid injection;  Surgeon: Kendal Jeffers M.D.;  [facility];  Service: Pain Management    AR INJ LUMBAR/SACRAL,W/ IMAGING Right 2022    Procedure: RIGHT lumbar L2-3 interlaminar epidural steroid injection;  Surgeon: Kendal Jeffers M.D.;  Location: [facility];  Service: Pain Management    ABDOMINAL HYSTERECTOMY TOTAL      ARTHROPLASTY      EYE SURGERY      FOOT SURGERY      HERNIA REPAIR      [id number]      L tka    LAMINOTOMY      LUMPECTOMY      PRIMARY C SECTION      THYROIDECTOMY TOTAL      2019  thyroid cancer       Family Hx:   Family History   Problem Relation Age of Onset    COPD Mother     Cancer Father         pancreatic    Hypertension Father     Hyperlipidemia Father     Alcohol abuse Father     Drug abuse Brother        Social Hx:  Social History     Socioeconomic History    Marital status:      Spouse name: Not on file    Number of children: Not on file    Years of education: Not on file    Highest education level: Some college, no degree   Occupational History    Not on file   Tobacco Use    Smoking status: Former     Current packs/day: 0.00     Average packs/day: 1.5 packs/day for 50.0 years (75.0 ttl pk-yrs)     Types: Cigarettes     Start date: 1968     Quit date: 2018     Years since quittin.3    Smokeless tobacco: Never   Vaping Use    Vaping Use: Some days    Substances: Nicotine, CBD    Devices: Pre-filled or refillable cartridge, Refillable tank   Substance and Sexual Activity    Alcohol use: Never    Drug use: Yes     Types: Marijuana, Methamphetamines     Comment: once in a  while    Sexual activity: Yes     Partners: Male     Birth control/protection: Female Sterilization   Other Topics Concern    Not on file   Social History Narrative    Not on file     Social Determinants of Health     Financial Resource Strain: Medium Risk (7/10/2023)    Overall Financial Resource Strain (CARDIA)     Difficulty of Paying Living Expenses: Somewhat hard   Food Insecurity: No Food Insecurity (7/10/2023)    Hunger Vital Sign     Worried About Running Out of Food in the Last Year: Never true     Ran Out of Food in the Last Year: Never true   Transportation Needs: No Transportation Needs (7/10/2023)    PRAPARE - Transportation     Lack of Transportation (Medical): No     Lack of Transportation (Non-Medical): No   Physical Activity: Insufficiently Active (7/10/2023)    Exercise Vital Sign     Days of Exercise per Week: 5 days     Minutes of Exercise per Session: 20 min   Stress: No Stress Concern Present (7/10/2023)    [employer] [place] [employer] - Occupational Stress Questionnaire     Feeling of Stress : Only a little   Social Connections: Moderately Isolated (7/10/2023)    Social Connection and Isolation Panel [NHANES]     Frequency of Communication with Friends and Family: Never     Frequency of Social Gatherings with Friends and Family: Never     Attends Adventism Services: Never     Active Member of Clubs or Organizations: Yes     Attends Club or Organization Meetings: Never     Marital Status:    Intimate Partner Violence: Not on file   Housing Stability: Low Risk  (7/10/2023)    Housing Stability Vital Sign     Unable to Pay for Housing in the Last Year: No     Number of Places Lived in the Last Year: 1     Unstable Housing in the Last Year: No       Allergies:  Allergies   Allergen Reactions    Ace Inhibitors Cough    Chlorhexidine Rash    Flexeril [Cyclobenzaprine] Unspecified     Irritability     Triamterene      Other reaction(s): Nephrotoxicity       Medications: reviewed on  epic.   Outpatient Medications Marked as Taking for the 12/7/23 encounter (Office Visit) with Kendal Jeffers M.D.   Medication Sig Dispense Refill    buPROPion (WELLBUTRIN XL) 300 MG XL tablet Take 1 Tablet by mouth every morning. 90 Tablet 3    gabapentin (NEURONTIN) 300 MG Cap Take 3 Capsules by mouth 3 times a day. 810 Capsule 1    losartan (COZAAR) 100 MG Tab Take 1 Tablet by mouth every day. 90 Tablet 3    SYNTHROID 88 MCG Tab Take 1 Tablet by mouth every morning on an empty stomach. 90 Tablet 1    omeprazole (PRILOSEC) 40 MG delayed-release capsule Take 1 Capsule by mouth every day. 90 Capsule 3    amLODIPine (NORVASC) 10 MG Tab Take 1 Tablet by mouth every day. 90 Tablet 3    meloxicam (MOBIC) 15 MG tablet Take 1 Tablet by mouth every day. 90 Tablet 3    albuterol 108 (90 Base) MCG/ACT Aero Soln inhalation aerosol Inhale 2 Puffs every 6 hours as needed for Shortness of Breath. 8.5 g 3    potassium chloride SA (KDUR) 20 MEQ Tab CR Take 1 Tablet by mouth every day. 90 Tablet 1    calcium citrate (CALCITRATE) 950 (200 Ca) MG Tab Take 1 Tablet by mouth every day.      Multiple Vitamin (MULTI-VITAMINS PO) Take  by mouth.      aspirin EC (ECOTRIN) 81 MG Tablet Delayed Response Take 1 Tablet by mouth every day.      Riboflavin (VITAMIN B-2 PO) Take  by mouth.          Current Outpatient Medications on File Prior to Visit   Medication Sig Dispense Refill    buPROPion (WELLBUTRIN XL) 300 MG XL tablet Take 1 Tablet by mouth every morning. 90 Tablet 3    gabapentin (NEURONTIN) 300 MG Cap Take 3 Capsules by mouth 3 times a day. 810 Capsule 1    losartan (COZAAR) 100 MG Tab Take 1 Tablet by mouth every day. 90 Tablet 3    SYNTHROID 88 MCG Tab Take 1 Tablet by mouth every morning on an empty stomach. 90 Tablet 1    omeprazole (PRILOSEC) 40 MG delayed-release capsule Take 1 Capsule by mouth every day. 90 Capsule 3    amLODIPine (NORVASC) 10 MG Tab Take 1 Tablet by mouth every day. 90 Tablet 3    meloxicam (MOBIC) 15 MG  tablet Take 1 Tablet by mouth every day. 90 Tablet 3    albuterol 108 (90 Base) MCG/ACT Aero Soln inhalation aerosol Inhale 2 Puffs every 6 hours as needed for Shortness of Breath. 8.5 g 3    potassium chloride SA (KDUR) 20 MEQ Tab CR Take 1 Tablet by mouth every day. 90 Tablet 1    calcium citrate (CALCITRATE) 950 (200 Ca) MG Tab Take 1 Tablet by mouth every day.      Multiple Vitamin (MULTI-VITAMINS PO) Take  by mouth.      aspirin EC (ECOTRIN) 81 MG Tablet Delayed Response Take 1 Tablet by mouth every day.      Riboflavin (VITAMIN B-2 PO) Take  by mouth.       No current facility-administered medications on file prior to visit.         EXAMINATION     Physical Exam:   There were no vitals taken for this visit.    Constitutional:   Body Habitus: There is no height or weight on file to calculate BMI.  Cooperation: Fully cooperates with exam  Appearance: Well-groomed, well-nourished.    Eyes: No scleral icterus to suggest severe liver disease, no proptosis to suggest severe hyperthyroidism    ENT -no obvious auditory deficits, no noticeable facial droop     Skin -no rashes or lesions noted     Respiratory-  breathing comfortably on room air, no audible wheezing    Cardiovascular-distal extremities warm and well perfused.  No lower extremity edema is noted.     Gastrointestinal - no obvious abdominal masses, non-distended    Psychiatric- alert and oriented ×3. Normal affect.     Gait - normal gait, no use of ambulatory device, nonantalgic.     Musculoskeletal and Neuro -       Cervical spine   Inspection: No deformities of the skin over the cervical spine. No rashes or lesions.    limited active range of motion in all directions    Spurling's sign  negative bilaterally  Cervical facet loading maneuver  negative bilaterally    No signs of muscular atrophy in bilateral upper extremities     Tenderness to palpation at paracervical muscles on left. No tenderness to palpation elsewhere including midline of cervical spine  and paracervical muscles on right.      Key points for the international standards for neurological classification of spinal cord injury (ISNCSCI) to light touch.     Dermatome R L   C4 2 2   C5 2 2   C6 2 2   C7 2 2   C8 2 2   T1 2 2   T2 2 2       Motor Exam Upper Extremities   ? Myotome R L   Shoulder abduction C5 5 5   Elbow flexion C5 5 5   Wrist extension C6 5 5   Elbow extension C7 5 5   Finger flexion C8 5 5   Finger abduction T1 5 5     Reflexes  2+ bilateral biceps, brachialis, triceps.  Negative Eric's  Glute  Previous exam    Full AROM of bilateral shoulders    Thoracic/Lumbar Spine/Sacral Spine/Hips   Palpation:   Tenderness to palpation across bilateral upper glutes.  No tenderness to palpation in the low back/hips including midline of lumbosacral spine, paraspinal muscles bilaterally, lumbar facets bilaterally, sacroiliac joints bilaterally, PSIS bilaterally, and greater trochanters bilaterally.    Inspection: No evidence of atrophy in bilateral lower extremities throughout      Lumbar spine /hip provocative exam maneuvers  Straight leg raise negative bilaterally  FADIR test negative bilaterally    SI joint tests  EBEN test negative bilaterally  Thigh thrust test negative bilaterally     Key points for the international standards for neurological classification of spinal cord injury (ISNCSCI) to light touch.   Dermatome R L   L2 2 2   L3 2 2   L4 2 2   L5 2 2   S1 2 2   S2 2 2         Motor Exam Lower Extremities  ? Myotome R L   Hip flexion L2 5 5   Knee extension L3 5 5   Ankle dorsiflexion L4 5 5   Toe extension L5 5 5   Ankle plantarflexion S1 5 5      There is full active range of motion with lumbar extension     Facet loading maneuver negative bilaterally     Heel walking and toe walking intact.       Reflexes  ?   R L   Patella   2+ 2+   Achilles    2+ 2+      Clonus of the ankle negative bilaterally        MEDICAL DECISION MAKING     Medical records review: see under HPI section.  "      MEDICAL DECISION MAKING    Medical records review: see under HPI section.     DATA    Labs: No new labs available for review since last visit.    Lab Results   Component Value Date/Time    SODIUM 141 12/20/2022 10:30 AM    POTASSIUM 3.9 12/20/2022 10:30 AM    CHLORIDE 103 12/20/2022 10:30 AM    CO2 26 12/20/2022 10:30 AM    ANION 12.0 12/20/2022 10:30 AM    GLUCOSE 109 (H) 12/20/2022 10:30 AM    BUN 20 12/20/2022 10:30 AM    CREATININE 0.75 12/20/2022 10:30 AM    CALCIUM 10.3 12/20/2022 10:30 AM    ASTSGOT 16 03/01/2023 01:09 PM    ALTSGPT 11 03/01/2023 01:09 PM    TBILIRUBIN 0.5 03/01/2023 01:09 PM    ALBUMIN 4.5 03/01/2023 01:09 PM    TOTPROTEIN 7.0 03/01/2023 01:09 PM    GLOBULIN 2.7 12/20/2022 10:30 AM    AGRATIO 1.7 12/20/2022 10:30 AM       No results found for: \"PROTHROMBTM\", \"INR\"     Lab Results   Component Value Date/Time    WBC 7.8 12/20/2022 10:30 AM    RBC 5.17 12/20/2022 10:30 AM    HEMOGLOBIN 15.3 12/20/2022 10:30 AM    HEMATOCRIT 45.9 12/20/2022 10:30 AM    MCV 88.8 12/20/2022 10:30 AM    MCH 29.6 12/20/2022 10:30 AM    MCHC 33.3 (L) 12/20/2022 10:30 AM    MPV 9.0 12/20/2022 10:30 AM    NEUTSPOLYS 65.70 12/20/2022 10:30 AM    LYMPHOCYTES 20.80 (L) 12/20/2022 10:30 AM    MONOCYTES 10.50 12/20/2022 10:30 AM    EOSINOPHILS 2.00 12/20/2022 10:30 AM    BASOPHILS 0.60 12/20/2022 10:30 AM        Lab Results   Component Value Date/Time    HBA1C 5.8 (H) 04/28/2022 10:39 AM        Imaging:   I personally reviewed following images, these are my reads  MRI cervical spine 11/23/23  Fusion hardware at C5-C7.  At C7-T1 there is moderate-severe bilateral neuroforaminal stenosis.  At C6-C7 there is borderline left neuroforaminal stenosis.  At C5-6 there is mild left-sided neuroforaminal stenosis.  At C3-4 and C4-5 there is moderate right-sided neuroforaminal stenosis.See formal radiology report for further details.      MRI lumbar spine 9/2/2022  Evidence of lumbar laminectomy at L4-S1, interbody fusion and " posterior fusion at L3-S1.  Broad-based disc bulge at L1-L2 resulting in severe central canal stenosis and compression of descending bilateral L2 nerve roots and possibly bilateral L3 nerve roots and mild impingement of exiting L1 nerve roots bilaterally.  Mild right neuroforaminal stenosis at T12-L1.  Possible mild bilateral neuroforaminal stenosis at L5-S1, quality of images impaired due to metallic artifact. Appearance of chronic postoperative seroma posterior to L4 and L5 vertebral bodies.    XR Right hand 11/22/22  Moderate to severe CMC joint OA. See formal radiology report for further details.    IMAGING radiology reads. I reviewed the following radiology reads   MRI cervical spine 11/23/23  FINDINGS:  Images are degraded by patient motion artifact. Alignment in the cervical spine shows mild degenerative anterolisthesis of C7 on T1.     There is postoperative change with anterior cervical fusion hardware at C5-C6-C7. Expected hardware artifact.     Marrow signal in the vertebral bodies is otherwise unremarkable.     The prevertebral and paraspinous soft tissues are unremarkable.     There are no anomalies at the craniovertebral junction.  The cervical spinal cord is normal in caliber and signal throughout its course.     At C2-3, no significant disc bulge or protrusion. No central stenosis. Mild bilateral foraminal stenosis.     At C3-4, mild disc-osteophyte change. Small impression on the ventral subarachnoid space. No central stenosis. Moderate bilateral foraminal stenosis due to spondylotic change.     C4-C5, no significant disc bulge or protrusion. No central stenosis. The left neural foramen appears intact. Moderate right foraminal stenosis.     C5-C6 posterior bony ridging. Minimal ligamentum flavum hypertrophy. No candace central stenosis. The right neural foramen is intact. Mild left foraminal stenosis due to uncinate spondylosis.     C6-C7, no significant disc bulge or protrusion. Endplate hypertrophy  contributing to mild central stenosis (T2 sagittal image 10, series 2, T2 axial image 12, series 8). Mild left-sided posterior endplate spurring and uncinate hypertrophy with borderline   left lateral recess stenosis and borderline left foraminal stenosis. The right neural foramen is intact.     C7-T1, moderate disc/osteophyte change accentuated by anterolisthesis. Partial effacement of ventral subarachnoid space. Thecal sac at the lower range of normal without candace central stenosis. Moderate-marked bilateral foraminal stenosis best seen on the   sagittal images.     IMPRESSION:     1.  Postoperative anterior cervical fusion with hardware fixation C5-C6-C7.  2.  Multilevel degenerative changes most notable for C6-C7 mild central stenosis at [id number]-[id number] bilateral foraminal stenosis.  3.  Details for each level above in the body of the report.  4.  No myelopathic cord signal is appreciated.    Results for orders placed during the hospital encounter of 09/02/22    MR-LUMBAR SPINE-W/O    Impression  1.  L3-4 slight anterolisthesis and L5-S1 slight retrolisthesis.  2.  Postoperative changes with lumbar laminectomy L4-L5-S1, interbody fusion L3-L4, L4-L5, L5-S1, and posterior fusion with transpedicular screw fixation at L3-L4-L5-S1.  3.  Chronic postoperative seroma occupying the laminectomy interval without dorsal epidural mass effect.  4.  The study is most notable for L1-L2 large disc protrusion-extrusion resulting in severe central stenosis.  5.  Additional degenerative changes detailed for each level above in the body of report.        MRI lumbar spine 10/20/21  Lumbar spine:    Alignment: Grade 1 L3-L4 anterolisthesis. Previously seen L4-L5  anterolisthesis is improved compared to MRI prior to surgery.    Vertebral body heights and marrow: Postsurgical changes from L3-S1  posterior fusion. Multilevel lumbar spondylosis with osteophytes, facet  arthropathy, and endplate marrow degenerative changes are seen.  Otherwise  the vertebral body heights and marrow signal are unremarkable.    Conus medullaris: Normal, terminating at L1/L2.    Soft tissues: There is a fluid collection in the paraspinal soft tissues  posterior to the L3-L5 laminectomy sites, likely postoperative seroma  measuring 63 x 28 mm (series 17, image 7). Small right renal cyst.    L1-L2: Persistent disc bulge with worsening superimposed central disc  extrusion. Bilateral facet arthropathy and dorsal epidural fat. The  constellation of findings produces moderate to severe spinal canal  stenosis. Mild to moderate left neural foraminal stenosis is improved  compared to prior.  Ligamentum flavum thickening. Facet hypertrophy, mild.    L2-L3: Disc bulge, ligamentum flavum thickening, facet hypertrophy  resulting in mild spinal canal stenosis. Mild left neural foraminal  stenosis.    L3-L4: Partially uncovered disc. Mild to moderate right neural foraminal  stenosis. Limited evaluation of the left neural foramen. Laminectomy.    L4-L5: No spinal canal stenosis. Limited evaluation of neural foramina due  to spinal hardware. Laminectomy.    L5-S1: Disc bulge without spinal canal stenosis. Limited evaluation of  neural foramina due to spinal hardware.     IMPRESSION:    1. Worsening disc protrusion at L1-L2, resulting in worsening spinal  canal stenosis, now moderate to severe.   2. Multilevel degenerative changes of the cervical spine, similar  compared to prior.  3. Multilevel degenerative changes in thoracic spine, with up to mild  to moderate spinal canal stenosis at T8-T9 secondary to disc bulge.  4. Postsurgical changes . Small postoperative seroma in L3-L5  laminectomy sites.        X-ray left hand 3/19/2019  FINDINGS:   There is no acute fracture or dislocation.   Advanced osteoarthrosis of the first CMC joint, characterized by joint   space narrowing, subchondral sclerosis, osteophyte formation, and radial   subluxation. Mild osteoarthrosis of the STT  joint. Osteoarthrosis of   scattered IP joints. No focal soft tissue swelling.     IMPRESSION:   Advanced osteoarthrosis of the first CMC joint.     XR Right hand 22  FINDINGS:     BONE MINERALIZATION: Normal.  JOINTS: Moderate to severe first carpometacarpal joint osteoarthrosis. Mild triscaphe joint osteoarthrosis. Mild multifocal osteoarthrosis otherwise. No erosions.  FRACTURE: None.  DISLOCATION: None.  SOFT TISSUES: No mass.     IMPRESSION:     1.  Moderate to severe first carpometacarpal joint osteoarthrosis.  2.  Mild multifocal osteoarthrosis otherwise.                                                  Diagnosis  Visit Diagnoses     ICD-10-CM   1. Cervical radiculopathy  M54.12   2. S/P cervical spinal fusion  Z98.1   3. Neuroforaminal stenosis of cervical spine  M48.02   4. Osteoarthritis of carpometacarpal (CMC) joints of both thumbs, unspecified osteoarthritis type  M18.0   5. Chronic right-sided low back pain with right-sided sciatica  M54.41    G89.29   6. History of lumbar fusion  Z98.1   7. Numbness and tingling of right leg  R20.0    R20.2   8. Lumbar disc herniation  M51.26   9. Myalgia  M79.10   10. Lumbar radiculitis  M54.16   11. Bilateral thumb pain  M79.644    M79.645   12. Numbness and tingling in left arm  R20.0    R20.2                     ASSESSMENT AND PLAN:  Robin Lynn Zielesch (: 1955) is a female with history of HTN, cervical laminectomy, depression, anxiety, thyroid cancer, BMI 30, L3-pelvis posterior spinal fusion with TLIF/ PLIF on 21.      Mathew was seen today for follow-up.    Diagnoses and all orders for this visit:    Cervical radiculopathy  -     Referral to Pain Clinic    S/P cervical spinal fusion    Neuroforaminal stenosis of cervical spine    Osteoarthritis of carpometacarpal (CMC) joints of both thumbs, unspecified osteoarthritis type    Chronic right-sided low back pain with right-sided sciatica    History of lumbar fusion    Numbness and tingling of  right leg    Lumbar disc herniation    Myalgia    Lumbar radiculitis    Bilateral thumb pain    Numbness and tingling in left arm              PLAN  Physical Therapy: I discussed possible referral to physical therapy.  She has declined a physical therapy referral as she has done extensive physical therapy in the past which worsened her symptoms and is currently too busy caring for her .       Diagnostic workup: Personally reviewed at today's visit:  MRI cervical spine 11/23/23    Medications:   -Discussed that she should continue gabapentin, Mobic as per PCP  -  reviewed, records do not demonstrate increased risk of opioid abuse.     Interventions:   -Left cervical epidural steroid injection at T1-T2 vs T2-T3 for safety reasons.  Discussed with the patient needs to stop meloxicam for 5 days prior to the procedure to minimize risk of bleeding.  The risks, benefits, and alternatives to this procedure were discussed and the patient wishes to proceed with the procedure. Risks include but are not limited to damage to surrounding structures, infection, bleeding, worsening of pain which can be permanent, and weakness which can be permanent. Benefits include pain relief and improved function. Alternatives include not doing the procedure.      -right L2-3 interlaminar epidural steroid injection PRN given significant improvement in pain with this procedure in the past.   -Bilateral CMC joint steroid injections under ultrasound guidance PRN given significant improvement in pain with this procedure in the past.     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  Given her hx of significant improvement in pain after our previous epidural steroid injection and no neurologic deficits on exam today, I believe it is reasonable to defer a neurosurgery referral at this  time  -Discussed that she could consider deep myofascial release techniques including a foam roller for her right thigh    Follow-up: 1 month after cervical epidural    Orders Placed This Encounter    Referral to Pain Clinic       Kendal Jeffers MD  Interventional Pain and Spine  Physical Medicine and Rehabilitation  [facility]      The above note documents my personal evaluation of this patient. In addition, I have reviewed and confirmed with the patient and MA the supportive information documented in today's Patient Health Questionnaire and Office Note.     Please note that this dictation was created using voice recognition software. I have made every reasonable attempt to correct obvious errors, but I expect that there are errors of grammar and possibly content that I did not discover before finalizing the note.

## 2023-12-07 NOTE — H&P (VIEW-ONLY)
Follow-up patient Note    Interventional Pain and Spine  Physiatry (Physical Medicine and Rehabilitation)     Patient Name: Robin Lynn Zielesch  : 1955  Date of service: 2023    Chief Complaint:   Chief Complaint   Patient presents with    Follow-Up     Osteoarthritis of carpometacarpal (CMC) joints of both thumbs, unspecified osteoarthritis type         HISTORY (2022):  Robin Lynn Zielesch is a 67 y.o. female who presents today with pain radiating from her right posterolateral glute down her right anterolateral and posterior thigh accompanied by numbness/tingling/weakness in this area. This started in Sep 2021 while recovering from a L2-pelvis posterior spinal fusion with TLIF/ PLIF on 21 with Dr. Bates ([facility] which she states she had done for similar radiating pain down her left leg.  Her radiating left leg pain resolved after surgery.     Her pain at its best-worse level during the course of the day is 5-9/10, respectively. Pain right now is 7/10 on the numeric pain scale. Pain worsens with sitting, standing, walking, bending backwards, side bending or twisting, walking upstairs, and walking downstairs and improves with nothing. Her pain interferes somewhat with ADLs. The patient otherwise denies new weakness, numbness, or bladder/bowel incontinence. States she has fallen a few times secondary to pain and possibly weakness. Moved from [facility] in May 2022.     The patient has done physical therapy for this problem with worsening pain.     Patient has tried the following medications with varied success (current meds in bold): Hayes back and body  Gabapentin 300mg TID - no relief. Used to help with left sided pain  Naproxen BID - significant relief     Therapeutic modalities and interventional therapies to date include:  -No injections     Medical history includes HTN, cervical laminectomy, depression, anxiety, thyroid cancer, BMI 30, L2-pelvis posterior spinal fusion with TLIF/ PLIF  "on 21    HPI  Today's visit   Robin Lynn Zielesch ( 1955) is a female with Diagnoses of Cervical radiculopathy, S/P cervical spinal fusion, Neuroforaminal stenosis of cervical spine, Osteoarthritis of carpometacarpal (CMC) joints of both thumbs, unspecified osteoarthritis type, Chronic right-sided low back pain with right-sided sciatica, History of lumbar fusion, Numbness and tingling of right leg, Lumbar disc herniation, Myalgia, Lumbar radiculitis, Bilateral thumb pain, and Numbness and tingling in left arm were pertinent to this visit.    Notes ongoing radiating pain from her left neck down her left arm feeling like \"someone is stabbing me from the inside.\"  She is unsure what worsens her pain.  The pain can come on randomly. Pain improves with tilting her head to the right and down. Also notes LUE weakness noticeable with stirring.  Pain ratsd 6/10 on NRS.  Notes numbness and tingling in distribution of pain.    Neck hurts more than her leg.  Would like to prioritize treating her neck first.    Thumb pain is resolved after 11/15/23 BILATERAL ultrasound-guided 1st carpometacarpal joint injection    Procedure history:  - 22 right L2-3 interlaminar epidural steroid injection - 90% improvement in pain, resolution of radiating pain.  - 22 bilateral CMC joint injections - 100% improvement in thumb pain bilaterally  - 3/7/23 right L2-3 interlaminar epidural steroid injection -100% improvement in back pain and radiating pain, ongoing tightness in her right thigh  - 23 trigger point injections   - 11/15/23 BILATERAL ultrasound-guided 1st carpometacarpal joint injection - 100% improvement in thumb pain bilaterally    ROS:   Red Flags ROS:   Fever, Chills, Sweats: Denies  Involuntary Weight Loss: Denies  Bladder Incontinence: Denies  Bowel Incontinence: denies  Saddle Anesthesia: Denies    All other systems reviewed and negative.     PMHx:   Past Medical History:   Diagnosis Date    Anxiety     " Arthritis     Cancer (HCC)     COPD (chronic obstructive pulmonary disease) (HCC)     GERD (gastroesophageal reflux disease)     Hypertension     Migraine     Thyroid disease        PSHx:   Past Surgical History:   Procedure Laterality Date    NM INJ LUMBAR/SACRAL,W/ IMAGING Right 2023    Procedure: RIGHT Lumbar L2-3 interlaminar epidural steroid injection;  Surgeon: Kendal Jeffers M.D.;  [facility];  Service: Pain Management    NM INJ LUMBAR/SACRAL,W/ IMAGING Right 2022    Procedure: RIGHT lumbar L2-3 interlaminar epidural steroid injection;  Surgeon: Kendal Jeffers M.D.;  Location: [facility];  Service: Pain Management    ABDOMINAL HYSTERECTOMY TOTAL      ARTHROPLASTY      EYE SURGERY      FOOT SURGERY      HERNIA REPAIR      [id number]      L tka    LAMINOTOMY      LUMPECTOMY      PRIMARY C SECTION      THYROIDECTOMY TOTAL      2019  thyroid cancer       Family Hx:   Family History   Problem Relation Age of Onset    COPD Mother     Cancer Father         pancreatic    Hypertension Father     Hyperlipidemia Father     Alcohol abuse Father     Drug abuse Brother        Social Hx:  Social History     Socioeconomic History    Marital status:      Spouse name: Not on file    Number of children: Not on file    Years of education: Not on file    Highest education level: Some college, no degree   Occupational History    Not on file   Tobacco Use    Smoking status: Former     Current packs/day: 0.00     Average packs/day: 1.5 packs/day for 50.0 years (75.0 ttl pk-yrs)     Types: Cigarettes     Start date: 1968     Quit date: 2018     Years since quittin.3    Smokeless tobacco: Never   Vaping Use    Vaping Use: Some days    Substances: Nicotine, CBD    Devices: Pre-filled or refillable cartridge, Refillable tank   Substance and Sexual Activity    Alcohol use: Never    Drug use: Yes     Types: Marijuana, Methamphetamines     Comment: once in a  while    Sexual activity: Yes     Partners: Male     Birth control/protection: Female Sterilization   Other Topics Concern    Not on file   Social History Narrative    Not on file     Social Determinants of Health     Financial Resource Strain: Medium Risk (7/10/2023)    Overall Financial Resource Strain (CARDIA)     Difficulty of Paying Living Expenses: Somewhat hard   Food Insecurity: No Food Insecurity (7/10/2023)    Hunger Vital Sign     Worried About Running Out of Food in the Last Year: Never true     Ran Out of Food in the Last Year: Never true   Transportation Needs: No Transportation Needs (7/10/2023)    PRAPARE - Transportation     Lack of Transportation (Medical): No     Lack of Transportation (Non-Medical): No   Physical Activity: Insufficiently Active (7/10/2023)    Exercise Vital Sign     Days of Exercise per Week: 5 days     Minutes of Exercise per Session: 20 min   Stress: No Stress Concern Present (7/10/2023)    [employer] [place] [employer] - Occupational Stress Questionnaire     Feeling of Stress : Only a little   Social Connections: Moderately Isolated (7/10/2023)    Social Connection and Isolation Panel [NHANES]     Frequency of Communication with Friends and Family: Never     Frequency of Social Gatherings with Friends and Family: Never     Attends Nondenominational Services: Never     Active Member of Clubs or Organizations: Yes     Attends Club or Organization Meetings: Never     Marital Status:    Intimate Partner Violence: Not on file   Housing Stability: Low Risk  (7/10/2023)    Housing Stability Vital Sign     Unable to Pay for Housing in the Last Year: No     Number of Places Lived in the Last Year: 1     Unstable Housing in the Last Year: No       Allergies:  Allergies   Allergen Reactions    Ace Inhibitors Cough    Chlorhexidine Rash    Flexeril [Cyclobenzaprine] Unspecified     Irritability     Triamterene      Other reaction(s): Nephrotoxicity       Medications: reviewed on  epic.   Outpatient Medications Marked as Taking for the 12/7/23 encounter (Office Visit) with Kendal Jeffers M.D.   Medication Sig Dispense Refill    buPROPion (WELLBUTRIN XL) 300 MG XL tablet Take 1 Tablet by mouth every morning. 90 Tablet 3    gabapentin (NEURONTIN) 300 MG Cap Take 3 Capsules by mouth 3 times a day. 810 Capsule 1    losartan (COZAAR) 100 MG Tab Take 1 Tablet by mouth every day. 90 Tablet 3    SYNTHROID 88 MCG Tab Take 1 Tablet by mouth every morning on an empty stomach. 90 Tablet 1    omeprazole (PRILOSEC) 40 MG delayed-release capsule Take 1 Capsule by mouth every day. 90 Capsule 3    amLODIPine (NORVASC) 10 MG Tab Take 1 Tablet by mouth every day. 90 Tablet 3    meloxicam (MOBIC) 15 MG tablet Take 1 Tablet by mouth every day. 90 Tablet 3    albuterol 108 (90 Base) MCG/ACT Aero Soln inhalation aerosol Inhale 2 Puffs every 6 hours as needed for Shortness of Breath. 8.5 g 3    potassium chloride SA (KDUR) 20 MEQ Tab CR Take 1 Tablet by mouth every day. 90 Tablet 1    calcium citrate (CALCITRATE) 950 (200 Ca) MG Tab Take 1 Tablet by mouth every day.      Multiple Vitamin (MULTI-VITAMINS PO) Take  by mouth.      aspirin EC (ECOTRIN) 81 MG Tablet Delayed Response Take 1 Tablet by mouth every day.      Riboflavin (VITAMIN B-2 PO) Take  by mouth.          Current Outpatient Medications on File Prior to Visit   Medication Sig Dispense Refill    buPROPion (WELLBUTRIN XL) 300 MG XL tablet Take 1 Tablet by mouth every morning. 90 Tablet 3    gabapentin (NEURONTIN) 300 MG Cap Take 3 Capsules by mouth 3 times a day. 810 Capsule 1    losartan (COZAAR) 100 MG Tab Take 1 Tablet by mouth every day. 90 Tablet 3    SYNTHROID 88 MCG Tab Take 1 Tablet by mouth every morning on an empty stomach. 90 Tablet 1    omeprazole (PRILOSEC) 40 MG delayed-release capsule Take 1 Capsule by mouth every day. 90 Capsule 3    amLODIPine (NORVASC) 10 MG Tab Take 1 Tablet by mouth every day. 90 Tablet 3    meloxicam (MOBIC) 15 MG  tablet Take 1 Tablet by mouth every day. 90 Tablet 3    albuterol 108 (90 Base) MCG/ACT Aero Soln inhalation aerosol Inhale 2 Puffs every 6 hours as needed for Shortness of Breath. 8.5 g 3    potassium chloride SA (KDUR) 20 MEQ Tab CR Take 1 Tablet by mouth every day. 90 Tablet 1    calcium citrate (CALCITRATE) 950 (200 Ca) MG Tab Take 1 Tablet by mouth every day.      Multiple Vitamin (MULTI-VITAMINS PO) Take  by mouth.      aspirin EC (ECOTRIN) 81 MG Tablet Delayed Response Take 1 Tablet by mouth every day.      Riboflavin (VITAMIN B-2 PO) Take  by mouth.       No current facility-administered medications on file prior to visit.         EXAMINATION     Physical Exam:   There were no vitals taken for this visit.    Constitutional:   Body Habitus: There is no height or weight on file to calculate BMI.  Cooperation: Fully cooperates with exam  Appearance: Well-groomed, well-nourished.    Eyes: No scleral icterus to suggest severe liver disease, no proptosis to suggest severe hyperthyroidism    ENT -no obvious auditory deficits, no noticeable facial droop     Skin -no rashes or lesions noted     Respiratory-  breathing comfortably on room air, no audible wheezing    Cardiovascular-distal extremities warm and well perfused.  No lower extremity edema is noted.     Gastrointestinal - no obvious abdominal masses, non-distended    Psychiatric- alert and oriented ×3. Normal affect.     Gait - normal gait, no use of ambulatory device, nonantalgic.     Musculoskeletal and Neuro -       Cervical spine   Inspection: No deformities of the skin over the cervical spine. No rashes or lesions.    limited active range of motion in all directions    Spurling's sign  negative bilaterally  Cervical facet loading maneuver  negative bilaterally    No signs of muscular atrophy in bilateral upper extremities     Tenderness to palpation at paracervical muscles on left. No tenderness to palpation elsewhere including midline of cervical spine  and paracervical muscles on right.      Key points for the international standards for neurological classification of spinal cord injury (ISNCSCI) to light touch.     Dermatome R L   C4 2 2   C5 2 2   C6 2 2   C7 2 2   C8 2 2   T1 2 2   T2 2 2       Motor Exam Upper Extremities   ? Myotome R L   Shoulder abduction C5 5 5   Elbow flexion C5 5 5   Wrist extension C6 5 5   Elbow extension C7 5 5   Finger flexion C8 5 5   Finger abduction T1 5 5     Reflexes  2+ bilateral biceps, brachialis, triceps.  Negative Eric's  Glute  Previous exam    Full AROM of bilateral shoulders    Thoracic/Lumbar Spine/Sacral Spine/Hips   Palpation:   Tenderness to palpation across bilateral upper glutes.  No tenderness to palpation in the low back/hips including midline of lumbosacral spine, paraspinal muscles bilaterally, lumbar facets bilaterally, sacroiliac joints bilaterally, PSIS bilaterally, and greater trochanters bilaterally.    Inspection: No evidence of atrophy in bilateral lower extremities throughout      Lumbar spine /hip provocative exam maneuvers  Straight leg raise negative bilaterally  FADIR test negative bilaterally    SI joint tests  EBEN test negative bilaterally  Thigh thrust test negative bilaterally     Key points for the international standards for neurological classification of spinal cord injury (ISNCSCI) to light touch.   Dermatome R L   L2 2 2   L3 2 2   L4 2 2   L5 2 2   S1 2 2   S2 2 2         Motor Exam Lower Extremities  ? Myotome R L   Hip flexion L2 5 5   Knee extension L3 5 5   Ankle dorsiflexion L4 5 5   Toe extension L5 5 5   Ankle plantarflexion S1 5 5      There is full active range of motion with lumbar extension     Facet loading maneuver negative bilaterally     Heel walking and toe walking intact.       Reflexes  ?   R L   Patella   2+ 2+   Achilles    2+ 2+      Clonus of the ankle negative bilaterally        MEDICAL DECISION MAKING     Medical records review: see under HPI section.  "      MEDICAL DECISION MAKING    Medical records review: see under HPI section.     DATA    Labs: No new labs available for review since last visit.    Lab Results   Component Value Date/Time    SODIUM 141 12/20/2022 10:30 AM    POTASSIUM 3.9 12/20/2022 10:30 AM    CHLORIDE 103 12/20/2022 10:30 AM    CO2 26 12/20/2022 10:30 AM    ANION 12.0 12/20/2022 10:30 AM    GLUCOSE 109 (H) 12/20/2022 10:30 AM    BUN 20 12/20/2022 10:30 AM    CREATININE 0.75 12/20/2022 10:30 AM    CALCIUM 10.3 12/20/2022 10:30 AM    ASTSGOT 16 03/01/2023 01:09 PM    ALTSGPT 11 03/01/2023 01:09 PM    TBILIRUBIN 0.5 03/01/2023 01:09 PM    ALBUMIN 4.5 03/01/2023 01:09 PM    TOTPROTEIN 7.0 03/01/2023 01:09 PM    GLOBULIN 2.7 12/20/2022 10:30 AM    AGRATIO 1.7 12/20/2022 10:30 AM       No results found for: \"PROTHROMBTM\", \"INR\"     Lab Results   Component Value Date/Time    WBC 7.8 12/20/2022 10:30 AM    RBC 5.17 12/20/2022 10:30 AM    HEMOGLOBIN 15.3 12/20/2022 10:30 AM    HEMATOCRIT 45.9 12/20/2022 10:30 AM    MCV 88.8 12/20/2022 10:30 AM    MCH 29.6 12/20/2022 10:30 AM    MCHC 33.3 (L) 12/20/2022 10:30 AM    MPV 9.0 12/20/2022 10:30 AM    NEUTSPOLYS 65.70 12/20/2022 10:30 AM    LYMPHOCYTES 20.80 (L) 12/20/2022 10:30 AM    MONOCYTES 10.50 12/20/2022 10:30 AM    EOSINOPHILS 2.00 12/20/2022 10:30 AM    BASOPHILS 0.60 12/20/2022 10:30 AM        Lab Results   Component Value Date/Time    HBA1C 5.8 (H) 04/28/2022 10:39 AM        Imaging:   I personally reviewed following images, these are my reads  MRI cervical spine 11/23/23  Fusion hardware at C5-C7.  At C7-T1 there is moderate-severe bilateral neuroforaminal stenosis.  At C6-C7 there is borderline left neuroforaminal stenosis.  At C5-6 there is mild left-sided neuroforaminal stenosis.  At C3-4 and C4-5 there is moderate right-sided neuroforaminal stenosis.See formal radiology report for further details.      MRI lumbar spine 9/2/2022  Evidence of lumbar laminectomy at L4-S1, interbody fusion and " posterior fusion at L3-S1.  Broad-based disc bulge at L1-L2 resulting in severe central canal stenosis and compression of descending bilateral L2 nerve roots and possibly bilateral L3 nerve roots and mild impingement of exiting L1 nerve roots bilaterally.  Mild right neuroforaminal stenosis at T12-L1.  Possible mild bilateral neuroforaminal stenosis at L5-S1, quality of images impaired due to metallic artifact. Appearance of chronic postoperative seroma posterior to L4 and L5 vertebral bodies.    XR Right hand 11/22/22  Moderate to severe CMC joint OA. See formal radiology report for further details.    IMAGING radiology reads. I reviewed the following radiology reads   MRI cervical spine 11/23/23  FINDINGS:  Images are degraded by patient motion artifact. Alignment in the cervical spine shows mild degenerative anterolisthesis of C7 on T1.     There is postoperative change with anterior cervical fusion hardware at C5-C6-C7. Expected hardware artifact.     Marrow signal in the vertebral bodies is otherwise unremarkable.     The prevertebral and paraspinous soft tissues are unremarkable.     There are no anomalies at the craniovertebral junction.  The cervical spinal cord is normal in caliber and signal throughout its course.     At C2-3, no significant disc bulge or protrusion. No central stenosis. Mild bilateral foraminal stenosis.     At C3-4, mild disc-osteophyte change. Small impression on the ventral subarachnoid space. No central stenosis. Moderate bilateral foraminal stenosis due to spondylotic change.     C4-C5, no significant disc bulge or protrusion. No central stenosis. The left neural foramen appears intact. Moderate right foraminal stenosis.     C5-C6 posterior bony ridging. Minimal ligamentum flavum hypertrophy. No candace central stenosis. The right neural foramen is intact. Mild left foraminal stenosis due to uncinate spondylosis.     C6-C7, no significant disc bulge or protrusion. Endplate hypertrophy  contributing to mild central stenosis (T2 sagittal image 10, series 2, T2 axial image 12, series 8). Mild left-sided posterior endplate spurring and uncinate hypertrophy with borderline   left lateral recess stenosis and borderline left foraminal stenosis. The right neural foramen is intact.     C7-T1, moderate disc/osteophyte change accentuated by anterolisthesis. Partial effacement of ventral subarachnoid space. Thecal sac at the lower range of normal without candace central stenosis. Moderate-marked bilateral foraminal stenosis best seen on the   sagittal images.     IMPRESSION:     1.  Postoperative anterior cervical fusion with hardware fixation C5-C6-C7.  2.  Multilevel degenerative changes most notable for C6-C7 mild central stenosis at [id number] bilateral foraminal stenosis.  3.  Details for each level above in the body of the report.  4.  No myelopathic cord signal is appreciated.    Results for orders placed during the hospital encounter of 09/02/22    MR-LUMBAR SPINE-W/O    Impression  1.  L3-4 slight anterolisthesis and L5-S1 slight retrolisthesis.  2.  Postoperative changes with lumbar laminectomy L4-L5-S1, interbody fusion L3-L4, L4-L5, L5-S1, and posterior fusion with transpedicular screw fixation at L3-L4-L5-S1.  3.  Chronic postoperative seroma occupying the laminectomy interval without dorsal epidural mass effect.  4.  The study is most notable for L1-L2 large disc protrusion-extrusion resulting in severe central stenosis.  5.  Additional degenerative changes detailed for each level above in the body of report.        MRI lumbar spine 10/20/21  Lumbar spine:    Alignment: Grade 1 L3-L4 anterolisthesis. Previously seen L4-L5  anterolisthesis is improved compared to MRI prior to surgery.    Vertebral body heights and marrow: Postsurgical changes from L3-S1  posterior fusion. Multilevel lumbar spondylosis with osteophytes, facet  arthropathy, and endplate marrow degenerative changes are seen.  Otherwise  the vertebral body heights and marrow signal are unremarkable.    Conus medullaris: Normal, terminating at L1/L2.    Soft tissues: There is a fluid collection in the paraspinal soft tissues  posterior to the L3-L5 laminectomy sites, likely postoperative seroma  measuring 63 x 28 mm (series 17, image 7). Small right renal cyst.    L1-L2: Persistent disc bulge with worsening superimposed central disc  extrusion. Bilateral facet arthropathy and dorsal epidural fat. The  constellation of findings produces moderate to severe spinal canal  stenosis. Mild to moderate left neural foraminal stenosis is improved  compared to prior.  Ligamentum flavum thickening. Facet hypertrophy, mild.    L2-L3: Disc bulge, ligamentum flavum thickening, facet hypertrophy  resulting in mild spinal canal stenosis. Mild left neural foraminal  stenosis.    L3-L4: Partially uncovered disc. Mild to moderate right neural foraminal  stenosis. Limited evaluation of the left neural foramen. Laminectomy.    L4-L5: No spinal canal stenosis. Limited evaluation of neural foramina due  to spinal hardware. Laminectomy.    L5-S1: Disc bulge without spinal canal stenosis. Limited evaluation of  neural foramina due to spinal hardware.     IMPRESSION:    1. Worsening disc protrusion at L1-L2, resulting in worsening spinal  canal stenosis, now moderate to severe.   2. Multilevel degenerative changes of the cervical spine, similar  compared to prior.  3. Multilevel degenerative changes in thoracic spine, with up to mild  to moderate spinal canal stenosis at T8-T9 secondary to disc bulge.  4. Postsurgical changes . Small postoperative seroma in L3-L5  laminectomy sites.        X-ray left hand 3/19/2019  FINDINGS:   There is no acute fracture or dislocation.   Advanced osteoarthrosis of the first CMC joint, characterized by joint   space narrowing, subchondral sclerosis, osteophyte formation, and radial   subluxation. Mild osteoarthrosis of the STT  joint. Osteoarthrosis of   scattered IP joints. No focal soft tissue swelling.     IMPRESSION:   Advanced osteoarthrosis of the first CMC joint.     XR Right hand 22  FINDINGS:     BONE MINERALIZATION: Normal.  JOINTS: Moderate to severe first carpometacarpal joint osteoarthrosis. Mild triscaphe joint osteoarthrosis. Mild multifocal osteoarthrosis otherwise. No erosions.  FRACTURE: None.  DISLOCATION: None.  SOFT TISSUES: No mass.     IMPRESSION:     1.  Moderate to severe first carpometacarpal joint osteoarthrosis.  2.  Mild multifocal osteoarthrosis otherwise.                                                  Diagnosis  Visit Diagnoses     ICD-10-CM   1. Cervical radiculopathy  M54.12   2. S/P cervical spinal fusion  Z98.1   3. Neuroforaminal stenosis of cervical spine  M48.02   4. Osteoarthritis of carpometacarpal (CMC) joints of both thumbs, unspecified osteoarthritis type  M18.0   5. Chronic right-sided low back pain with right-sided sciatica  M54.41    G89.29   6. History of lumbar fusion  Z98.1   7. Numbness and tingling of right leg  R20.0    R20.2   8. Lumbar disc herniation  M51.26   9. Myalgia  M79.10   10. Lumbar radiculitis  M54.16   11. Bilateral thumb pain  M79.644    M79.645   12. Numbness and tingling in left arm  R20.0    R20.2                     ASSESSMENT AND PLAN:  Robin Lynn Zielesch (: 1955) is a female with history of HTN, cervical laminectomy, depression, anxiety, thyroid cancer, BMI 30, L3-pelvis posterior spinal fusion with TLIF/ PLIF on 21.      Mathew was seen today for follow-up.    Diagnoses and all orders for this visit:    Cervical radiculopathy  -     Referral to Pain Clinic    S/P cervical spinal fusion    Neuroforaminal stenosis of cervical spine    Osteoarthritis of carpometacarpal (CMC) joints of both thumbs, unspecified osteoarthritis type    Chronic right-sided low back pain with right-sided sciatica    History of lumbar fusion    Numbness and tingling of  right leg    Lumbar disc herniation    Myalgia    Lumbar radiculitis    Bilateral thumb pain    Numbness and tingling in left arm              PLAN  Physical Therapy: I discussed possible referral to physical therapy.  She has declined a physical therapy referral as she has done extensive physical therapy in the past which worsened her symptoms and is currently too busy caring for her .       Diagnostic workup: Personally reviewed at today's visit:  MRI cervical spine 11/23/23    Medications:   -Discussed that she should continue gabapentin, Mobic as per PCP  -  reviewed, records do not demonstrate increased risk of opioid abuse.     Interventions:   -Left cervical epidural steroid injection at T1-T2 vs T2-T3 for safety reasons.  Discussed with the patient needs to stop meloxicam for 5 days prior to the procedure to minimize risk of bleeding.  The risks, benefits, and alternatives to this procedure were discussed and the patient wishes to proceed with the procedure. Risks include but are not limited to damage to surrounding structures, infection, bleeding, worsening of pain which can be permanent, and weakness which can be permanent. Benefits include pain relief and improved function. Alternatives include not doing the procedure.      -right L2-3 interlaminar epidural steroid injection PRN given significant improvement in pain with this procedure in the past.   -Bilateral CMC joint steroid injections under ultrasound guidance PRN given significant improvement in pain with this procedure in the past.     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  Given her hx of significant improvement in pain after our previous epidural steroid injection and no neurologic deficits on exam today, I believe it is reasonable to defer a neurosurgery referral at this  time  -Discussed that she could consider deep myofascial release techniques including a foam roller for her right thigh    Follow-up: 1 month after cervical epidural    Orders Placed This Encounter    Referral to Pain Clinic       Kendal Jeffers MD  Interventional Pain and Spine  Physical Medicine and Rehabilitation  [facility]      The above note documents my personal evaluation of this patient. In addition, I have reviewed and confirmed with the patient and MA the supportive information documented in today's Patient Health Questionnaire and Office Note.     Please note that this dictation was created using voice recognition software. I have made every reasonable attempt to correct obvious errors, but I expect that there are errors of grammar and possibly content that I did not discover before finalizing the note.

## 2023-12-14 ENCOUNTER — TELEPHONE (OUTPATIENT)
Dept: PHYSICAL MEDICINE AND REHAB | Facility: MEDICAL CENTER | Age: 68
End: 2023-12-14

## 2023-12-14 ENCOUNTER — HOSPITAL ENCOUNTER (OUTPATIENT)
Facility: REHABILITATION | Age: 68
End: 2023-12-14
Attending: STUDENT IN AN ORGANIZED HEALTH CARE EDUCATION/TRAINING PROGRAM | Admitting: STUDENT IN AN ORGANIZED HEALTH CARE EDUCATION/TRAINING PROGRAM
Payer: MEDICARE

## 2023-12-14 ENCOUNTER — APPOINTMENT (OUTPATIENT)
Dept: RADIOLOGY | Facility: REHABILITATION | Age: 68
End: 2023-12-14
Attending: STUDENT IN AN ORGANIZED HEALTH CARE EDUCATION/TRAINING PROGRAM
Payer: MEDICARE

## 2023-12-14 VITALS
RESPIRATION RATE: 16 BRPM | BODY MASS INDEX: 31.16 KG/M2 | HEART RATE: 91 BPM | WEIGHT: 158.73 LBS | TEMPERATURE: 97.9 F | HEIGHT: 60 IN | DIASTOLIC BLOOD PRESSURE: 87 MMHG | SYSTOLIC BLOOD PRESSURE: 132 MMHG | OXYGEN SATURATION: 95 %

## 2023-12-14 PROCEDURE — 62321 NJX INTERLAMINAR CRV/THRC: CPT

## 2023-12-14 PROCEDURE — RXMED WILLOW AMBULATORY MEDICATION CHARGE: Performed by: FAMILY MEDICINE

## 2023-12-14 PROCEDURE — 700111 HCHG RX REV CODE 636 W/ 250 OVERRIDE (IP): Mod: JZ

## 2023-12-14 PROCEDURE — 700117 HCHG RX CONTRAST REV CODE 255

## 2023-12-14 RX ORDER — DEXAMETHASONE SODIUM PHOSPHATE 10 MG/ML
INJECTION, SOLUTION INTRAMUSCULAR; INTRAVENOUS
Status: COMPLETED
Start: 2023-12-14 | End: 2023-12-14

## 2023-12-14 RX ORDER — LIDOCAINE HYDROCHLORIDE 10 MG/ML
INJECTION, SOLUTION EPIDURAL; INFILTRATION; INTRACAUDAL; PERINEURAL
Status: COMPLETED
Start: 2023-12-14 | End: 2023-12-14

## 2023-12-14 RX ADMIN — IOHEXOL 10 ML: 240 INJECTION, SOLUTION INTRATHECAL; INTRAVASCULAR; INTRAVENOUS; ORAL at 09:10

## 2023-12-14 RX ADMIN — LIDOCAINE HYDROCHLORIDE 10 ML: 10 INJECTION, SOLUTION EPIDURAL; INFILTRATION; INTRACAUDAL; PERINEURAL at 09:10

## 2023-12-14 RX ADMIN — DEXAMETHASONE SODIUM PHOSPHATE 10 MG: 10 INJECTION, SOLUTION INTRAMUSCULAR; INTRAVENOUS at 09:10

## 2023-12-14 ASSESSMENT — FIBROSIS 4 INDEX: FIB4 SCORE: 0.98

## 2023-12-14 ASSESSMENT — PAIN DESCRIPTION - PAIN TYPE: TYPE: CHRONIC PAIN

## 2023-12-14 NOTE — OR SURGEON
Immediate Post OP Note    Pre-Op Diagnosis Codes:     * Cervical radiculopathy [M54.12]      Post-Op Diagnosis Codes:     * Cervical radiculopathy [M54.12]      Procedure(s):  interlaminar cervical epidural steroid injection at T3-T4 - Wound Class: Clean - ABORTED    Surgeon(s):  Kendal Jeffers M.D.    Anesthesiologist/Type of Anesthesia:  No anesthesia staff entered./Local    Surgical Staff:  Circulator: Alexandria Child R.N.  Scrub Person: Yadira Simmons  Radiology Technologist: Naida Anand    Specimens removed if any:  * No specimens in log *    Estimated Blood Loss: None    Findings: None    Complications: None        12/14/2023 9:22 AM Kendal Jeffers M.D.

## 2023-12-14 NOTE — PROGRESS NOTES
0922: Rec'd pt from procedure room, pt ambulatory to chair, steady on feet, tolerating liquids. Dressing CDI.  Ice pack placed to incision site.    0925: Dr. Jeffers in to see patient, procedure was aborted d/t excess calcium build up, pt meets D/C criteria.    0936: Patient d/c'd to designated , placed in passenger seat.

## 2023-12-14 NOTE — TELEPHONE ENCOUNTER
----- Message from Kendal Jeffers M.D. sent at 12/14/2023  9:49 AM PST -----  Please call Mathew to change her post-SP f/u appointment to tomorrow if there's room, or early Jan.

## 2023-12-14 NOTE — PROGRESS NOTES
0850: Dr. Jeffers into see patient, questions answered, d/c instructions given with understanding.

## 2023-12-14 NOTE — OP REPORT
Date of Service: 12/14/2023    Physician/s: Kendal Jeffers MD    Pre-operative Diagnosis: Cervical radiculopathy    Post-operative Diagnosis: Cervical radiculopathy    Procedure: ATTEMPTED AND ABORTED left T2-3 and T3-4 interlaminar epidural steroid injection    Description of procedure:    The risks, benefits, and alternatives of the procedure were reviewed and discussed with the patient.  Written informed consent was freely obtained. A pre-procedural time-out was conducted by the physician verifying patient’s identity, procedure to be performed, procedure site and side, and allergy verification. Appropriate equipment was determined to be in place for the procedure.     The patient's vital signs were carefully monitored before, throughout, and after the procedure.     In the fluoroscopy suite the patient was placed in a prone position, a pillow placed underneath their chest. The skin was prepped and draped in the usual sterile fashion. The fluoroscope was placed over the cervical neck at the appropriate injection AP angle view, and the target for injection was marked. A 25g needle was placed into the marked site, and the skin and subcutaneous tissues were infiltrated with 1% lidocaine. The needle was removed. A 20g Tuohy needle was then placed and advanced under fluoroscopic guidance into the left T2-T3 interlaminar space at both the initial position AP view and contralateral oblique at a lateral view to ensure proper location of the needle tip at all times.  The needle was advanced with fluoroscopic guidance to the superior aspect of the T2 lamina.  Then a contralateral oblique view was used to advance the needle slightly towards the epidural space, but calcified ligament was encountered prohibiting access of the epidural space despite multiple attempts redirecting the needle laterally to the right and left, and superiorly and inferiorly.    The decision was made to reattempt entry at the left T3-T4 interlaminar  space. The needle was removed and a 25g needle was placed into the marked site, and the skin and subcutaneous tissues were infiltrated with 1% lidocaine. The needle was removed. A 20g Tuohy needle was then placed and advanced under fluoroscopic guidance into the left T3-T4 interlaminar space at both the initial position AP view and contralateral oblique at a lateral view to ensure proper location of the needle tip at all times.  The needle was advanced with fluoroscopic guidance to the superior aspect of the T3 lamina.  Then a contralateral oblique view was used to advance the needle slightly towards the epidural space, but calcified ligament was encountered prohibiting access of the epidural space despite multiple attempts redirecting the needle laterally to the right and left, and superiorly and inferiorly.    The decision was then made to abort the procedure for the patient's well being because access to the epidural space could not be safely obtained for the technical reasons noted above.    The patient was then evaluated post-procedure, and was hemodynamically stable prior to leaving the post-operative care unit.     Follow-up as scheduled    Kendal Jeffers MD  Interventional Pain and Spine  Physical Medicine and Rehabilitation  [facility]    CPT  interlaminar cervical/thoracic epidural: [id number]

## 2023-12-14 NOTE — INTERVAL H&P NOTE
H&P reviewed. The patient was examined and there are no changes to the H&P    Kendal Jeffers MD  Interventional Pain and Spine  Physical Medicine and Rehabilitation  [facility]

## 2023-12-15 ENCOUNTER — EH NON-PROVIDER (OUTPATIENT)
Dept: OCCUPATIONAL MEDICINE | Facility: CLINIC | Age: 68
End: 2023-12-15

## 2023-12-15 ENCOUNTER — OFFICE VISIT (OUTPATIENT)
Dept: PHYSICAL MEDICINE AND REHAB | Facility: MEDICAL CENTER | Age: 68
End: 2023-12-15
Payer: MEDICARE

## 2023-12-15 ENCOUNTER — HOSPITAL ENCOUNTER (OUTPATIENT)
Facility: MEDICAL CENTER | Age: 68
End: 2023-12-15
Attending: NURSE PRACTITIONER
Payer: COMMERCIAL

## 2023-12-15 ENCOUNTER — EMPLOYEE HEALTH (OUTPATIENT)
Dept: OCCUPATIONAL MEDICINE | Facility: CLINIC | Age: 68
End: 2023-12-15

## 2023-12-15 VITALS
WEIGHT: 157.63 LBS | SYSTOLIC BLOOD PRESSURE: 118 MMHG | TEMPERATURE: 96.9 F | BODY MASS INDEX: 30.95 KG/M2 | HEART RATE: 112 BPM | HEIGHT: 60 IN | OXYGEN SATURATION: 95 % | DIASTOLIC BLOOD PRESSURE: 72 MMHG

## 2023-12-15 DIAGNOSIS — Z98.1 HISTORY OF LUMBAR FUSION: ICD-10-CM

## 2023-12-15 DIAGNOSIS — M54.12 CERVICAL RADICULOPATHY: ICD-10-CM

## 2023-12-15 DIAGNOSIS — M79.10 MYALGIA: ICD-10-CM

## 2023-12-15 DIAGNOSIS — Z02.1 PRE-EMPLOYMENT DRUG SCREENING: ICD-10-CM

## 2023-12-15 DIAGNOSIS — M18.0 OSTEOARTHRITIS OF CARPOMETACARPAL (CMC) JOINTS OF BOTH THUMBS, UNSPECIFIED OSTEOARTHRITIS TYPE: ICD-10-CM

## 2023-12-15 DIAGNOSIS — M79.644 BILATERAL THUMB PAIN: ICD-10-CM

## 2023-12-15 DIAGNOSIS — Z02.89 ENCOUNTER FOR OCCUPATIONAL HEALTH ASSESSMENT: ICD-10-CM

## 2023-12-15 DIAGNOSIS — M51.26 LUMBAR DISC HERNIATION: ICD-10-CM

## 2023-12-15 DIAGNOSIS — M54.16 LUMBAR RADICULITIS: ICD-10-CM

## 2023-12-15 DIAGNOSIS — M54.41 CHRONIC RIGHT-SIDED LOW BACK PAIN WITH RIGHT-SIDED SCIATICA: ICD-10-CM

## 2023-12-15 DIAGNOSIS — M79.645 BILATERAL THUMB PAIN: ICD-10-CM

## 2023-12-15 DIAGNOSIS — Z98.1 S/P CERVICAL SPINAL FUSION: ICD-10-CM

## 2023-12-15 DIAGNOSIS — R20.0 NUMBNESS AND TINGLING IN LEFT ARM: ICD-10-CM

## 2023-12-15 DIAGNOSIS — R20.2 NUMBNESS AND TINGLING OF RIGHT LEG: ICD-10-CM

## 2023-12-15 DIAGNOSIS — R20.2 NUMBNESS AND TINGLING IN LEFT ARM: ICD-10-CM

## 2023-12-15 DIAGNOSIS — M48.02 NEUROFORAMINAL STENOSIS OF CERVICAL SPINE: ICD-10-CM

## 2023-12-15 DIAGNOSIS — G89.29 CHRONIC RIGHT-SIDED LOW BACK PAIN WITH RIGHT-SIDED SCIATICA: ICD-10-CM

## 2023-12-15 DIAGNOSIS — Z02.1 PRE-EMPLOYMENT HEALTH SCREENING EXAMINATION: ICD-10-CM

## 2023-12-15 DIAGNOSIS — R20.0 NUMBNESS AND TINGLING OF RIGHT LEG: ICD-10-CM

## 2023-12-15 PROCEDURE — 3078F DIAST BP <80 MM HG: CPT | Performed by: STUDENT IN AN ORGANIZED HEALTH CARE EDUCATION/TRAINING PROGRAM

## 2023-12-15 PROCEDURE — 86787 VARICELLA-ZOSTER ANTIBODY: CPT | Performed by: NURSE PRACTITIONER

## 2023-12-15 PROCEDURE — 76942 ECHO GUIDE FOR BIOPSY: CPT | Mod: 26,59 | Performed by: STUDENT IN AN ORGANIZED HEALTH CARE EDUCATION/TRAINING PROGRAM

## 2023-12-15 PROCEDURE — 3074F SYST BP LT 130 MM HG: CPT | Performed by: STUDENT IN AN ORGANIZED HEALTH CARE EDUCATION/TRAINING PROGRAM

## 2023-12-15 PROCEDURE — 86480 TB TEST CELL IMMUN MEASURE: CPT | Performed by: NURSE PRACTITIONER

## 2023-12-15 PROCEDURE — 8915 PR COMPREHENSIVE PHYSICAL: Performed by: NURSE PRACTITIONER

## 2023-12-15 PROCEDURE — 20553 NJX 1/MLT TRIGGER POINTS 3/>: CPT | Performed by: STUDENT IN AN ORGANIZED HEALTH CARE EDUCATION/TRAINING PROGRAM

## 2023-12-15 PROCEDURE — 99213 OFFICE O/P EST LOW 20 MIN: CPT | Mod: 25 | Performed by: STUDENT IN AN ORGANIZED HEALTH CARE EDUCATION/TRAINING PROGRAM

## 2023-12-15 PROCEDURE — 1126F AMNT PAIN NOTED NONE PRSNT: CPT | Performed by: STUDENT IN AN ORGANIZED HEALTH CARE EDUCATION/TRAINING PROGRAM

## 2023-12-15 ASSESSMENT — FIBROSIS 4 INDEX: FIB4 SCORE: 0.98

## 2023-12-15 ASSESSMENT — PAIN SCALES - GENERAL: PAINLEVEL: NO PAIN

## 2023-12-15 NOTE — PROCEDURES
Patient Name: Robin Lynn Zielesch  : 1955  Date of Service: 12/15/2023    Physician/s: Kendal Jeffers MD    Pre-operative Diagnosis: Myalgia (M79.1)    Post-operative Diagnosis: Myalgia (M79.1)    Procedure: trigger point injections of the following muscles:    Site R L   Splenius capitis     Semispinalis capitis     Splenius cervicis  x   Sternocleidomastoid     Rhomboids     Levator scapulae  x   Pectoralis minor     Pectoralis major     Serratus anterior     Supraspinatus     Infraspinatus     Teres minor     Teres major     Quadratus lumborum     Paravertebral, cervical     Paravertebral, thoracic     Paravertebral, lumbar     Gluteus silvano     Gluteus medius     Gluteus minimus     Tensor fascia emily     Vastus lateralis     Adductor jesusita     Adductor longus     Occipitalis     Cervical paraspinal     Trapezius, upper  x   Trapezius, mid     Trapezius, lower     Latissimus dorsi       Description of procedure:    The risks, benefits, and alternatives of the procedure were reviewed and discussed with the patient.  Written informed consent was freely obtained. A pre-procedural time-out was conducted by the physician verifying patient’s identity, procedure to be performed, procedure site and side, and allergy verification. Appropriate equipment was determined to be in place for the procedure.     In the office suite exam room the patient was placed in a seated position and the skin areas for injection over the above muscles were marked. A total of 7 areas of pain were identified for injection. The areas of pain were then prepped and draped in the usual sterile fashion. A solution was prepared with 5 mL of 1% lidocaine and 5 mL of 0.5% bupivacaine. Ultrasound was confirmed to view the adjacent structures for blood vessels and nerves and to confirm the needle path was not within the structures nor was it too deep to be within the lung. A 27g needle was placed into each of the markings at the areas above  under ultrasound guidance with an out of plane approach.. After negative aspiration, approximately 1.5 mL of the above solution was injected. The needle was removed intact after each trigger point injection, and the patient's back was covered with a 4x4 gauze, the area was cleansed with sterile normal saline, and a dressing was applied. There were no complications noted. The images were uploaded to our media tab for permanent storage.    Patient noted improvement in pain with the procedure.    Kendal Jeffers MD  Interventional Pain and Spine  Physical Medicine and Rehabilitation  [facility]

## 2023-12-15 NOTE — H&P (VIEW-ONLY)
Follow-up patient Note    Interventional Pain and Spine  Physiatry (Physical Medicine and Rehabilitation)     Patient Name: Robin Lynn Zielesch  : 1955  Date of service: 12/15/2023    Chief Complaint:   Chief Complaint   Patient presents with    Follow-Up     Neck pain         HISTORY (2022):  Robin Lynn Zielesch is a 67 y.o. female who presents today with pain radiating from her right posterolateral glute down her right anterolateral and posterior thigh accompanied by numbness/tingling/weakness in this area. This started in Sep 2021 while recovering from a L2-pelvis posterior spinal fusion with TLIF/ PLIF on 21 with Dr. Bates ([facility] which she states she had done for similar radiating pain down her left leg.  Her radiating left leg pain resolved after surgery.     Her pain at its best-worse level during the course of the day is 5-9/10, respectively. Pain right now is 7/10 on the numeric pain scale. Pain worsens with sitting, standing, walking, bending backwards, side bending or twisting, walking upstairs, and walking downstairs and improves with nothing. Her pain interferes somewhat with ADLs. The patient otherwise denies new weakness, numbness, or bladder/bowel incontinence. States she has fallen a few times secondary to pain and possibly weakness. Moved from [facility] in May 2022.     The patient has done physical therapy for this problem with worsening pain.     Patient has tried the following medications with varied success (current meds in bold): Hayes back and body  Gabapentin 300mg TID - no relief. Used to help with left sided pain  Naproxen BID - significant relief     Therapeutic modalities and interventional therapies to date include:  -No injections     Medical history includes HTN, cervical laminectomy, depression, anxiety, thyroid cancer, BMI 30, L2-pelvis posterior spinal fusion with TLIF/ PLIF on 21    HPI  Today's visit   Robin Lynn Zielesch ( 1955) is a female  with Diagnoses of Myalgia, Lumbar radiculitis, Cervical radiculopathy, S/P cervical spinal fusion, Neuroforaminal stenosis of cervical spine, Osteoarthritis of carpometacarpal (CMC) joints of both thumbs, unspecified osteoarthritis type, Chronic right-sided low back pain with right-sided sciatica, History of lumbar fusion, Bilateral thumb pain, Numbness and tingling of right leg, Lumbar disc herniation, and Numbness and tingling in left arm were pertinent to this visit.     Today Mathew presents for follow-up.  Notes ongoing pain radiating down her left arm.  She would like to try an injection if possible. S/p attempted and aborted left cervical epidural steroid injection at T2-3 and T3-4 due to calcified ligament yesterday. Pain improves with tilting her head to the right and down.  Worsens with tilting her neck up.    Also reports ongoing radiating pain from her right low back to her right thigh, previously improved after right L2-3 interlaminar epidural steroid injection.  Worse with activity and better with rest.  Would like to repeat epidural if possible which previously improved this pain significantly over 50% for multiple months.    Thumb pain is resolved after 11/15/23 BILATERAL ultrasound-guided 1st carpometacarpal joint injection    Procedure history:  - 11/1/22 right L2-3 interlaminar epidural steroid injection - 90% improvement in pain, resolution of radiating pain.  - 11/28/22 bilateral CMC joint injections - 100% improvement in thumb pain bilaterally  - 3/7/23 right L2-3 interlaminar epidural steroid injection -100% improvement in back pain and radiating pain, ongoing tightness in her right thigh  - 06/27/23 trigger point injections   - 11/15/23 BILATERAL ultrasound-guided 1st carpometacarpal joint injection - 100% improvement in thumb pain bilaterally    ROS:   Red Flags ROS:   Fever, Chills, Sweats: Denies  Involuntary Weight Loss: Denies  Bladder Incontinence: Denies  Bowel Incontinence:  denies  Saddle Anesthesia: Denies    All other systems reviewed and negative.     PMHx:   Past Medical History:   Diagnosis Date    Anxiety     Arthritis     Cancer (HCC)     COPD (chronic obstructive pulmonary disease) (HCC)     GERD (gastroesophageal reflux disease)     Hypertension     Migraine     Thyroid disease        PSHx:   Past Surgical History:   Procedure Laterality Date    MO INJ LUMBAR/SACRAL,W/ IMAGING Right 2023    Procedure: RIGHT Lumbar L2-3 interlaminar epidural steroid injection;  Surgeon: Kendal Jeffers M.D.;  [facility];  Service: Pain Management    MO INJ LUMBAR/SACRAL,W/ IMAGING Right 2022    Procedure: RIGHT lumbar L2-3 interlaminar epidural steroid injection;  Surgeon: Kendal Jeffers M.D.;  Location: [facility];  Service: Pain Management    ABDOMINAL HYSTERECTOMY TOTAL      ARTHROPLASTY      EYE SURGERY      FOOT SURGERY      HERNIA REPAIR      [id number]      L tka    LAMINOTOMY      LUMPECTOMY      PRIMARY C SECTION      THYROIDECTOMY TOTAL      2019  thyroid cancer       Family Hx:   Family History   Problem Relation Age of Onset    COPD Mother     Cancer Father         pancreatic    Hypertension Father     Hyperlipidemia Father     Alcohol abuse Father     Drug abuse Brother        Social Hx:  Social History     Socioeconomic History    Marital status:      Spouse name: Not on file    Number of children: Not on file    Years of education: Not on file    Highest education level: Some college, no degree   Occupational History    Not on file   Tobacco Use    Smoking status: Former     Current packs/day: 0.00     Average packs/day: 1.5 packs/day for 50.0 years (75.0 ttl pk-yrs)     Types: Cigarettes     Start date: 1968     Quit date: 2018     Years since quittin.3    Smokeless tobacco: Never   Vaping Use    Vaping Use: Some days    Substances: Nicotine, CBD    Devices: Pre-filled or refillable cartridge,  Refillable tank   Substance and Sexual Activity    Alcohol use: Never    Drug use: Yes     Types: Marijuana, Methamphetamines     Comment: once in a while    Sexual activity: Yes     Partners: Male     Birth control/protection: Female Sterilization   Other Topics Concern    Not on file   Social History Narrative    Not on file     Social Determinants of Health     Financial Resource Strain: Medium Risk (7/10/2023)    Overall Financial Resource Strain (CARDIA)     Difficulty of Paying Living Expenses: Somewhat hard   Food Insecurity: No Food Insecurity (7/10/2023)    Hunger Vital Sign     Worried About Running Out of Food in the Last Year: Never true     Ran Out of Food in the Last Year: Never true   Transportation Needs: No Transportation Needs (7/10/2023)    PRAPARE - Transportation     Lack of Transportation (Medical): No     Lack of Transportation (Non-Medical): No   Physical Activity: Insufficiently Active (7/10/2023)    Exercise Vital Sign     Days of Exercise per Week: 5 days     Minutes of Exercise per Session: 20 min   Stress: No Stress Concern Present (7/10/2023)    [employer] [place] [employer] - Occupational Stress Questionnaire     Feeling of Stress : Only a little   Social Connections: Moderately Isolated (7/10/2023)    Social Connection and Isolation Panel [NHANES]     Frequency of Communication with Friends and Family: Never     Frequency of Social Gatherings with Friends and Family: Never     Attends Faith Services: Never     Active Member of Clubs or Organizations: Yes     Attends Club or Organization Meetings: Never     Marital Status:    Intimate Partner Violence: Not on file   Housing Stability: Low Risk  (7/10/2023)    Housing Stability Vital Sign     Unable to Pay for Housing in the Last Year: No     Number of Places Lived in the Last Year: 1     Unstable Housing in the Last Year: No       Allergies:  Allergies   Allergen Reactions    Ace Inhibitors Cough    Chlorhexidine  Rash    Flexeril [Cyclobenzaprine] Unspecified     Irritability     Triamterene      Other reaction(s): Nephrotoxicity       Medications: reviewed on epic.   Outpatient Medications Marked as Taking for the 12/15/23 encounter (Office Visit) with Kendal Jeffers M.D.   Medication Sig Dispense Refill    buPROPion (WELLBUTRIN XL) 300 MG XL tablet Take 1 Tablet by mouth every morning. 90 Tablet 3    gabapentin (NEURONTIN) 300 MG Cap Take 3 Capsules by mouth 3 times a day. 810 Capsule 1    losartan (COZAAR) 100 MG Tab Take 1 Tablet by mouth every day. 90 Tablet 3    SYNTHROID 88 MCG Tab Take 1 Tablet by mouth every morning on an empty stomach. 90 Tablet 1    omeprazole (PRILOSEC) 40 MG delayed-release capsule Take 1 Capsule by mouth every day. 90 Capsule 3    amLODIPine (NORVASC) 10 MG Tab Take 1 Tablet by mouth every day. 90 Tablet 3    meloxicam (MOBIC) 15 MG tablet Take 1 Tablet by mouth every day. 90 Tablet 3    albuterol 108 (90 Base) MCG/ACT Aero Soln inhalation aerosol Inhale 2 Puffs every 6 hours as needed for Shortness of Breath. 8.5 g 3    potassium chloride SA (KDUR) 20 MEQ Tab CR Take 1 Tablet by mouth every day. 90 Tablet 1    calcium citrate (CALCITRATE) 950 (200 Ca) MG Tab Take 1 Tablet by mouth every day.      Multiple Vitamin (MULTI-VITAMINS PO) Take  by mouth.      Riboflavin (VITAMIN B-2 PO) Take  by mouth.          Current Outpatient Medications on File Prior to Visit   Medication Sig Dispense Refill    buPROPion (WELLBUTRIN XL) 300 MG XL tablet Take 1 Tablet by mouth every morning. 90 Tablet 3    gabapentin (NEURONTIN) 300 MG Cap Take 3 Capsules by mouth 3 times a day. 810 Capsule 1    losartan (COZAAR) 100 MG Tab Take 1 Tablet by mouth every day. 90 Tablet 3    SYNTHROID 88 MCG Tab Take 1 Tablet by mouth every morning on an empty stomach. 90 Tablet 1    omeprazole (PRILOSEC) 40 MG delayed-release capsule Take 1 Capsule by mouth every day. 90 Capsule 3    amLODIPine (NORVASC) 10 MG Tab Take 1 Tablet  by mouth every day. 90 Tablet 3    meloxicam (MOBIC) 15 MG tablet Take 1 Tablet by mouth every day. 90 Tablet 3    albuterol 108 (90 Base) MCG/ACT Aero Soln inhalation aerosol Inhale 2 Puffs every 6 hours as needed for Shortness of Breath. 8.5 g 3    potassium chloride SA (KDUR) 20 MEQ Tab CR Take 1 Tablet by mouth every day. 90 Tablet 1    calcium citrate (CALCITRATE) 950 (200 Ca) MG Tab Take 1 Tablet by mouth every day.      Multiple Vitamin (MULTI-VITAMINS PO) Take  by mouth.      Riboflavin (VITAMIN B-2 PO) Take  by mouth.       No current facility-administered medications on file prior to visit.         EXAMINATION     Physical Exam:   /72 (BP Location: Right arm, Patient Position: Sitting, BP Cuff Size: Adult)   Pulse (!) 112   Temp 36.1 °C (96.9 °F) (Temporal)   Ht 1.524 m (5')   Wt 71.5 kg (157 lb 10.1 oz)   SpO2 95%     Constitutional:   Body Habitus: Body mass index is 30.78 kg/m².  Cooperation: Fully cooperates with exam  Appearance: Well-groomed, well-nourished.    Eyes: No scleral icterus to suggest severe liver disease, no proptosis to suggest severe hyperthyroidism    ENT -no obvious auditory deficits, no noticeable facial droop     Skin -no rashes or lesions noted     Respiratory-  breathing comfortably on room air, no audible wheezing    Cardiovascular-distal extremities warm and well perfused.  No lower extremity edema is noted.     Gastrointestinal - no obvious abdominal masses, non-distended    Psychiatric- alert and oriented ×3. Normal affect.     Gait - normal gait, no use of ambulatory device, nonantalgic.     Musculoskeletal and Neuro -       Cervical spine   Inspection: No deformities of the skin over the cervical spine. No rashes or lesions.    limited active range of motion in all directions    Spurling's sign  negative bilaterally  Cervical facet loading maneuver  negative bilaterally    Tenderness to palpation at paracervical muscles on left and upper trapezius on left. No  tenderness to palpation elsewhere including midline of cervical spine and paracervical muscles on right.    Previous exam  Key points for the international standards for neurological classification of spinal cord injury (ISNCSCI) to light touch.     Dermatome R L   C4 2 2   C5 2 2   C6 2 2   C7 2 2   C8 2 2   T1 2 2   T2 2 2       Motor Exam Upper Extremities   ? Myotome R L   Shoulder abduction C5 5 5   Elbow flexion C5 5 5   Wrist extension C6 5 5   Elbow extension C7 5 5   Finger flexion C8 5 5   Finger abduction T1 5 5     Reflexes  2+ bilateral biceps, brachialis, triceps.  Negative Eric's        Full AROM of bilateral shoulders    Thoracic/Lumbar Spine/Sacral Spine/Hips   Palpation:   Tenderness to palpation across bilateral upper glutes.  No tenderness to palpation in the low back/hips including midline of lumbosacral spine, paraspinal muscles bilaterally, lumbar facets bilaterally, sacroiliac joints bilaterally, PSIS bilaterally, and greater trochanters bilaterally.    Inspection: No evidence of atrophy in bilateral lower extremities throughout      Lumbar spine /hip provocative exam maneuvers  Straight leg raise negative bilaterally  FADIR test negative bilaterally    SI joint tests  EBEN test negative bilaterally  Thigh thrust test negative bilaterally     Key points for the international standards for neurological classification of spinal cord injury (ISNCSCI) to light touch.   Dermatome R L   L2 2 2   L3 2 2   L4 2 2   L5 2 2   S1 2 2   S2 2 2         Motor Exam Lower Extremities  ? Myotome R L   Hip flexion L2 5 5   Knee extension L3 5 5   Ankle dorsiflexion L4 5 5   Toe extension L5 5 5   Ankle plantarflexion S1 5 5      There is full active range of motion with lumbar extension     Facet loading maneuver negative bilaterally     Heel walking and toe walking intact.       Reflexes  ?   R L   Patella   2+ 2+   Achilles    2+ 2+      Clonus of the ankle negative bilaterally        MEDICAL DECISION  "MAKING     Medical records review: see under HPI section.       MEDICAL DECISION MAKING    Medical records review: see under HPI section.     DATA    Labs: No new labs available for review since last visit.    Lab Results   Component Value Date/Time    SODIUM 141 12/20/2022 10:30 AM    POTASSIUM 3.9 12/20/2022 10:30 AM    CHLORIDE 103 12/20/2022 10:30 AM    CO2 26 12/20/2022 10:30 AM    ANION 12.0 12/20/2022 10:30 AM    GLUCOSE 109 (H) 12/20/2022 10:30 AM    BUN 20 12/20/2022 10:30 AM    CREATININE 0.75 12/20/2022 10:30 AM    CALCIUM 10.3 12/20/2022 10:30 AM    ASTSGOT 16 03/01/2023 01:09 PM    ALTSGPT 11 03/01/2023 01:09 PM    TBILIRUBIN 0.5 03/01/2023 01:09 PM    ALBUMIN 4.5 03/01/2023 01:09 PM    TOTPROTEIN 7.0 03/01/2023 01:09 PM    GLOBULIN 2.7 12/20/2022 10:30 AM    AGRATIO 1.7 12/20/2022 10:30 AM       No results found for: \"PROTHROMBTM\", \"INR\"     Lab Results   Component Value Date/Time    WBC 7.8 12/20/2022 10:30 AM    RBC 5.17 12/20/2022 10:30 AM    HEMOGLOBIN 15.3 12/20/2022 10:30 AM    HEMATOCRIT 45.9 12/20/2022 10:30 AM    MCV 88.8 12/20/2022 10:30 AM    MCH 29.6 12/20/2022 10:30 AM    MCHC 33.3 (L) 12/20/2022 10:30 AM    MPV 9.0 12/20/2022 10:30 AM    NEUTSPOLYS 65.70 12/20/2022 10:30 AM    LYMPHOCYTES 20.80 (L) 12/20/2022 10:30 AM    MONOCYTES 10.50 12/20/2022 10:30 AM    EOSINOPHILS 2.00 12/20/2022 10:30 AM    BASOPHILS 0.60 12/20/2022 10:30 AM        Lab Results   Component Value Date/Time    HBA1C 5.8 (H) 04/28/2022 10:39 AM        Imaging:   I personally reviewed following images, these are my reads  MRI cervical spine 11/23/23  Fusion hardware at C5-C7.  At C7-T1 there is moderate-severe bilateral neuroforaminal stenosis.  At C6-C7 there is borderline left neuroforaminal stenosis.  At C5-6 there is mild left-sided neuroforaminal stenosis.  At C3-4 and C4-5 there is moderate right-sided neuroforaminal stenosis.See formal radiology report for further details.      MRI lumbar spine " 9/2/2022  Evidence of lumbar laminectomy at L4-S1, interbody fusion and posterior fusion at L3-S1.  Broad-based disc bulge at L1-L2 resulting in severe central canal stenosis and compression of descending bilateral L2 nerve roots and possibly bilateral L3 nerve roots and mild impingement of exiting L1 nerve roots bilaterally.  Mild right neuroforaminal stenosis at T12-L1.  Possible mild bilateral neuroforaminal stenosis at L5-S1, quality of images impaired due to metallic artifact. Appearance of chronic postoperative seroma posterior to L4 and L5 vertebral bodies.    XR Right hand 11/22/22  Moderate to severe CMC joint OA. See formal radiology report for further details.    IMAGING radiology reads. I reviewed the following radiology reads   MRI cervical spine 11/23/23  FINDINGS:  Images are degraded by patient motion artifact. Alignment in the cervical spine shows mild degenerative anterolisthesis of C7 on T1.     There is postoperative change with anterior cervical fusion hardware at C5-C6-C7. Expected hardware artifact.     Marrow signal in the vertebral bodies is otherwise unremarkable.     The prevertebral and paraspinous soft tissues are unremarkable.     There are no anomalies at the craniovertebral junction.  The cervical spinal cord is normal in caliber and signal throughout its course.     At C2-3, no significant disc bulge or protrusion. No central stenosis. Mild bilateral foraminal stenosis.     At C3-4, mild disc-osteophyte change. Small impression on the ventral subarachnoid space. No central stenosis. Moderate bilateral foraminal stenosis due to spondylotic change.     C4-C5, no significant disc bulge or protrusion. No central stenosis. The left neural foramen appears intact. Moderate right foraminal stenosis.     C5-C6 posterior bony ridging. Minimal ligamentum flavum hypertrophy. No candace central stenosis. The right neural foramen is intact. Mild left foraminal stenosis due to uncinate spondylosis.      C6-C7, no significant disc bulge or protrusion. Endplate hypertrophy contributing to mild central stenosis (T2 sagittal image 10, series 2, T2 axial image 12, series 8). Mild left-sided posterior endplate spurring and uncinate hypertrophy with borderline   left lateral recess stenosis and borderline left foraminal stenosis. The right neural foramen is intact.     C7-T1, moderate disc/osteophyte change accentuated by anterolisthesis. Partial effacement of ventral subarachnoid space. Thecal sac at the lower range of normal without candace central stenosis. Moderate-marked bilateral foraminal stenosis best seen on the   sagittal images.     IMPRESSION:     1.  Postoperative anterior cervical fusion with hardware fixation C5-C6-C7.  2.  Multilevel degenerative changes most notable for C6-C7 mild central stenosis at O6-R5-ocmdoe bilateral foraminal stenosis.  3.  Details for each level above in the body of the report.  4.  No myelopathic cord signal is appreciated.    Results for orders placed during the hospital encounter of 09/02/22    MR-LUMBAR SPINE-W/O    Impression  1.  L3-4 slight anterolisthesis and L5-S1 slight retrolisthesis.  2.  Postoperative changes with lumbar laminectomy L4-L5-S1, interbody fusion L3-L4, L4-L5, L5-S1, and posterior fusion with transpedicular screw fixation at L3-L4-L5-S1.  3.  Chronic postoperative seroma occupying the laminectomy interval without dorsal epidural mass effect.  4.  The study is most notable for L1-L2 large disc protrusion-extrusion resulting in severe central stenosis.  5.  Additional degenerative changes detailed for each level above in the body of report.        MRI lumbar spine 10/20/21  Lumbar spine:    Alignment: Grade 1 L3-L4 anterolisthesis. Previously seen L4-L5  anterolisthesis is improved compared to MRI prior to surgery.    Vertebral body heights and marrow: Postsurgical changes from L3-S1  posterior fusion. Multilevel lumbar spondylosis with osteophytes,  facet  arthropathy, and endplate marrow degenerative changes are seen. Otherwise  the vertebral body heights and marrow signal are unremarkable.    Conus medullaris: Normal, terminating at L1/L2.    Soft tissues: There is a fluid collection in the paraspinal soft tissues  posterior to the L3-L5 laminectomy sites, likely postoperative seroma  measuring 63 x 28 mm (series 17, image 7). Small right renal cyst.    L1-L2: Persistent disc bulge with worsening superimposed central disc  extrusion. Bilateral facet arthropathy and dorsal epidural fat. The  constellation of findings produces moderate to severe spinal canal  stenosis. Mild to moderate left neural foraminal stenosis is improved  compared to prior.  Ligamentum flavum thickening. Facet hypertrophy, mild.    L2-L3: Disc bulge, ligamentum flavum thickening, facet hypertrophy  resulting in mild spinal canal stenosis. Mild left neural foraminal  stenosis.    L3-L4: Partially uncovered disc. Mild to moderate right neural foraminal  stenosis. Limited evaluation of the left neural foramen. Laminectomy.    L4-L5: No spinal canal stenosis. Limited evaluation of neural foramina due  to spinal hardware. Laminectomy.    L5-S1: Disc bulge without spinal canal stenosis. Limited evaluation of  neural foramina due to spinal hardware.     IMPRESSION:    1. Worsening disc protrusion at L1-L2, resulting in worsening spinal  canal stenosis, now moderate to severe.   2. Multilevel degenerative changes of the cervical spine, similar  compared to prior.  3. Multilevel degenerative changes in thoracic spine, with up to mild  to moderate spinal canal stenosis at T8-T9 secondary to disc bulge.  4. Postsurgical changes . Small postoperative seroma in L3-L5  laminectomy sites.        X-ray left hand 3/19/2019  FINDINGS:   There is no acute fracture or dislocation.   Advanced osteoarthrosis of the first CMC joint, characterized by joint   space narrowing, subchondral sclerosis, osteophyte  formation, and radial   subluxation. Mild osteoarthrosis of the STT joint. Osteoarthrosis of   scattered IP joints. No focal soft tissue swelling.     IMPRESSION:   Advanced osteoarthrosis of the first CMC joint.     XR Right hand 22  FINDINGS:     BONE MINERALIZATION: Normal.  JOINTS: Moderate to severe first carpometacarpal joint osteoarthrosis. Mild triscaphe joint osteoarthrosis. Mild multifocal osteoarthrosis otherwise. No erosions.  FRACTURE: None.  DISLOCATION: None.  SOFT TISSUES: No mass.     IMPRESSION:     1.  Moderate to severe first carpometacarpal joint osteoarthrosis.  2.  Mild multifocal osteoarthrosis otherwise.                                                  Diagnosis  Visit Diagnoses     ICD-10-CM   1. Myalgia  M79.10   2. Lumbar radiculitis  M54.16   3. Cervical radiculopathy  M54.12   4. S/P cervical spinal fusion  Z98.1   5. Neuroforaminal stenosis of cervical spine  M48.02   6. Osteoarthritis of carpometacarpal (CMC) joints of both thumbs, unspecified osteoarthritis type  M18.0   7. Chronic right-sided low back pain with right-sided sciatica  M54.41    G89.29   8. History of lumbar fusion  Z98.1   9. Bilateral thumb pain  M79.644    M79.645   10. Numbness and tingling of right leg  R20.0    R20.2   11. Lumbar disc herniation  M51.26   12. Numbness and tingling in left arm  R20.0    R20.2                     ASSESSMENT AND PLAN:  Robin Lynn Zielesch (: 1955) is a female with history of HTN, cervical laminectomy, depression, anxiety, thyroid cancer, BMI 30, L3-pelvis posterior spinal fusion with TLIF/ PLIF on 21.      Mathew was seen today for follow-up.    Diagnoses and all orders for this visit:    Myalgia  -     Consent for all Surgical, Special Diagnostic or Therapeutic Procedures    Lumbar radiculitis  -     Referral to Pain Clinic    Cervical radiculopathy    S/P cervical spinal fusion    Neuroforaminal stenosis of cervical spine    Osteoarthritis of carpometacarpal (CMC)  joints of both thumbs, unspecified osteoarthritis type    Chronic right-sided low back pain with right-sided sciatica    History of lumbar fusion    Bilateral thumb pain    Numbness and tingling of right leg    Lumbar disc herniation    Numbness and tingling in left arm              PLAN  Physical Therapy: I have discussed possible referral to physical therapy.  She has declined a physical therapy referral as she has done extensive physical therapy in the past which worsened her symptoms and is currently too busy caring for her .       Diagnostic workup: Personally reviewed at today's visit:  MRI cervical spine 11/23/23    Medications:   -Discussed that she should continue gabapentin, Mobic as per PCP  -  reviewed, records do not demonstrate increased risk of opioid abuse.     Interventions:   -Trial of trigger point injections today targeting left neck area.  The risks, benefits, and alternatives to this procedure were discussed and the patient wishes to proceed with the procedure. Risks include but are not limited to damage to surrounding structures, infection, bleeding, worsening of pain which can be permanent, and collapsed lung. Benefits include pain relief and improved function. Alternatives include not doing the procedure.  - s/p attempted and aborted left cervical epidural steroid injection at T2-3 and T3-4 due to calcified ligament.  -right L2-3 interlaminar epidural steroid injection given recurrence of pain which previously significantly improved with this injection.  She would prefer for this injection to be done on a Friday if possible.  The risks, benefits, and alternatives to this procedure were discussed and the patient wishes to proceed with the procedure. Risks include but are not limited to damage to surrounding structures, infection, bleeding, worsening of pain which can be permanent, and weakness which can be permanent. Benefits include pain relief and improved function. Alternatives  include not doing the procedure.    -Bilateral CMC joint steroid injections under ultrasound guidance PRN given significant improvement in pain with this procedure in the past.     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  Given her hx of significant improvement in pain after our previous epidural steroid injection and no neurologic deficits on exam today, I believe it is reasonable to defer a neurosurgery referral at this time  -Discussed that she could consider deep myofascial release techniques including a foam roller for her right thigh    Follow-up: 1 month after lumbar epidural    Orders Placed This Encounter    Referral to Pain Clinic    Consent for all Surgical, Special Diagnostic or Therapeutic Procedures       Kendal Jeffers MD  Interventional Pain and Spine  Physical Medicine and Rehabilitation  [facility]      The above note documents my personal evaluation of this patient. In addition, I have reviewed and confirmed with the patient and MA the supportive information documented in today's Patient Health Questionnaire and Office Note.     Please note that this dictation was created using voice recognition software. I have made every reasonable attempt to correct obvious errors, but I expect that there are errors of grammar and possibly content that I did not discover before finalizing the note.

## 2023-12-15 NOTE — PROGRESS NOTES
Follow-up patient Note    Interventional Pain and Spine  Physiatry (Physical Medicine and Rehabilitation)     Patient Name: Robin Lynn Zielesch  : 1955  Date of service: 12/15/2023    Chief Complaint:   Chief Complaint   Patient presents with    Follow-Up     Neck pain         HISTORY (2022):  Robin Lynn Zielesch is a 67 y.o. female who presents today with pain radiating from her right posterolateral glute down her right anterolateral and posterior thigh accompanied by numbness/tingling/weakness in this area. This started in Sep 2021 while recovering from a L2-pelvis posterior spinal fusion with TLIF/ PLIF on 21 with Dr. Bates ([facility] which she states she had done for similar radiating pain down her left leg.  Her radiating left leg pain resolved after surgery.     Her pain at its best-worse level during the course of the day is 5-9/10, respectively. Pain right now is 7/10 on the numeric pain scale. Pain worsens with sitting, standing, walking, bending backwards, side bending or twisting, walking upstairs, and walking downstairs and improves with nothing. Her pain interferes somewhat with ADLs. The patient otherwise denies new weakness, numbness, or bladder/bowel incontinence. States she has fallen a few times secondary to pain and possibly weakness. Moved from [facility] in May 2022.     The patient has done physical therapy for this problem with worsening pain.     Patient has tried the following medications with varied success (current meds in bold): Hayes back and body  Gabapentin 300mg TID - no relief. Used to help with left sided pain  Naproxen BID - significant relief     Therapeutic modalities and interventional therapies to date include:  -No injections     Medical history includes HTN, cervical laminectomy, depression, anxiety, thyroid cancer, BMI 30, L2-pelvis posterior spinal fusion with TLIF/ PLIF on 21    HPI  Today's visit   Robin Lynn Zielesch ( 1955) is a female  with Diagnoses of Myalgia, Lumbar radiculitis, Cervical radiculopathy, S/P cervical spinal fusion, Neuroforaminal stenosis of cervical spine, Osteoarthritis of carpometacarpal (CMC) joints of both thumbs, unspecified osteoarthritis type, Chronic right-sided low back pain with right-sided sciatica, History of lumbar fusion, Bilateral thumb pain, Numbness and tingling of right leg, Lumbar disc herniation, and Numbness and tingling in left arm were pertinent to this visit.     Today Mathew presents for follow-up.  Notes ongoing pain radiating down her left arm.  She would like to try an injection if possible. S/p attempted and aborted left cervical epidural steroid injection at T2-3 and T3-4 due to calcified ligament yesterday. Pain improves with tilting her head to the right and down.  Worsens with tilting her neck up.    Also reports ongoing radiating pain from her right low back to her right thigh, previously improved after right L2-3 interlaminar epidural steroid injection.  Worse with activity and better with rest.  Would like to repeat epidural if possible which previously improved this pain significantly over 50% for multiple months.    Thumb pain is resolved after 11/15/23 BILATERAL ultrasound-guided 1st carpometacarpal joint injection    Procedure history:  - 11/1/22 right L2-3 interlaminar epidural steroid injection - 90% improvement in pain, resolution of radiating pain.  - 11/28/22 bilateral CMC joint injections - 100% improvement in thumb pain bilaterally  - 3/7/23 right L2-3 interlaminar epidural steroid injection -100% improvement in back pain and radiating pain, ongoing tightness in her right thigh  - 06/27/23 trigger point injections   - 11/15/23 BILATERAL ultrasound-guided 1st carpometacarpal joint injection - 100% improvement in thumb pain bilaterally    ROS:   Red Flags ROS:   Fever, Chills, Sweats: Denies  Involuntary Weight Loss: Denies  Bladder Incontinence: Denies  Bowel Incontinence:  denies  Saddle Anesthesia: Denies    All other systems reviewed and negative.     PMHx:   Past Medical History:   Diagnosis Date    Anxiety     Arthritis     Cancer (HCC)     COPD (chronic obstructive pulmonary disease) (HCC)     GERD (gastroesophageal reflux disease)     Hypertension     Migraine     Thyroid disease        PSHx:   Past Surgical History:   Procedure Laterality Date    MN INJ LUMBAR/SACRAL,W/ IMAGING Right 2023    Procedure: RIGHT Lumbar L2-3 interlaminar epidural steroid injection;  Surgeon: Kendal Jeffers M.D.;  [facility];  Service: Pain Management    MN INJ LUMBAR/SACRAL,W/ IMAGING Right 2022    Procedure: RIGHT lumbar L2-3 interlaminar epidural steroid injection;  Surgeon: Kendal Jeffers M.D.;  Location: [facility];  Service: Pain Management    ABDOMINAL HYSTERECTOMY TOTAL      ARTHROPLASTY      EYE SURGERY      FOOT SURGERY      HERNIA REPAIR      [id number]      L tka    LAMINOTOMY      LUMPECTOMY      PRIMARY C SECTION      THYROIDECTOMY TOTAL      2019  thyroid cancer       Family Hx:   Family History   Problem Relation Age of Onset    COPD Mother     Cancer Father         pancreatic    Hypertension Father     Hyperlipidemia Father     Alcohol abuse Father     Drug abuse Brother        Social Hx:  Social History     Socioeconomic History    Marital status:      Spouse name: Not on file    Number of children: Not on file    Years of education: Not on file    Highest education level: Some college, no degree   Occupational History    Not on file   Tobacco Use    Smoking status: Former     Current packs/day: 0.00     Average packs/day: 1.5 packs/day for 50.0 years (75.0 ttl pk-yrs)     Types: Cigarettes     Start date: 1968     Quit date: 2018     Years since quittin.3    Smokeless tobacco: Never   Vaping Use    Vaping Use: Some days    Substances: Nicotine, CBD    Devices: Pre-filled or refillable cartridge,  Refillable tank   Substance and Sexual Activity    Alcohol use: Never    Drug use: Yes     Types: Marijuana, Methamphetamines     Comment: once in a while    Sexual activity: Yes     Partners: Male     Birth control/protection: Female Sterilization   Other Topics Concern    Not on file   Social History Narrative    Not on file     Social Determinants of Health     Financial Resource Strain: Medium Risk (7/10/2023)    Overall Financial Resource Strain (CARDIA)     Difficulty of Paying Living Expenses: Somewhat hard   Food Insecurity: No Food Insecurity (7/10/2023)    Hunger Vital Sign     Worried About Running Out of Food in the Last Year: Never true     Ran Out of Food in the Last Year: Never true   Transportation Needs: No Transportation Needs (7/10/2023)    PRAPARE - Transportation     Lack of Transportation (Medical): No     Lack of Transportation (Non-Medical): No   Physical Activity: Insufficiently Active (7/10/2023)    Exercise Vital Sign     Days of Exercise per Week: 5 days     Minutes of Exercise per Session: 20 min   Stress: No Stress Concern Present (7/10/2023)    [employer] - Occupational Stress Questionnaire     Feeling of Stress : Only a little   Social Connections: Moderately Isolated (7/10/2023)    Social Connection and Isolation Panel [NHANES]     Frequency of Communication with Friends and Family: Never     Frequency of Social Gatherings with Friends and Family: Never     Attends [employer] Services: Never     Active Member of Clubs or Organizations: Yes     Attends Club or Organization Meetings: Never     Marital Status:    Intimate Partner Violence: Not on file   Housing Stability: Low Risk  (7/10/2023)    Housing Stability Vital Sign     Unable to Pay for Housing in the Last Year: No     Number of Places Lived in the Last Year: 1     Unstable Housing in the Last Year: No       Allergies:  Allergies   Allergen Reactions    Ace Inhibitors Cough    Chlorhexidine  Rash    Flexeril [Cyclobenzaprine] Unspecified     Irritability     Triamterene      Other reaction(s): Nephrotoxicity       Medications: reviewed on epic.   Outpatient Medications Marked as Taking for the 12/15/23 encounter (Office Visit) with Kendal Jeffers M.D.   Medication Sig Dispense Refill    buPROPion (WELLBUTRIN XL) 300 MG XL tablet Take 1 Tablet by mouth every morning. 90 Tablet 3    gabapentin (NEURONTIN) 300 MG Cap Take 3 Capsules by mouth 3 times a day. 810 Capsule 1    losartan (COZAAR) 100 MG Tab Take 1 Tablet by mouth every day. 90 Tablet 3    SYNTHROID 88 MCG Tab Take 1 Tablet by mouth every morning on an empty stomach. 90 Tablet 1    omeprazole (PRILOSEC) 40 MG delayed-release capsule Take 1 Capsule by mouth every day. 90 Capsule 3    amLODIPine (NORVASC) 10 MG Tab Take 1 Tablet by mouth every day. 90 Tablet 3    meloxicam (MOBIC) 15 MG tablet Take 1 Tablet by mouth every day. 90 Tablet 3    albuterol 108 (90 Base) MCG/ACT Aero Soln inhalation aerosol Inhale 2 Puffs every 6 hours as needed for Shortness of Breath. 8.5 g 3    potassium chloride SA (KDUR) 20 MEQ Tab CR Take 1 Tablet by mouth every day. 90 Tablet 1    calcium citrate (CALCITRATE) 950 (200 Ca) MG Tab Take 1 Tablet by mouth every day.      Multiple Vitamin (MULTI-VITAMINS PO) Take  by mouth.      Riboflavin (VITAMIN B-2 PO) Take  by mouth.          Current Outpatient Medications on File Prior to Visit   Medication Sig Dispense Refill    buPROPion (WELLBUTRIN XL) 300 MG XL tablet Take 1 Tablet by mouth every morning. 90 Tablet 3    gabapentin (NEURONTIN) 300 MG Cap Take 3 Capsules by mouth 3 times a day. 810 Capsule 1    losartan (COZAAR) 100 MG Tab Take 1 Tablet by mouth every day. 90 Tablet 3    SYNTHROID 88 MCG Tab Take 1 Tablet by mouth every morning on an empty stomach. 90 Tablet 1    omeprazole (PRILOSEC) 40 MG delayed-release capsule Take 1 Capsule by mouth every day. 90 Capsule 3    amLODIPine (NORVASC) 10 MG Tab Take 1 Tablet  by mouth every day. 90 Tablet 3    meloxicam (MOBIC) 15 MG tablet Take 1 Tablet by mouth every day. 90 Tablet 3    albuterol 108 (90 Base) MCG/ACT Aero Soln inhalation aerosol Inhale 2 Puffs every 6 hours as needed for Shortness of Breath. 8.5 g 3    potassium chloride SA (KDUR) 20 MEQ Tab CR Take 1 Tablet by mouth every day. 90 Tablet 1    calcium citrate (CALCITRATE) 950 (200 Ca) MG Tab Take 1 Tablet by mouth every day.      Multiple Vitamin (MULTI-VITAMINS PO) Take  by mouth.      Riboflavin (VITAMIN B-2 PO) Take  by mouth.       No current facility-administered medications on file prior to visit.         EXAMINATION     Physical Exam:   /72 (BP Location: Right arm, Patient Position: Sitting, BP Cuff Size: Adult)   Pulse (!) 112   Temp 36.1 °C (96.9 °F) (Temporal)   Ht 1.524 m (5')   Wt 71.5 kg (157 lb 10.1 oz)   SpO2 95%     Constitutional:   Body [place]itus: Body mass index is 30.78 kg/m².  Cooperation: Fully cooperates with exam  Appearance: Well-groomed, well-nourished.    Eyes: No scleral icterus to suggest severe liver disease, no proptosis to suggest severe hyperthyroidism    ENT -no obvious auditory deficits, no noticeable facial droop     Skin -no rashes or lesions noted     Respiratory-  breathing comfortably on room air, no audible wheezing    Cardiovascular-distal extremities warm and well perfused.  No lower extremity edema is noted.     Gastrointestinal - no obvious abdominal masses, non-distended    Psychiatric- alert and oriented ×3. Normal affect.     Gait - normal gait, no use of ambulatory device, nonantalgic.     Musculoskeletal and Neuro -       Cervical spine   Inspection: No deformities of the skin over the cervical spine. No rashes or lesions.    limited active range of motion in all directions    Spurling's sign  negative bilaterally  Cervical facet loading maneuver  negative bilaterally    Tenderness to palpation at paracervical muscles on left and upper trapezius on left. No  tenderness to palpation elsewhere including midline of cervical spine and paracervical muscles on right.    Previous exam  Key points for the international standards for neurological classification of spinal cord injury (ISNCSCI) to light touch.     Dermatome R L   C4 2 2   C5 2 2   C6 2 2   C7 2 2   C8 2 2   T1 2 2   T2 2 2       Motor Exam Upper Extremities   ? Myotome R L   Shoulder abduction C5 5 5   Elbow flexion C5 5 5   Wrist extension C6 5 5   Elbow extension C7 5 5   Finger flexion C8 5 5   Finger abduction T1 5 5     Reflexes  2+ bilateral biceps, brachialis, triceps.  Negative Eric's        Full AROM of bilateral shoulders    Thoracic/Lumbar Spine/Sacral Spine/Hips   Palpation:   Tenderness to palpation across bilateral upper glutes.  No tenderness to palpation in the low back/hips including midline of lumbosacral spine, paraspinal muscles bilaterally, lumbar facets bilaterally, sacroiliac joints bilaterally, PSIS bilaterally, and greater trochanters bilaterally.    Inspection: No evidence of atrophy in bilateral lower extremities throughout      Lumbar spine /hip provocative exam maneuvers  Straight leg raise negative bilaterally  FADIR test negative bilaterally    SI joint tests  EBEN test negative bilaterally  Thigh thrust test negative bilaterally     Key points for the international standards for neurological classification of spinal cord injury (ISNCSCI) to light touch.   Dermatome R L   L2 2 2   L3 2 2   L4 2 2   L5 2 2   S1 2 2   S2 2 2         Motor Exam Lower Extremities  ? Myotome R L   Hip flexion L2 5 5   Knee extension L3 5 5   Ankle dorsiflexion L4 5 5   Toe extension L5 5 5   Ankle plantarflexion S1 5 5      There is full active range of motion with lumbar extension     Facet loading maneuver negative bilaterally     Heel walking and toe walking intact.       Reflexes  ?   R L   Patella   2+ 2+   Achilles    2+ 2+      Clonus of the ankle negative bilaterally        MEDICAL DECISION  "MAKING     Medical records review: see under HPI section.       MEDICAL DECISION MAKING    Medical records review: see under HPI section.     DATA    Labs: No new labs available for review since last visit.    Lab Results   Component Value Date/Time    SODIUM 141 12/20/2022 10:30 AM    POTASSIUM 3.9 12/20/2022 10:30 AM    CHLORIDE 103 12/20/2022 10:30 AM    CO2 26 12/20/2022 10:30 AM    ANION 12.0 12/20/2022 10:30 AM    GLUCOSE 109 (H) 12/20/2022 10:30 AM    BUN 20 12/20/2022 10:30 AM    CREATININE 0.75 12/20/2022 10:30 AM    CALCIUM 10.3 12/20/2022 10:30 AM    ASTSGOT 16 03/01/2023 01:09 PM    ALTSGPT 11 03/01/2023 01:09 PM    TBILIRUBIN 0.5 03/01/2023 01:09 PM    ALBUMIN 4.5 03/01/2023 01:09 PM    TOTPROTEIN 7.0 03/01/2023 01:09 PM    GLOBULIN 2.7 12/20/2022 10:30 AM    AGRATIO 1.7 12/20/2022 10:30 AM       No results found for: \"PROTHROMBTM\", \"INR\"     Lab Results   Component Value Date/Time    WBC 7.8 12/20/2022 10:30 AM    RBC 5.17 12/20/2022 10:30 AM    HEMOGLOBIN 15.3 12/20/2022 10:30 AM    HEMATOCRIT 45.9 12/20/2022 10:30 AM    MCV 88.8 12/20/2022 10:30 AM    MCH 29.6 12/20/2022 10:30 AM    MCHC 33.3 (L) 12/20/2022 10:30 AM    MPV 9.0 12/20/2022 10:30 AM    NEUTSPOLYS 65.70 12/20/2022 10:30 AM    LYMPHOCYTES 20.80 (L) 12/20/2022 10:30 AM    MONOCYTES 10.50 12/20/2022 10:30 AM    EOSINOPHILS 2.00 12/20/2022 10:30 AM    BASOPHILS 0.60 12/20/2022 10:30 AM        Lab Results   Component Value Date/Time    HBA1C 5.8 (H) 04/28/2022 10:39 AM        Imaging:   I personally reviewed following images, these are my reads  MRI cervical spine 11/23/23  Fusion hardware at C5-C7.  At C7-T1 there is moderate-severe bilateral neuroforaminal stenosis.  At C6-C7 there is borderline left neuroforaminal stenosis.  At C5-6 there is mild left-sided neuroforaminal stenosis.  At C3-4 and C4-5 there is moderate right-sided neuroforaminal stenosis.See formal radiology report for further details.      MRI lumbar spine " 9/2/2022  Evidence of lumbar laminectomy at L4-S1, interbody fusion and posterior fusion at L3-S1.  Broad-based disc bulge at L1-L2 resulting in severe central canal stenosis and compression of descending bilateral L2 nerve roots and possibly bilateral L3 nerve roots and mild impingement of exiting L1 nerve roots bilaterally.  Mild right neuroforaminal stenosis at T12-L1.  Possible mild bilateral neuroforaminal stenosis at L5-S1, quality of images impaired due to metallic artifact. Appearance of chronic postoperative seroma posterior to L4 and L5 vertebral bodies.    XR Right hand 11/22/22  Moderate to severe CMC joint OA. See formal radiology report for further details.    IMAGING radiology reads. I reviewed the following radiology reads   MRI cervical spine 11/23/23  FINDINGS:  Images are degraded by patient motion artifact. Alignment in the cervical spine shows mild degenerative anterolisthesis of C7 on T1.     There is postoperative change with anterior cervical fusion hardware at C5-C6-C7. Expected hardware artifact.     Marrow signal in the vertebral bodies is otherwise unremarkable.     The prevertebral and paraspinous soft tissues are unremarkable.     There are no anomalies at the craniovertebral junction.  The cervical spinal cord is normal in caliber and signal throughout its course.     At C2-3, no significant disc bulge or protrusion. No central stenosis. Mild bilateral foraminal stenosis.     At C3-4, mild disc-osteophyte change. Small impression on the ventral subarachnoid space. No central stenosis. Moderate bilateral foraminal stenosis due to spondylotic change.     C4-C5, no significant disc bulge or protrusion. No central stenosis. The left neural foramen appears intact. Moderate right foraminal stenosis.     C5-C6 posterior bony ridging. Minimal ligamentum flavum hypertrophy. No candace central stenosis. The right neural foramen is intact. Mild left foraminal stenosis due to uncinate spondylosis.      C6-C7, no significant disc bulge or protrusion. Endplate hypertrophy contributing to mild central stenosis (T2 sagittal image 10, series 2, T2 axial image 12, series 8). Mild left-sided posterior endplate spurring and uncinate hypertrophy with borderline   left lateral recess stenosis and borderline left foraminal stenosis. The right neural foramen is intact.     C7-T1, moderate disc/osteophyte change accentuated by anterolisthesis. Partial effacement of ventral subarachnoid space. Thecal sac at the lower range of normal without candace central stenosis. Moderate-marked bilateral foraminal stenosis best seen on the   sagittal images.     IMPRESSION:     1.  Postoperative anterior cervical fusion with hardware fixation C5-C6-C7.  2.  Multilevel degenerative changes most notable for C6-C7 mild central stenosis at [id number] bilateral foraminal stenosis.  3.  Details for each level above in the body of the report.  4.  No myelopathic cord signal is appreciated.    Results for orders placed during the hospital encounter of 09/02/22    MR-LUMBAR SPINE-W/O    Impression  1.  L3-4 slight anterolisthesis and L5-S1 slight retrolisthesis.  2.  Postoperative changes with lumbar laminectomy L4-L5-S1, interbody fusion L3-L4, L4-L5, L5-S1, and posterior fusion with transpedicular screw fixation at L3-L4-L5-S1.  3.  Chronic postoperative seroma occupying the laminectomy interval without dorsal epidural mass effect.  4.  The study is most notable for L1-L2 large disc protrusion-extrusion resulting in severe central stenosis.  5.  Additional degenerative changes detailed for each level above in the body of report.        MRI lumbar spine 10/20/21  Lumbar spine:    Alignment: Grade 1 L3-L4 anterolisthesis. Previously seen L4-L5  anterolisthesis is improved compared to MRI prior to surgery.    Vertebral body heights and marrow: Postsurgical changes from L3-S1  posterior fusion. Multilevel lumbar spondylosis with osteophytes,  facet  arthropathy, and endplate marrow degenerative changes are seen. Otherwise  the vertebral body heights and marrow signal are unremarkable.    Conus medullaris: Normal, terminating at L1/L2.    Soft tissues: There is a fluid collection in the paraspinal soft tissues  posterior to the L3-L5 laminectomy sites, likely postoperative seroma  measuring 63 x 28 mm (series 17, image 7). Small right renal cyst.    L1-L2: Persistent disc bulge with worsening superimposed central disc  extrusion. Bilateral facet arthropathy and dorsal epidural fat. The  constellation of findings produces moderate to severe spinal canal  stenosis. Mild to moderate left neural foraminal stenosis is improved  compared to prior.  Ligamentum flavum thickening. Facet hypertrophy, mild.    L2-L3: Disc bulge, ligamentum flavum thickening, facet hypertrophy  resulting in mild spinal canal stenosis. Mild left neural foraminal  stenosis.    L3-L4: Partially uncovered disc. Mild to moderate right neural foraminal  stenosis. Limited evaluation of the left neural foramen. Laminectomy.    L4-L5: No spinal canal stenosis. Limited evaluation of neural foramina due  to spinal hardware. Laminectomy.    L5-S1: Disc bulge without spinal canal stenosis. Limited evaluation of  neural foramina due to spinal hardware.     IMPRESSION:    1. Worsening disc protrusion at L1-L2, resulting in worsening spinal  canal stenosis, now moderate to severe.   2. Multilevel degenerative changes of the cervical spine, similar  compared to prior.  3. Multilevel degenerative changes in thoracic spine, with up to mild  to moderate spinal canal stenosis at T8-T9 secondary to disc bulge.  4. Postsurgical changes . Small postoperative seroma in L3-L5  laminectomy sites.        X-ray left hand 3/19/2019  FINDINGS:   There is no acute fracture or dislocation.   Advanced osteoarthrosis of the first CMC joint, characterized by joint   space narrowing, subchondral sclerosis, osteophyte  formation, and radial   subluxation. Mild osteoarthrosis of the STT joint. Osteoarthrosis of   scattered IP joints. No focal soft tissue swelling.     IMPRESSION:   Advanced osteoarthrosis of the first CMC joint.     XR Right hand 22  FINDINGS:     BONE MINERALIZATION: Normal.  JOINTS: Moderate to severe first carpometacarpal joint osteoarthrosis. Mild triscaphe joint osteoarthrosis. Mild multifocal osteoarthrosis otherwise. No erosions.  FRACTURE: None.  DISLOCATION: None.  SOFT TISSUES: No mass.     IMPRESSION:     1.  Moderate to severe first carpometacarpal joint osteoarthrosis.  2.  Mild multifocal osteoarthrosis otherwise.                                                  Diagnosis  Visit Diagnoses     ICD-10-CM   1. Myalgia  M79.10   2. Lumbar radiculitis  M54.16   3. Cervical radiculopathy  M54.12   4. S/P cervical spinal fusion  Z98.1   5. Neuroforaminal stenosis of cervical spine  M48.02   6. Osteoarthritis of carpometacarpal (CMC) joints of both thumbs, unspecified osteoarthritis type  M18.0   7. Chronic right-sided low back pain with right-sided sciatica  M54.41    G89.29   8. History of lumbar fusion  Z98.1   9. Bilateral thumb pain  M79.644    M79.645   10. Numbness and tingling of right leg  R20.0    R20.2   11. Lumbar disc herniation  M51.26   12. Numbness and tingling in left arm  R20.0    R20.2                     ASSESSMENT AND PLAN:  Robin Lynn Zielesch (: 1955) is a female with history of HTN, cervical laminectomy, depression, anxiety, thyroid cancer, BMI 30, L3-pelvis posterior spinal fusion with TLIF/ PLIF on 21.      Mathew was seen today for follow-up.    Diagnoses and all orders for this visit:    Myalgia  -     Consent for all Surgical, Special Diagnostic or Therapeutic Procedures    Lumbar radiculitis  -     Referral to Pain Clinic    Cervical radiculopathy    S/P cervical spinal fusion    Neuroforaminal stenosis of cervical spine    Osteoarthritis of carpometacarpal (CMC)  joints of both thumbs, unspecified osteoarthritis type    Chronic right-sided low back pain with right-sided sciatica    History of lumbar fusion    Bilateral thumb pain    Numbness and tingling of right leg    Lumbar disc herniation    Numbness and tingling in left arm              PLAN  Physical Therapy: I have discussed possible referral to physical therapy.  She has declined a physical therapy referral as she has done extensive physical therapy in the past which worsened her symptoms and is currently too busy caring for her .       Diagnostic workup: Personally reviewed at today's visit:  MRI cervical spine 11/23/23    Medications:   -Discussed that she should continue gabapentin, Mobic as per PCP  -  reviewed, records do not demonstrate increased risk of opioid abuse.     Interventions:   -Trial of trigger point injections today targeting left neck area.  The risks, benefits, and alternatives to this procedure were discussed and the patient wishes to proceed with the procedure. Risks include but are not limited to damage to surrounding structures, infection, bleeding, worsening of pain which can be permanent, and collapsed lung. Benefits include pain relief and improved function. Alternatives include not doing the procedure.  - s/p attempted and aborted left cervical epidural steroid injection at T2-3 and T3-4 due to calcified ligament.  -right L2-3 interlaminar epidural steroid injection given recurrence of pain which previously significantly improved with this injection.  She would prefer for this injection to be done on a Friday if possible.  The risks, benefits, and alternatives to this procedure were discussed and the patient wishes to proceed with the procedure. Risks include but are not limited to damage to surrounding structures, infection, bleeding, worsening of pain which can be permanent, and weakness which can be permanent. Benefits include pain relief and improved function. Alternatives  include not doing the procedure.    -Bilateral CMC joint steroid injections under ultrasound guidance PRN given significant improvement in pain with this procedure in the past.     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  Given her hx of significant improvement in pain after our previous epidural steroid injection and no neurologic deficits on exam today, I believe it is reasonable to defer a neurosurgery referral at this time  -Discussed that she could consider deep myofascial release techniques including a foam roller for her right thigh    Follow-up: 1 month after lumbar epidural    Orders Placed This Encounter    Referral to Pain Clinic    Consent for all Surgical, Special Diagnostic or Therapeutic Procedures       Kendal Jeffers MD  Interventional Pain and Spine  Physical Medicine and Rehabilitation  [facility]      The above note documents my personal evaluation of this patient. In addition, I have reviewed and confirmed with the patient and MA the supportive information documented in today's Patient Health Questionnaire and Office Note.     Please note that this dictation was created using voice recognition software. I have made every reasonable attempt to correct obvious errors, but I expect that there are errors of grammar and possibly content that I did not discover before finalizing the note.

## 2023-12-17 LAB — VZV IGG SER IA-ACNC: >4000 IV

## 2023-12-18 ENCOUNTER — PHARMACY VISIT (OUTPATIENT)
Dept: PHARMACY | Facility: MEDICAL CENTER | Age: 68
End: 2023-12-18
Payer: COMMERCIAL

## 2023-12-18 LAB
GAMMA INTERFERON BACKGROUND BLD IA-ACNC: 0.06 IU/ML
M TB IFN-G BLD-IMP: NEGATIVE
M TB IFN-G CD4+ BCKGRND COR BLD-ACNC: 0 IU/ML
MITOGEN IGNF BCKGRD COR BLD-ACNC: >10 IU/ML
QFT TB2 - NIL TBQ2: -0.01 IU/ML

## 2024-01-03 RX ORDER — ASPIRIN 81 MG/1
81 TABLET, CHEWABLE ORAL DAILY
COMMUNITY

## 2024-01-05 ENCOUNTER — HOSPITAL ENCOUNTER (OUTPATIENT)
Facility: REHABILITATION | Age: 69
End: 2024-01-05
Attending: STUDENT IN AN ORGANIZED HEALTH CARE EDUCATION/TRAINING PROGRAM | Admitting: STUDENT IN AN ORGANIZED HEALTH CARE EDUCATION/TRAINING PROGRAM
Payer: MEDICARE

## 2024-01-05 ENCOUNTER — APPOINTMENT (OUTPATIENT)
Dept: RADIOLOGY | Facility: REHABILITATION | Age: 69
End: 2024-01-05
Attending: STUDENT IN AN ORGANIZED HEALTH CARE EDUCATION/TRAINING PROGRAM
Payer: MEDICARE

## 2024-01-05 VITALS
RESPIRATION RATE: 16 BRPM | WEIGHT: 161.16 LBS | TEMPERATURE: 97.5 F | OXYGEN SATURATION: 95 % | BODY MASS INDEX: 31.47 KG/M2 | HEART RATE: 89 BPM | SYSTOLIC BLOOD PRESSURE: 145 MMHG | DIASTOLIC BLOOD PRESSURE: 82 MMHG

## 2024-01-05 PROCEDURE — 700111 HCHG RX REV CODE 636 W/ 250 OVERRIDE (IP): Mod: JZ

## 2024-01-05 PROCEDURE — 700117 HCHG RX CONTRAST REV CODE 255

## 2024-01-05 PROCEDURE — 62323 NJX INTERLAMINAR LMBR/SAC: CPT

## 2024-01-05 RX ORDER — DEXAMETHASONE SODIUM PHOSPHATE 10 MG/ML
INJECTION, SOLUTION INTRAMUSCULAR; INTRAVENOUS
Status: COMPLETED
Start: 2024-01-05 | End: 2024-01-05

## 2024-01-05 RX ORDER — LIDOCAINE HYDROCHLORIDE 10 MG/ML
INJECTION, SOLUTION EPIDURAL; INFILTRATION; INTRACAUDAL; PERINEURAL
Status: COMPLETED
Start: 2024-01-05 | End: 2024-01-05

## 2024-01-05 RX ADMIN — DEXAMETHASONE SODIUM PHOSPHATE 10 MG: 10 INJECTION, SOLUTION INTRAMUSCULAR; INTRAVENOUS at 08:14

## 2024-01-05 RX ADMIN — LIDOCAINE HYDROCHLORIDE 10 ML: 10 INJECTION, SOLUTION EPIDURAL; INFILTRATION; INTRACAUDAL; PERINEURAL at 08:14

## 2024-01-05 RX ADMIN — IOHEXOL 5 ML: 240 INJECTION, SOLUTION INTRATHECAL; INTRAVASCULAR; INTRAVENOUS; ORAL at 08:14

## 2024-01-05 ASSESSMENT — PAIN DESCRIPTION - PAIN TYPE: TYPE: CHRONIC PAIN

## 2024-01-05 ASSESSMENT — FIBROSIS 4 INDEX: FIB4 SCORE: 0.98

## 2024-01-05 NOTE — OP REPORT
Date of Service: 01/05/24    Patient: Robin Lynn Zielesch 68 y.o. female     MRN: [id number]     Physician/s: Kendal Jeffers MD    Pre-operative Diagnosis: Lumbar radiculopathy    Post-operative Diagnosis: Lumbar radiculopathy    Procedure: interlaminar Lumbar Epidural Steroid Injection at the right L2-L3 level.     Description of procedure:    The risks, benefits, and alternatives of the procedure were reviewed and discussed with the patient.  Written informed consent was freely obtained. A pre-procedural time-out was conducted by the physician verifying patient’s identity, procedure to be performed, procedure site and side, and allergy verification. Appropriate equipment was determined to be in place for the procedure.     The patient's vital signs were carefully monitored before, throughout, and after the procedure.     The patient was placed in the prone position, and fluoroscopy was used to identify the L2-L3 level.  For annotation purposes, the L2-3 level was defined as the L2-L3 level noted on MRI which had also noted transpedicular screws at L3, L4, L5, and S1.     The lumbar area was prepped with iodine solution and draped with sterile drape.  Sterile technique was used throughout.  At the needle entry point, the skin and subcutaneous tissues were infiltrated with 1% lidocaine.  An 18-gauge Tuohy needle was advanced to the epidural space, using the loss of resistance technique in a contralateral oblique fluoroscopic view with the needle tip well visualized. Due to copious amounts of firm scar tissue, extra time and effort was required to be able to safely advance the needle towards the epidural space.    In the AP and lateral views, contrast dye was injected which first appeared posterior to the epidural space. The needle was slowly advanced. Then a subtle loss of resistance was encountered. Subsequent injection of contrast dye was used to highlight the epidural space spread while the fluoroscope was running  live. With the needle in the epidural space, aspiration was negative for blood or other fluid.  Dexamethasone, 10 mg., lidocaine, 1mg, and 1cc of 0.9% normal saline (total volume 3 ml.) were injected through the Tuohy needle.  The injected local anesthetic and steroid resulted in dispersion of the previously injected contrast. The needle was removed intact. The patient's back was covered with a 4x4 gauze, the area was cleansed with sterile normal saline, and a dressing was applied. There were no complications noted.     The procedure was well tolerated, and there were no apparent complications.      The patient was then evaluated post-procedure, and was hemodynamically stable prior to leaving the post-operative care unit.     Follow-up as scheduled    Kendal Jeffers MD  Interventional Pain and Spine  Physical Medicine and Rehabilitation  [facility]      CPT codes  Interlaminar epidural injection - lumbar or sacral (caudal): 37787 - 22      Pre-procedure pain score: 4/10 on NRS  Post-procedure pain score: 0/10 on NRS

## 2024-01-05 NOTE — INTERVAL H&P NOTE
H&P reviewed. The patient was examined and there are no changes to the H&P    Kendal Jeffers MD  Interventional Pain and Spine  Physical Medicine and Rehabilitation  [facility]

## 2024-01-08 PROCEDURE — RXMED WILLOW AMBULATORY MEDICATION CHARGE: Performed by: FAMILY MEDICINE

## 2024-01-08 RX ORDER — POTASSIUM CHLORIDE 20 MEQ/1
20 TABLET, EXTENDED RELEASE ORAL DAILY
Qty: 90 TABLET | Refills: 3 | Status: SHIPPED | OUTPATIENT
Start: 2024-01-08

## 2024-01-09 ENCOUNTER — PHARMACY VISIT (OUTPATIENT)
Dept: PHARMACY | Facility: MEDICAL CENTER | Age: 69
End: 2024-01-09
Payer: COMMERCIAL

## 2024-01-13 PROCEDURE — RXMED WILLOW AMBULATORY MEDICATION CHARGE: Performed by: FAMILY MEDICINE

## 2024-01-15 ENCOUNTER — PHARMACY VISIT (OUTPATIENT)
Dept: PHARMACY | Facility: MEDICAL CENTER | Age: 69
End: 2024-01-15
Payer: COMMERCIAL

## 2024-01-16 PROCEDURE — RXMED WILLOW AMBULATORY MEDICATION CHARGE: Performed by: FAMILY MEDICINE

## 2024-01-18 ENCOUNTER — TELEPHONE (OUTPATIENT)
Dept: PHYSICAL MEDICINE AND REHAB | Facility: MEDICAL CENTER | Age: 69
End: 2024-01-18
Payer: MEDICARE

## 2024-01-18 NOTE — TELEPHONE ENCOUNTER
Called for post-sp check-up. Pt reported the following regarding the procedure site:RIGHT Lumbar L2-3 interlaminar epidural steroid injection.      Change in pain?: no     Concerns?: no     Confirmed [facility] appt?: no

## 2024-01-19 ENCOUNTER — OFFICE VISIT (OUTPATIENT)
Dept: MEDICAL GROUP | Facility: PHYSICIAN GROUP | Age: 69
End: 2024-01-19
Payer: MEDICARE

## 2024-01-19 VITALS
TEMPERATURE: 97.9 F | HEART RATE: 88 BPM | WEIGHT: 165 LBS | OXYGEN SATURATION: 95 % | DIASTOLIC BLOOD PRESSURE: 72 MMHG | RESPIRATION RATE: 18 BRPM | BODY MASS INDEX: 32.39 KG/M2 | SYSTOLIC BLOOD PRESSURE: 126 MMHG | HEIGHT: 60 IN

## 2024-01-19 DIAGNOSIS — F33.0 MILD EPISODE OF RECURRENT MAJOR DEPRESSIVE DISORDER (HCC): ICD-10-CM

## 2024-01-19 PROCEDURE — 3078F DIAST BP <80 MM HG: CPT | Performed by: FAMILY MEDICINE

## 2024-01-19 PROCEDURE — 3074F SYST BP LT 130 MM HG: CPT | Performed by: FAMILY MEDICINE

## 2024-01-19 PROCEDURE — 99213 OFFICE O/P EST LOW 20 MIN: CPT | Performed by: FAMILY MEDICINE

## 2024-01-19 PROCEDURE — RXMED WILLOW AMBULATORY MEDICATION CHARGE: Performed by: FAMILY MEDICINE

## 2024-01-19 RX ORDER — LORAZEPAM 0.5 MG/1
0.5 TABLET ORAL EVERY 8 HOURS PRN
Qty: 60 TABLET | Refills: 0 | Status: SHIPPED | OUTPATIENT
Start: 2024-01-19 | End: 2024-02-19

## 2024-01-19 ASSESSMENT — FIBROSIS 4 INDEX: FIB4 SCORE: 0.98

## 2024-01-19 ASSESSMENT — PATIENT HEALTH QUESTIONNAIRE - PHQ9: CLINICAL INTERPRETATION OF PHQ2 SCORE: 0

## 2024-01-19 NOTE — ASSESSMENT & PLAN NOTE
This is a chronic problem.  Patient is here to get a signed consent for a controlled drug and a renewal on her lorazepam.  She still uses it sparingly and often not every day when the stress gets too much for her.

## 2024-01-19 NOTE — PROGRESS NOTES
Subjective:     CC: Here for follow-up on her prescription.      HPI:   Mathew presents today with the following medical concerns:    Episode of recurrent major depressive disorder (HCC)  This is a chronic problem.  Patient is here to get a signed consent for a controlled drug and a renewal on her lorazepam.  She still uses it sparingly and often not every day when the stress gets too much for her.    Past Medical History:   Diagnosis Date    Anxiety     Arthritis     Cancer (HCC)     COPD (chronic obstructive pulmonary disease) (HCC)     GERD (gastroesophageal reflux disease)     Hypertension     Migraine     Thyroid disease        Social History     Tobacco Use    Smoking status: Former     Current packs/day: 0.00     Average packs/day: 1.5 packs/day for 50.0 years (75.0 ttl pk-yrs)     Types: Cigarettes     Start date: 1968     Quit date: 2018     Years since quittin.4    Smokeless tobacco: Never   Vaping Use    Vaping Use: Some days    Substances: Nicotine, CBD    Devices: Pre-filled or refillable cartridge, Refillable tank   Substance Use Topics    Alcohol use: Never    Drug use: Not Currently     Types: Marijuana, Methamphetamines     Comment: once in a while       Current Outpatient Medications Ordered in Epic   Medication Sig Dispense Refill    LORazepam (ATIVAN) 0.5 MG Tab Take 1 Tablet by mouth every 8 hours as needed for Anxiety for up to 30 days. 60 Tablet 0    potassium chloride SA (KDUR) 20 MEQ Tab CR Take 1 Tablet by mouth every day. 90 Tablet 3    aspirin (ASA) 81 MG Chew Tab chewable tablet Chew 81 mg every day.      buPROPion (WELLBUTRIN XL) 300 MG XL tablet Take 1 Tablet by mouth every morning. 90 Tablet 3    gabapentin (NEURONTIN) 300 MG Cap Take 3 Capsules by mouth 3 times a day. 810 Capsule 1    losartan (COZAAR) 100 MG Tab Take 1 Tablet by mouth every day. 90 Tablet 3    SYNTHROID 88 MCG Tab Take 1 Tablet by mouth every morning on an empty stomach. 90 Tablet 1    omeprazole  (PRILOSEC) 40 MG delayed-release capsule Take 1 Capsule by mouth every day. 90 Capsule 3    amLODIPine (NORVASC) 10 MG Tab Take 1 Tablet by mouth every day. 90 Tablet 3    meloxicam (MOBIC) 15 MG tablet Take 1 Tablet by mouth every day. 90 Tablet 3    albuterol 108 (90 Base) MCG/ACT Aero Soln inhalation aerosol Inhale 2 Puffs every 6 hours as needed for Shortness of Breath. 8.5 g 3    calcium citrate (CALCITRATE) 950 (200 Ca) MG Tab Take 1 Tablet by mouth every day.      Multiple Vitamin (MULTI-VITAMINS PO) Take  by mouth.      Riboflavin (VITAMIN B-2 PO) Take  by mouth.       No current Epic-ordered facility-administered medications on file.       Allergies:  Ace inhibitors, Chlorhexidine, Flexeril [cyclobenzaprine], and Triamterene    Health Maintenance: Completed    ROS:  Gen: no fevers/chills, no changes in weight  Eyes: no changes in vision  ENT: no sore throat, no hearing loss, no bloody nose  Pulm: no sob, no cough  CV: no chest pain, no palpitations  GI: no nausea/vomiting, no diarrhea  : no dysuria  MSk: no myalgias  Skin: no rash  Neuro: no headaches, no numbness/tingling  Heme/Lymph: no easy bruising      Objective:       Exam:  /72 (BP Location: Left arm, Patient Position: Sitting, BP Cuff Size: Adult)   Pulse 88   Temp 36.6 °C (97.9 °F) (Temporal)   Resp 18   Ht 1.524 m (5')   Wt 74.8 kg (165 lb)   SpO2 95%   BMI 32.22 kg/m²  Body mass index is 32.22 kg/m².    Gen: Alert and oriented, No apparent distress.  Psych: Patient is alert and cooperative.  No unusual thought process expressed.  Insight and judgment is good.  She does not appear to be overly anxious or depressed on today's visit.    Labs: She wants to have an exam and labs in about 6 months.    Assessment & Plan:     68 y.o. female with the following -     1. Mild episode of recurrent major depressive disorder (HCC)  This is a chronic stable condition.  She does use the medication sparingly and encouraged her to continue with  that.  I gave her a renewal on her medication and signed her agreement today.  Follow-up in 3 months if he needs refills.  - Controlled Substance Treatment Agreement  - LORazepam (ATIVAN) 0.5 MG Tab; Take 1 Tablet by mouth every 8 hours as needed for Anxiety for up to 30 days.  Dispense: 60 Tablet; Refill: 0      Return in about 3 months (around 4/19/2024) for Long.    Please note that this dictation was created using voice recognition software. I have made every reasonable attempt to correct obvious errors, but I expect that there are errors of grammar and possibly content that I did not discover before finalizing the note.

## 2024-01-20 ENCOUNTER — PHARMACY VISIT (OUTPATIENT)
Dept: PHARMACY | Facility: MEDICAL CENTER | Age: 69
End: 2024-01-20
Payer: COMMERCIAL

## 2024-01-31 PROCEDURE — RXMED WILLOW AMBULATORY MEDICATION CHARGE: Performed by: FAMILY MEDICINE

## 2024-02-01 ENCOUNTER — PHARMACY VISIT (OUTPATIENT)
Dept: PHARMACY | Facility: MEDICAL CENTER | Age: 69
End: 2024-02-01
Payer: COMMERCIAL

## 2024-02-02 ENCOUNTER — APPOINTMENT (OUTPATIENT)
Dept: PHYSICAL MEDICINE AND REHAB | Facility: MEDICAL CENTER | Age: 69
End: 2024-02-02
Payer: COMMERCIAL

## 2024-02-03 PROCEDURE — RXMED WILLOW AMBULATORY MEDICATION CHARGE: Performed by: FAMILY MEDICINE

## 2024-02-05 ENCOUNTER — PHARMACY VISIT (OUTPATIENT)
Dept: PHARMACY | Facility: MEDICAL CENTER | Age: 69
End: 2024-02-05
Payer: COMMERCIAL

## 2024-02-08 NOTE — PROGRESS NOTES
Follow-up patient Note    Interventional Pain and Spine  Physiatry (Physical Medicine and Rehabilitation)     Patient Name: Robin Lynn Zielesch  : 1955  Date of service: 2024    Chief Complaint:   Chief Complaint   Patient presents with    Follow-Up     Myalgia         HISTORY (2022):  Robin Lynn Zielesch is a 67 y.o. female who presents today with pain radiating from her right posterolateral glute down her right anterolateral and posterior thigh accompanied by numbness/tingling/weakness in this area. This started in Sep 2021 while recovering from a L2-pelvis posterior spinal fusion with TLIF/ PLIF on 21 with Dr. Bates ([facility] which she states she had done for similar radiating pain down her left leg.  Her radiating left leg pain resolved after surgery.     Her pain at its best-worse level during the course of the day is 5-9/10, respectively. Pain right now is 7/10 on the numeric pain scale. Pain worsens with sitting, standing, walking, bending backwards, side bending or twisting, walking upstairs, and walking downstairs and improves with nothing. Her pain interferes somewhat with ADLs. The patient otherwise denies new weakness, numbness, or bladder/bowel incontinence. States she has fallen a few times secondary to pain and possibly weakness. Moved from [facility] in May 2022.     The patient has done physical therapy for this problem with worsening pain.     Patient has tried the following medications with varied success (current meds in bold): Hayes back and body  Gabapentin 300mg TID - no relief. Used to help with left sided pain  Naproxen BID - significant relief     Therapeutic modalities and interventional therapies to date include:  -No injections     Medical history includes HTN, cervical laminectomy, depression, anxiety, thyroid cancer, BMI 30, L2-pelvis posterior spinal fusion with TLIF/ PLIF on 21    HPI  Today's visit   Robin Lynn Zielesch ( 1955) is a female with  Diagnoses of Myalgia, Lumbar radiculitis, Cervical radiculopathy, S/P cervical spinal fusion, Neuroforaminal stenosis of cervical spine, Osteoarthritis of carpometacarpal (CMC) joints of both thumbs, unspecified osteoarthritis type, Chronic right-sided low back pain with right-sided sciatica, History of lumbar fusion, Bilateral thumb pain, Numbness and tingling of right leg, Lumbar disc herniation, Numbness and tingling in left arm, Risk for falls, and Foot pain, bilateral were pertinent to this visit.    Today Mathew presents after - 1/5/24 right L2-3 interlaminar epidural steroid injection with resolution of low back pain and pain radiating from her right low back to her right thigh.    Notes bilateral foot pain for which she has seen Dr. Hood at [facility] and was recommended for specific shoes and insoles. She was fitted for this. Did PT for 2.5 months. Notes she has high arches and hx of screw placement in her feet.  Has not gone back to the shoe store to see if modifications can be made.  States she has been told he has tendinitis at her lateral foot, provoked by inversion of her ankle while walking, which she does to avoid discomfort associated with screw placement.    Reports occasional pain radiating down her left arm with numbness and tingling in the area. S/p attempted and aborted left cervical epidural steroid injection at T2-3 and T3-4 due to calcified ligament.  Denies any improvement after trigger point injections. Pain improves with tilting her head to the right and down.  Worsens with tilting her neck up.    Procedure history:  - 11/1/22 right L2-3 interlaminar epidural steroid injection - 90% improvement in pain, resolution of radiating pain.  - 11/28/22 bilateral CMC joint injections - 100% improvement in thumb pain bilaterally  - 3/7/23 right L2-3 interlaminar epidural steroid injection -100% improvement in back pain and radiating pain, ongoing tightness in her right thigh  - 06/27/23 trigger point  injections   - 11/15/23 BILATERAL ultrasound-guided 1st carpometacarpal joint injection - 100% improvement in thumb pain bilaterally  - 12/15/23 trigger point injections-no relief  - 1/5/24 right L2-3 interlaminar epidural steroid injection-resolution of low back pain and pain radiating down right leg    ROS:   Red Flags ROS:   Fever, Chills, Sweats: Denies  Involuntary Weight Loss: Denies  Bladder Incontinence: Denies  Bowel Incontinence: denies  Saddle Anesthesia: Denies    All other systems reviewed and negative.     PMHx:   Past Medical History:   Diagnosis Date    Anxiety     Arthritis     Cancer (HCC)     COPD (chronic obstructive pulmonary disease) (HCC)     GERD (gastroesophageal reflux disease)     Hypertension     Migraine     Thyroid disease        PSHx:   Past Surgical History:   Procedure Laterality Date    SD INJ LUMBAR/SACRAL,W/ IMAGING Right 1/5/2024    Procedure: RIGHT Lumbar L2-3 interlaminar epidural steroid injection.  PT NEEDS TO HOLD ASA AND MELOXICAM PRIOR TO PROCEDURE.  PT PREFERS TO SCHEDULE ON FRIDAY IF POSS;  Surgeon: Kendal Jeffers M.D.;  [facility];  Service: Pain Management    SD INJ CERV/THORAC,W/ IMAGING N/A 12/14/2023    Procedure: interlaminar cervical epidural steroid injection at T3-T4;  Surgeon: Kendal Jeffers M.D.;  Location: [facility];  Service: Pain Management    SD INJ LUMBAR/SACRAL,W/ IMAGING Right 03/07/2023    Procedure: RIGHT Lumbar L2-3 interlaminar epidural steroid injection;  Surgeon: Kendal Jeffers M.D.;  Location: [facility];  Service: Pain Management    SD INJ LUMBAR/SACRAL,W/ IMAGING Right 11/01/2022    Procedure: RIGHT lumbar L2-3 interlaminar epidural steroid injection;  Surgeon: Kendal Jeffers M.D.;  Location: [facility];  Service: Pain Management    ABDOMINAL HYSTERECTOMY TOTAL      ARTHROPLASTY      EYE SURGERY      FOOT SURGERY      HERNIA REPAIR      [id number]      L  tka    LAMINOTOMY      LUMPECTOMY      PRIMARY C SECTION      THYROIDECTOMY TOTAL      2019  thyroid cancer       Family Hx:   Family History   Problem Relation Age of Onset    COPD Mother     Cancer Father         pancreatic    Hypertension Father     Hyperlipidemia Father     Alcohol abuse Father     Drug abuse Brother        Social Hx:  Social History     Socioeconomic History    Marital status:      Spouse name: Not on file    Number of children: Not on file    Years of education: Not on file    Highest education level: Some college, no degree   Occupational History    Not on file   Tobacco Use    Smoking status: Former     Current packs/day: 0.00     Average packs/day: 1.5 packs/day for 50.0 years (75.0 ttl pk-yrs)     Types: Cigarettes     Start date: 1968     Quit date: 2018     Years since quittin.4    Smokeless tobacco: Never   Vaping Use    Vaping Use: Some days    Substances: Nicotine, CBD    Devices: Pre-filled or refillable cartridge, Refillable tank   Substance and Sexual Activity    Alcohol use: Never    Drug use: Not Currently     Types: Marijuana, Methamphetamines     Comment: once in a while    Sexual activity: Yes     Partners: Male     Birth control/protection: Female Sterilization   Other Topics Concern    Not on file   Social History Narrative    Not on file     Social Determinants of Health     Financial Resource Strain: Medium Risk (7/10/2023)    Overall Financial Resource Strain (CARDIA)     Difficulty of Paying Living Expenses: Somewhat hard   Food Insecurity: No Food Insecurity (7/10/2023)    Hunger Vital Sign     Worried About Running Out of Food in the Last Year: Never true     Ran Out of Food in the Last Year: Never true   Transportation Needs: No Transportation Needs (7/10/2023)    PRAPARE - Transportation     Lack of Transportation (Medical): No     Lack of Transportation (Non-Medical): No   Physical Activity: Insufficiently Active (7/10/2023)    Exercise Vital  Sign     Days of Exercise per Week: 5 days     Minutes of Exercise per Session: 20 min   Stress: No Stress Concern Present (7/10/2023)    [employer] [place] [employer] - Occupational Stress Questionnaire     Feeling of Stress : Only a little   Social Connections: Moderately Isolated (7/10/2023)    Social Connection and Isolation Panel [NHANES]     Frequency of Communication with Friends and Family: Never     Frequency of Social Gatherings with Friends and Family: Never     Attends [employer]al Services: Never     Active Member of Clubs or Organizations: Yes     Attends Club or Organization Meetings: Never     Marital Status:    Intimate Partner Violence: Not on file   Housing Stability: Low Risk  (7/10/2023)    Housing Stability Vital Sign     Unable to Pay for Housing in the Last Year: No     Number of Places Lived in the Last Year: 1     Unstable Housing in the Last Year: No       Allergies:  Allergies   Allergen Reactions    Ace Inhibitors Cough    Chlorhexidine Rash    Flexeril [Cyclobenzaprine] Unspecified     Irritability     Triamterene      Other reaction(s): Nephrotoxicity       Medications: reviewed on epic.   Outpatient Medications Marked as Taking for the 2/9/24 encounter (Office Visit) with Kendal Jeffers M.D.   Medication Sig Dispense Refill    LORazepam (ATIVAN) 0.5 MG Tab Take 1 Tablet by mouth every 8 hours as needed for Anxiety for up to 30 days. 60 Tablet 0    potassium chloride SA (KDUR) 20 MEQ Tab CR Take 1 Tablet by mouth every day. 90 Tablet 3    aspirin (ASA) 81 MG Chew Tab chewable tablet Chew 81 mg every day.      buPROPion (WELLBUTRIN XL) 300 MG XL tablet Take 1 Tablet by mouth every morning. 90 Tablet 3    gabapentin (NEURONTIN) 300 MG Cap Take 3 Capsules by mouth 3 times a day. 810 Capsule 1    losartan (COZAAR) 100 MG Tab Take 1 Tablet by mouth every day. 90 Tablet 3    SYNTHROID 88 MCG Tab Take 1 Tablet by mouth every morning on an empty stomach. 90 Tablet 1     omeprazole (PRILOSEC) 40 MG delayed-release capsule Take 1 Capsule by mouth every day. 90 Capsule 3    amLODIPine (NORVASC) 10 MG Tab Take 1 Tablet by mouth every day. 90 Tablet 3    meloxicam (MOBIC) 15 MG tablet Take 1 Tablet by mouth every day. 90 Tablet 3    albuterol 108 (90 Base) MCG/ACT Aero Soln inhalation aerosol Inhale 2 Puffs every 6 hours as needed for Shortness of Breath. 8.5 g 3    calcium citrate (CALCITRATE) 950 (200 Ca) MG Tab Take 1 Tablet by mouth every day.      Multiple Vitamin (MULTI-VITAMINS PO) Take  by mouth.      Riboflavin (VITAMIN B-2 PO) Take  by mouth.          Current Outpatient Medications on File Prior to Visit   Medication Sig Dispense Refill    LORazepam (ATIVAN) 0.5 MG Tab Take 1 Tablet by mouth every 8 hours as needed for Anxiety for up to 30 days. 60 Tablet 0    potassium chloride SA (KDUR) 20 MEQ Tab CR Take 1 Tablet by mouth every day. 90 Tablet 3    aspirin (ASA) 81 MG Chew Tab chewable tablet Chew 81 mg every day.      buPROPion (WELLBUTRIN XL) 300 MG XL tablet Take 1 Tablet by mouth every morning. 90 Tablet 3    gabapentin (NEURONTIN) 300 MG Cap Take 3 Capsules by mouth 3 times a day. 810 Capsule 1    losartan (COZAAR) 100 MG Tab Take 1 Tablet by mouth every day. 90 Tablet 3    SYNTHROID 88 MCG Tab Take 1 Tablet by mouth every morning on an empty stomach. 90 Tablet 1    omeprazole (PRILOSEC) 40 MG delayed-release capsule Take 1 Capsule by mouth every day. 90 Capsule 3    amLODIPine (NORVASC) 10 MG Tab Take 1 Tablet by mouth every day. 90 Tablet 3    meloxicam (MOBIC) 15 MG tablet Take 1 Tablet by mouth every day. 90 Tablet 3    albuterol 108 (90 Base) MCG/ACT Aero Soln inhalation aerosol Inhale 2 Puffs every 6 hours as needed for Shortness of Breath. 8.5 g 3    calcium citrate (CALCITRATE) 950 (200 Ca) MG Tab Take 1 Tablet by mouth every day.      Multiple Vitamin (MULTI-VITAMINS PO) Take  by mouth.      Riboflavin (VITAMIN B-2 PO) Take  by mouth.       No current  facility-administered medications on file prior to visit.         EXAMINATION     Physical Exam:   /70 (BP Location: Left arm, Patient Position: Sitting, BP Cuff Size: Adult)   Pulse 75   Temp 36.6 °C (97.8 °F) (Temporal)   Ht 1.524 m (5')   Wt 75.7 kg (166 lb 14.2 oz)   SpO2 95%     Constitutional:   Body Habitus: Body mass index is 32.59 kg/m².  Cooperation: Fully cooperates with exam  Appearance: Well-groomed, well-nourished.    Eyes: No scleral icterus to suggest severe liver disease, no proptosis to suggest severe hyperthyroidism    ENT -no obvious auditory deficits, no noticeable facial droop     Skin -no rashes or lesions noted     Respiratory-  breathing comfortably on room air, no audible wheezing    Cardiovascular-distal extremities warm and well perfused.  No lower extremity edema is noted.     Gastrointestinal - no obvious abdominal masses, non-distended    Psychiatric- alert and oriented ×3. Normal affect.     Gait - normal gait, no use of ambulatory device, nonantalgic.     Musculoskeletal and Neuro -     Thoracic/Lumbar Spine/Sacral Spine/Hips   Palpation:   No tenderness to palpation across bilateral upper glutes.  No tenderness to palpation in the low back/hips including midline of lumbosacral spine, paraspinal muscles bilaterally, lumbar facets bilaterally, sacroiliac joints bilaterally, PSIS bilaterally, and greater trochanters bilaterally.    Inspection: No evidence of atrophy in bilateral lower extremities throughout      Lumbar spine /hip provocative exam maneuvers  Straight leg raise negative bilaterally  FADIR test negative bilaterally    SI joint tests  EBEN test negative bilaterally  Thigh thrust test negative bilaterally     Key points for the international standards for neurological classification of spinal cord injury (ISNCSCI) to light touch.   Dermatome R L   L2 2 2   L3 2 2   L4 2 2   L5 2 2   S1 2 2   S2 2 2         Motor Exam Lower Extremities  ? Myotome R L   Hip  flexion L2 5 5   Knee extension L3 5 5   Ankle dorsiflexion L4 5 5   Toe extension L5 5 5   Ankle plantarflexion S1 5 5          Previous exam  Cervical spine   Inspection: No deformities of the skin over the cervical spine. No rashes or lesions.    limited active range of motion in all directions    Spurling's sign  negative bilaterally  Cervical facet loading maneuver  negative bilaterally    Tenderness to palpation at paracervical muscles on left and upper trapezius on left. No tenderness to palpation elsewhere including midline of cervical spine and paracervical muscles on right.    Previous exam  Key points for the international standards for neurological classification of spinal cord injury (ISNCSCI) to light touch.     Dermatome R L   C4 2 2   C5 2 2   C6 2 2   C7 2 2   C8 2 2   T1 2 2   T2 2 2       Motor Exam Upper Extremities   ? Myotome R L   Shoulder abduction C5 5 5   Elbow flexion C5 5 5   Wrist extension C6 5 5   Elbow extension C7 5 5   Finger flexion C8 5 5   Finger abduction T1 5 5     Reflexes  2+ bilateral biceps, brachialis, triceps.  Negative Eric's        Full AROM of bilateral shoulders  There is full active range of motion with lumbar extension     Facet loading maneuver negative bilaterally     Heel walking and toe walking intact.       Reflexes  ?   R L   Patella   2+ 2+   Achilles    2+ 2+      Clonus of the ankle negative bilaterally        MEDICAL DECISION MAKING     Medical records review: see under HPI section.       MEDICAL DECISION MAKING    Medical records review: see under HPI section.     DATA    Labs: No new labs available for review since last visit.    Lab Results   Component Value Date/Time    SODIUM 141 12/20/2022 10:30 AM    POTASSIUM 3.9 12/20/2022 10:30 AM    CHLORIDE 103 12/20/2022 10:30 AM    CO2 26 12/20/2022 10:30 AM    ANION 12.0 12/20/2022 10:30 AM    GLUCOSE 109 (H) 12/20/2022 10:30 AM    BUN 20 12/20/2022 10:30 AM    CREATININE 0.75 12/20/2022 10:30 AM     "CALCIUM 10.3 12/20/2022 10:30 AM    ASTSGOT 16 03/01/2023 01:09 PM    ALTSGPT 11 03/01/2023 01:09 PM    TBILIRUBIN 0.5 03/01/2023 01:09 PM    ALBUMIN 4.5 03/01/2023 01:09 PM    TOTPROTEIN 7.0 03/01/2023 01:09 PM    GLOBULIN 2.7 12/20/2022 10:30 AM    AGRATIO 1.7 12/20/2022 10:30 AM       No results found for: \"PROTHROMBTM\", \"INR\"     Lab Results   Component Value Date/Time    WBC 7.8 12/20/2022 10:30 AM    RBC 5.17 12/20/2022 10:30 AM    HEMOGLOBIN 15.3 12/20/2022 10:30 AM    HEMATOCRIT 45.9 12/20/2022 10:30 AM    MCV 88.8 12/20/2022 10:30 AM    MCH 29.6 12/20/2022 10:30 AM    MCHC 33.3 (L) 12/20/2022 10:30 AM    MPV 9.0 12/20/2022 10:30 AM    NEUTSPOLYS 65.70 12/20/2022 10:30 AM    LYMPHOCYTES 20.80 (L) 12/20/2022 10:30 AM    MONOCYTES 10.50 12/20/2022 10:30 AM    EOSINOPHILS 2.00 12/20/2022 10:30 AM    BASOPHILS 0.60 12/20/2022 10:30 AM        Lab Results   Component Value Date/Time    HBA1C 5.8 (H) 04/28/2022 10:39 AM        Imaging:   I personally reviewed following images, these are my reads  MRI cervical spine 11/23/23  Fusion hardware at C5-C7.  At C7-T1 there is moderate-severe bilateral neuroforaminal stenosis.  At C6-C7 there is borderline left neuroforaminal stenosis.  At C5-6 there is mild left-sided neuroforaminal stenosis.  At C3-4 and C4-5 there is moderate right-sided neuroforaminal stenosis.See formal radiology report for further details.      MRI lumbar spine 9/2/2022  Evidence of lumbar laminectomy at L4-S1, interbody fusion and posterior fusion at L3-S1.  Broad-based disc bulge at L1-L2 resulting in severe central canal stenosis and compression of descending bilateral L2 nerve roots and possibly bilateral L3 nerve roots and mild impingement of exiting L1 nerve roots bilaterally.  Mild right neuroforaminal stenosis at T12-L1.  Possible mild bilateral neuroforaminal stenosis at L5-S1, quality of images impaired due to metallic artifact. Appearance of chronic postoperative seroma posterior to L4 and " L5 vertebral bodies.    XR Right hand 11/22/22  Moderate to severe CMC joint OA. See formal radiology report for further details.    IMAGING radiology reads. I reviewed the following radiology reads   MRI cervical spine 11/23/23  FINDINGS:  Images are degraded by patient motion artifact. Alignment in the cervical spine shows mild degenerative anterolisthesis of C7 on T1.     There is postoperative change with anterior cervical fusion hardware at C5-C6-C7. Expected hardware artifact.     Marrow signal in the vertebral bodies is otherwise unremarkable.     The prevertebral and paraspinous soft tissues are unremarkable.     There are no anomalies at the craniovertebral junction.  The cervical spinal cord is normal in caliber and signal throughout its course.     At C2-3, no significant disc bulge or protrusion. No central stenosis. Mild bilateral foraminal stenosis.     At C3-4, mild disc-osteophyte change. Small impression on the ventral subarachnoid space. No central stenosis. Moderate bilateral foraminal stenosis due to spondylotic change.     C4-C5, no significant disc bulge or protrusion. No central stenosis. The left neural foramen appears intact. Moderate right foraminal stenosis.     C5-C6 posterior bony ridging. Minimal ligamentum flavum hypertrophy. No candace central stenosis. The right neural foramen is intact. Mild left foraminal stenosis due to uncinate spondylosis.     C6-C7, no significant disc bulge or protrusion. Endplate hypertrophy contributing to mild central stenosis (T2 sagittal image 10, series 2, T2 axial image 12, series 8). Mild left-sided posterior endplate spurring and uncinate hypertrophy with borderline   left lateral recess stenosis and borderline left foraminal stenosis. The right neural foramen is intact.     C7-T1, moderate disc/osteophyte change accentuated by anterolisthesis. Partial effacement of ventral subarachnoid space. Thecal sac at the lower range of normal without candace  central stenosis. Moderate-marked bilateral foraminal stenosis best seen on the   sagittal images.     IMPRESSION:     1.  Postoperative anterior cervical fusion with hardware fixation C5-C6-C7.  2.  Multilevel degenerative changes most notable for C6-C7 mild central stenosis at [id number] bilateral foraminal stenosis.  3.  Details for each level above in the body of the report.  4.  No myelopathic cord signal is appreciated.    Results for orders placed during the hospital encounter of 09/02/22    MR-LUMBAR SPINE-W/O    Impression  1.  L3-4 slight anterolisthesis and L5-S1 slight retrolisthesis.  2.  Postoperative changes with lumbar laminectomy L4-L5-S1, interbody fusion L3-L4, L4-L5, L5-S1, and posterior fusion with transpedicular screw fixation at L3-L4-L5-S1.  3.  Chronic postoperative seroma occupying the laminectomy interval without dorsal epidural mass effect.  4.  The study is most notable for L1-L2 large disc protrusion-extrusion resulting in severe central stenosis.  5.  Additional degenerative changes detailed for each level above in the body of report.        MRI lumbar spine 10/20/21  Lumbar spine:    Alignment: Grade 1 L3-L4 anterolisthesis. Previously seen L4-L5  anterolisthesis is improved compared to MRI prior to surgery.    Vertebral body heights and marrow: Postsurgical changes from L3-S1  posterior fusion. Multilevel lumbar spondylosis with osteophytes, facet  arthropathy, and endplate marrow degenerative changes are seen. Otherwise  the vertebral body heights and marrow signal are unremarkable.    Conus medullaris: Normal, terminating at L1/L2.    Soft tissues: There is a fluid collection in the paraspinal soft tissues  posterior to the L3-L5 laminectomy sites, likely postoperative seroma  measuring 63 x 28 mm (series 17, image 7). Small right renal cyst.    L1-L2: Persistent disc bulge with worsening superimposed central disc  extrusion. Bilateral facet arthropathy and dorsal epidural fat.  The  constellation of findings produces moderate to severe spinal canal  stenosis. Mild to moderate left neural foraminal stenosis is improved  compared to prior.  Ligamentum flavum thickening. Facet hypertrophy, mild.    L2-L3: Disc bulge, ligamentum flavum thickening, facet hypertrophy  resulting in mild spinal canal stenosis. Mild left neural foraminal  stenosis.    L3-L4: Partially uncovered disc. Mild to moderate right neural foraminal  stenosis. Limited evaluation of the left neural foramen. Laminectomy.    L4-L5: No spinal canal stenosis. Limited evaluation of neural foramina due  to spinal hardware. Laminectomy.    L5-S1: Disc bulge without spinal canal stenosis. Limited evaluation of  neural foramina due to spinal hardware.     IMPRESSION:    1. Worsening disc protrusion at L1-L2, resulting in worsening spinal  canal stenosis, now moderate to severe.   2. Multilevel degenerative changes of the cervical spine, similar  compared to prior.  3. Multilevel degenerative changes in thoracic spine, with up to mild  to moderate spinal canal stenosis at T8-T9 secondary to disc bulge.  4. Postsurgical changes . Small postoperative seroma in L3-L5  laminectomy sites.        X-ray left hand 3/19/2019  FINDINGS:   There is no acute fracture or dislocation.   Advanced osteoarthrosis of the first CMC joint, characterized by joint   space narrowing, subchondral sclerosis, osteophyte formation, and radial   subluxation. Mild osteoarthrosis of the STT joint. Osteoarthrosis of   scattered IP joints. No focal soft tissue swelling.     IMPRESSION:   Advanced osteoarthrosis of the first CMC joint.     XR Right hand 11/22/22  FINDINGS:     BONE MINERALIZATION: Normal.  JOINTS: Moderate to severe first carpometacarpal joint osteoarthrosis. Mild triscaphe joint osteoarthrosis. Mild multifocal osteoarthrosis otherwise. No erosions.  FRACTURE: None.  DISLOCATION: None.  SOFT TISSUES: No mass.     IMPRESSION:     1.  Moderate to severe  first carpometacarpal joint osteoarthrosis.  2.  Mild multifocal osteoarthrosis otherwise.                                                  Diagnosis  Visit Diagnoses     ICD-10-CM   1. Myalgia  M79.10   2. Lumbar radiculitis  M54.16   3. Cervical radiculopathy  M54.12   4. S/P cervical spinal fusion  Z98.1   5. Neuroforaminal stenosis of cervical spine  M48.02   6. Osteoarthritis of carpometacarpal (CMC) joints of both thumbs, unspecified osteoarthritis type  M18.0   7. Chronic right-sided low back pain with right-sided sciatica  M54.41    G89.29   8. History of lumbar fusion  Z98.1   9. Bilateral thumb pain  M79.644    M79.645   10. Numbness and tingling of right leg  R20.0    R20.2   11. Lumbar disc herniation  M51.26   12. Numbness and tingling in left arm  R20.0    R20.2   13. Risk for falls  Z91.81   14. Foot pain, bilateral  M79.671    M79.672                       ASSESSMENT AND PLAN:  Robin Lynn Zielesch (: 1955) is a female with history of HTN, cervical laminectomy, depression, anxiety, thyroid cancer, BMI 30, L3-pelvis posterior spinal fusion with TLIF/ PLIF on 21.      Mathew was seen today for follow-up.    Diagnoses and all orders for this visit:    Myalgia    Lumbar radiculitis    Cervical radiculopathy    S/P cervical spinal fusion    Neuroforaminal stenosis of cervical spine    Osteoarthritis of carpometacarpal (CMC) joints of both thumbs, unspecified osteoarthritis type    Chronic right-sided low back pain with right-sided sciatica    History of lumbar fusion    Bilateral thumb pain    Numbness and tingling of right leg    Lumbar disc herniation    Numbness and tingling in left arm    Risk for falls  -     Patient identified as fall risk.  Appropriate orders and counseling given.    Foot pain, bilateral                PLAN  Physical Therapy: I have discussed possible referral to physical therapy.  She has declined a physical therapy referral as she has done extensive physical therapy in  the past which worsened her symptoms and is currently too busy caring for her .       Diagnostic workup: No new imaging needed at this time    Medications:   -Discussed that she should continue gabapentin, Mobic as per PCP     Interventions:   -S/p trial of trigger point injections targeting left neck area with no relief  - s/p attempted and aborted left cervical epidural steroid injection at T2-3 and T3-4 due to calcified ligament.  -right L2-3 interlaminar epidural steroid injection as needed given significant improvement with this procedure in the past  -Bilateral CMC joint steroid injections under ultrasound guidance PRN given significant improvement in pain with this procedure in the past.     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  Given her hx of significant improvement in pain after our previous epidural steroid injection and no neurologic deficits on exam today, I believe it is reasonable to defer a neurosurgery referral at this time  -Discussed that she could consider deep myofascial release techniques including a foam roller for her right thigh  -Continue follow-up with Dr. Hood at [facility]. Discussed that perhaps a referral to an orthotist could be considered    Follow-up: as needed    Orders Placed This Encounter    Patient identified as fall risk.  Appropriate orders and counseling given.       Kendal Jeffers MD  Interventional Pain and Spine  Physical Medicine and Rehabilitation  [facility]      The above note documents my personal evaluation of this patient. In addition, I have reviewed and confirmed with the patient and MA the supportive information documented in today's Patient Health Questionnaire and Office Note.     Please note that this dictation was created using voice recognition software. I have made every  reasonable attempt to correct obvious errors, but I expect that there are errors of grammar and possibly content that I did not discover before finalizing the note.

## 2024-02-09 ENCOUNTER — OFFICE VISIT (OUTPATIENT)
Dept: PHYSICAL MEDICINE AND REHAB | Facility: MEDICAL CENTER | Age: 69
End: 2024-02-09
Payer: MEDICARE

## 2024-02-09 VITALS
BODY MASS INDEX: 32.76 KG/M2 | DIASTOLIC BLOOD PRESSURE: 70 MMHG | TEMPERATURE: 97.8 F | WEIGHT: 166.89 LBS | SYSTOLIC BLOOD PRESSURE: 118 MMHG | HEART RATE: 75 BPM | OXYGEN SATURATION: 95 % | HEIGHT: 60 IN

## 2024-02-09 DIAGNOSIS — R20.2 NUMBNESS AND TINGLING OF RIGHT LEG: ICD-10-CM

## 2024-02-09 DIAGNOSIS — G89.29 CHRONIC RIGHT-SIDED LOW BACK PAIN WITH RIGHT-SIDED SCIATICA: ICD-10-CM

## 2024-02-09 DIAGNOSIS — Z91.81 RISK FOR FALLS: ICD-10-CM

## 2024-02-09 DIAGNOSIS — M54.41 CHRONIC RIGHT-SIDED LOW BACK PAIN WITH RIGHT-SIDED SCIATICA: ICD-10-CM

## 2024-02-09 DIAGNOSIS — M54.16 LUMBAR RADICULITIS: ICD-10-CM

## 2024-02-09 DIAGNOSIS — M48.02 NEUROFORAMINAL STENOSIS OF CERVICAL SPINE: ICD-10-CM

## 2024-02-09 DIAGNOSIS — M51.26 LUMBAR DISC HERNIATION: ICD-10-CM

## 2024-02-09 DIAGNOSIS — R20.0 NUMBNESS AND TINGLING IN LEFT ARM: ICD-10-CM

## 2024-02-09 DIAGNOSIS — R20.0 NUMBNESS AND TINGLING OF RIGHT LEG: ICD-10-CM

## 2024-02-09 DIAGNOSIS — M18.0 OSTEOARTHRITIS OF CARPOMETACARPAL (CMC) JOINTS OF BOTH THUMBS, UNSPECIFIED OSTEOARTHRITIS TYPE: ICD-10-CM

## 2024-02-09 DIAGNOSIS — M54.12 CERVICAL RADICULOPATHY: ICD-10-CM

## 2024-02-09 DIAGNOSIS — Z98.1 S/P CERVICAL SPINAL FUSION: ICD-10-CM

## 2024-02-09 DIAGNOSIS — M79.672 FOOT PAIN, BILATERAL: ICD-10-CM

## 2024-02-09 DIAGNOSIS — M79.644 BILATERAL THUMB PAIN: ICD-10-CM

## 2024-02-09 DIAGNOSIS — Z98.1 HISTORY OF LUMBAR FUSION: ICD-10-CM

## 2024-02-09 DIAGNOSIS — R20.2 NUMBNESS AND TINGLING IN LEFT ARM: ICD-10-CM

## 2024-02-09 DIAGNOSIS — M79.671 FOOT PAIN, BILATERAL: ICD-10-CM

## 2024-02-09 DIAGNOSIS — M79.10 MYALGIA: ICD-10-CM

## 2024-02-09 DIAGNOSIS — M79.645 BILATERAL THUMB PAIN: ICD-10-CM

## 2024-02-09 PROCEDURE — 99213 OFFICE O/P EST LOW 20 MIN: CPT | Performed by: STUDENT IN AN ORGANIZED HEALTH CARE EDUCATION/TRAINING PROGRAM

## 2024-02-09 PROCEDURE — 3074F SYST BP LT 130 MM HG: CPT | Performed by: STUDENT IN AN ORGANIZED HEALTH CARE EDUCATION/TRAINING PROGRAM

## 2024-02-09 PROCEDURE — 1126F AMNT PAIN NOTED NONE PRSNT: CPT | Performed by: STUDENT IN AN ORGANIZED HEALTH CARE EDUCATION/TRAINING PROGRAM

## 2024-02-09 PROCEDURE — 3078F DIAST BP <80 MM HG: CPT | Performed by: STUDENT IN AN ORGANIZED HEALTH CARE EDUCATION/TRAINING PROGRAM

## 2024-02-09 ASSESSMENT — PATIENT HEALTH QUESTIONNAIRE - PHQ9: CLINICAL INTERPRETATION OF PHQ2 SCORE: 0

## 2024-02-09 ASSESSMENT — FIBROSIS 4 INDEX: FIB4 SCORE: 0.99

## 2024-02-09 ASSESSMENT — PAIN SCALES - GENERAL: PAINLEVEL: NO PAIN

## 2024-02-10 PROCEDURE — RXMED WILLOW AMBULATORY MEDICATION CHARGE: Performed by: FAMILY MEDICINE

## 2024-02-12 ENCOUNTER — PHARMACY VISIT (OUTPATIENT)
Dept: PHARMACY | Facility: MEDICAL CENTER | Age: 69
End: 2024-02-12
Payer: COMMERCIAL

## 2024-02-23 ENCOUNTER — PHARMACY VISIT (OUTPATIENT)
Dept: PHARMACY | Facility: MEDICAL CENTER | Age: 69
End: 2024-02-23
Payer: COMMERCIAL

## 2024-02-23 ENCOUNTER — OFFICE VISIT (OUTPATIENT)
Dept: URGENT CARE | Facility: PHYSICIAN GROUP | Age: 69
End: 2024-02-23
Payer: MEDICARE

## 2024-02-23 VITALS
HEART RATE: 85 BPM | BODY MASS INDEX: 31.41 KG/M2 | HEIGHT: 60 IN | DIASTOLIC BLOOD PRESSURE: 82 MMHG | TEMPERATURE: 97.5 F | WEIGHT: 160 LBS | SYSTOLIC BLOOD PRESSURE: 118 MMHG | RESPIRATION RATE: 16 BRPM | OXYGEN SATURATION: 98 %

## 2024-02-23 DIAGNOSIS — R05.1 ACUTE COUGH: ICD-10-CM

## 2024-02-23 DIAGNOSIS — Z76.0 MEDICATION REFILL: ICD-10-CM

## 2024-02-23 PROCEDURE — 3074F SYST BP LT 130 MM HG: CPT | Performed by: REGISTERED NURSE

## 2024-02-23 PROCEDURE — 3079F DIAST BP 80-89 MM HG: CPT | Performed by: REGISTERED NURSE

## 2024-02-23 PROCEDURE — 99213 OFFICE O/P EST LOW 20 MIN: CPT | Performed by: REGISTERED NURSE

## 2024-02-23 PROCEDURE — RXMED WILLOW AMBULATORY MEDICATION CHARGE: Performed by: REGISTERED NURSE

## 2024-02-23 RX ORDER — ALBUTEROL SULFATE 90 UG/1
2 AEROSOL, METERED RESPIRATORY (INHALATION) EVERY 6 HOURS PRN
Qty: 8.5 G | Refills: 3 | Status: SHIPPED | OUTPATIENT
Start: 2024-02-23

## 2024-02-23 RX ORDER — BENZONATATE 100 MG/1
100 CAPSULE ORAL 3 TIMES DAILY PRN
Qty: 30 CAPSULE | Refills: 0 | Status: SHIPPED | OUTPATIENT
Start: 2024-02-23 | End: 2024-03-04

## 2024-02-23 ASSESSMENT — ENCOUNTER SYMPTOMS
CHILLS: 0
SPUTUM PRODUCTION: 0
SHORTNESS OF BREATH: 0
COUGH: 1
WHEEZING: 0
DIZZINESS: 0
FEVER: 0
ABDOMINAL PAIN: 0

## 2024-02-23 ASSESSMENT — FIBROSIS 4 INDEX: FIB4 SCORE: 0.99

## 2024-02-23 NOTE — LETTER
February 23, 2024         Patient: Robin Lynn Zielesch   YOB: 1955   Date of Visit: 2/23/2024           To Whom it May Concern:    Robin Zielesch was seen in my clinic on 2/23/2024. Please excuse any absences related to illness. She may return on 02/25/24.    If you have any questions or concerns, please don't hesitate to call.        Sincerely,           TAISHA Gutiérrez  Electronically Signed

## 2024-02-23 NOTE — PROGRESS NOTES
Subjective:   Robin Lynn Zielesch is a 69 y.o. female who presents for Letter for School/Work (Caught a cold and missed work for 3 days, needs a doctors note for work )      HPI  Cold symptoms earlier in the week, these have been improving. Still having a slight cough that makes sleep difficult. Overall feels good. Has COPD but not having issues with this. Overall feels great. Immunizations current.    Review of Systems   Constitutional:  Negative for chills and fever.   HENT:  Negative for congestion.    Respiratory:  Positive for cough. Negative for sputum production, shortness of breath and wheezing.    Cardiovascular:  Negative for chest pain.   Gastrointestinal:  Negative for abdominal pain.   Skin:  Negative for rash.   Neurological:  Negative for dizziness.       Medications, Allergies, and current problem list reviewed today in Epic.     Objective:     /82   Pulse 85   Temp 36.4 °C (97.5 °F) (Temporal)   Resp 16   Ht 1.524 m (5')   Wt 72.6 kg (160 lb)   SpO2 98%     Physical Exam  Vitals and nursing note reviewed.   Constitutional:       Appearance: Normal appearance. She is not ill-appearing or toxic-appearing.   HENT:      Right Ear: Tympanic membrane normal.      Left Ear: Tympanic membrane normal.      Nose: No congestion.      Mouth/Throat:      Mouth: Mucous membranes are moist.   Eyes:      Pupils: Pupils are equal, round, and reactive to light.   Cardiovascular:      Rate and Rhythm: Normal rate and regular rhythm.      Heart sounds: No murmur heard.  Pulmonary:      Effort: Pulmonary effort is normal. No respiratory distress.      Breath sounds: Normal breath sounds. No wheezing or rales.   Musculoskeletal:         General: Normal range of motion.      Cervical back: Normal range of motion.   Skin:     General: Skin is warm and dry.      Capillary Refill: Capillary refill takes less than 2 seconds.      Findings: No rash.   Neurological:      General: No focal deficit present.       Mental Status: She is alert and oriented to person, place, and time.   Psychiatric:         Mood and Affect: Mood normal.         Assessment/Plan:       1. Acute cough  benzonatate (TESSALON) 100 MG Cap      2. Medication refill  albuterol 108 (90 Base) MCG/ACT Aero Soln inhalation aerosol        TESTING: Deferred    VITAL SIGNS: Within normal limits  EXAM ABNORMALITIES: None    MDM/PLAN: Pleasant 69-year-old who presented for a work note, she had a cold earlier in the week most of the symptoms have resolved does have a lingering cough which makes sleep more difficult but not having distress.  No shortness of breath or chest pain.  No wheezing.  Not using her albuterol inhaler more frequently but does need a refill.  No adventitious lung sounds.  Overall appears well.    Benzonatate  Refill albuterol  Pulmonary toilet  Recommend adequate hydration   Work note given  Return precautions discussed      Differential diagnosis, natural history, and supportive care discussed. We also reviewed side effects of medication including allergic response, GI upset, tendon injury, rash, sedation etc. Patient and/or guardian voices understanding.      Advised the patient to follow-up with the primary care physician for recheck, reevaluation, and consideration of further management.    I personally reviewed prior external notes and test results pertinent to today's visit as well as additional imaging and testing completed in clinic today.     Please note that this dictation was created using voice recognition software. I have made every reasonable attempt to correct obvious errors, but I expect that there are errors of grammar and possibly content that I did not discover before finalizing the note.    This note was electronically signed by TAISHA Gutiérrez

## 2024-02-27 ENCOUNTER — PHARMACY VISIT (OUTPATIENT)
Dept: PHARMACY | Facility: MEDICAL CENTER | Age: 69
End: 2024-02-27

## 2024-03-19 PROCEDURE — RXMED WILLOW AMBULATORY MEDICATION CHARGE: Performed by: FAMILY MEDICINE

## 2024-03-19 RX ORDER — LEVOTHYROXINE SODIUM 88 MCG
88 TABLET ORAL
Qty: 90 TABLET | Refills: 0 | Status: SHIPPED | OUTPATIENT
Start: 2024-03-19

## 2024-03-19 NOTE — TELEPHONE ENCOUNTER
Received request via: Pharmacy    Was the patient seen in the last year in this department? Yes    Does the patient have an active prescription (recently filled or refills available) for medication(s) requested? No    Pharmacy Name: [facility]     Does the patient have senior living Plus and need 100 day supply (blood pressure, diabetes and cholesterol meds only)? Patient does not have SCP

## 2024-03-22 ENCOUNTER — PHARMACY VISIT (OUTPATIENT)
Dept: PHARMACY | Facility: MEDICAL CENTER | Age: 69
End: 2024-03-22
Payer: COMMERCIAL

## 2024-04-07 PROCEDURE — RXMED WILLOW AMBULATORY MEDICATION CHARGE: Performed by: FAMILY MEDICINE

## 2024-04-10 ENCOUNTER — PHARMACY VISIT (OUTPATIENT)
Dept: PHARMACY | Facility: MEDICAL CENTER | Age: 69
End: 2024-04-10
Payer: COMMERCIAL

## 2024-04-18 PROCEDURE — RXMED WILLOW AMBULATORY MEDICATION CHARGE: Performed by: FAMILY MEDICINE

## 2024-04-19 ENCOUNTER — PHARMACY VISIT (OUTPATIENT)
Dept: PHARMACY | Facility: MEDICAL CENTER | Age: 69
End: 2024-04-19
Payer: COMMERCIAL

## 2024-05-04 PROCEDURE — RXMED WILLOW AMBULATORY MEDICATION CHARGE: Performed by: FAMILY MEDICINE

## 2024-05-07 PROCEDURE — RXMED WILLOW AMBULATORY MEDICATION CHARGE: Performed by: FAMILY MEDICINE

## 2024-05-07 RX ORDER — GABAPENTIN 300 MG/1
900 CAPSULE ORAL 3 TIMES DAILY
Qty: 810 CAPSULE | Refills: 1 | Status: SHIPPED | OUTPATIENT
Start: 2024-05-07

## 2024-05-07 NOTE — TELEPHONE ENCOUNTER
Received request via: Pharmacy    Was the patient seen in the last year in this department? Yes    Does the patient have an active prescription (recently filled or refills available) for medication(s) requested? No    Pharmacy Name: [facility]     Does the patient have retirement Plus and need 100 day supply (blood pressure, diabetes and cholesterol meds only)? Patient does not have SCP

## 2024-05-08 ENCOUNTER — PHARMACY VISIT (OUTPATIENT)
Dept: PHARMACY | Facility: MEDICAL CENTER | Age: 69
End: 2024-05-08
Payer: COMMERCIAL

## 2024-05-13 PROCEDURE — RXMED WILLOW AMBULATORY MEDICATION CHARGE: Performed by: FAMILY MEDICINE

## 2024-05-15 ENCOUNTER — PHARMACY VISIT (OUTPATIENT)
Dept: PHARMACY | Facility: MEDICAL CENTER | Age: 69
End: 2024-05-15
Payer: COMMERCIAL

## 2024-05-24 ENCOUNTER — OFFICE VISIT (OUTPATIENT)
Dept: MEDICAL GROUP | Facility: PHYSICIAN GROUP | Age: 69
End: 2024-05-24
Payer: COMMERCIAL

## 2024-05-24 VITALS
BODY MASS INDEX: 33.18 KG/M2 | TEMPERATURE: 97.8 F | DIASTOLIC BLOOD PRESSURE: 76 MMHG | SYSTOLIC BLOOD PRESSURE: 122 MMHG | WEIGHT: 169 LBS | OXYGEN SATURATION: 96 % | RESPIRATION RATE: 16 BRPM | HEIGHT: 60 IN | HEART RATE: 74 BPM

## 2024-05-24 DIAGNOSIS — F41.9 ANXIETY: ICD-10-CM

## 2024-05-24 PROCEDURE — 3074F SYST BP LT 130 MM HG: CPT | Performed by: FAMILY MEDICINE

## 2024-05-24 PROCEDURE — 99213 OFFICE O/P EST LOW 20 MIN: CPT | Performed by: FAMILY MEDICINE

## 2024-05-24 PROCEDURE — 3078F DIAST BP <80 MM HG: CPT | Performed by: FAMILY MEDICINE

## 2024-05-24 PROCEDURE — RXMED WILLOW AMBULATORY MEDICATION CHARGE: Performed by: FAMILY MEDICINE

## 2024-05-24 RX ORDER — LORAZEPAM 0.5 MG/1
0.5 TABLET ORAL EVERY 8 HOURS PRN
Qty: 60 TABLET | Refills: 0 | Status: SHIPPED | OUTPATIENT
Start: 2024-05-24 | End: 2024-06-25

## 2024-05-24 ASSESSMENT — FIBROSIS 4 INDEX: FIB4 SCORE: 0.99
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
## 2024-05-26 ENCOUNTER — PHARMACY VISIT (OUTPATIENT)
Dept: PHARMACY | Facility: MEDICAL CENTER | Age: 69
End: 2024-05-26
Payer: COMMERCIAL

## 2024-06-17 NOTE — TELEPHONE ENCOUNTER
Received request via: Pharmacy    Was the patient seen in the last year in this department? Yes    Does the patient have an active prescription (recently filled or refills available) for medication(s) requested? No    Ph[facility] Name[facility]     Does the patient have intermediate Plus and need 100 day supply (blood pressure, diabetes and cholesterol meds only)? Patient does not have SCP

## 2024-06-18 DIAGNOSIS — E03.9 ACQUIRED HYPOTHYROIDISM: ICD-10-CM

## 2024-06-18 DIAGNOSIS — I10 ESSENTIAL HYPERTENSION: Chronic | ICD-10-CM

## 2024-06-18 DIAGNOSIS — Z00.00 WELLNESS EXAMINATION: ICD-10-CM

## 2024-06-18 PROCEDURE — RXMED WILLOW AMBULATORY MEDICATION CHARGE: Performed by: FAMILY MEDICINE

## 2024-06-18 RX ORDER — LEVOTHYROXINE SODIUM 88 MCG
88 TABLET ORAL
Qty: 90 TABLET | Refills: 0 | Status: SHIPPED | OUTPATIENT
Start: 2024-06-18

## 2024-06-20 ENCOUNTER — PHARMACY VISIT (OUTPATIENT)
Dept: PHARMACY | Facility: MEDICAL CENTER | Age: 69
End: 2024-06-20
Payer: COMMERCIAL

## 2024-07-02 PROCEDURE — RXMED WILLOW AMBULATORY MEDICATION CHARGE: Performed by: FAMILY MEDICINE

## 2024-07-05 ENCOUNTER — HOSPITAL ENCOUNTER (OUTPATIENT)
Dept: LAB | Facility: MEDICAL CENTER | Age: 69
End: 2024-07-05
Attending: FAMILY MEDICINE
Payer: COMMERCIAL

## 2024-07-05 DIAGNOSIS — I10 ESSENTIAL HYPERTENSION: Chronic | ICD-10-CM

## 2024-07-05 DIAGNOSIS — Z00.00 WELLNESS EXAMINATION: ICD-10-CM

## 2024-07-05 DIAGNOSIS — E83.52 HYPERCALCEMIA: ICD-10-CM

## 2024-07-05 DIAGNOSIS — R74.8 ALKALINE PHOSPHATASE ELEVATION: ICD-10-CM

## 2024-07-05 DIAGNOSIS — E03.9 ACQUIRED HYPOTHYROIDISM: ICD-10-CM

## 2024-07-05 LAB
ALBUMIN SERPL BCP-MCNC: 4.5 G/DL (ref 3.2–4.9)
ALBUMIN/GLOB SERPL: 1.7 G/DL
ALP SERPL-CCNC: 157 U/L (ref 30–99)
ALT SERPL-CCNC: 15 U/L (ref 2–50)
ANION GAP SERPL CALC-SCNC: 12 MMOL/L (ref 7–16)
APPEARANCE UR: CLEAR
AST SERPL-CCNC: 19 U/L (ref 12–45)
BASOPHILS # BLD AUTO: 0.9 % (ref 0–1.8)
BASOPHILS # BLD: 0.09 K/UL (ref 0–0.12)
BILIRUB SERPL-MCNC: 0.6 MG/DL (ref 0.1–1.5)
BILIRUB UR QL STRIP.AUTO: NEGATIVE
BUN SERPL-MCNC: 23 MG/DL (ref 8–22)
CALCIUM ALBUM COR SERPL-MCNC: 10.6 MG/DL (ref 8.5–10.5)
CALCIUM SERPL-MCNC: 11 MG/DL (ref 8.5–10.5)
CHLORIDE SERPL-SCNC: 104 MMOL/L (ref 96–112)
CHOLEST SERPL-MCNC: 209 MG/DL (ref 100–199)
CO2 SERPL-SCNC: 28 MMOL/L (ref 20–33)
COLOR UR: YELLOW
CREAT SERPL-MCNC: 0.92 MG/DL (ref 0.5–1.4)
EOSINOPHIL # BLD AUTO: 0.77 K/UL (ref 0–0.51)
EOSINOPHIL NFR BLD: 7.7 % (ref 0–6.9)
ERYTHROCYTE [DISTWIDTH] IN BLOOD BY AUTOMATED COUNT: 45.9 FL (ref 35.9–50)
FASTING STATUS PATIENT QL REPORTED: NORMAL
GFR SERPLBLD CREATININE-BSD FMLA CKD-EPI: 68 ML/MIN/1.73 M 2
GLOBULIN SER CALC-MCNC: 2.7 G/DL (ref 1.9–3.5)
GLUCOSE SERPL-MCNC: 111 MG/DL (ref 65–99)
GLUCOSE UR STRIP.AUTO-MCNC: NEGATIVE MG/DL
HCT VFR BLD AUTO: 48 % (ref 37–47)
HDLC SERPL-MCNC: 54 MG/DL
HGB BLD-MCNC: 15.4 G/DL (ref 12–16)
IMM GRANULOCYTES # BLD AUTO: 0.04 K/UL (ref 0–0.11)
IMM GRANULOCYTES NFR BLD AUTO: 0.4 % (ref 0–0.9)
KETONES UR STRIP.AUTO-MCNC: NEGATIVE MG/DL
LDLC SERPL CALC-MCNC: 135 MG/DL
LEUKOCYTE ESTERASE UR QL STRIP.AUTO: NEGATIVE
LYMPHOCYTES # BLD AUTO: 1.81 K/UL (ref 1–4.8)
LYMPHOCYTES NFR BLD: 18 % (ref 22–41)
MCH RBC QN AUTO: 29.4 PG (ref 27–33)
MCHC RBC AUTO-ENTMCNC: 32.1 G/DL (ref 32.2–35.5)
MCV RBC AUTO: 91.6 FL (ref 81.4–97.8)
MICRO URNS: NORMAL
MONOCYTES # BLD AUTO: 0.8 K/UL (ref 0–0.85)
MONOCYTES NFR BLD AUTO: 8 % (ref 0–13.4)
NEUTROPHILS # BLD AUTO: 6.54 K/UL (ref 1.82–7.42)
NEUTROPHILS NFR BLD: 65 % (ref 44–72)
NITRITE UR QL STRIP.AUTO: NEGATIVE
NRBC # BLD AUTO: 0 K/UL
NRBC BLD-RTO: 0 /100 WBC (ref 0–0.2)
PH UR STRIP.AUTO: 7.5 [PH] (ref 5–8)
PLATELET # BLD AUTO: 344 K/UL (ref 164–446)
PMV BLD AUTO: 9.2 FL (ref 9–12.9)
POTASSIUM SERPL-SCNC: 5.2 MMOL/L (ref 3.6–5.5)
PROT SERPL-MCNC: 7.2 G/DL (ref 6–8.2)
PROT UR QL STRIP: NEGATIVE MG/DL
RBC # BLD AUTO: 5.24 M/UL (ref 4.2–5.4)
RBC UR QL AUTO: NEGATIVE
SODIUM SERPL-SCNC: 144 MMOL/L (ref 135–145)
SP GR UR STRIP.AUTO: 1.01
TRIGL SERPL-MCNC: 100 MG/DL (ref 0–149)
TSH SERPL DL<=0.005 MIU/L-ACNC: 0.9 UIU/ML (ref 0.38–5.33)
UROBILINOGEN UR STRIP.AUTO-MCNC: 0.2 MG/DL
WBC # BLD AUTO: 10.1 K/UL (ref 4.8–10.8)

## 2024-07-05 PROCEDURE — 85025 COMPLETE CBC W/AUTO DIFF WBC: CPT

## 2024-07-05 PROCEDURE — 84443 ASSAY THYROID STIM HORMONE: CPT

## 2024-07-05 PROCEDURE — 80061 LIPID PANEL: CPT

## 2024-07-05 PROCEDURE — 36415 COLL VENOUS BLD VENIPUNCTURE: CPT

## 2024-07-05 PROCEDURE — 80053 COMPREHEN METABOLIC PANEL: CPT

## 2024-07-05 PROCEDURE — 81003 URINALYSIS AUTO W/O SCOPE: CPT

## 2024-07-07 ENCOUNTER — PHARMACY VISIT (OUTPATIENT)
Dept: PHARMACY | Facility: MEDICAL CENTER | Age: 69
End: 2024-07-07
Payer: COMMERCIAL

## 2024-07-09 ENCOUNTER — APPOINTMENT (OUTPATIENT)
Dept: MEDICAL GROUP | Facility: PHYSICIAN GROUP | Age: 69
End: 2024-07-09
Payer: COMMERCIAL

## 2024-07-30 PROCEDURE — RXMED WILLOW AMBULATORY MEDICATION CHARGE: Performed by: INTERNAL MEDICINE

## 2024-07-30 RX ORDER — MELOXICAM 15 MG/1
15 TABLET ORAL DAILY
Qty: 90 TABLET | Refills: 0 | Status: SHIPPED | OUTPATIENT
Start: 2024-07-30

## 2024-08-01 ENCOUNTER — PHARMACY VISIT (OUTPATIENT)
Dept: PHARMACY | Facility: MEDICAL CENTER | Age: 69
End: 2024-08-01
Payer: COMMERCIAL

## 2024-08-02 ENCOUNTER — APPOINTMENT (OUTPATIENT)
Dept: MEDICAL GROUP | Facility: PHYSICIAN GROUP | Age: 69
End: 2024-08-02
Payer: COMMERCIAL

## 2024-08-02 VITALS
SYSTOLIC BLOOD PRESSURE: 128 MMHG | DIASTOLIC BLOOD PRESSURE: 72 MMHG | TEMPERATURE: 97.8 F | HEART RATE: 88 BPM | WEIGHT: 170 LBS | BODY MASS INDEX: 33.38 KG/M2 | RESPIRATION RATE: 16 BRPM | HEIGHT: 60 IN | OXYGEN SATURATION: 96 %

## 2024-08-02 DIAGNOSIS — Z00.00 WELLNESS EXAMINATION: ICD-10-CM

## 2024-08-02 DIAGNOSIS — Z12.31 ENCOUNTER FOR SCREENING MAMMOGRAM FOR MALIGNANT NEOPLASM OF BREAST: ICD-10-CM

## 2024-08-02 DIAGNOSIS — F41.9 ANXIETY: ICD-10-CM

## 2024-08-02 PROCEDURE — 3078F DIAST BP <80 MM HG: CPT | Performed by: FAMILY MEDICINE

## 2024-08-02 PROCEDURE — 99397 PER PM REEVAL EST PAT 65+ YR: CPT | Performed by: FAMILY MEDICINE

## 2024-08-02 PROCEDURE — 3074F SYST BP LT 130 MM HG: CPT | Performed by: FAMILY MEDICINE

## 2024-08-02 RX ORDER — LORAZEPAM 0.5 MG/1
0.5 TABLET ORAL EVERY 4 HOURS PRN
Qty: 60 TABLET | Refills: 0 | Status: SHIPPED | OUTPATIENT
Start: 2024-08-02 | End: 2024-09-05

## 2024-08-02 ASSESSMENT — FIBROSIS 4 INDEX: FIB4 SCORE: 0.97

## 2024-08-02 NOTE — ASSESSMENT & PLAN NOTE
Patient is here today for wellness exam.  She recently did her labs and I did send her message about that.  We discussed it further today.  Overall she is feeling well.

## 2024-08-02 NOTE — ASSESSMENT & PLAN NOTE
This is a chronic problem.  She is asking for refill on her lorazepam.  The last prescription was over 2 months ago.  Her agreement is current.

## 2024-08-02 NOTE — PROGRESS NOTES
Subjective:     CC: Here for wellness exam.    HPI:   Mathew presents today with the following medical concern:    Wellness examination  Patient is here today for wellness exam.  She recently did her labs and I did send her message about that.  We discussed it further today.  Overall she is feeling well.    Anxiety  This is a chronic problem.  She is asking for refill on her lorazepam.  The last prescription was over 2 months ago.  Her agreement is current.    Past Medical History:   Diagnosis Date    Anxiety     Arthritis     Cancer (HCC)     COPD (chronic obstructive pulmonary disease) (HCC)     GERD (gastroesophageal reflux disease)     Hypertension     Migraine     Thyroid disease        Social History     Tobacco Use    Smoking status: Former     Current packs/day: 0.00     Average packs/day: 1.5 packs/day for 50.0 years (75.0 ttl pk-yrs)     Types: Cigarettes     Start date: 1968     Quit date: 2018     Years since quittin.9    Smokeless tobacco: Never   Vaping Use    Vaping status: Some Days    Substances: Nicotine, CBD    Devices: Pre-filled or refillable cartridge, Refillable tank   Substance Use Topics    Alcohol use: Never    Drug use: Not Currently     Types: Marijuana, Methamphetamines     Comment: once in a while       Current Outpatient Medications Ordered in Epic   Medication Sig Dispense Refill    LORazepam (ATIVAN) 0.5 MG Tab Take 1 Tablet by mouth every four hours as needed for Anxiety for up to 30 days. 60 Tablet 0    meloxicam (MOBIC) 15 MG tablet Take 1 Tablet by mouth every day. 90 Tablet 0    SYNTHROID 88 MCG Tab Take 1 Tablet by mouth every morning on an empty stomach. 90 Tablet 0    gabapentin (NEURONTIN) 300 MG Cap Take 3 Capsules by mouth 3 times a day. 810 Capsule 1    albuterol 108 (90 Base) MCG/ACT Aero Soln inhalation aerosol Inhale 2 Puffs every 6 hours as needed for Shortness of Breath. 8.5 g 3    potassium chloride SA (KDUR) 20 MEQ Tab CR Take 1 Tablet by mouth every  day. 90 Tablet 3    aspirin (ASA) 81 MG Chew Tab chewable tablet Chew 81 mg every day.      buPROPion (WELLBUTRIN XL) 300 MG XL tablet Take 1 Tablet by mouth every morning. 90 Tablet 3    losartan (COZAAR) 100 MG Tab Take 1 Tablet by mouth every day. 90 Tablet 3    omeprazole (PRILOSEC) 40 MG delayed-release capsule Take 1 Capsule by mouth every day. 90 Capsule 3    amLODIPine (NORVASC) 10 MG Tab Take 1 Tablet by mouth every day. 90 Tablet 3    calcium citrate (CALCITRATE) 950 (200 Ca) MG Tab Take 1 Tablet by mouth every day.      Multiple Vitamin (MULTI-VITAMINS PO) Take  by mouth.      Riboflavin (VITAMIN B-2 PO) Take  by mouth.       No current Epic-ordered facility-administered medications on file.       Allergies:  Ace inhibitors, Chlorhexidine, Flexeril [cyclobenzaprine], and Triamterene    Health Maintenance: Completed    ROS:  Gen: no fevers/chills, no changes in weight  Eyes: no changes in vision  ENT: no sore throat, no hearing loss, no bloody nose  Pulm: no sob, no cough  CV: no chest pain, no palpitations  GI: no nausea/vomiting, no diarrhea  : no dysuria  MSk: no myalgias  Skin: no rash  Neuro: no headaches, no numbness/tingling  Heme/Lymph: no easy bruising      Objective:       Exam:  /72 (BP Location: Right arm, Patient Position: Sitting, BP Cuff Size: Adult)   Pulse 88   Temp 36.6 °C (97.8 °F) (Temporal)   Resp 16   Ht 1.524 m (5')   Wt 77.1 kg (170 lb)   SpO2 96%   BMI 33.20 kg/m²  Body mass index is 33.2 kg/m².    Gen: Alert and oriented, No apparent distress.  Eyes:   No scleral icterus seen.  Ears:   Ear canals and TMs are clear.   Neck: Neck is supple without lymphadenopathy.  Thyroid exam is normal.  Lungs: Normal effort, CTA bilaterally, no wheezes, rhonchi, or rales  CV: Regular rate and rhythm. No murmurs, rubs, or gallops.  No carotid bruits heard.  Abdomen: Soft, nontender, no organomegaly or masses.  Ext: No clubbing, cyanosis, edema.  Neuro: Cranial nerves II through VIII  are grossly intact.    Labs: Results reviewed with patient    Assessment & Plan:     69 y.o. female with the following -     1. Encounter for screening mammogram for malignant neoplasm of breast  This is a screening issue.  Mammogram ordered.  - MA-SCREENING MAMMO BILAT W/TOMOSYNTHESIS W/CAD; Future    2. Anxiety  This is a chronic problem.  Continue to monitor.  Medication renewed and continue to use sparingly.  She knows if she needs refills on this she is to return at least every 90 days.  - LORazepam (ATIVAN) 0.5 MG Tab; Take 1 Tablet by mouth every four hours as needed for Anxiety for up to 30 days.  Dispense: 60 Tablet; Refill: 0    3. Wellness examination  Patient's wellness exam was performed today.  General health issues addressed.  Review of her labs done with her.    Anticipatory guidance: Patient talked about healthy lifestyle and her anxiety.  Also about screening test for cancer.  Return in about 1 year (around 8/2/2025) for annual exam.    Please note that this dictation was created using voice recognition software. I have made every reasonable attempt to correct obvious errors, but I expect that there are errors of grammar and possibly content that I did not discover before finalizing the note.

## 2024-08-04 PROCEDURE — RXMED WILLOW AMBULATORY MEDICATION CHARGE: Performed by: FAMILY MEDICINE

## 2024-08-05 RX ORDER — OMEPRAZOLE 40 MG/1
40 CAPSULE, DELAYED RELEASE ORAL DAILY
Qty: 90 CAPSULE | Refills: 3 | Status: SHIPPED | OUTPATIENT
Start: 2024-08-05

## 2024-08-05 NOTE — TELEPHONE ENCOUNTER
Received request via: Pharmacy    Was the patient seen in the last year in this department? Yes    Does the patient have an active prescription (recently filled or refills available) for medication(s) requested? No    Pharmacy Name:   [facility]              Does the patient have longterm Plus and need 100 day supply (blood pressure, diabetes and cholesterol meds only)? Patient does not have SCP

## 2024-08-06 ENCOUNTER — PHARMACY VISIT (OUTPATIENT)
Dept: PHARMACY | Facility: MEDICAL CENTER | Age: 69
End: 2024-08-06
Payer: COMMERCIAL

## 2024-08-06 ENCOUNTER — HOSPITAL ENCOUNTER (OUTPATIENT)
Dept: RADIOLOGY | Facility: MEDICAL CENTER | Age: 69
End: 2024-08-06
Attending: FAMILY MEDICINE
Payer: COMMERCIAL

## 2024-08-06 DIAGNOSIS — Z12.31 ENCOUNTER FOR SCREENING MAMMOGRAM FOR MALIGNANT NEOPLASM OF BREAST: ICD-10-CM

## 2024-08-06 PROCEDURE — 77067 SCR MAMMO BI INCL CAD: CPT

## 2024-08-07 PROCEDURE — RXMED WILLOW AMBULATORY MEDICATION CHARGE: Performed by: FAMILY MEDICINE

## 2024-08-11 ENCOUNTER — PHARMACY VISIT (OUTPATIENT)
Dept: PHARMACY | Facility: MEDICAL CENTER | Age: 69
End: 2024-08-11
Payer: COMMERCIAL

## 2024-08-18 PROCEDURE — RXMED WILLOW AMBULATORY MEDICATION CHARGE: Performed by: FAMILY MEDICINE

## 2024-08-19 ENCOUNTER — PHARMACY VISIT (OUTPATIENT)
Dept: PHARMACY | Facility: MEDICAL CENTER | Age: 69
End: 2024-08-19
Payer: COMMERCIAL

## 2024-08-19 RX ORDER — AMLODIPINE BESYLATE 10 MG/1
10 TABLET ORAL DAILY
Qty: 90 TABLET | Refills: 3 | Status: SHIPPED | OUTPATIENT
Start: 2024-08-19

## 2024-08-19 NOTE — TELEPHONE ENCOUNTER
Received request via: Pharmacy    Was the patient seen in the last year in this department? Yes    Does the patient have an active prescription (recently filled or refills available) for medication(s) requested? No    Pharmacy Name:     [facility]       Does the patient have shelter Plus and need 100-day supply? (This applies to ALL medications) Patient does not have SCP

## 2024-08-22 PROCEDURE — RXMED WILLOW AMBULATORY MEDICATION CHARGE: Performed by: FAMILY MEDICINE

## 2024-08-25 ENCOUNTER — PHARMACY VISIT (OUTPATIENT)
Dept: PHARMACY | Facility: MEDICAL CENTER | Age: 69
End: 2024-08-25
Payer: COMMERCIAL

## 2024-09-16 ENCOUNTER — PATIENT MESSAGE (OUTPATIENT)
Dept: MEDICAL GROUP | Facility: PHYSICIAN GROUP | Age: 69
End: 2024-09-16
Payer: COMMERCIAL

## 2024-09-17 RX ORDER — LEVOTHYROXINE SODIUM 88 MCG
88 TABLET ORAL
Qty: 90 TABLET | Refills: 3 | Status: SHIPPED | OUTPATIENT
Start: 2024-09-17

## 2024-09-18 ENCOUNTER — PHARMACY VISIT (OUTPATIENT)
Dept: PHARMACY | Facility: MEDICAL CENTER | Age: 69
End: 2024-09-18
Payer: COMMERCIAL

## 2024-09-18 PROCEDURE — RXMED WILLOW AMBULATORY MEDICATION CHARGE: Performed by: FAMILY MEDICINE

## 2024-09-27 ENCOUNTER — NON-PROVIDER VISIT (OUTPATIENT)
Dept: MEDICAL GROUP | Facility: PHYSICIAN GROUP | Age: 69
End: 2024-09-27
Payer: COMMERCIAL

## 2024-09-27 DIAGNOSIS — Z23 NEED FOR VACCINATION: ICD-10-CM

## 2024-09-27 NOTE — PROGRESS NOTES
"Robin Lynn Zielesch is a 69 y.o. female here for a non-provider visit for:   FLU    Reason for immunization: Annual Flu Vaccine  Immunization records indicate need for vaccine: Yes, confirmed with Epic  Minimum interval has been met for this vaccine: Yes  ABN completed: Yes    VIS Dated  8/6/21 was given to patient: Yes  All IAC Questionnaire questions were answered \"No.\"    Patient tolerated injection and no adverse effects were observed or reported: Yes    Pt scheduled for next dose in series: Not Indicated  "

## 2024-09-30 ENCOUNTER — PATIENT MESSAGE (OUTPATIENT)
Dept: MEDICAL GROUP | Facility: PHYSICIAN GROUP | Age: 69
End: 2024-09-30
Payer: COMMERCIAL

## 2024-09-30 DIAGNOSIS — Z76.0 MEDICATION REFILL: ICD-10-CM

## 2024-09-30 PROCEDURE — RXMED WILLOW AMBULATORY MEDICATION CHARGE: Performed by: FAMILY MEDICINE

## 2024-09-30 RX ORDER — ALBUTEROL SULFATE 90 UG/1
2 INHALANT RESPIRATORY (INHALATION) EVERY 6 HOURS PRN
Qty: 8.5 G | Refills: 3 | Status: SHIPPED | OUTPATIENT
Start: 2024-09-30

## 2024-10-03 PROCEDURE — RXMED WILLOW AMBULATORY MEDICATION CHARGE: Performed by: FAMILY MEDICINE

## 2024-10-04 ENCOUNTER — OFFICE VISIT (OUTPATIENT)
Dept: PHYSICAL MEDICINE AND REHAB | Facility: MEDICAL CENTER | Age: 69
End: 2024-10-04
Payer: COMMERCIAL

## 2024-10-04 ENCOUNTER — PHARMACY VISIT (OUTPATIENT)
Dept: PHARMACY | Facility: MEDICAL CENTER | Age: 69
End: 2024-10-04
Payer: COMMERCIAL

## 2024-10-04 VITALS
BODY MASS INDEX: 33.15 KG/M2 | HEART RATE: 74 BPM | DIASTOLIC BLOOD PRESSURE: 80 MMHG | HEIGHT: 60 IN | WEIGHT: 168.87 LBS | SYSTOLIC BLOOD PRESSURE: 128 MMHG | OXYGEN SATURATION: 96 % | TEMPERATURE: 97.9 F

## 2024-10-04 DIAGNOSIS — M51.26 LUMBAR DISC HERNIATION: ICD-10-CM

## 2024-10-04 DIAGNOSIS — Z98.1 S/P CERVICAL SPINAL FUSION: ICD-10-CM

## 2024-10-04 DIAGNOSIS — R20.0 NUMBNESS AND TINGLING OF RIGHT LEG: ICD-10-CM

## 2024-10-04 DIAGNOSIS — M79.644 BILATERAL THUMB PAIN: ICD-10-CM

## 2024-10-04 DIAGNOSIS — M54.41 CHRONIC RIGHT-SIDED LOW BACK PAIN WITH RIGHT-SIDED SCIATICA: ICD-10-CM

## 2024-10-04 DIAGNOSIS — R20.2 NUMBNESS AND TINGLING IN LEFT ARM: ICD-10-CM

## 2024-10-04 DIAGNOSIS — M79.10 MYALGIA: ICD-10-CM

## 2024-10-04 DIAGNOSIS — Z98.1 HISTORY OF LUMBAR FUSION: ICD-10-CM

## 2024-10-04 DIAGNOSIS — M54.12 CERVICAL RADICULOPATHY: ICD-10-CM

## 2024-10-04 DIAGNOSIS — M79.645 BILATERAL THUMB PAIN: ICD-10-CM

## 2024-10-04 DIAGNOSIS — M48.02 NEUROFORAMINAL STENOSIS OF CERVICAL SPINE: ICD-10-CM

## 2024-10-04 DIAGNOSIS — M53.3 SACROILIAC JOINT DYSFUNCTION OF BOTH SIDES: ICD-10-CM

## 2024-10-04 DIAGNOSIS — G89.29 CHRONIC RIGHT-SIDED LOW BACK PAIN WITH RIGHT-SIDED SCIATICA: ICD-10-CM

## 2024-10-04 DIAGNOSIS — R20.0 NUMBNESS AND TINGLING IN LEFT ARM: ICD-10-CM

## 2024-10-04 DIAGNOSIS — M18.0 OSTEOARTHRITIS OF CARPOMETACARPAL (CMC) JOINTS OF BOTH THUMBS, UNSPECIFIED OSTEOARTHRITIS TYPE: ICD-10-CM

## 2024-10-04 DIAGNOSIS — R20.2 NUMBNESS AND TINGLING OF RIGHT LEG: ICD-10-CM

## 2024-10-04 DIAGNOSIS — M54.16 LUMBAR RADICULITIS: ICD-10-CM

## 2024-10-04 PROCEDURE — 3079F DIAST BP 80-89 MM HG: CPT | Performed by: STUDENT IN AN ORGANIZED HEALTH CARE EDUCATION/TRAINING PROGRAM

## 2024-10-04 PROCEDURE — 1125F AMNT PAIN NOTED PAIN PRSNT: CPT | Performed by: STUDENT IN AN ORGANIZED HEALTH CARE EDUCATION/TRAINING PROGRAM

## 2024-10-04 PROCEDURE — 99214 OFFICE O/P EST MOD 30 MIN: CPT | Performed by: STUDENT IN AN ORGANIZED HEALTH CARE EDUCATION/TRAINING PROGRAM

## 2024-10-04 PROCEDURE — 3074F SYST BP LT 130 MM HG: CPT | Performed by: STUDENT IN AN ORGANIZED HEALTH CARE EDUCATION/TRAINING PROGRAM

## 2024-10-04 PROCEDURE — G2211 COMPLEX E/M VISIT ADD ON: HCPCS | Performed by: STUDENT IN AN ORGANIZED HEALTH CARE EDUCATION/TRAINING PROGRAM

## 2024-10-04 ASSESSMENT — PATIENT HEALTH QUESTIONNAIRE - PHQ9: CLINICAL INTERPRETATION OF PHQ2 SCORE: 0

## 2024-10-04 ASSESSMENT — PAIN SCALES - GENERAL: PAINLEVEL: 7=MODERATE-SEVERE PAIN

## 2024-10-04 ASSESSMENT — FIBROSIS 4 INDEX: FIB4 SCORE: 0.97

## 2024-10-16 ENCOUNTER — HOSPITAL ENCOUNTER (OUTPATIENT)
Facility: REHABILITATION | Age: 69
End: 2024-10-16
Attending: STUDENT IN AN ORGANIZED HEALTH CARE EDUCATION/TRAINING PROGRAM | Admitting: STUDENT IN AN ORGANIZED HEALTH CARE EDUCATION/TRAINING PROGRAM
Payer: COMMERCIAL

## 2024-10-16 ENCOUNTER — APPOINTMENT (OUTPATIENT)
Dept: RADIOLOGY | Facility: REHABILITATION | Age: 69
End: 2024-10-16
Attending: STUDENT IN AN ORGANIZED HEALTH CARE EDUCATION/TRAINING PROGRAM
Payer: COMMERCIAL

## 2024-10-16 VITALS
TEMPERATURE: 97.2 F | BODY MASS INDEX: 33.5 KG/M2 | SYSTOLIC BLOOD PRESSURE: 140 MMHG | RESPIRATION RATE: 16 BRPM | DIASTOLIC BLOOD PRESSURE: 79 MMHG | HEIGHT: 60 IN | HEART RATE: 75 BPM | OXYGEN SATURATION: 100 % | WEIGHT: 170.64 LBS

## 2024-10-16 PROCEDURE — 77002 NEEDLE LOCALIZATION BY XRAY: CPT

## 2024-10-16 PROCEDURE — 700111 HCHG RX REV CODE 636 W/ 250 OVERRIDE (IP): Mod: JZ

## 2024-10-16 PROCEDURE — 700117 HCHG RX CONTRAST REV CODE 255

## 2024-10-16 PROCEDURE — G0260 INJ FOR SACROILIAC JT ANESTH: HCPCS

## 2024-10-16 RX ORDER — LIDOCAINE HYDROCHLORIDE 10 MG/ML
INJECTION, SOLUTION EPIDURAL; INFILTRATION; INTRACAUDAL; PERINEURAL
Status: COMPLETED
Start: 2024-10-16 | End: 2024-10-16

## 2024-10-16 RX ORDER — DEXAMETHASONE SODIUM PHOSPHATE 10 MG/ML
INJECTION, SOLUTION INTRAMUSCULAR; INTRAVENOUS
Status: COMPLETED
Start: 2024-10-16 | End: 2024-10-16

## 2024-10-16 RX ADMIN — DEXAMETHASONE SODIUM PHOSPHATE 10 MG: 10 INJECTION, SOLUTION INTRAMUSCULAR; INTRAVENOUS at 15:37

## 2024-10-16 RX ADMIN — LIDOCAINE HYDROCHLORIDE 10 ML: 10 INJECTION, SOLUTION EPIDURAL; INFILTRATION; INTRACAUDAL; PERINEURAL at 15:37

## 2024-10-16 RX ADMIN — IOHEXOL 10 ML: 240 INJECTION, SOLUTION INTRATHECAL; INTRAVASCULAR; INTRAVENOUS; ORAL at 15:37

## 2024-10-16 ASSESSMENT — PAIN DESCRIPTION - PAIN TYPE
TYPE: CHRONIC PAIN
TYPE: CHRONIC PAIN

## 2024-10-16 ASSESSMENT — FIBROSIS 4 INDEX: FIB4 SCORE: 0.97

## 2024-10-24 ENCOUNTER — TELEPHONE (OUTPATIENT)
Dept: PHYSICAL MEDICINE AND REHAB | Facility: MEDICAL CENTER | Age: 69
End: 2024-10-24
Payer: COMMERCIAL

## 2024-11-01 PROCEDURE — RXMED WILLOW AMBULATORY MEDICATION CHARGE: Performed by: FAMILY MEDICINE

## 2024-11-04 PROCEDURE — RXMED WILLOW AMBULATORY MEDICATION CHARGE: Performed by: FAMILY MEDICINE

## 2024-11-04 RX ORDER — MELOXICAM 15 MG/1
15 TABLET ORAL DAILY
Qty: 90 TABLET | Refills: 3 | Status: SHIPPED | OUTPATIENT
Start: 2024-11-04

## 2024-11-04 NOTE — TELEPHONE ENCOUNTER
Received request via: Patient    Was the patient seen in the last year in this department? Yes    Does the patient have an active prescription (recently filled or refills available) for medication(s) requested? No        Does the patient have intermediate Plus and need 100-day supply? (This applies to ALL medications) Patient does not have SCP

## 2024-11-05 ENCOUNTER — PHARMACY VISIT (OUTPATIENT)
Dept: PHARMACY | Facility: MEDICAL CENTER | Age: 69
End: 2024-11-05
Payer: COMMERCIAL

## 2024-11-08 PROCEDURE — RXMED WILLOW AMBULATORY MEDICATION CHARGE: Performed by: FAMILY MEDICINE

## 2024-11-10 ENCOUNTER — PHARMACY VISIT (OUTPATIENT)
Dept: PHARMACY | Facility: MEDICAL CENTER | Age: 69
End: 2024-11-10
Payer: COMMERCIAL

## 2024-11-13 ENCOUNTER — OFFICE VISIT (OUTPATIENT)
Dept: MEDICAL GROUP | Facility: PHYSICIAN GROUP | Age: 69
End: 2024-11-13
Payer: COMMERCIAL

## 2024-11-13 VITALS
OXYGEN SATURATION: 92 % | DIASTOLIC BLOOD PRESSURE: 76 MMHG | BODY MASS INDEX: 33.77 KG/M2 | SYSTOLIC BLOOD PRESSURE: 134 MMHG | HEART RATE: 84 BPM | HEIGHT: 60 IN | TEMPERATURE: 97.8 F | RESPIRATION RATE: 16 BRPM | WEIGHT: 172 LBS

## 2024-11-13 DIAGNOSIS — I67.9 SMALL VESSEL DISEASE, CEREBROVASCULAR: ICD-10-CM

## 2024-11-13 PROCEDURE — 3075F SYST BP GE 130 - 139MM HG: CPT | Performed by: FAMILY MEDICINE

## 2024-11-13 PROCEDURE — 3078F DIAST BP <80 MM HG: CPT | Performed by: FAMILY MEDICINE

## 2024-11-13 PROCEDURE — 99213 OFFICE O/P EST LOW 20 MIN: CPT | Performed by: FAMILY MEDICINE

## 2024-11-13 ASSESSMENT — FIBROSIS 4 INDEX: FIB4 SCORE: 0.97

## 2024-11-14 NOTE — ASSESSMENT & PLAN NOTE
This is a chronic problem.  Has been noted on the patient's last 2 MRIs of her brain.  She came in today concerned since her younger sister apparently had a severe stroke.  She is also noted apparently to have an aneurysms that they were able to place a coil in.  So it does not sound like it ruptured.  The doctor had told the patient that aneurysms can run in families and she might should be checked.  I told her given that the previous 2 MRIs did not show an aneurysm she does not need to worry about that.

## 2024-11-14 NOTE — PROGRESS NOTES
Subjective:     CC: Here to discuss couple of issues.    HPI:   Mathew presents today with the following medical concerns:    Small vessel disease, cerebrovascular  This is a chronic problem.  Has been noted on the patient's last 2 MRIs of her brain.  She came in today concerned since her younger sister apparently had a severe stroke.  She is also noted apparently to have an aneurysms that they were able to place a coil in.  So it does not sound like it ruptured.  The doctor had told the patient that aneurysms can run in families and she might should be checked.  I told her given that the previous 2 MRIs did not show an aneurysm she does not need to worry about that.    Past Medical History:   Diagnosis Date    Anxiety     Arthritis     Cancer (HCC)     COPD (chronic obstructive pulmonary disease) (HCC)     GERD (gastroesophageal reflux disease)     Hypertension     Migraine     Thyroid disease        Social History     Tobacco Use    Smoking status: Former     Current packs/day: 0.00     Average packs/day: 1.5 packs/day for 50.0 years (75.0 ttl pk-yrs)     Types: Cigarettes     Start date: 1968     Quit date: 2018     Years since quittin.2    Smokeless tobacco: Never   Vaping Use    Vaping status: Some Days    Substances: Nicotine, CBD    Devices: Pre-filled or refillable cartridge, Refillable tank   Substance Use Topics    Alcohol use: Never    Drug use: Not Currently     Types: Marijuana, Methamphetamines     Comment: once in a while       Current Outpatient Medications Ordered in Epic   Medication Sig Dispense Refill    meloxicam (MOBIC) 15 MG tablet Take 1 Tablet by mouth every day. 90 Tablet 3    albuterol 108 (90 Base) MCG/ACT Aero Soln inhalation aerosol Inhale 2 Puffs every 6 hours as needed for Shortness of Breath. 8.5 g 3    SYNTHROID 88 MCG Tab Take 1 Tablet by mouth every morning on an empty stomach. 90 Tablet 3    amLODIPine (NORVASC) 10 MG Tab Take 1 Tablet by mouth every day. 90 Tablet 3     omeprazole (PRILOSEC) 40 MG delayed-release capsule Take 1 Capsule by mouth every day. 90 Capsule 3    gabapentin (NEURONTIN) 300 MG Cap Take 3 Capsules by mouth 3 times a day. 810 Capsule 1    potassium chloride SA (KDUR) 20 MEQ Tab CR Take 1 Tablet by mouth every day. 90 Tablet 3    aspirin (ASA) 81 MG Chew Tab chewable tablet Chew 81 mg every day.      buPROPion (WELLBUTRIN XL) 300 MG XL tablet Take 1 Tablet by mouth every morning. 90 Tablet 3    losartan (COZAAR) 100 MG Tab Take 1 Tablet by mouth every day. 90 Tablet 3    calcium citrate (CALCITRATE) 950 (200 Ca) MG Tab Take 1 Tablet by mouth every day.      Multiple Vitamin (MULTI-VITAMINS PO) Take  by mouth.      Riboflavin (VITAMIN B-2 PO) Take  by mouth.       No current Epic-ordered facility-administered medications on file.       Allergies:  Ace inhibitors, Chlorhexidine, Flexeril [cyclobenzaprine], and Triamterene    Health Maintenance: Completed    ROS:  Gen: no fevers/chills, no changes in weight  Eyes: no changes in vision  ENT: no sore throat, no hearing loss, no bloody nose  Pulm: no sob, no cough  CV: no chest pain, no palpitations  GI: no nausea/vomiting, no diarrhea  : no dysuria  MSk: no myalgias  Skin: no rash  Neuro: no headaches, no numbness/tingling  Heme/Lymph: no easy bruising      Objective:       Exam:  /76 (BP Location: Left arm, Patient Position: Sitting, BP Cuff Size: Adult)   Pulse 84   Temp 36.6 °C (97.8 °F) (Temporal)   Resp 16   Ht 1.524 m (5')   Wt 78 kg (172 lb)   SpO2 92%   BMI 33.59 kg/m²  Body mass index is 33.59 kg/m².    Gen: Alert and oriented, No apparent distress.  Lungs: Normal effort,   Ext: No clubbing, cyanosis, edema.  Neuro: Grossly intact      Assessment & Plan:     69 y.o. female with the following -     1. Small vessel disease, cerebrovascular  This is a chronic finding.  Patient was told all we can do to help prevent strokes in her condition is for her to continue on an aspirin a day and keep  the blood pressure well-controlled.  Also her last lipid level showed elevation of her cholesterol and LDL.  She is working on her diet and when we recheck it if it still elevated she could consider going on to a statin.  I told her none of her MRI showed evidence of an aneurysm so she does not need to worry about that.      Return if symptoms worsen or fail to improve.    Please note that this dictation was created using voice recognition software. I have made every reasonable attempt to correct obvious errors, but I expect that there are errors of grammar and possibly content that I did not discover before finalizing the note.

## 2024-11-15 ENCOUNTER — OFFICE VISIT (OUTPATIENT)
Dept: PHYSICAL MEDICINE AND REHAB | Facility: MEDICAL CENTER | Age: 69
End: 2024-11-15
Payer: COMMERCIAL

## 2024-11-15 ENCOUNTER — PHARMACY VISIT (OUTPATIENT)
Dept: PHARMACY | Facility: MEDICAL CENTER | Age: 69
End: 2024-11-15
Payer: COMMERCIAL

## 2024-11-15 ENCOUNTER — HOSPITAL ENCOUNTER (OUTPATIENT)
Dept: RADIOLOGY | Facility: MEDICAL CENTER | Age: 69
End: 2024-11-15
Attending: STUDENT IN AN ORGANIZED HEALTH CARE EDUCATION/TRAINING PROGRAM
Payer: COMMERCIAL

## 2024-11-15 VITALS
HEIGHT: 60 IN | TEMPERATURE: 97.8 F | WEIGHT: 171.52 LBS | OXYGEN SATURATION: 94 % | HEART RATE: 82 BPM | BODY MASS INDEX: 33.67 KG/M2 | SYSTOLIC BLOOD PRESSURE: 124 MMHG | DIASTOLIC BLOOD PRESSURE: 74 MMHG

## 2024-11-15 DIAGNOSIS — M79.10 MYALGIA: ICD-10-CM

## 2024-11-15 DIAGNOSIS — Z98.1 HISTORY OF LUMBAR FUSION: ICD-10-CM

## 2024-11-15 DIAGNOSIS — R52 ACUTE PAIN: ICD-10-CM

## 2024-11-15 DIAGNOSIS — R07.89 RIGHT-SIDED CHEST WALL PAIN: ICD-10-CM

## 2024-11-15 DIAGNOSIS — R07.81 RIB PAIN ON RIGHT SIDE: ICD-10-CM

## 2024-11-15 DIAGNOSIS — M53.3 SACROILIAC JOINT DYSFUNCTION OF BOTH SIDES: ICD-10-CM

## 2024-11-15 PROCEDURE — 3074F SYST BP LT 130 MM HG: CPT | Performed by: STUDENT IN AN ORGANIZED HEALTH CARE EDUCATION/TRAINING PROGRAM

## 2024-11-15 PROCEDURE — 99214 OFFICE O/P EST MOD 30 MIN: CPT | Performed by: STUDENT IN AN ORGANIZED HEALTH CARE EDUCATION/TRAINING PROGRAM

## 2024-11-15 PROCEDURE — RXMED WILLOW AMBULATORY MEDICATION CHARGE: Performed by: STUDENT IN AN ORGANIZED HEALTH CARE EDUCATION/TRAINING PROGRAM

## 2024-11-15 PROCEDURE — 1125F AMNT PAIN NOTED PAIN PRSNT: CPT | Performed by: STUDENT IN AN ORGANIZED HEALTH CARE EDUCATION/TRAINING PROGRAM

## 2024-11-15 PROCEDURE — 3078F DIAST BP <80 MM HG: CPT | Performed by: STUDENT IN AN ORGANIZED HEALTH CARE EDUCATION/TRAINING PROGRAM

## 2024-11-15 PROCEDURE — RXMED WILLOW AMBULATORY MEDICATION CHARGE: Performed by: FAMILY MEDICINE

## 2024-11-15 PROCEDURE — 71100 X-RAY EXAM RIBS UNI 2 VIEWS: CPT

## 2024-11-15 PROCEDURE — G2211 COMPLEX E/M VISIT ADD ON: HCPCS | Performed by: STUDENT IN AN ORGANIZED HEALTH CARE EDUCATION/TRAINING PROGRAM

## 2024-11-15 RX ORDER — TRAMADOL HYDROCHLORIDE 50 MG/1
25-50 TABLET ORAL EVERY 6 HOURS PRN
Qty: 28 TABLET | Refills: 0 | Status: SHIPPED | OUTPATIENT
Start: 2024-11-15 | End: 2024-11-22

## 2024-11-15 ASSESSMENT — PAIN SCALES - GENERAL: PAINLEVEL_OUTOF10: 8=MODERATE-SEVERE PAIN

## 2024-11-15 ASSESSMENT — PATIENT HEALTH QUESTIONNAIRE - PHQ9: CLINICAL INTERPRETATION OF PHQ2 SCORE: 0

## 2024-11-15 ASSESSMENT — FIBROSIS 4 INDEX: FIB4 SCORE: 0.97

## 2024-11-15 NOTE — PROGRESS NOTES
Follow-up patient Note    Interventional Pain and Spine  Physiatry (Physical Medicine and Rehabilitation)     Patient Name: Robin Lynn Zielesch  : 1955  Date of service: 11/15/2024    Chief Complaint:   Chief Complaint   Patient presents with    Follow-Up     Myalgia         HISTORY (2022):  Robin Lynn Zielesch is a 67 y.o. female who presents today with pain radiating from her right posterolateral glute down her right anterolateral and posterior thigh accompanied by numbness/tingling/weakness in this area. This started in Sep 2021 while recovering from a L2-pelvis posterior spinal fusion with TLIF/ PLIF on 21 with Dr. Bates ([facility] which she states she had done for similar radiating pain down her left leg.  Her radiating left leg pain resolved after surgery.     Her pain at its best-worse level during the course of the day is 5-9/10, respectively. Pain right now is 7/10 on the numeric pain scale. Pain worsens with sitting, standing, walking, bending backwards, side bending or twisting, walking upstairs, and walking downstairs and improves with nothing. Her pain interferes somewhat with ADLs. The patient otherwise denies new weakness, numbness, or bladder/bowel incontinence. States she has fallen a few times secondary to pain and possibly weakness. Moved from [facility] in May 2022.     The patient has done physical therapy for this problem with worsening pain.     Patient has tried the following medications with varied success (current meds in bold): Hayes back and body  Gabapentin 300mg TID - no relief. Used to help with left sided pain  Naproxen BID - significant relief     Therapeutic modalities and interventional therapies to date include:  -No injections     Medical history includes HTN, cervical laminectomy, depression, anxiety, thyroid cancer, BMI 30, L2-pelvis posterior spinal fusion with TLIF/ PLIF on 21    HPI  Today's visit   Robin Lynn Zielesch ( 1955) is a female with  Diagnoses of Right-sided chest wall pain, Rib pain on right side, Acute pain, Myalgia, History of lumbar fusion, and Sacroiliac joint dysfunction of both sides were pertinent to this visit.      History of Present Illness  The patient is a 69-year-old female who presents for a follow-up visit. Back pain has resolved after right and left Sacroiliac joint injection     She experienced a fall 3 days ago, initially without significant pain. However, she woke up the following day with severe pain, making it difficult for her to get out of bed. She reports that her lower back is in good condition, but she experiences pain when moving her right arm.    She has been using Salonpas and Aspercreme for relief. She has tried Aleve, ibuprofen, and Tylenol, but none have provided relief. She also takes meloxicam 15 mg daily. She has not tried tramadol.  She feels that hydrocodone is too strong would like to avoid it.    She has not undergone any imaging tests. She reports no neck pain or numbness. She has Narcan at home.    FAMILY HISTORY  Her  has subclavicle cancer. Her sister had a major stroke on 10/24/2024.      Procedure history:  - 11/1/22 right L2-3 interlaminar epidural steroid injection - 90% improvement in pain, resolution of radiating pain.  - 11/28/22 bilateral CMC joint injections - 100% improvement in thumb pain bilaterally  - 3/7/23 right L2-3 interlaminar epidural steroid injection -100% improvement in back pain and radiating pain, ongoing tightness in her right thigh  - 06/27/23 trigger point injections   - 11/15/23 BILATERAL ultrasound-guided 1st carpometacarpal joint injection - 100% improvement in thumb pain bilaterally  - 12/15/23 trigger point injections-no relief  - 1/5/24 right L2-3 interlaminar epidural steroid injection-resolution of low back pain and pain radiating down right leg  - 10/16/24 right and left Sacroiliac joint injection-resolution of index pain    ROS:   Red Flags ROS:   Fever,  Chills, Sweats: Denies  Involuntary Weight Loss: Denies  Bladder Incontinence: Denies  Bowel Incontinence: denies  Saddle Anesthesia: Denies    All other systems reviewed and negative.     PMHx:   Past Medical History:   Diagnosis Date    Anxiety     Arthritis     Cancer (HCC)     COPD (chronic obstructive pulmonary disease) (HCC)     GERD (gastroesophageal reflux disease)     Hypertension     Migraine     Thyroid disease        PSHx:   Past Surgical History:   Procedure Laterality Date    ND INJECTION,SACROILIAC JOINT Bilateral 10/16/2024    Procedure: RIGHT and LEFT sacroiliac joint injection with fluoroscopic guidance;  Surgeon: Kendal Jeffers M.D.;  [facility];  Service: Pain Management    ND INJ LUMBAR/SACRAL,W/ IMAGING Right 1/5/2024    Procedure: RIGHT Lumbar L2-3 interlaminar epidural steroid injection.  PT NEEDS TO HOLD ASA AND MELOXICAM PRIOR TO PROCEDURE.  PT PREFERS TO SCHEDULE ON FRIDAY IF POSS;  Surgeon: Kendal Jeffers M.D.;  Location: [facility];  Service: Pain Management    ND INJ CERV/THORAC,W/ IMAGING N/A 12/14/2023    Procedure: interlaminar cervical epidural steroid injection at T3-T4;  Surgeon: Kendal Jeffers M.D.;  Location: [facility];  Service: Pain Management    ND INJ LUMBAR/SACRAL,W/ IMAGING Right 03/07/2023    Procedure: RIGHT Lumbar L2-3 interlaminar epidural steroid injection;  Surgeon: Kendal Jeffers M.D.;  Location: [facility];  Service: Pain Management    ND INJ LUMBAR/SACRAL,W/ IMAGING Right 11/01/2022    Procedure: RIGHT lumbar L2-3 interlaminar epidural steroid injection;  Surgeon: Kendal Jeffers M.D.;  Location: [facility];  Service: Pain Management    ABDOMINAL HYSTERECTOMY TOTAL      ARTHROPLASTY      EYE SURGERY      FOOT SURGERY      HERNIA REPAIR      [id number]      L tka    LAMINOTOMY      LUMPECTOMY      PRIMARY C SECTION      THYROIDECTOMY TOTAL      2019  thyroid  cancer       Family Hx:   Family History   Problem Relation Age of Onset    COPD Mother     Cancer Father         pancreatic    Hypertension Father     Hyperlipidemia Father     Alcohol abuse Father     Drug abuse Brother        Social Hx:  Social History     Socioeconomic History    Marital status:      Spouse name: Not on file    Number of children: Not on file    Years of education: Not on file    Highest education level: Some college, no degree   Occupational History    Not on file   Tobacco Use    Smoking status: Former     Current packs/day: 0.00     Average packs/day: 1.5 packs/day for 50.0 years (75.0 ttl pk-yrs)     Types: Cigarettes     Start date: 1968     Quit date: 2018     Years since quittin.2    Smokeless tobacco: Never   Vaping Use    Vaping status: Some Days    Substances: Nicotine, CBD    Devices: Pre-filled or refillable cartridge, Refillable tank   Substance and Sexual Activity    Alcohol use: Never    Drug use: Not Currently     Types: Marijuana, Methamphetamines     Comment: once in a while    Sexual activity: Yes     Partners: Male     Birth control/protection: Female Sterilization   Other Topics Concern    Not on file   Social History Narrative    Not on file     Social Drivers of Health     Financial Resource Strain: Medium Risk (7/10/2023)    Overall Financial Resource Strain (CARDIA)     Difficulty of Paying Living Expenses: Somewhat hard   Food Insecurity: No Food Insecurity (7/10/2023)    Hunger Vital Sign     Worried About Running Out of Food in the Last Year: Never true     Ran Out of Food in the Last Year: Never true   Transportation Needs: No Transportation Needs (7/10/2023)    PRAPARE - Transportation     Lack of Transportation (Medical): No     Lack of Transportation (Non-Medical): No   Physical Activity: Insufficiently Active (7/10/2023)    Exercise Vital Sign     Days of Exercise per Week: 5 days     Minutes of Exercise per Session: 20 min   Stress: No  Stress Concern Present (7/10/2023)    [place] [employer] - Occupational Stress Questionnaire     Feeling of Stress : Only a little   Social Connections: Moderately Isolated (7/10/2023)    Social Connection and Isolation Panel [NHANES]     Frequency of Communication with Friends and Family: Never     Frequency of Social Gatherings with Friends and Family: Never     Attends Denominational Services: Never     Active Member of Clubs or Organizations: Yes     Attends Club or Organization Meetings: Never     Marital Status:    Intimate Partner Violence: Not At Risk (2/7/2019)    Received from [facility], [facility]    Humiliation, Afraid, Rape, and Kick questionnaire     Fear of Current or Ex-Partner: No     Emotionally Abused: No     Physically Abused: No     Sexually Abused: No   Housing Stability: Low Risk  (7/10/2023)    Housing Stability Vital Sign     Unable to Pay for Housing in the Last Year: No     Number of Places Lived in the Last Year: 1     Unstable Housing in the Last Year: No       Allergies:  Allergies   Allergen Reactions    Ace Inhibitors Cough    Chlorhexidine Rash    Flexeril [Cyclobenzaprine] Unspecified     Irritability     Triamterene      Other reaction(s): Nephrotoxicity       Medications: reviewed on epic.   Outpatient Medications Marked as Taking for the 11/15/24 encounter (Office Visit) with Kendal Jeffers M.D.   Medication Sig Dispense Refill    traMADol (ULTRAM) 50 MG Tab Take one-half (0.5) -1 Tablet by mouth every 6 hours as needed for Severe Pain for up to 7 days. 28 Tablet 0    meloxicam (MOBIC) 15 MG tablet Take 1 Tablet by mouth every day. 90 Tablet 3    albuterol 108 (90 Base) MCG/ACT Aero Soln inhalation aerosol Inhale 2 Puffs every 6 hours as needed for Shortness of Breath. 8.5 g 3    SYNTHROID 88 MCG Tab Take 1 Tablet by mouth every morning on an empty stomach. 90 Tablet 3    amLODIPine (NORVASC) 10 MG Tab Take 1 Tablet by mouth every day. 90  Tablet 3    omeprazole (PRILOSEC) 40 MG delayed-release capsule Take 1 Capsule by mouth every day. 90 Capsule 3    gabapentin (NEURONTIN) 300 MG Cap Take 3 Capsules by mouth 3 times a day. 810 Capsule 1    potassium chloride SA (KDUR) 20 MEQ Tab CR Take 1 Tablet by mouth every day. 90 Tablet 3    aspirin (ASA) 81 MG Chew Tab chewable tablet Chew 81 mg every day.      buPROPion (WELLBUTRIN XL) 300 MG XL tablet Take 1 Tablet by mouth every morning. 90 Tablet 3    losartan (COZAAR) 100 MG Tab Take 1 Tablet by mouth every day. 90 Tablet 3    calcium citrate (CALCITRATE) 950 (200 Ca) MG Tab Take 1 Tablet by mouth every day.      Multiple Vitamin (MULTI-VITAMINS PO) Take  by mouth.      Riboflavin (VITAMIN B-2 PO) Take  by mouth.          Current Outpatient Medications on File Prior to Visit   Medication Sig Dispense Refill    meloxicam (MOBIC) 15 MG tablet Take 1 Tablet by mouth every day. 90 Tablet 3    albuterol 108 (90 Base) MCG/ACT Aero Soln inhalation aerosol Inhale 2 Puffs every 6 hours as needed for Shortness of Breath. 8.5 g 3    SYNTHROID 88 MCG Tab Take 1 Tablet by mouth every morning on an empty stomach. 90 Tablet 3    amLODIPine (NORVASC) 10 MG Tab Take 1 Tablet by mouth every day. 90 Tablet 3    omeprazole (PRILOSEC) 40 MG delayed-release capsule Take 1 Capsule by mouth every day. 90 Capsule 3    gabapentin (NEURONTIN) 300 MG Cap Take 3 Capsules by mouth 3 times a day. 810 Capsule 1    potassium chloride SA (KDUR) 20 MEQ Tab CR Take 1 Tablet by mouth every day. 90 Tablet 3    aspirin (ASA) 81 MG Chew Tab chewable tablet Chew 81 mg every day.      buPROPion (WELLBUTRIN XL) 300 MG XL tablet Take 1 Tablet by mouth every morning. 90 Tablet 3    losartan (COZAAR) 100 MG Tab Take 1 Tablet by mouth every day. 90 Tablet 3    calcium citrate (CALCITRATE) 950 (200 Ca) MG Tab Take 1 Tablet by mouth every day.      Multiple Vitamin (MULTI-VITAMINS PO) Take  by mouth.      Riboflavin (VITAMIN B-2 PO) Take  by mouth.        No current facility-administered medications on file prior to visit.         EXAMINATION     Physical Exam:   /74 (BP Location: Right arm, Patient Position: Sitting, BP Cuff Size: Adult)   Pulse 82   Temp 36.6 °C (97.8 °F) (Temporal)   Ht 1.524 m (5')   Wt 77.8 kg (171 lb 8.3 oz)   SpO2 94%     Constitutional:   Body Habitus: Body mass index is 33.5 kg/m².  Cooperation: Fully cooperates with exam  Appearance: Well-groomed, well-nourished.    Eyes: No scleral icterus to suggest severe liver disease, no proptosis to suggest severe hyperthyroidism    ENT -no obvious auditory deficits, no noticeable facial droop     Skin -no rashes or lesions noted     Respiratory-  breathing comfortably on room air, no audible wheezing    Cardiovascular-distal extremities warm and well perfused.  No lower extremity edema is noted.     Gastrointestinal - no obvious abdominal masses, non-distended    Psychiatric- alert and oriented ×3. Normal affect.     Gait - normal gait, no use of ambulatory device, nonantalgic.     Musculoskeletal and Neuro -   Tenderness to palpation over the right upper scapular region and right upper chest.  Visible bruising at right scapular region.  Pain with right shoulder abduction and flexion.  Limited active range of motion secondary to pain    Sensation intact to light touch in bilateral upper remedies    Thoracic/Lumbar Spine/Sacral Spine/Hips   Palpation:   No tenderness to palpation across bilateral upper glutes.  No tenderness to palpation in the low back/hips including midline of lumbosacral spine, paraspinal muscles bilaterally, lumbar facets bilaterally, sacroiliac joints bilaterally, PSIS bilaterally, and greater trochanters bilaterally.    Inspection: No evidence of atrophy in bilateral lower extremities throughout      Slump test negative bilaterally    Key points for the international standards for neurological classification of spinal cord injury (ISNCSCI) to light touch.    Dermatome R L   L2 2 2   L3 2 2   L4 2 2   L5 2 2   S1 2 2   S2 2 2         Motor Exam Lower Extremities  ? Myotome R L   Hip flexion L2 5 5   Knee extension L3 5 5   Ankle dorsiflexion L4 5 5   Toe extension L5 5 5   Ankle plantarflexion S1 5 5          Previous exam  Cervical spine   Inspection: No deformities of the skin over the cervical spine. No rashes or lesions.    limited active range of motion in all directions    Spurling's sign  negative bilaterally  Cervical facet loading maneuver  negative bilaterally    Tenderness to palpation at paracervical muscles on left and upper trapezius on left. No tenderness to palpation elsewhere including midline of cervical spine and paracervical muscles on right.    Previous exam  Key points for the international standards for neurological classification of spinal cord injury (ISNCSCI) to light touch.     Dermatome R L   C4 2 2   C5 2 2   C6 2 2   C7 2 2   C8 2 2   T1 2 2   T2 2 2       Motor Exam Upper Extremities   ? Myotome R L   Shoulder abduction C5 5 5   Elbow flexion C5 5 5   Wrist extension C6 5 5   Elbow extension C7 5 5   Finger flexion C8 5 5   Finger abduction T1 5 5     Reflexes  2+ bilateral biceps, brachialis, triceps.  Negative Eric's        Full AROM of bilateral shoulders  There is full active range of motion with lumbar extension     Facet loading maneuver negative bilaterally     Heel walking and toe walking intact.       Reflexes  ?   R L   Patella   2+ 2+   Achilles    2+ 2+      Clonus of the ankle negative bilaterally        MEDICAL DECISION MAKING     Medical records review: see under HPI section.       MEDICAL DECISION MAKING    Medical records review: see under HPI section.     DATA    Labs: No new labs available for review since last visit.    Lab Results   Component Value Date/Time    SODIUM 144 07/05/2024 07:15 AM    POTASSIUM 5.2 07/05/2024 07:15 AM    CHLORIDE 104 07/05/2024 07:15 AM    CO2 28 07/05/2024 07:15 AM    ANION 12.0  "07/05/2024 07:15 AM    GLUCOSE 111 (H) 07/05/2024 07:15 AM    BUN 23 (H) 07/05/2024 07:15 AM    CREATININE 0.92 07/05/2024 07:15 AM    CALCIUM 11.0 (H) 07/05/2024 07:15 AM    ASTSGOT 19 07/05/2024 07:15 AM    ALTSGPT 15 07/05/2024 07:15 AM    TBILIRUBIN 0.6 07/05/2024 07:15 AM    ALBUMIN 4.5 07/05/2024 07:15 AM    TOTPROTEIN 7.2 07/05/2024 07:15 AM    GLOBULIN 2.7 07/05/2024 07:15 AM    AGRATIO 1.7 07/05/2024 07:15 AM       No results found for: \"PROTHROMBTM\", \"INR\"     Lab Results   Component Value Date/Time    WBC 10.1 07/05/2024 07:15 AM    RBC 5.24 07/05/2024 07:15 AM    HEMOGLOBIN 15.4 07/05/2024 07:15 AM    HEMATOCRIT 48.0 (H) 07/05/2024 07:15 AM    MCV 91.6 07/05/2024 07:15 AM    MCH 29.4 07/05/2024 07:15 AM    MCHC 32.1 (L) 07/05/2024 07:15 AM    MPV 9.2 07/05/2024 07:15 AM    NEUTSPOLYS 65.00 07/05/2024 07:15 AM    LYMPHOCYTES 18.00 (L) 07/05/2024 07:15 AM    MONOCYTES 8.00 07/05/2024 07:15 AM    EOSINOPHILS 7.70 (H) 07/05/2024 07:15 AM    BASOPHILS 0.90 07/05/2024 07:15 AM        Lab Results   Component Value Date/Time    HBA1C 5.8 (H) 04/28/2022 10:39 AM        Imaging:   I personally reviewed following images, these are my reads  MRI cervical spine 11/23/23  Fusion hardware at C5-C7.  At C7-T1 there is moderate-severe bilateral neuroforaminal stenosis.  At C6-C7 there is borderline left neuroforaminal stenosis.  At C5-6 there is mild left-sided neuroforaminal stenosis.  At C3-4 and C4-5 there is moderate right-sided neuroforaminal stenosis.See formal radiology report for further details.      MRI lumbar spine 9/2/2022  Evidence of lumbar laminectomy at L4-S1, interbody fusion and posterior fusion at L3-S1.  Broad-based disc bulge at L1-L2 resulting in severe central canal stenosis and compression of descending bilateral L2 nerve roots and possibly bilateral L3 nerve roots and mild impingement of exiting L1 nerve roots bilaterally.  Mild right neuroforaminal stenosis at T12-L1.  Possible mild bilateral " neuroforaminal stenosis at L5-S1, quality of images impaired due to metallic artifact. Appearance of chronic postoperative seroma posterior to L4 and L5 vertebral bodies.    XR Right hand 11/22/22  Moderate to severe CMC joint OA. See formal radiology report for further details.    IMAGING radiology reads. I reviewed the following radiology reads   MRI cervical spine 11/23/23  FINDINGS:  Images are degraded by patient motion artifact. Alignment in the cervical spine shows mild degenerative anterolisthesis of C7 on T1.     There is postoperative change with anterior cervical fusion hardware at C5-C6-C7. Expected hardware artifact.     Marrow signal in the vertebral bodies is otherwise unremarkable.     The prevertebral and paraspinous soft tissues are unremarkable.     There are no anomalies at the craniovertebral junction.  The cervical spinal cord is normal in caliber and signal throughout its course.     At C2-3, no significant disc bulge or protrusion. No central stenosis. Mild bilateral foraminal stenosis.     At C3-4, mild disc-osteophyte change. Small impression on the ventral subarachnoid space. No central stenosis. Moderate bilateral foraminal stenosis due to spondylotic change.     C4-C5, no significant disc bulge or protrusion. No central stenosis. The left neural foramen appears intact. Moderate right foraminal stenosis.     C5-C6 posterior bony ridging. Minimal ligamentum flavum hypertrophy. No candace central stenosis. The right neural foramen is intact. Mild left foraminal stenosis due to uncinate spondylosis.     C6-C7, no significant disc bulge or protrusion. Endplate hypertrophy contributing to mild central stenosis (T2 sagittal image 10, series 2, T2 axial image 12, series 8). Mild left-sided posterior endplate spurring and uncinate hypertrophy with borderline   left lateral recess stenosis and borderline left foraminal stenosis. The right neural foramen is intact.     C7-T1, moderate disc/osteophyte  change accentuated by anterolisthesis. Partial effacement of ventral subarachnoid space. Thecal sac at the lower range of normal without candace central stenosis. Moderate-marked bilateral foraminal stenosis best seen on the   sagittal images.     IMPRESSION:     1.  Postoperative anterior cervical fusion with hardware fixation C5-C6-C7.  2.  Multilevel degenerative changes most notable for C6-C7 mild central stenosis at Q7-[id number] bilateral foraminal stenosis.  3.  Details for each level above in the body of the report.  4.  No myelopathic cord signal is appreciated.    Results for orders placed during the hospital encounter of 09/02/22    MR-LUMBAR SPINE-W/O    Impression  1.  L3-4 slight anterolisthesis and L5-S1 slight retrolisthesis.  2.  Postoperative changes with lumbar laminectomy L4-L5-S1, interbody fusion L3-L4, L4-L5, L5-S1, and posterior fusion with transpedicular screw fixation at L3-L4-L5-S1.  3.  Chronic postoperative seroma occupying the laminectomy interval without dorsal epidural mass effect.  4.  The study is most notable for L1-L2 large disc protrusion-extrusion resulting in severe central stenosis.  5.  Additional degenerative changes detailed for each level above in the body of report.        MRI lumbar spine 10/20/21  Lumbar spine:    Alignment: Grade 1 L3-L4 anterolisthesis. Previously seen L4-L5  anterolisthesis is improved compared to MRI prior to surgery.    Vertebral body heights and marrow: Postsurgical changes from L3-S1  posterior fusion. Multilevel lumbar spondylosis with osteophytes, facet  arthropathy, and endplate marrow degenerative changes are seen. Otherwise  the vertebral body heights and marrow signal are unremarkable.    Conus medullaris: Normal, terminating at L1/L2.    Soft tissues: There is a fluid collection in the paraspinal soft tissues  posterior to the L3-L5 laminectomy sites, likely postoperative seroma  measuring 63 x 28 mm (series 17, image 7). Small right renal  cyst.    L1-L2: Persistent disc bulge with worsening superimposed central disc  extrusion. Bilateral facet arthropathy and dorsal epidural fat. The  constellation of findings produces moderate to severe spinal canal  stenosis. Mild to moderate left neural foraminal stenosis is improved  compared to prior.  Ligamentum flavum thickening. Facet hypertrophy, mild.    L2-L3: Disc bulge, ligamentum flavum thickening, facet hypertrophy  resulting in mild spinal canal stenosis. Mild left neural foraminal  stenosis.    L3-L4: Partially uncovered disc. Mild to moderate right neural foraminal  stenosis. Limited evaluation of the left neural foramen. Laminectomy.    L4-L5: No spinal canal stenosis. Limited evaluation of neural foramina due  to spinal hardware. Laminectomy.    L5-S1: Disc bulge without spinal canal stenosis. Limited evaluation of  neural foramina due to spinal hardware.     IMPRESSION:    1. Worsening disc protrusion at L1-L2, resulting in worsening spinal  canal stenosis, now moderate to severe.   2. Multilevel degenerative changes of the cervical spine, similar  compared to prior.  3. Multilevel degenerative changes in thoracic spine, with up to mild  to moderate spinal canal stenosis at T8-T9 secondary to disc bulge.  4. Postsurgical changes . Small postoperative seroma in L3-L5  laminectomy sites.        X-ray left hand 3/19/2019  FINDINGS:   There is no acute fracture or dislocation.   Advanced osteoarthrosis of the first CMC joint, characterized by joint   space narrowing, subchondral sclerosis, osteophyte formation, and radial   subluxation. Mild osteoarthrosis of the STT joint. Osteoarthrosis of   scattered IP joints. No focal soft tissue swelling.     IMPRESSION:   Advanced osteoarthrosis of the first CMC joint.     XR Right hand 11/22/22  FINDINGS:     BONE MINERALIZATION: Normal.  JOINTS: Moderate to severe first carpometacarpal joint osteoarthrosis. Mild triscaphe joint osteoarthrosis. Mild  multifocal osteoarthrosis otherwise. No erosions.  FRACTURE: None.  DISLOCATION: None.  SOFT TISSUES: No mass.     IMPRESSION:     1.  Moderate to severe first carpometacarpal joint osteoarthrosis.  2.  Mild multifocal osteoarthrosis otherwise.                                                  Diagnosis  Visit Diagnoses     ICD-10-CM   1. Right-sided chest wall pain  R07.89   2. Rib pain on right side  R07.81   3. Acute pain  R52   4. Myalgia  M79.10   5. History of lumbar fusion  Z98.1   6. Sacroiliac joint dysfunction of both sides  M53.3       ASSESSMENT AND PLAN:  Robin Lynn Zielesch (: 1955) is a female with history of HTN, cervical laminectomy, depression, anxiety, thyroid cancer, BMI 30, L3-pelvis posterior spinal fusion with TLIF/ PLIF on 21.      Mathew was seen today for follow-up.    Diagnoses and all orders for this visit:    Right-sided chest wall pain  -     [id number] (W/O CXR); Future  -     traMADol (ULTRAM) 50 MG Tab; Take one-half (0.5) -1 Tablet by mouth every 6 hours as needed for Severe Pain for up to 7 days.    Rib pain on right side  -     [id number] (W/O CXR); Future  -     traMADol (ULTRAM) 50 MG Tab; Take one-half (0.5) -1 Tablet by mouth every 6 hours as needed for Severe Pain for up to 7 days.    Acute pain  -     traMADol (ULTRAM) 50 MG Tab; Take one-half (0.5) -1 Tablet by mouth every 6 hours as needed for Severe Pain for up to 7 days.    Myalgia    History of lumbar fusion    Sacroiliac joint dysfunction of both sides    Other orders  -     Consent for Opiate Prescription                  PLAN  Physical Therapy: discussed referral to physical therapy for low back pain.  She declined a physical therapy referral as she has done extensive physical therapy in the past which worsened her symptoms     Diagnostic workup: Order x-ray ribs to assess for fracture.  Discussed that if fracture is seen, it is often managed conservatively as we are doing, however it may help  us prognosticate how long to anticipate her pain will last    Medications:   -I have discussed that she should continue gabapentin, Mobic as per PCP  -Discussed that she should not combine Mobic with any other NSAIDs  -Prescribed tramadol 25-50 mg every 6 hours as needed as above.  Discussed that she should take this as sparingly as possible    Last opioid risk scale: 8/19/2022  Last controlled substance agreement: 11/15/2024    reviewed: 11/15/2024   Naloxone prescribed: Discussed prescription and offered it.  The patient declined saying she has at home already       Opioid Risk Score: 8    Interpretation of Opioid Risk Score   Score 0-3 = Low risk of abuse. Do UDS at least once per year.  Score 4-7 = Moderate risk of abuse. Do UDS 1-4 times per year.  Score 8+ = High risk of abuse. Refer to specialist.     I reviewed the     In prescribing controlled substances to this patient, I certify that I have obtained and reviewed the medical history of Robin Lynn Zielesch. I have also made a good sidney effort to obtain applicable records from other providers who have treated the patient and records did not demonstrate any increased risk of substance abuse that would prevent me from prescribing controlled substances.     I have conducted a physical exam and documented it. I have reviewed Ms. Zielesch’s prescription history as maintained by the [place] Prescription Monitoring Program.     I have assessed the patient’s risk for abuse, dependency, and addiction using the validated Opioid Risk Tool available at https://www.mdcalc.com/qxfluo-fxpj-tlux-ort-narcotic-abuse.     Given the above, I believe the benefits of controlled substance therapy outweigh the risks. The reasons for prescribing controlled substances include non-narcotic, oral analgesic alternatives have been inadequate for pain control. Accordingly, I have discussed the risk and benefits, treatment plan, and alternative therapies with the patient.      Interventions:   -Bilateral sacroiliac joint injections under fluoroscopic guidance as needed  -S/p trial of trigger point injections targeting left neck area with no relief  - s/p attempted and aborted left cervical epidural steroid injection at T2-3 and T3-4 due to calcified ligament.  -right L2-3 interlaminar epidural steroid injection as needed given significant improvement with this procedure in the past  -Bilateral CMC joint steroid injections under ultrasound guidance PRN given significant improvement in pain with this procedure in the past.     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  Given her hx of significant improvement in pain after our previous epidural steroid injection and no neurologic deficits on exam today, I believe it is reasonable to defer a neurosurgery referral at this time  -Discussed that she could consider deep myofascial release techniques including a foam roller for her right thigh  -Continue follow-up with Dr. Hood at [facility]. Discussed that perhaps a referral to an orthotist could be considered    Follow-up: 3 months for routine follow-up or sooner as needed.  Discussed that I will reach out to her with results of her chest x-ray    Orders Placed This Encounter    [id number] (W/O CXR)    traMADol (ULTRAM) 50 MG Tab    Consent for Opiate Prescription       Kendal Jeffers MD  Interventional Pain and Spine  Physical Medicine and Rehabilitation  [facility]      The above note documents my personal evaluation of this patient. In addition, I have reviewed and confirmed with the patient and MA the supportive information documented in today's Patient Health Questionnaire and Office Note.     Please note that this dictation was created using voice recognition software. I have made every reasonable attempt to  correct obvious errors, but I expect that there are errors of grammar and possibly content that I did not discover before finalizing the note.

## 2024-11-16 ENCOUNTER — PHARMACY VISIT (OUTPATIENT)
Dept: PHARMACY | Facility: MEDICAL CENTER | Age: 69
End: 2024-11-16
Payer: COMMERCIAL

## 2024-11-18 PROCEDURE — RXMED WILLOW AMBULATORY MEDICATION CHARGE: Performed by: FAMILY MEDICINE

## 2024-11-18 RX ORDER — LOSARTAN POTASSIUM 100 MG/1
100 TABLET ORAL DAILY
Qty: 90 TABLET | Refills: 3 | Status: SHIPPED | OUTPATIENT
Start: 2024-11-18

## 2024-11-18 RX ORDER — BUPROPION HYDROCHLORIDE 300 MG/1
300 TABLET ORAL EVERY MORNING
Qty: 90 TABLET | Refills: 3 | Status: SHIPPED | OUTPATIENT
Start: 2024-11-18

## 2024-11-18 NOTE — TELEPHONE ENCOUNTER
Received request via: Patient    Was the patient seen in the last year in this department? Yes    Does the patient have an active prescription (recently filled or refills available) for medication(s) requested? No    Pharmacy Name:   [facility] [place]   [address], [address]  [facility] [address]  Phone: [phone] Fax: [phone]        Does the patient have USP Plus and need 100-day supply? (This applies to ALL medications) Patient does not have SCP

## 2024-11-20 ENCOUNTER — OCCUPATIONAL MEDICINE (OUTPATIENT)
Dept: URGENT CARE | Facility: PHYSICIAN GROUP | Age: 69
End: 2024-11-20
Payer: COMMERCIAL

## 2024-11-20 ENCOUNTER — NON-PROVIDER VISIT (OUTPATIENT)
Dept: URGENT CARE | Facility: PHYSICIAN GROUP | Age: 69
End: 2024-11-20
Payer: COMMERCIAL

## 2024-11-20 ENCOUNTER — PHARMACY VISIT (OUTPATIENT)
Dept: PHARMACY | Facility: MEDICAL CENTER | Age: 69
End: 2024-11-20
Payer: COMMERCIAL

## 2024-11-20 ENCOUNTER — NON-PROVIDER VISIT (OUTPATIENT)
Dept: OCCUPATIONAL MEDICINE | Facility: CLINIC | Age: 69
End: 2024-11-20
Payer: COMMERCIAL

## 2024-11-20 VITALS
SYSTOLIC BLOOD PRESSURE: 118 MMHG | HEART RATE: 90 BPM | OXYGEN SATURATION: 91 % | BODY MASS INDEX: 32.89 KG/M2 | RESPIRATION RATE: 18 BRPM | DIASTOLIC BLOOD PRESSURE: 70 MMHG | WEIGHT: 167.55 LBS | TEMPERATURE: 97.1 F | HEIGHT: 60 IN

## 2024-11-20 DIAGNOSIS — R07.9 RIGHT-SIDED CHEST PAIN: ICD-10-CM

## 2024-11-20 DIAGNOSIS — M79.601 RIGHT ARM PAIN: ICD-10-CM

## 2024-11-20 DIAGNOSIS — S40.011A CONTUSION OF RIGHT SHOULDER, INITIAL ENCOUNTER: ICD-10-CM

## 2024-11-20 DIAGNOSIS — Z02.83 ENCOUNTER FOR DRUG SCREENING: ICD-10-CM
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
## 2024-11-20 DIAGNOSIS — Z02.1 PRE-EMPLOYMENT DRUG SCREENING: ICD-10-CM

## 2024-11-20 LAB
AMP AMPHETAMINE: NORMAL
BAR BARBITURATES: NORMAL
BREATH ALCOHOL COMMENT: NORMAL
BZO BENZODIAZEPINES: NORMAL
COC COCAINE: NORMAL
INT CON NEG: NEGATIVE
INT CON POS: POSITIVE
MDMA ECSTASY: NORMAL
MET METHAMPHETAMINES: NORMAL
MTD METHADONE: NORMAL
OPI OPIATES: NORMAL
OXY OXYCODONE: NORMAL
PCP PHENCYCLIDINE: NORMAL
POC BREATHALIZER: 0 PERCENT (ref 0–0.01)
POC URINE DRUG SCREEN OCDRS: NORMAL
THC: NORMAL

## 2024-11-20 PROCEDURE — 3074F SYST BP LT 130 MM HG: CPT

## 2024-11-20 PROCEDURE — 3078F DIAST BP <80 MM HG: CPT

## 2024-11-20 PROCEDURE — 82075 ASSAY OF BREATH ETHANOL: CPT

## 2024-11-20 PROCEDURE — RXMED WILLOW AMBULATORY MEDICATION CHARGE

## 2024-11-20 PROCEDURE — 8911 PR MRO FEE: Performed by: NURSE PRACTITIONER

## 2024-11-20 PROCEDURE — 80305 DRUG TEST PRSMV DIR OPT OBS: CPT

## 2024-11-20 PROCEDURE — 99213 OFFICE O/P EST LOW 20 MIN: CPT

## 2024-11-20 ASSESSMENT — FIBROSIS 4 INDEX: FIB4 SCORE: 0.97

## 2024-11-20 NOTE — LETTER
EMPLOYEE’S CLAIM FOR COMPENSATION/ REPORT OF INITIAL TREATMENT  FORM C-4  PLEASE TYPE OR PRINT    EMPLOYEE’S CLAIM - PROVIDE ALL INFORMATION REQUESTED   First Name                    MEY Carmona                  Last Name  Zielesch Birthdate                    1955                Sex  Female Claim Number (Insurer’s Use Only)     Home Address  [address] Age  69 y.o. 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 (if applicable)  Walking down the hallway    How did this injury or occupational disease occur? (Be specific and answer in detail. Use additional sheet if necessary)  My shoes were non-slip - shoe stopped & my body kept going   If you believe that you have an occupational disease, when did you first have knowledge of the disability and its relationship to your employment?   Witnesses to the Accident (if applicable)  Rosa      Nature of Injury or Occupational Disease  Strain  Part(s) of Body Injured or Affected  Shoulder (R)      I CERTIFY THAT THE ABOVE IS TRUE AND CORRECT TO T HE BEST OF MY  KNOWLEDGE AND THAT I HAVE PROVIDED THIS INFORMATION IN ORDER TO OBTAIN THE BENEFITS OF [place], OR [place]).  I HEREBY AUTHORIZE ANY PHYSICIAN, CHIROPRACTOR, SURGEON, PRACTITIONER OR ANY OTHER PERSON, ANY HOSPITAL, INCLUDING [facility] OR [facility], ANY  MEDICAL SERVICE ORGANIZATION, ANY INSURANCE COMPANY, OR OTHER INSTITUTION OR ORGANIZATION TO RELEASE TO EACH OTHER, ANY MEDICAL OR OTHER INFORMATION, INCLUDING BENEFITS PAID OR PAYABLE, PERTINENT TO THIS INJURY OR DISEASE, EXCEPT INFORMATION RELATIVE TO DIAGNOSIS, TREATMENT AND/OR COUNSELING FOR AIDS, PSYCHOLOGICAL CONDITIONS, ALCOHOL OR CONTROLLED SUBSTANCES, FOR WHICH I MUST GIVE SPECIFIC AUTHORIZATION.  A PHOTOSTAT OF THIS AUTHORIZATION SHALL BE VALID AS THE ORIGINAL.     Date   Place Employee’s Original or  *Electronic Signature   THIS REPORT MUST BE COMPLETED AND MAILED WITHIN 3 WORKING DAYS OF TREATMENT   Place  [facility]    Name of Facility  [place]   Date 11/20/2024 Diagnosis and Description of Injury or Occupational Disease  (R07.9) Right-sided chest pain  (Y99.0) Work related injury  (M79.601) Right arm pain  Diagnoses of Right-sided chest pain, Work related injury, and Right arm pain were pertinent to this visit. Is there evidence that the injured employee was under the influence of alcohol and/or another controlled substance at the time of accident?  []No  [] Yes (if yes, please explain)   Hour 9:33 AM  No   Treatment: See d-39    Have you advised the patient to remain off work five days or more?   [] Yes Indicate dates: From   To    [] No      If no, is the injured employee capable of: [] full duty [] modified duty                     If modified duty, specify any limitations / restrictions:  See d-39                                                                                                                                                                                                                                                                                                                                                                                                                X-Ray Findings: Negative    From information given by the employee, together with medical evidence, can you directly connect this injury or occupational disease as job incurred?  []Yes   [] No Yes    Is additional medical care by a physician indicated? []Yes [] No  Yes    Do you know of any previous injury or disease contributing to this condition or occupational disease? []Yes [] No (Explain if yes)                          No   Date  11/20/2024 Print Health Care Provider’s Name  TAISHA Pulido I certify that the employer’s copy of  this form was delivered to the employer on:   Address  [address]  [facility] INSURER'S USE ONLY                       [facility]  [address] Provider’s Tax ID Number  [id number]   Telephone  Dept: [phone]    Health Care Provider’s Original or Electronic Signature  e-EDUARDO Lynn Degree (MD,DO, DC,PA-C,APRN)  APRN  Choose (if applicable)      ORIGINAL - TREATING HEALTHCARE PROVIDER PAGE 2 - INSURER/TPA PAGE 3 - EMPLOYER PAGE 4 - EMPLOYEE             Form C-4 (rev.08/23)

## 2024-11-20 NOTE — PROGRESS NOTES
Robin Lynn Zielesch is a 69 y.o. female who presents for Fall (NEW WC DOI 11/12/2024. Pt states she landed on her (R) breast and states she has been having (R) breast pain, back pain and down the (R) arm)    DOI: 11/12/24 Pt was wearing non skid shoes and she tripped forward and fell directly on her right chest. She did not fall on an outstretched arm.  She did not hit her head.  She had immediate pain to the right upper chest and knocked the wind out of her.  Her right arm, shoulder and upper back did not start hurting until the next day.  She had an appointment with her pain doctor and brought up her fall. They completed a right rib series and found no rib fractures.  Pt was started on tramadol but the pain has persisted.  No worsening but not getting better.   She has not hurt her upper back, arm or chest before. This is her only form of employment        PMH:   No pertinent past medical history to this problem  MEDS:  Medications were reviewed in EMR  ALLERGIES:  Allergies were reviewed in EMR  FH:   No pertinent family history to this problem       Objective:     /70 (BP Location: Left arm, Patient Position: Sitting, BP Cuff Size: Adult)   Pulse 90   Temp 36.2 °C (97.1 °F) (Temporal)   Resp 18   Ht 1.524 m (5')   Wt 76 kg (167 lb 8.8 oz)   SpO2 91%   BMI 32.72 kg/m²     Physical Exam    Gen: no acute distress, normal voice  Skin: dry, intact, moist mucosal membranes  Head: Atraumatic, normocephalic  Psych: normal affect, normal judgement, alert, awake  Musculoskeletal: tender to right anterior upper chest, no bony deformity.  Tender to scapula, no bony deformity, tenderness to right muscles of right upper arm.  Full ROM in right shoulder.  No tenderness at elbow, wrist or hand on right side          Assessment/Plan:     IMPRESSION:  Pt has stable vital signs and no red flag symptoms or exam findings identified.      1. Right-sided chest pain  - tizanidine (ZANAFLEX) 4 MG Tab; Take 1 Tablet by mouth  every 6 hours as needed (arm pain).  Dispense: 30 Tablet; Refill: 3    2. Work related injury  - tizanidine (ZANAFLEX) 4 MG Tab; Take 1 Tablet by mouth every 6 hours as needed (arm pain).  Dispense: 30 Tablet; Refill: 3    3. Right arm pain    4. Contusion of right shoulder, initial encounter    -Release to restricted duty.  She feels like she can not work tomorrow and would like to rest and go back on her regular scheduled day on Monday. She is started on muscles relaxers as the tramadol is not helping much and she is almost out of it.  She will not take them together   -Work restrictions   No work 11/21/24  May return on next scheduled day on 11/25  Light duty  No lifting, pushing or pulling with right arm     Differential diagnosis, natural history, supportive care, and indications for immediate follow-up discussed.

## 2024-11-20 NOTE — LETTER
PHYSICIAN’S AND CHIROPRACTIC PHYSICIAN'S   PROGRESS REPORT   CERTIFICATION OF DISABILITY Claim Number:     Social Security Number:    Patient’s Name: Robin Zielesch Date of Injury: 11/12/2024   Employer: [employer] Name of O (if applicable):      Patient’s Job Description/Occupation: PAR       Previous Injuries/Diseases/Surgeries Contributing to the Condition:         Diagnosis: (R07.9) Right-sided chest pain  (Y99.0) Work related injury  (M79.601) Right arm pain      Related to the Industrial Injury? Yes     Explain: DOI: 11/12/24 Pt was wearing non skid shoes and she tripped forward and fell directly on her right chest. She did not fall on an outstretched arm.  She did not hit her head.  She was having pain to the right upper chest and went to see her pain doctor.  Pt had xrays of right ribs and found no rib fractures.  Pt was started on tramadol.        Objective Medical Findings: Gen: no acute distress, normal voice  Skin: dry, intact, moist mucosal membranes  Head: Atraumatic, normocephalic  Psych: normal affect, normal judgement, alert, awake  Musculoskeletal: tender to right anterior upper chest, no bony deformity.  Tender to scapula, no bony deformity, tenderness to right muscles of right upper arm.  Full ROM in right shoulder.  No tenderness at elbow, wrist or hand on right side             None - Discharged                         Stable  No                 Ratable  No        Generally Improved                         Condition Worsened               X   Condition Same  May Have Suffered a Permanent Disability No     Treatment Plan:              No Change in Therapy                  PT/OT Prescribed                      Medication May be Used While Working        Case Management                          PT/OT Discontinued    Consultation    Further Diagnostic Studies:    Prescription(s)                 Released to FULL DUTY /No Restrictions on (Date):       Certified TOTALLY TEMPORARILY  DISABLED (Indicate Dates) From:   To:    X  Released to RESTRICTED/Modified Duty on (Date): From: 11/20/2024 To: 11/29/2024  Restrictions Are:         No Sitting    No Standing X   No Pulling Other: No work 11/21/24  May return on next scheduled day on 11/25  Light duty  No lifting, pushing or pulling with right arm        No Bending at Waist     No Stooping X    No Lifting    X    No Carrying     No Walking Lifting Restricted to (lbs.):       X   No Pushing        No Climbing     No Reaching Above Shoulders       Date of Next Visit:  11/29/2024 Date of this Exam: 11/20/2024 Physician/Chiropractic Physician Name: TAISHA Pulido Physician/Chiropractic Physician Signature:  Brett Montero DO MPH     [place]:  [address] [place] [address], [address] [place], [place] [address] - Telephone [phone] [place]:  [address] [place] [address], [address] [place], [place] [address] - Telephone [phone]    [email or web address]  D-39 (Rev. 10/24)

## 2024-11-29 ENCOUNTER — OCCUPATIONAL MEDICINE (OUTPATIENT)
Dept: URGENT CARE | Facility: PHYSICIAN GROUP | Age: 69
End: 2024-11-29
Payer: COMMERCIAL

## 2024-11-29 VITALS
OXYGEN SATURATION: 97 % | DIASTOLIC BLOOD PRESSURE: 74 MMHG | HEART RATE: 86 BPM | SYSTOLIC BLOOD PRESSURE: 122 MMHG | HEIGHT: 60 IN | RESPIRATION RATE: 16 BRPM | BODY MASS INDEX: 32.79 KG/M2 | WEIGHT: 167 LBS | TEMPERATURE: 97.2 F

## 2024-11-29 DIAGNOSIS — S40.011D CONTUSION OF RIGHT SHOULDER, SUBSEQUENT ENCOUNTER: ICD-10-CM
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
## 2024-11-29 DIAGNOSIS — S43.401D SPRAIN OF RIGHT SHOULDER, SUBSEQUENT ENCOUNTER: ICD-10-CM

## 2024-11-29 RX ORDER — LIDOCAINE 50 MG/G
1 PATCH TOPICAL EVERY 24 HOURS
Qty: 20 PATCH | Refills: 0 | Status: SHIPPED | OUTPATIENT
Start: 2024-11-29

## 2024-11-29 ASSESSMENT — ENCOUNTER SYMPTOMS
NECK PAIN: 0
MYALGIAS: 0
BACK PAIN: 0
FALLS: 1

## 2024-11-29 ASSESSMENT — FIBROSIS 4 INDEX: FIB4 SCORE: 0.97

## 2024-11-29 NOTE — LETTER
PHYSICIAN’S AND CHIROPRACTIC PHYSICIAN'S   PROGRESS REPORT   CERTIFICATION OF DISABILITY Claim Number:     Social Security Number:    Patient’s Name: Robin Zielesch Date of Injury: 11/12/2024   Employer: [employer] Name of O (if applicable):      Patient’s Job Description/Occupation: PAR       Previous Injuries/Diseases/Surgeries Contributing to the Condition:  N/A      Diagnosis: (S40.011D) Contusion of right shoulder, subsequent encounter  (S43.401D) Sprain of right shoulder, subsequent encounter  (Y99.0) Work related injury      Related to the Industrial Injury? Yes     Explain: Fell while performing job duties      Objective Medical Findings: Slight TTP in anterior deltoid.  Full ROM throughout RUE  No obvious signs of trauma, deformity, or ecchymosis         None - Discharged                         Stable  No                 Ratable  No     X   Generally Improved                         Condition Worsened                  Condition Same  May Have Suffered a Permanent Disability No     Treatment Plan:    Continue zanaflex as needed  Tylenol as needed  Lidocaine patch 24 hours on 24 hours off  Moderate activity and movement, avoid heavy lifting         No Change in Therapy                  PT/OT Prescribed                      Medication May be Used While Working        Case Management                          PT/OT Discontinued    Consultation    Further Diagnostic Studies:    Prescription(s)           Do not use Zanaflex while working or driving     Released to FULL DUTY /No Restrictions on (Date):       Certified TOTALLY TEMPORARILY DISABLED (Indicate Dates) From:   To:    X  Released to RESTRICTED/Modified Duty on (Date): From: 11/29/2024 To: 12/6/2024  Restrictions Are:  Temporary      No Sitting    No Standing    No Pulling Other: Patient can perform all job duties, please allow for her to take breaks, rest, sit, stand as she needs throughout her workday       No Bending at Waist     No  Stooping     No Lifting        No Carrying     No Walking Lifting Restricted to (lbs.):          No Pushing        No Climbing     No Reaching Above Shoulders       Date of Next Visit:  12/6/2024 Date of this Exam: 11/29/2024 Physician/Chiropractic Physician Name: TAISHA Ty Physician/Chiropractic Physician Signature:  Brett Montero DO MPH     [place]:  [facility]  [facility], [address] [place], [place] [address] - Telephone [phone] [place]:  [address] [place] [address], [address] [place], [place] [address] - Telephone [phone]    [email or web address]  D-39 (Rev. 10/24)

## 2024-11-29 NOTE — PROGRESS NOTES
Subjective:     Robin Lynn Zielesch is a 69 y.o. female who presents for Follow-Up (WC Follow up // Right shoulder still sore but getting better )    DOI:    This is patient's first follow-up visit for work-related injury in which she had fallen with point of impact being her right shoulder/upper chest.  Patient had originally been seen by her PCP as she had a scheduled appointment, x-rays were obtained at that appointment and overall normal.  She was prescribed tramadol at that point for pain.  Patient was originally seen on 11/20/2024 in urgent care for her first work-related injury.  Overall she was well, though having pain and tenderness in her right upper extremity at the shoulder.  Patient was prescribed Zanaflex at that visit with plans to remain off work for 1 week.  Today in clinic patient states that her symptoms have improved markedly, states that her pain is about a 3-4 out of 10 in the affected area and only reproduced with right upper extremity flexion at the bicep.  She denies any new injuries.  This is her only job       PMH:   No pertinent past medical history to this problem  MEDS:  Medications were reviewed in EMR  ALLERGIES:  Allergies were reviewed in EMR  SOCHX:  Works as a part for [employer]  FH:   No pertinent family history to this problem    Review of Systems   Musculoskeletal:  Positive for falls and joint pain. Negative for back pain, myalgias and neck pain.   All other systems reviewed and are negative.         Objective:     /74   Pulse 86   Temp 36.2 °C (97.2 °F) (Temporal)   Resp 16   Ht 1.524 m (5')   Wt 75.8 kg (167 lb)   SpO2 97%   BMI 32.61 kg/m²     Slight TTP in anterior deltoid.  Full ROM throughout RUE  No obvious signs of trauma, deformity, or ecchymosis    Physical Exam  Constitutional:       General: She is not in acute distress.     Appearance: Normal appearance. She is not ill-appearing.   HENT:      Head: Normocephalic and atraumatic.    Musculoskeletal:      Right shoulder: Tenderness present. No swelling, deformity, effusion, laceration, bony tenderness or crepitus. Normal range of motion. Normal strength. Normal pulse.      Left shoulder: Normal.        Arms:       Comments: Slight tenderness to palpation above marked area.  Full range of motion without any reproducible significant pain   Neurological:      Mental Status: She is alert.         Assessment/Plan:       1. Contusion of right shoulder, subsequent encounter  - lidocaine (LIDODERM) 5 % Patch; Place 1 Patch on the skin every 24 hours. Apply to affected area, 24 hours on followed by 24 hours off.  Dispense: 20 Patch; Refill: 0    2. Sprain of right shoulder, subsequent encounter  - lidocaine (LIDODERM) 5 % Patch; Place 1 Patch on the skin every 24 hours. Apply to affected area, 24 hours on followed by 24 hours off.  Dispense: 20 Patch; Refill: 0    3. Work related injury  - lidocaine (LIDODERM) 5 % Patch; Place 1 Patch on the skin every 24 hours. Apply to affected area, 24 hours on followed by 24 hours off.  Dispense: 20 Patch; Refill: 0     D39 updated  Patient can perform all job duties, please allow for her to take breaks, rest, sit, stand as she needs throughout her workdayOverall patient presents well in clinic, she has had significant improvement of her symptoms.  States that she would like to go back to work.  I think that this is appropriate at this time, I think she can do her full job duties with the plan to be able to rest, stretch, sit and stand as needed throughout her workday   Continue zanaflex as needed  Tylenol as needed  Lidocaine patch 24 hours on 24 hours off  Moderate activity and movement, avoid heavy lifting  Follow-up 1 week from today, I do believe patient will achieve MMI at this visit and be discharged from urgent care service    Differential diagnosis, natural history, supportive care, and indications for immediate follow-up discussed.

## 2024-11-29 NOTE — TELEPHONE ENCOUNTER
Received request via: Patient    Was the patient seen in the last year in this department? Yes    Does the patient have an active prescription (recently filled or refills available) for medication(s) requested? No    
no concerns

## 2024-11-29 NOTE — LETTER
PHYSICIAN’S AND CHIROPRACTIC PHYSICIAN'S   PROGRESS REPORT   CERTIFICATION OF DISABILITY Claim Number:     Social Security Number:    Patient’s Name: Robin Zielesch Date of Injury: 11/12/2024   Employer: [employer] Name of [facility] (if applicable):      Patient’s Job Description/Occupation: PAR       Previous Injuries/Diseases/Surgeries Contributing to the Condition:  N/A      Diagnosis: No diagnosis found.      Related to the Industrial Injury? Yes     Explain: Fell while performing job duties      Objective Medical Findings: Slight TTP in anterior deltoid.  Full ROM throughout RUE  No obvious signs of trauma, deformity, or ecchymosis         None - Discharged                         Stable  No                 Ratable  No     X   Generally Improved                         Condition Worsened                  Condition Same  May Have Suffered a Permanent Disability No     Treatment Plan:    Continue zanaflex as needed  Tylenol as needed  Lidocaine patch 24 hours on 24 hours off  Moderate activity and movement, avoid heavy lifting         No Change in Therapy                  PT/OT Prescribed                      Medication May be Used While Working        Case Management                          PT/OT Discontinued    Consultation    Further Diagnostic Studies:    Prescription(s)           Do not use Zanaflex while working or driving     Released to FULL DUTY /No Restrictions on (Date):       Certified TOTALLY TEMPORARILY DISABLED (Indicate Dates) From:   To:    X  Released to RESTRICTED/Modified Duty on (Date): From: 11/29/2024 To: 12/6/2024  Restrictions Are:  Temporary      No Sitting    No Standing    No Pulling Other: Patient can perform all job duties, please allow for her to take breaks, rest, sit, stand as she needs throughout her workday       No Bending at Waist     No Stooping     No Lifting        No Carrying     No Walking Lifting Restricted to (lbs.):          No Pushing        No Climbing      No Reaching Above Shoulders       Date of Next Visit:  12/6/2024 at 8:00AM Date of this Exam: 11/29/2024 Physician/Chiropractic Physician Name: TAISHA Ty Physician/Chiropractic Physician Signature:  Brett Montero DO MPH     [place]:  [address] [place] [address], [address] [place], [place] [address] - Telephone [phone] [place]:  [address] [place] [address], [address] [place], [place] [address] - Telephone [phone]    [email or web address]  D-39 (Rev. 10/24)

## 2024-12-01 ENCOUNTER — PHARMACY VISIT (OUTPATIENT)
Dept: PHARMACY | Facility: MEDICAL CENTER | Age: 69
End: 2024-12-01
Payer: COMMERCIAL

## 2024-12-01 PROCEDURE — RXMED WILLOW AMBULATORY MEDICATION CHARGE

## 2024-12-06 ENCOUNTER — OCCUPATIONAL MEDICINE (OUTPATIENT)
Dept: URGENT CARE | Facility: PHYSICIAN GROUP | Age: 69
End: 2024-12-06
Payer: COMMERCIAL

## 2024-12-06 VITALS
TEMPERATURE: 97 F | SYSTOLIC BLOOD PRESSURE: 118 MMHG | OXYGEN SATURATION: 92 % | BODY MASS INDEX: 31.53 KG/M2 | WEIGHT: 167 LBS | HEART RATE: 96 BPM | HEIGHT: 61 IN | RESPIRATION RATE: 17 BRPM | DIASTOLIC BLOOD PRESSURE: 70 MMHG

## 2024-12-06 DIAGNOSIS — S40.011D CONTUSION OF RIGHT SHOULDER, SUBSEQUENT ENCOUNTER: ICD-10-CM
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
## 2024-12-06 PROCEDURE — 3078F DIAST BP <80 MM HG: CPT

## 2024-12-06 PROCEDURE — 99213 OFFICE O/P EST LOW 20 MIN: CPT

## 2024-12-06 PROCEDURE — 3074F SYST BP LT 130 MM HG: CPT

## 2024-12-06 ASSESSMENT — FIBROSIS 4 INDEX: FIB4 SCORE: 0.97

## 2024-12-06 NOTE — LETTER
PHYSICIAN’S AND CHIROPRACTIC PHYSICIAN'S   PROGRESS REPORT   CERTIFICATION OF DISABILITY Claim Number:     Social Security Number:    Patient’s Name: Robin Zielesch Date of Injury: 11/12/2024   Employer: [employer] Name of [facility] (if applicable):      Patient’s Job Description/Occupation: PAR       Previous Injuries/Diseases/Surgeries Contributing to the Condition:         Diagnosis: (S40.011D) Contusion of right shoulder, subsequent encounter  (Y99.0) Work related injury      Related to the Industrial Injury?  Yes            Objective Medical Findings:  No TTP to anterior deltoid. Full ROM RUE. No obvious signs of deformity, trauma, ecchymosis. Distal neurovascular intact.          None - Discharged                        X Stable                    Ratable         X  Generally Improved                         Condition Worsened                  Condition Same  May Have Suffered a Permanent Disability  No      Treatment Plan:     Continue Zanaflex and lidocaine patch PRN. Work restrictions maintained. FU 7 days         X No Change in Therapy                  PT/OT Prescribed                      Medication May be Used While Working        Case Management                          PT/OT Discontinued    Consultation    Further Diagnostic Studies:    Prescription(s)            Do not use Zanaflex while at work      Released to FULL DUTY /No Restrictions on (Date):       Certified TOTALLY TEMPORARILY DISABLED (Indicate Dates) From:   To:      Released to RESTRICTED/Modified Duty on (Date): From:  12/6/24 To:  12/13/24.   Restrictions Are:         No Sitting    No Standing    No Pulling Other:  Can perform all job duties. Please allow her to take breaks, rest, sit, stand as she needs to throughout her work day.        No Bending at Waist     No Stooping     No Lifting        No Carrying     No Walking Lifting Restricted to (lbs.):          No Pushing        No Climbing     No Reaching Above Shoulders        Date of Next Visit:   12/13/24.   8 AM  Date of this Exam: 12/6/2024 Physician/Chiropractic Physician Name: TAISHA Martinez Physician/Chiropractic Physician Signature:  Brett Montero DO MPH     [place]:  [facility]  [facility], [address] [place], [place] [address] - Telephone [phone] [place]:  [address] [place] [address], [address] [place], [place] [address] - Telephone [phone]    [email or web address]  D-39 (Rev. 10/24)   37

## 2024-12-06 NOTE — PROGRESS NOTES
"Subjective:     Robin Lynn Zielesch is a 69 y.o. female who presents for Follow-Up (F/u wc doi: 11/12/2024 right shoulder feeling better )    HPI:   Copied from previous visit (11/29/24): \"This is patient's first follow-up visit for work-related injury in which she had fallen with point of impact being her right shoulder/upper chest.  Patient had originally been seen by her PCP as she had a scheduled appointment, x-rays were obtained at that appointment and overall normal.  She was prescribed tramadol at that point for pain.  Patient was originally seen on 11/20/2024 in urgent care for her first work-related injury.  Overall she was well, though having pain and tenderness in her right upper extremity at the shoulder.  Patient was prescribed Zanaflex at that visit with plans to remain off work for 1 week.  Today in clinic patient states that her symptoms have improved markedly, states that her pain is about a 3-4 out of 10 in the affected area and only reproduced with right upper extremity flexion at the bicep.  She denies any new injuries.  This is her only job.\"     12/6/24 - Patient reports improvement in right anterior shoulder pain.  She has been tolerating current work restrictions.  Denies numbness/tingling or sensation loss.  She has been using lidocaine patches as needed.  Has not required use of Zanaflex.  No swelling or ecchymosis.     PMH:   No pertinent past medical history to this problem  MEDS:  Medications were reviewed in EMR  ALLERGIES:  Allergies were reviewed in EMR   FH:   No pertinent family history to this problem     Objective:     /70 (BP Location: Left arm, Patient Position: Sitting, BP Cuff Size: Large adult)   Pulse 96   Temp 36.1 °C (97 °F) (Temporal)   Resp 17   Ht 1.549 m (5' 1\")   Wt 75.8 kg (167 lb)   SpO2 92%   BMI 31.55 kg/m²     Physical Exam     Assessment/Plan:     1. Contusion of right shoulder, subsequent encounter    2. Work related injury    Released to " RESTRICTED Duty FROM 12/6/24 TO 12/13/24.   Restrictions: Can perform all job duties. Please allow her to take breaks, rest, sit, stand as she needs to throughout her work day.   Continue Zanaflex and lidocaine patch PRN. Work restrictions maintained. FU 7 days (anticipate MMI).      Illness progression and alarm symptoms discussed with patient, emphasizing low threshold for returning to clinic/emergency department for worsening symptoms. Patient is agreeable to the plan and verbalizes understanding, and will follow up if warranted.       Differential diagnosis, natural history, supportive care, and indications for immediate follow-up discussed.     Follow-up as needed if symptoms worsen or fail to improve to PCP, Urgent care or Emergency Room.                           Electronically signed by OBIE Mckenna

## 2024-12-09 NOTE — TELEPHONE ENCOUNTER
Received request via: Pharmacy    Was the patient seen in the last year in this department? Yes    Does the patient have an active prescription (recently filled or refills available) for medication(s) requested? No    Pharmacy Name: [facility]     Does the patient have [facility] and need 100-day supply? (This applies to ALL medications) Patient does not have SCP

## 2024-12-10 RX ORDER — GABAPENTIN 300 MG/1
900 CAPSULE ORAL 3 TIMES DAILY
Qty: 810 CAPSULE | Refills: 1 | Status: SHIPPED | OUTPATIENT
Start: 2024-12-10

## 2024-12-11 PROCEDURE — RXMED WILLOW AMBULATORY MEDICATION CHARGE: Performed by: FAMILY MEDICINE

## 2024-12-13 ENCOUNTER — OCCUPATIONAL MEDICINE (OUTPATIENT)
Dept: URGENT CARE | Facility: PHYSICIAN GROUP | Age: 69
End: 2024-12-13
Payer: COMMERCIAL

## 2024-12-13 VITALS
RESPIRATION RATE: 16 BRPM | SYSTOLIC BLOOD PRESSURE: 124 MMHG | TEMPERATURE: 97.7 F | WEIGHT: 167.11 LBS | HEART RATE: 105 BPM | BODY MASS INDEX: 32.81 KG/M2 | DIASTOLIC BLOOD PRESSURE: 80 MMHG | OXYGEN SATURATION: 95 % | HEIGHT: 60 IN

## 2024-12-13 DIAGNOSIS — S40.011D CONTUSION OF RIGHT SHOULDER, SUBSEQUENT ENCOUNTER: ICD-10-CM

## 2024-12-13 PROCEDURE — 3079F DIAST BP 80-89 MM HG: CPT | Performed by: PHYSICIAN ASSISTANT

## 2024-12-13 PROCEDURE — 99213 OFFICE O/P EST LOW 20 MIN: CPT | Performed by: PHYSICIAN ASSISTANT

## 2024-12-13 PROCEDURE — 3074F SYST BP LT 130 MM HG: CPT | Performed by: PHYSICIAN ASSISTANT

## 2024-12-13 ASSESSMENT — ENCOUNTER SYMPTOMS
NAUSEA: 0
VOMITING: 0
EYE DISCHARGE: 0
EYE REDNESS: 0
FEVER: 0

## 2024-12-13 ASSESSMENT — FIBROSIS 4 INDEX: FIB4 SCORE: 0.97

## 2024-12-13 NOTE — PROGRESS NOTES
"Subjective     Robin Lynn Zielesch is a 69 y.o. female who presents with Follow-Up (Workers comp established)            HPI    The patient presents to clinic for Workmen's Comp. follow-up.    DOI: 11/12/2024 -copied from original visit- \"Pt was wearing non skid shoes and she tripped forward and fell directly on her right chest. She did not fall on an outstretched arm. She did not hit her head. She had immediate pain to the right upper chest and knocked the wind out of her. Her right arm, shoulder and upper back did not start hurting until the next day.\"    Visit #4:   Today, the patient reports overall improvement of the injury to her right chest wall and right shoulder.  The patient states she continues to experience intermittent pain to her right upper chest wall.  The patient reports no radiation of pain to her right upper extremity.  She also reports no numbness, tingling, weakness of the right upper extremity.  The patient reports no decreased range of motion of the right shoulder.  The patient states she continues to use the lidocaine patches as needed.  The patient is no longer taking the muscle relaxer.  She is also not taking any OTC medications.  The patient states she was recently terminated and is currently not working.    PMH: Reviewed in Epic, no pertinent findings.  MEDS: Reviewed in Epic.  ALLERGIES: Reviewed in Epic.  SURGHX: Reviewed in Epic.  SOCHX: Reviewed in Epic.  FH: Family history was reviewed, no pertinent findings to report.       Review of Systems   Constitutional:  Negative for fever.   Eyes:  Negative for discharge and redness.   Gastrointestinal:  Negative for nausea and vomiting.   Musculoskeletal:  Negative for joint pain.              Objective     /80 (BP Location: Left arm, Patient Position: Sitting, BP Cuff Size: Adult)   Pulse (!) 105   Temp 36.5 °C (97.7 °F) (Temporal)   Resp 16   Ht 1.524 m (5')   Wt 75.8 kg (167 lb 1.7 oz)   SpO2 95%   BMI 32.64 kg/m²  "     Physical Exam  Constitutional:       General: She is not in acute distress.     Appearance: Normal appearance. She is well-developed. She is not ill-appearing.   HENT:      Head: Normocephalic and atraumatic.      Right Ear: External ear normal.      Left Ear: External ear normal.      Nose: Nose normal.   Eyes:      Extraocular Movements: Extraocular movements intact.      Conjunctiva/sclera: Conjunctivae normal.   Cardiovascular:      Rate and Rhythm: Normal rate and regular rhythm.      Heart sounds: Normal heart sounds.   Pulmonary:      Effort: Pulmonary effort is normal.   Musculoskeletal:      Cervical back: Normal range of motion and neck supple.      Comments:   Right Shoulder:  A localized area of tenderness is present to the anterior aspect of the right shoulder extending to the proximal aspect of the right chest wall.  No tenderness to the posterior aspect of the right shoulder.  No tenderness over the proximal humerus.  No edema.  No ecchymosis.  No overlying erythema.  No increased warmth.  No obvious deformity.  No palpable crepitus.  ROM intact -the patient demonstrates full active range of motion of the right shoulder  Neurovascular intact distally  Strength 5/5   Skin:     General: Skin is warm and dry.   Neurological:      Mental Status: She is alert and oriented to person, place, and time.                             Assessment & Plan        Assessment & Plan  Contusion of right shoulder, subsequent encounter         The patient's symptoms are overall improved.  The patient continues to experience intermittent pain to the right anterior shoulder and right anterior chest wall.  Patient states this pain improves with using the previously prescribed lidocaine patches.  The patient is requesting discharge/MMI at this time as she was recently terminated.  The patient is currently not working.          Plan:  Discharge/MMI - D39 provided   Continue lidocaine patches as needed  Monitor for worsening  signs or symptoms  Return to clinic or follow-up with primary care for any worsening and or concerning symptoms.    Discussed plan with the patient, and she agrees to the above.     I personally reviewed prior external notes and test results pertinent to today's visit.  I have independently reviewed and interpreted all diagnostics ordered during this urgent care visit.     Please note that this dictation was created using voice recognition software. I have made every reasonable attempt to correct obvious errors, but I expect that there may be errors of grammar and possibly content that I did not discover before finalizing the note.     This note was electronically signed by Zhane Cisse PA-C

## 2024-12-13 NOTE — LETTER
"PHYSICIAN’S AND CHIROPRACTIC PHYSICIAN'S   PROGRESS REPORT   CERTIFICATION OF DISABILITY Claim Number:     Social Security Number:    Patient’s Name: Robin Zielesch Date of Injury: 11/12/2024   Employer: [employer] Name of [place] (if applicable):      Patient’s Job Description/Occupation: PAR       Previous Injuries/Diseases/Surgeries Contributing to the Condition:         Diagnosis: (S40.011D) Contusion of right shoulder, subsequent encounter      Related to the Industrial Injury? Yes     Explain: The patient presents to clinic for Workmen's Comp. follow-up.    DOI: 11/12/2024 -copied from original visit- \"Pt was wearing non skid shoes and she tripped forward and fell directly on her right chest. She did not fall on an outstretched arm. She did not hit her head. She had immediate pain to the right upper chest and knocked the wind out of her. Her right arm, shoulder and upper back did not start hurting until the next day.\"    Visit #4:   Today, the patient reports overall improvement of the injury to her right chest wall and right shoulder.  The patient states she continues to experience intermittent pain to her right upper chest wall.  The patient reports no radiation of pain to her right upper extremity.  She also reports no numbness, tingling, weakness of the right upper extremity.  The patient reports no decreased range of motion of the right shoulder.  The patient states she continues to use the lidocaine patches as needed.  The patient is no longer taking the muscle relaxer.  She is also not taking any OTC medications.  The patient states she was recently terminated and is currently not working.        Objective Medical Findings: Right Shoulder:  A localized area of tenderness is present to the anterior aspect of the right shoulder extending to the proximal aspect of the right chest wall.  No tenderness to the posterior aspect of the right shoulder.  No tenderness over the proximal humerus.  No " edema.  No ecchymosis.  No overlying erythema.  No increased warmth.  No obvious deformity.  No palpable crepitus.  ROM intact -the patient demonstrates full active range of motion of the right shoulder  Neurovascular intact distally  Strength 5/5         X   None - Discharged                         Stable  No                 Ratable  No     X   Generally Improved                         Condition Worsened                  Condition Same  May Have Suffered a Permanent Disability No     Treatment Plan:    Plan:  Discharge/MMI - D39 provided   Continue lidocaine patches as needed  Monitor for worsening signs or symptoms  Return to clinic or follow-up with primary care for any worsening and or concerning symptoms.           No Change in Therapy                  PT/OT Prescribed                      Medication May be Used While Working        Case Management                          PT/OT Discontinued    Consultation    Further Diagnostic Studies:    Prescription(s)               X  Released to FULL DUTY /No Restrictions on (Date):  12/13/2024    Certified TOTALLY TEMPORARILY DISABLED (Indicate Dates) From:   To:      Released to RESTRICTED/Modified Duty on (Date): From:   To:    Restrictions Are:         No Sitting    No Standing    No Pulling Other:         No Bending at Waist     No Stooping     No Lifting        No Carrying     No Walking Lifting Restricted to (lbs.):          No Pushing        No Climbing     No Reaching Above Shoulders       Date of Next Visit:    Date of this Exam: 12/13/2024 Physician/Chiropractic Physician Name: Zhane Cisse P.A.-C. Physician/Chiropractic Physician Signature:  Brett Montero DO MPH     [place]:  [address] [place] [address], [address] [place], [place] [address] - Telephone [phone] [place]:  [address] [place] [address], [address] [place], [place] [address] - Telephone [phone]    [email or web address]  D-39 (Rev. 10/24)

## 2024-12-14 ENCOUNTER — PHARMACY VISIT (OUTPATIENT)
Dept: PHARMACY | Facility: MEDICAL CENTER | Age: 69
End: 2024-12-14
Payer: COMMERCIAL

## 2024-12-16 ENCOUNTER — OFFICE VISIT (OUTPATIENT)
Dept: MEDICAL GROUP | Facility: PHYSICIAN GROUP | Age: 69
End: 2024-12-16
Payer: COMMERCIAL

## 2024-12-16 VITALS
HEART RATE: 96 BPM | DIASTOLIC BLOOD PRESSURE: 74 MMHG | OXYGEN SATURATION: 96 % | TEMPERATURE: 97.8 F | HEIGHT: 60 IN | WEIGHT: 170 LBS | RESPIRATION RATE: 16 BRPM | BODY MASS INDEX: 33.38 KG/M2 | SYSTOLIC BLOOD PRESSURE: 128 MMHG

## 2024-12-16 DIAGNOSIS — G47.19 EXCESSIVE DAYTIME SLEEPINESS: ICD-10-CM

## 2024-12-16 DIAGNOSIS — M54.2 CERVICAL PAIN: ICD-10-CM

## 2024-12-16 PROCEDURE — RXMED WILLOW AMBULATORY MEDICATION CHARGE: Performed by: FAMILY MEDICINE

## 2024-12-16 PROCEDURE — 3074F SYST BP LT 130 MM HG: CPT | Performed by: FAMILY MEDICINE

## 2024-12-16 PROCEDURE — 3078F DIAST BP <80 MM HG: CPT | Performed by: FAMILY MEDICINE

## 2024-12-16 PROCEDURE — 99214 OFFICE O/P EST MOD 30 MIN: CPT | Performed by: FAMILY MEDICINE

## 2024-12-16 RX ORDER — METHYLPREDNISOLONE 4 MG/1
TABLET ORAL
Qty: 21 TABLET | Refills: 0 | Status: SHIPPED | OUTPATIENT
Start: 2024-12-16

## 2024-12-16 ASSESSMENT — FIBROSIS 4 INDEX: FIB4 SCORE: 0.97

## 2024-12-16 NOTE — ASSESSMENT & PLAN NOTE
This is a chronic problem.  Patient been having recurrence of her neck pain.  She has had a previous fusion.  She states that there is always calcification that Dr Jeffers cannot do any injections.  She is already on an NSAID.

## 2024-12-16 NOTE — ASSESSMENT & PLAN NOTE
This is a chronic problem.  She just got laid off from her appointment due to falling asleep during the job.  She has a previous history of being tested for sleep apnea and at one point she was sent home with a CPAP machine but states she could not tolerate it.  She states she gets about 5 to 6 hours of sleep at night.

## 2024-12-16 NOTE — PROGRESS NOTES
Subjective:     CC: Here for couple of issues.    HPI:   Mathew presents today with the following medical concern:    Cervical pain  This is a chronic problem.  Patient been having recurrence of her neck pain.  She has had a previous fusion.  She states that there is always calcification that Dr Jeffers cannot do any injections.  She is already on an NSAID.    Excessive daytime sleepiness  This is a chronic problem.  She just got laid off from her appointment due to falling asleep during the job.  She has a previous history of being tested for sleep apnea and at one point she was sent home with a CPAP machine but states she could not tolerate it.  She states she gets about 5 to 6 hours of sleep at night.    Past Medical History:   Diagnosis Date    Anxiety     Arthritis     Cancer (HCC)     COPD (chronic obstructive pulmonary disease) (HCC)     GERD (gastroesophageal reflux disease)     Hypertension     Migraine     Thyroid disease        Social History     Tobacco Use    Smoking status: Former     Current packs/day: 0.00     Average packs/day: 1.5 packs/day for 50.0 years (75.0 ttl pk-yrs)     Types: Cigarettes     Start date: 1968     Quit date: 2018     Years since quittin.3    Smokeless tobacco: Never   Vaping Use    Vaping status: Some Days    Substances: Nicotine, CBD    Devices: Pre-filled or refillable cartridge, Refillable tank   Substance Use Topics    Alcohol use: Never    Drug use: Not Currently     Types: Marijuana, Methamphetamines     Comment: once in a while       Current Outpatient Medications Ordered in Epic   Medication Sig Dispense Refill    methylPREDNISolone (MEDROL DOSEPAK) 4 MG Tablet Therapy Pack As directed on the packaging label. 21 Tablet 0    gabapentin (NEURONTIN) 300 MG Cap Take 3 Capsules by mouth 3 times a day. 810 Capsule 1    lidocaine (LIDODERM) 5 % Patch Place 1 Patch on the skin every 24 hours. Apply to affected area, 24 hours on followed by 24 hours off. 20 Patch 0     tizanidine (ZANAFLEX) 4 MG Tab Take 1 Tablet by mouth every 6 hours as needed (arm pain). 30 Tablet 3    losartan (COZAAR) 100 MG Tab Take 1 Tablet by mouth every day. 90 Tablet 3    buPROPion (WELLBUTRIN XL) 300 MG XL tablet Take 1 Tablet by mouth every morning. 90 Tablet 3    meloxicam (MOBIC) 15 MG tablet Take 1 Tablet by mouth every day. 90 Tablet 3    albuterol 108 (90 Base) MCG/ACT Aero Soln inhalation aerosol Inhale 2 Puffs every 6 hours as needed for Shortness of Breath. 8.5 g 3    SYNTHROID 88 MCG Tab Take 1 Tablet by mouth every morning on an empty stomach. 90 Tablet 3    amLODIPine (NORVASC) 10 MG Tab Take 1 Tablet by mouth every day. 90 Tablet 3    omeprazole (PRILOSEC) 40 MG delayed-release capsule Take 1 Capsule by mouth every day. 90 Capsule 3    potassium chloride SA (KDUR) 20 MEQ Tab CR Take 1 Tablet by mouth every day. 90 Tablet 3    aspirin (ASA) 81 MG Chew Tab chewable tablet Chew 81 mg every day.      calcium citrate (CALCITRATE) 950 (200 Ca) MG Tab Take 950 mg by mouth every day.      Multiple Vitamin (MULTI-VITAMINS PO) Take  by mouth.      Riboflavin (VITAMIN B-2 PO) Take  by mouth.       No current Epic-ordered facility-administered medications on file.       Allergies:  Ace inhibitors, Chlorhexidine, Flexeril [cyclobenzaprine], and Triamterene    Health Maintenance: Completed    ROS:  Gen: no fevers/chills, no changes in weight  Eyes: no changes in vision  ENT: no sore throat, no hearing loss, no bloody nose  Pulm: no sob, no cough  CV: no chest pain, no palpitations  GI: no nausea/vomiting, no diarrhea  : no dysuria  MSk: no myalgias  Skin: no rash  Neuro: no headaches, no numbness/tingling  Heme/Lymph: no easy bruising      Objective:       Exam:  /74 (BP Location: Left arm, Patient Position: Sitting, BP Cuff Size: Adult)   Pulse 96   Temp 36.6 °C (97.8 °F) (Temporal)   Resp 16   Ht 1.524 m (5')   Wt 77.1 kg (170 lb)   SpO2 96%   BMI 33.20 kg/m²  Body mass index is 33.2  kg/m².    Gen: Alert and oriented, No apparent distress.  Neck: No radicular symptoms  Lungs: Normal effort,  Ext: No clubbing, cyanosis, edema.  Psych: Patient is alert and cooperative.  No unusual thought Anatoly expressed.  Insight and judgment is good.      Assessment & Plan:     69 y.o. female with the following -     1. Excessive daytime sleepiness  This is a chronic problem.  It is still like she most likely does have sleep apnea but we do not have those records.  Will go ahead and have her retested and see another sleep specialist for treatment.  - Referral to Pulmonary and Sleep Medicine    2. Cervical pain  This is a chronic problem.  Likely due to her surgery as well as osteoarthritis.  Will go ahead and give her Medrol Dosepak as she is used that in the past.  Heat to the painful area.      Return if symptoms worsen or fail to improve.    Please note that this dictation was created using voice recognition software. I have made every reasonable attempt to correct obvious errors, but I expect that there are errors of grammar and possibly content that I did not discover before finalizing the note.

## 2024-12-18 ENCOUNTER — PHARMACY VISIT (OUTPATIENT)
Dept: PHARMACY | Facility: MEDICAL CENTER | Age: 69
End: 2024-12-18
Payer: COMMERCIAL

## 2025-01-02 ENCOUNTER — APPOINTMENT (OUTPATIENT)
Dept: MEDICAL GROUP | Facility: PHYSICIAN GROUP | Age: 70
End: 2025-01-02
Payer: MEDICARE

## 2025-01-02 VITALS
SYSTOLIC BLOOD PRESSURE: 118 MMHG | WEIGHT: 166 LBS | HEART RATE: 74 BPM | DIASTOLIC BLOOD PRESSURE: 70 MMHG | TEMPERATURE: 97.6 F | BODY MASS INDEX: 32.59 KG/M2 | HEIGHT: 60 IN | OXYGEN SATURATION: 95 % | RESPIRATION RATE: 16 BRPM

## 2025-01-02 DIAGNOSIS — M15.0 PRIMARY OSTEOARTHRITIS INVOLVING MULTIPLE JOINTS: ICD-10-CM

## 2025-01-02 DIAGNOSIS — F43.21 GRIEF: ICD-10-CM

## 2025-01-02 PROCEDURE — 99213 OFFICE O/P EST LOW 20 MIN: CPT | Performed by: FAMILY MEDICINE

## 2025-01-02 PROCEDURE — 3074F SYST BP LT 130 MM HG: CPT | Performed by: FAMILY MEDICINE

## 2025-01-02 PROCEDURE — 3078F DIAST BP <80 MM HG: CPT | Performed by: FAMILY MEDICINE

## 2025-01-02 ASSESSMENT — PATIENT HEALTH QUESTIONNAIRE - PHQ9
4. FEELING TIRED OR HAVING LITTLE ENERGY: NOT AT ALL
SUM OF ALL RESPONSES TO PHQ QUESTIONS 1-9: 0
SUM OF ALL RESPONSES TO PHQ9 QUESTIONS 1 AND 2: 0
5. POOR APPETITE OR OVEREATING: NOT AT ALL
7. TROUBLE CONCENTRATING ON THINGS, SUCH AS READING THE NEWSPAPER OR WATCHING TELEVISION: NOT AT ALL
2. FEELING DOWN, DEPRESSED, IRRITABLE, OR HOPELESS: NOT AT ALL
6. FEELING BAD ABOUT YOURSELF - OR THAT YOU ARE A FAILURE OR HAVE LET YOURSELF OR YOUR FAMILY DOWN: NOT AL ALL
3. TROUBLE FALLING OR STAYING ASLEEP OR SLEEPING TOO MUCH: NOT AT ALL
9. THOUGHTS THAT YOU WOULD BE BETTER OFF DEAD, OR OF HURTING YOURSELF: NOT AT ALL
1. LITTLE INTEREST OR PLEASURE IN DOING THINGS: NOT AT ALL
8. MOVING OR SPEAKING SO SLOWLY THAT OTHER PEOPLE COULD HAVE NOTICED. OR THE OPPOSITE, BEING SO FIGETY OR RESTLESS THAT YOU HAVE BEEN MOVING AROUND A LOT MORE THAN USUAL: NOT AT ALL

## 2025-01-02 ASSESSMENT — FIBROSIS 4 INDEX: FIB4 SCORE: 0.97

## 2025-01-02 NOTE — ASSESSMENT & PLAN NOTE
This is a chronic problem.  Patient is here to have a handicap form completed.  She cannot walk 200 feet or more without having to stop and rest due to arthritis in her knees, hip and back.  Her  had the previous handicap license due to his illness but he has passed away.

## 2025-01-02 NOTE — ASSESSMENT & PLAN NOTE
This is a new problem.  Patient's  passed away without warning at home.  She is dealing with all the issues involved with that.  She does have good support at the present time.

## 2025-01-02 NOTE — PROGRESS NOTES
Subjective:     CC: Here for couple of issues.    HPI:   Mathew presents today with the following medical concerns:    Osteoarthritis  This is a chronic problem.  Patient is here to have a handicap form completed.  She cannot walk 200 feet or more without having to stop and rest due to arthritis in her knees, hip and back.  Her  had the previous handicap license due to his illness but he has passed away.      Grief  This is a new problem.  Patient's  passed away without warning at home.  She is dealing with all the issues involved with that.  She does have good support at the present time.    Past Medical History:   Diagnosis Date    Anxiety     Arthritis     Cancer (HCC)     COPD (chronic obstructive pulmonary disease) (HCC)     GERD (gastroesophageal reflux disease)     Hypertension     Migraine     Thyroid disease        Social History     Tobacco Use    Smoking status: Former     Current packs/day: 0.00     Average packs/day: 1.5 packs/day for 50.0 years (75.0 ttl pk-yrs)     Types: Cigarettes     Start date: 1968     Quit date: 2018     Years since quittin.3    Smokeless tobacco: Never   Vaping Use    Vaping status: Some Days    Substances: Nicotine, CBD    Devices: Pre-filled or refillable cartridge, Refillable tank   Substance Use Topics    Alcohol use: Never    Drug use: Not Currently     Types: Marijuana, Methamphetamines     Comment: once in a while       Current Outpatient Medications Ordered in Epic   Medication Sig Dispense Refill    methylPREDNISolone (MEDROL DOSEPAK) 4 MG Tablet Therapy Pack As directed on the packaging label. 21 Tablet 0    gabapentin (NEURONTIN) 300 MG Cap Take 3 Capsules by mouth 3 times a day. 810 Capsule 1    lidocaine (LIDODERM) 5 % Patch Place 1 Patch on the skin every 24 hours. Apply to affected area, 24 hours on followed by 24 hours off. 20 Patch 0    tizanidine (ZANAFLEX) 4 MG Tab Take 1 Tablet by mouth every 6 hours as needed (arm pain). 30 Tablet  3    losartan (COZAAR) 100 MG Tab Take 1 Tablet by mouth every day. 90 Tablet 3    buPROPion (WELLBUTRIN XL) 300 MG XL tablet Take 1 Tablet by mouth every morning. 90 Tablet 3    meloxicam (MOBIC) 15 MG tablet Take 1 Tablet by mouth every day. 90 Tablet 3    albuterol 108 (90 Base) MCG/ACT Aero Soln inhalation aerosol Inhale 2 Puffs every 6 hours as needed for Shortness of Breath. 8.5 g 3    SYNTHROID 88 MCG Tab Take 1 Tablet by mouth every morning on an empty stomach. 90 Tablet 3    amLODIPine (NORVASC) 10 MG Tab Take 1 Tablet by mouth every day. 90 Tablet 3    omeprazole (PRILOSEC) 40 MG delayed-release capsule Take 1 Capsule by mouth every day. 90 Capsule 3    potassium chloride SA (KDUR) 20 MEQ Tab CR Take 1 Tablet by mouth every day. 90 Tablet 3    aspirin (ASA) 81 MG Chew Tab chewable tablet Chew 81 mg every day.      calcium citrate (CALCITRATE) 950 (200 Ca) MG Tab Take 950 mg by mouth every day.      Multiple Vitamin (MULTI-VITAMINS PO) Take  by mouth.      Riboflavin (VITAMIN B-2 PO) Take  by mouth.       No current Epic-ordered facility-administered medications on file.       Allergies:  Ace inhibitors, Chlorhexidine, Flexeril [cyclobenzaprine], and Triamterene    Health Maintenance: Completed    ROS:  Gen: no fevers/chills, no changes in weight  Eyes: no changes in vision  ENT: no sore throat, no hearing loss, no bloody nose  Pulm: no sob, no cough  CV: no chest pain, no palpitations  GI: no nausea/vomiting, no diarrhea  : no dysuria    Skin: no rash  Neuro: no headaches, no numbness/tingling  Heme/Lymph: no easy bruising      Objective:       Exam:  /70 (BP Location: Left arm, Patient Position: Sitting, BP Cuff Size: Adult)   Pulse 74   Temp 36.4 °C (97.6 °F) (Temporal)   Resp 16   Ht 1.524 m (5')   Wt 75.3 kg (166 lb)   SpO2 95%   BMI 32.42 kg/m²  Body mass index is 32.42 kg/m².    Gen: Alert and oriented, No apparent distress.  Lungs: Normal effort  Ext: No clubbing, cyanosis,  edema.  Psych: Patient is alert and cooperative.  No unusual thought was expressed.  Insight and judgment is good.  She is tearful and mildly depressed when talking about the recent passing of her .      Assessment & Plan:     69 y.o. female with the following -     1. Grief  This is a new problem.  We talked about issues involved with the passing of her .  Will continue to follow.  She does have good support from friends.    2. Primary osteoarthritis involving multiple joints  This is a chronic problem.  Handicap form completed.      Return if symptoms worsen or fail to improve.    Please note that this dictation was created using voice recognition software. I have made every reasonable attempt to correct obvious errors, but I expect that there are errors of grammar and possibly content that I did not discover before finalizing the note.

## 2025-01-03 PROCEDURE — RXMED WILLOW AMBULATORY MEDICATION CHARGE: Performed by: FAMILY MEDICINE

## 2025-01-06 PROCEDURE — RXMED WILLOW AMBULATORY MEDICATION CHARGE: Performed by: FAMILY MEDICINE

## 2025-01-06 RX ORDER — POTASSIUM CHLORIDE 1500 MG/1
20 TABLET, EXTENDED RELEASE ORAL DAILY
Qty: 90 TABLET | Refills: 3 | Status: SHIPPED | OUTPATIENT
Start: 2025-01-06

## 2025-01-06 NOTE — TELEPHONE ENCOUNTER
Received request via: Pharmacy    Was the patient seen in the last year in this department? Yes    Does the patient have an active prescription (recently filled or refills available) for medication(s) requested? No    Pharmacy Name: [facility] [place]     Does the patient have [facility] Plus and need 100-day supply? (This applies to ALL medications) Patient does not have SCP

## 2025-01-08 ENCOUNTER — PHARMACY VISIT (OUTPATIENT)
Dept: PHARMACY | Facility: MEDICAL CENTER | Age: 70
End: 2025-01-08
Payer: COMMERCIAL

## 2025-01-15 DIAGNOSIS — F41.9 ANXIETY: ICD-10-CM

## 2025-01-15 PROCEDURE — RXMED WILLOW AMBULATORY MEDICATION CHARGE: Performed by: FAMILY MEDICINE

## 2025-01-15 RX ORDER — LORAZEPAM 0.5 MG/1
0.5 TABLET ORAL EVERY 8 HOURS PRN
Qty: 60 TABLET | Refills: 0 | Status: SHIPPED | OUTPATIENT
Start: 2025-01-15 | End: 2025-02-15

## 2025-01-16 ENCOUNTER — PHARMACY VISIT (OUTPATIENT)
Dept: PHARMACY | Facility: MEDICAL CENTER | Age: 70
End: 2025-01-16
Payer: COMMERCIAL

## 2025-01-27 ENCOUNTER — OFFICE VISIT (OUTPATIENT)
Dept: SLEEP MEDICINE | Facility: MEDICAL CENTER | Age: 70
End: 2025-01-27
Attending: STUDENT IN AN ORGANIZED HEALTH CARE EDUCATION/TRAINING PROGRAM
Payer: MEDICARE

## 2025-01-27 VITALS
HEART RATE: 79 BPM | DIASTOLIC BLOOD PRESSURE: 82 MMHG | HEIGHT: 61 IN | BODY MASS INDEX: 33.04 KG/M2 | WEIGHT: 175 LBS | OXYGEN SATURATION: 94 % | SYSTOLIC BLOOD PRESSURE: 110 MMHG

## 2025-01-27 DIAGNOSIS — F51.04 PSYCHOPHYSIOLOGICAL INSOMNIA: ICD-10-CM

## 2025-01-27 DIAGNOSIS — G47.33 OSA (OBSTRUCTIVE SLEEP APNEA): ICD-10-CM

## 2025-01-27 DIAGNOSIS — G47.19 EXCESSIVE DAYTIME SLEEPINESS: ICD-10-CM

## 2025-01-27 PROCEDURE — 99214 OFFICE O/P EST MOD 30 MIN: CPT | Performed by: STUDENT IN AN ORGANIZED HEALTH CARE EDUCATION/TRAINING PROGRAM

## 2025-01-27 PROCEDURE — 99213 OFFICE O/P EST LOW 20 MIN: CPT | Performed by: STUDENT IN AN ORGANIZED HEALTH CARE EDUCATION/TRAINING PROGRAM

## 2025-01-27 ASSESSMENT — FIBROSIS 4 INDEX: FIB4 SCORE: 0.98

## 2025-01-27 NOTE — PATIENT INSTRUCTIONS
Psychologists who conduct CBTi in the area:    Dr. Matilde Gayle  [address] [place] [address]  [phone]    Dr. Annemarie Tim  [address] [place], [place] [address] [phone]     CBT-I Online Resources    Path to Better Sleep (free) - https://www.veterantraining.va.gov/insomnia/index.asp  This free online Cognitive Behavioral Therapy for Insomnia course, which was developed for  veterans, takes you through a sleep &quot;check-in&quot; and the various components of CBT-I,  including modifying unhelpful behaviors and unhelpful thoughts around sleep.  Insomnia  (free) - https://insomniacoach.com/  Offers a self-guided 5-week training plan designed to change sleep habits. Smart phone greg  available.  CBT for Insomnia - Drake Dailey, PhD, CBSM ($50-$70) -  https://www.cbtforinsomnia.com/  CBT for Insomnia is a five week PDF-based CBT-I program based on Dr. Drake Dailey&#39;  twenty years of CBT-I research and clinical practice at [employer].  Go! To Sleep - [facility] ($40) - Go! to Sleep ([facility])  A 6-week online course for improving sleep that teaches you to identify and then reframe  specific thoughts and behaviors that interfere with your ability to sleep deeply.  Sleepio - El Palomo PhD, CBSM ($50-$70) - https://www.Intrapace.Circular/  Sleepio is a 6-module online CBT-I program developed with El Palomo, a renowned  insomnia clinician and researcher, that is available through some Employee Assistance  Programs.  Sleepfitness.com - Brittanie Armstrong, PhD, CBSM ($129) - https://Porch.Circular/  A 6-week self-guided insomnia program based on Cognitive Behavioral Treatment for  Insomnia (CBTi) that is in online format. You can sign up for the 7-day free trial and learn  how CBTi can improve your sleep.  Insomnia Solved - Johnny Ramirez MD ($89) - http://www.edin.Circular/fix-  my-insomnia/  Insomnia Solved is a self-guided CBTI program created by  Johnny Ramirez M.D., a board-  certified medical doctor. The complete program includes full access to exclusive multimedia  content, including an 154-page eBook and online modules and audiofiles.  CBT-I Books  Estefania Mind: Turn Off Your Noisy Thoughts and Get a Good Night&#39;s Sleep -  Marion Gibson, PhD and Amy Avendano, PhD  Accessible, enjoyable, and grounded in evidence-based cognitive behavioral therapy (CBT),  Estefania Mind directly addresses the effects of rumination?or having an overactive  brain?on your ability to sleep well. Written by two psychologists who specialize in sleep  disorders, the book contains helpful exercises and insights into how you can better manage  your thoughts at bedtime, and finally get some sleep.  Quiet Your Mind and Get to Sleep: Solutions to Insomnia for Those with Depression,  Anxiety or Chronic Pain - Amy Avendano, PhD  This workbook uses cognitive behavior therapy, which has been shown to work as well as  sleep medications and produce longer-lasting effects. Research shows that it also works  well for those whose insomnia is experienced in the context of anxiety, depression, and  chronic pain. The complete program in this book goes to the root of your insomnia and offers  the same techniques used by experienced sleep specialists.  Sleep Through Insomnia - Johnny Ramirez MD  Cognitive behavioral therapy for insomnia (CBTI) is often structured as a 6-week treatment  program that can help people who have difficulty falling asleep, staying asleep, or find that  sleep is unrefreshing. CBTI is scientifically proven, highly effective, and does not rely on    medications. CBTI has life-long benefits and most participants report improved sleep  satisfaction. Insomnia Solved is based on the core features of this treatment.  Here are some guidelines to help you establish healthy sleep habits:      Keep a consistent sleep schedule. Get up at the same time every day, even on  weekends or  during vacations.      Set a bedtime that is early enough for you to get at least 7 hours of sleep. Establish relaxing  bedtime rituals (like a warm bath or nighttime reading routine) so that your body knows it&#39;s time  to wind down.      Limit exposure to light in the evenings. The light from screens (including smartphones, tablets,  televisions, and computers) can convince your brain that it&#39;s daytime and make you feel less  tired than you actually are. Lamplight is fine. Starting 2 hours before bedtime, turn off the  screens and choose quiet, relaxing activities that you enjoy.      Use your bed only for sleep and sex. Watching TV, using your laptop, etc in bed will make you  start to associate bed with the violent movie on TV, the stressful emails on your computer, etc.  Keep your bedroom quiet, dark, and cool, and let it be a haven from the rest of your busy life.      If you don’t fall asleep after what feels like about 20 minutes (don&#39;t keep your eye on the  clock!), get out of bed. The same is true for falling asleep after waking up in the middle of the  night. Leave the bedroom, find a book to read (or other quiet activity -- no screens), get a  simple, light snack or make a cup of hot milk or herbal tea, and relax in a your favorite chair. If  you feel tired enough to go back to sleep, return to bed. If not, don&#39;t worry. You&#39;ll be sleepier at  bedtime the next night and more likely to sleep well.      Exercise regularly and maintain a healthy diet. But, avoid vigorous exercise within 2 hours of  bedtime, and don’t eat a large meal before bedtime. If you are hungry at night, eat a light,  healthy snack.      Avoid consuming caffeine after 2 pm. If you are very sensitive to caffeine, don&#39;t use it after  noon.      Avoid consuming alcohol before bedtime. Alcohol may make you feel sleepy initially but will  actually reduce the quality of your sleep and make it likelier that  you will wake up in the middle  of the night.                 Allow 1-2 hours of “wind down” before bedtime with relaxing, non-stimulating activities              Try limiting screen use and/or using blue light blockers (apps for filters) starting 2 hours  before bedtime to prevent suppression of melatonin.              Get up and out of bed within 15 minutes of your desired time in the morning to set your  internal clock.              Make sure to get early exposure to bright light.  This will help you feel more awake and  set your internal clock to make it easier to wake up in the morning.

## 2025-01-27 NOTE — PROGRESS NOTES
[facility] Consult Note     Date: 1/27/2025 / Time: 8:02 AM      Thank you for requesting a sleep medicine consultation on Robin Zielesch at the sleep center. Presents today with the   Chief Complaint   Patient presents with    New Patient     SLEEP - NEW PATIENT CHART PREP COMPLETED ON 01/15/2025    Ref by Dr. Josue Dx: Excessive daytime sleepiness    Is Pt currently on PAP/O2? NO     Any prior Sleep Studies? NO.    Previously seen with [facility]? NO       She is referred by Jose Rafael Josue III, M.D.  [address] [facility] [place] [address] for evaluation and treatment of sleep disorder breathing.     HISTORY OF PRESENT ILLNESS:     Robin Lynn Zielesch is a 70 y.o. female former tobacco smoker  (75-pack-year history) with HTN, GERD, MDD, anxiety, thyroid cancer, hypothyroidism, CVA who presents to Sleep Clinic for SDB.     Patient endorses fragmented sleep with excessive daytime sleepiness during the day.  She had a sleep study at [facility] many years ago but does not think it was very accurate.    She endorses symptoms of sleep disordered breathing including snoring, nonrestorative sleep, fragmented sleep  She was recently was fired from her job for dozing during the day.     Her  passed away last month.  She has been dealing with the acute stressors from that    Sleep aids: sometimes lorazepam prn for panic attacks  Gabapentin TID     As per supplemental questionnaire to be scanned or imported into chart:    [place] Sleepiness Score: 10    Sleep Schedule  Bedtime: 8 to 9 PM  Wake time: 12-2 a, 4a  Sleep-onset latency: 30 minutes  Awakenings from sleep: Very fragmented often times for nocturia, 12-2 a  Difficulty falling back asleep: yes, unable to fall back asleep after initial wake up.  Bedroom partner: no  Naps: Yes    DAYTIME SYMPTOMS:   Excessive daytime sleepiness: No   Daytime fatigue: Yes  Difficulty concentrating: No   Memory problems: No   Irritability:No   Work/school  "performance issues: Yes  Sleepiness with driving: No   Caffeine/stimulant use: Yes, pepsi,  dinner time around 6 p  Alcohol use: No  THC smoking occasionally   Vape nicotine, last one at 8 pm     SLEEP RELATED SYMPTOMS  Snoring: Yes  Witnessed apnea or gasping/choking: No   Dry mouth or mouth breathing: No   Sweating: No   Teeth grinding/biting: yes, used to, no now no teeth  Morning headaches: No   Refreshed Upon Awakening: No      SLEEP RELATED BEHAVIORS:  Parasomnias (walking, talking, eating, violence): No   Leg kicking: No   Restless legs - \"urge to move\": No   Nightmares: No  Recurrent: No   Dream enactment: No      NARCOLEPSY:  Cataplexy: No   Sleep paralysis: No   Sleep attacks: No   Hypnagogic/hypnopompic hallucinations: No     MEDICAL HISTORY  Past Medical History:   Diagnosis Date    Anxiety     Arthritis     Cancer (HCC)     COPD (chronic obstructive pulmonary disease) (HCC)     Daytime sleepiness     GERD (gastroesophageal reflux disease)     Hypertension     Insomnia     Migraine     Thyroid disease         SURGICAL HISTORY  Past Surgical History:   Procedure Laterality Date    NC INJECTION,SACROILIAC JOINT Bilateral 10/16/2024    Procedure: RIGHT and LEFT sacroiliac joint injection with fluoroscopic guidance;  Surgeon: Kendal Jeffers M.D.;  [facility];  Service: Pain Management    NC INJ LUMBAR/SACRAL,W/ IMAGING Right 1/5/2024    Procedure: RIGHT Lumbar L2-3 interlaminar epidural steroid injection.  PT NEEDS TO HOLD ASA AND MELOXICAM PRIOR TO PROCEDURE.  PT PREFERS TO SCHEDULE ON FRIDAY IF POSS;  Surgeon: Kendal Jeffers M.D.;  Location[facility];  Service: Pain Management    NC INJ CERV/THORAC,W/ IMAGING N/A 12/14/2023    Procedure: interlaminar cervical epidural steroid injection at T3-T4;  Surgeon: Kendal Jeffers M.D.;  Location: [facility];  Service: Pain Management    NC INJ LUMBAR/SACRAL,W/ IMAGING Right 03/07/2023    " Procedure: RIGHT Lumbar L2-3 interlaminar epidural steroid injection;  Surgeon: Kendal Jeffers M.D.;  Location: [facility];  Service: Pain Management    UT INJ LUMBAR/SACRAL,W/ IMAGING Right 2022    Procedure: RIGHT lumbar L2-3 interlaminar epidural steroid injection;  Surgeon: Kendal Jeffers M.D.;  Location: [facility];  Service: Pain Management    ABDOMINAL HYSTERECTOMY TOTAL      ARTHROPLASTY      EYE SURGERY      FOOT SURGERY      HERNIA REPAIR      [id number]      L tka    LAMINOTOMY      LUMPECTOMY      PRIMARY C SECTION      THYROIDECTOMY TOTAL      2019  thyroid cancer        FAMILY HISTORY  Family History   Problem Relation Age of Onset    COPD Mother     Cancer Father         pancreatic    Hypertension Father     Hyperlipidemia Father     Alcohol abuse Father     Drug abuse Brother        SOCIAL HISTORY  Social History     Socioeconomic History    Marital status:     Highest education level: Some college, no degree   Tobacco Use    Smoking status: Former     Current packs/day: 0.00     Average packs/day: 1.5 packs/day for 50.0 years (75.0 ttl pk-yrs)     Types: Cigarettes     Start date: 1968     Quit date: 2018     Years since quittin.4    Smokeless tobacco: Never   Vaping Use    Vaping status: Some Days    Substances: Nicotine, CBD, Flavoring    Devices: Pre-filled or refillable cartridge, Refillable tank   Substance and Sexual Activity    Alcohol use: Never    Drug use: Not Currently     Types: Marijuana, Methamphetamines     Comment: once in a while    Sexual activity: Yes     Partners: Male     Birth control/protection: Female Sterilization     Social Drivers of Health     Financial Resource Strain: Medium Risk (7/10/2023)    Overall Financial Resource Strain (CARDIA)     Difficulty of Paying Living Expenses: Somewhat hard   Food Insecurity: No Food Insecurity (7/10/2023)    Hunger Vital Sign     Worried About Running Out of Food in the  Last Year: Never true     Ran Out of Food in the Last Year: Never true   Transportation Needs: No Transportation Needs (7/10/2023)    PRAPARE - Transportation     Lack of Transportation (Medical): No     Lack of Transportation (Non-Medical): No   Physical Activity: Insufficiently Active (7/10/2023)    Exercise Vital Sign     Days of Exercise per Week: 5 days     Minutes of Exercise per Session: 20 min   Stress: No Stress Concern Present (7/10/2023)    [place] [employer] - Occupational Stress Questionnaire     Feeling of Stress : Only a little   Social Connections: Moderately Isolated (7/10/2023)    Social Connection and Isolation Panel [NHANES]     Frequency of Communication with Friends and Family: Never     Frequency of Social Gatherings with Friends and Family: Never     Attends Confucianist Services: Never     Active Member of Clubs or Organizations: Yes     Attends Club or Organization Meetings: Never     Marital Status:    Intimate Partner Violence: Not At Risk (2/7/2019)    [facility]    Humiliation, Afraid, Rape, and Kick questionnaire     Fear of Current or Ex-Partner: No     Emotionally Abused: No     Physically Abused: No     Sexually Abused: No   Housing Stability: Low Risk  (7/10/2023)    Housing Stability Vital Sign     Unable to Pay for Housing in the Last Year: No     Number of Places Lived in the Last Year: 1     Unstable Housing in the Last Year: No        CURRENT MEDICATIONS  Current Outpatient Medications   Medication Sig    LORazepam (ATIVAN) 0.5 MG Tab Take 1 Tablet by mouth every 8 hours as needed for Anxiety for up to 30 days.    potassium chloride SA (KDUR) 20 MEQ Tab CR Take 1 Tablet by mouth every day.    methylPREDNISolone (MEDROL DOSEPAK) 4 MG Tablet Therapy Pack As directed on the packaging label.    gabapentin (NEURONTIN) 300 MG Cap Take 3 Capsules by mouth 3 times a day.    lidocaine (LIDODERM) 5 % Patch Place 1 Patch on  "the skin every 24 hours. Apply to affected area, 24 hours on followed by 24 hours off.    tizanidine (ZANAFLEX) 4 MG Tab Take 1 Tablet by mouth every 6 hours as needed (arm pain).    losartan (COZAAR) 100 MG Tab Take 1 Tablet by mouth every day.    buPROPion (WELLBUTRIN XL) 300 MG XL tablet Take 1 Tablet by mouth every morning.    meloxicam (MOBIC) 15 MG tablet Take 1 Tablet by mouth every day.    albuterol 108 (90 Base) MCG/ACT Aero Soln inhalation aerosol Inhale 2 Puffs every 6 hours as needed for Shortness of Breath.    SYNTHROID 88 MCG Tab Take 1 Tablet by mouth every morning on an empty stomach.    amLODIPine (NORVASC) 10 MG Tab Take 1 Tablet by mouth every day.    omeprazole (PRILOSEC) 40 MG delayed-release capsule Take 1 Capsule by mouth every day.    aspirin (ASA) 81 MG Chew Tab chewable tablet Chew 81 mg every day.    calcium citrate (CALCITRATE) 950 (200 Ca) MG Tab Take 950 mg by mouth every day.    Multiple Vitamin (MULTI-VITAMINS PO) Take  by mouth.    Riboflavin (VITAMIN B-2 PO) Take  by mouth.       REVIEW OF SYSTEMS  Constitutional: Denies fevers, Denies weight changes  Ears/Nose/Throat/Mouth: Denies nasal congestion or sore throat   Cardiovascular: Denies chest pain  Respiratory: Denies shortness of breath, Denies cough  Gastrointestinal/Hepatic: Denies nausea, vomiting  Sleep: see HPI    Physical Examination:  Vitals/ General Appearance:   Weight/BMI: Body mass index is 33.07 kg/m².  Vitals:    01/27/25 0807   BP: 110/82   BP Location: Left arm   Patient Position: Sitting   BP Cuff Size: Adult   Pulse: 79   SpO2: 94%   Weight: 79.4 kg (175 lb)   Height: 1.549 m (5' 1\")       Pt. is alert and oriented to time, place and person. Cooperative and in no apparent distress.     Constitutional: Alert, no distress, well-groomed.  Skin: No rashes in visible areas.    Hard palate narrow: Yes  Hard palate high: Yes  Soft palate/uvula (Mallampati score): 4, no teeth  Tongue Scalloping: " Yes  Retrognathia: Yes  Micrognathia: No  Cardiovascular: RRR  Pulmonary:Clear to auscultation  Neurologic:Awake, alert and oriented x 3  Extremities: No clubbing, cyanosis, or edema     Bicarb:   Lab Results   Component Value Date/Time    CO2 28 07/05/2024 0715    CO2 26 12/20/2022 1030    CO2 23 06/24/2022 0746     TSH:   Lab Results   Component Value Date/Time    TSHULTRASEN 0.900 07/05/2024 0715     CREATININE:   Lab Results   Component Value Date/Time    CREATININE 0.92 07/05/2024 0715     VIT D:   Lab Results   Component Value Date/Time    25HYDROXY 55 06/24/2022 0746     H/H:  Lab Results   Component Value Date/Time    HEMOGLOBIN 15.4 07/05/2024 07:15 AM       ASSESSMENT AND PLAN   1. Robin Lynn Zielesch  has symptoms of Obstructive Sleep Apnea (BI) including snoring, nocturia, fragmented sleep, unrefreshed upon awakening. These sxs interfere with activities of daily living. ESS 10  Pt has risk factors for BI include obesity, thick neck, and crowded oropharynx.     The pathophysiology of BI and the increased risk of cardiovascular morbidity from untreated BI is discussed in detail with the patient. She  also has HTN, GERD which can be worsened by BI.      We have discussed diagnostic options including in-laboratory, attended polysomnography and home sleep testing. We have also discussed treatment options including airway pressurization, reconstructive otolaryngologic surgery, dental appliances and weight management.     Subsequently,treatment options will be discussed based on the diagnostic study. Meanwhile, She is urged to avoid supine sleep, weight gain and alcoholic beverages since all of these can worsen BI. She is cautioned against drowsy driving. If She feels sleepy while driving, advised must pull over for a break/nap, rather than persist on the road, in the interest of Pt's own safety and that of others on the road.    Plan  -  She  will be scheduled for an overnight PSG to assess sleep related  breathing disorder.  - Follow up 1-2 weeks after sleep study to discuss results and treatment options moving forward   -Advised to reach out via MyChart or by phone with any questions or concerns.    2.  Sleep onset insomnia -discussed decreasing stimulus prior to bedtime, decreasing caffeine intake prior to bed, removing herself from bed if unable to sleep, anchoring consistent wake up time.  CBT-I resources given and CBT-I referral placed    3.  Regarding treatment of other past medical problems and general health maintenance,  Pt is urged to follow up with PCP.      Please note portions of this record was created using voice recognition software. I have made every reasonable attempt to correct obvious errors, but I expect that there are errors of grammar and possibly content I did not discover before finalizing the note.

## 2025-02-09 ENCOUNTER — PHARMACY VISIT (OUTPATIENT)
Dept: PHARMACY | Facility: MEDICAL CENTER | Age: 70
End: 2025-02-09
Payer: COMMERCIAL

## 2025-02-09 PROCEDURE — RXMED WILLOW AMBULATORY MEDICATION CHARGE: Performed by: FAMILY MEDICINE

## 2025-02-10 ENCOUNTER — APPOINTMENT (OUTPATIENT)
Dept: RADIOLOGY | Facility: MEDICAL CENTER | Age: 70
End: 2025-02-10
Attending: EMERGENCY MEDICINE
Payer: MEDICARE

## 2025-02-10 ENCOUNTER — APPOINTMENT (OUTPATIENT)
Dept: RADIOLOGY | Facility: MEDICAL CENTER | Age: 70
End: 2025-02-10
Payer: MEDICARE

## 2025-02-10 ENCOUNTER — HOSPITAL ENCOUNTER (EMERGENCY)
Facility: MEDICAL CENTER | Age: 70
End: 2025-02-10
Attending: EMERGENCY MEDICINE
Payer: MEDICARE

## 2025-02-10 VITALS
TEMPERATURE: 97.2 F | DIASTOLIC BLOOD PRESSURE: 91 MMHG | BODY MASS INDEX: 32.79 KG/M2 | RESPIRATION RATE: 16 BRPM | HEART RATE: 92 BPM | HEIGHT: 60 IN | WEIGHT: 167 LBS | OXYGEN SATURATION: 90 % | SYSTOLIC BLOOD PRESSURE: 137 MMHG

## 2025-02-10 DIAGNOSIS — R07.9 CHEST PAIN, UNSPECIFIED TYPE: ICD-10-CM

## 2025-02-10 LAB
ALBUMIN SERPL BCP-MCNC: 4.2 G/DL (ref 3.2–4.9)
ALBUMIN/GLOB SERPL: 1.4 G/DL
ALP SERPL-CCNC: 152 U/L (ref 30–99)
ALT SERPL-CCNC: 15 U/L (ref 2–50)
ANION GAP SERPL CALC-SCNC: 11 MMOL/L (ref 7–16)
AST SERPL-CCNC: 24 U/L (ref 12–45)
BASOPHILS # BLD AUTO: 0.4 % (ref 0–1.8)
BASOPHILS # BLD: 0.05 K/UL (ref 0–0.12)
BILIRUB SERPL-MCNC: 0.2 MG/DL (ref 0.1–1.5)
BUN SERPL-MCNC: 15 MG/DL (ref 8–22)
CALCIUM ALBUM COR SERPL-MCNC: 9 MG/DL (ref 8.5–10.5)
CALCIUM SERPL-MCNC: 9.2 MG/DL (ref 8.5–10.5)
CHLORIDE SERPL-SCNC: 108 MMOL/L (ref 96–112)
CO2 SERPL-SCNC: 22 MMOL/L (ref 20–33)
CREAT SERPL-MCNC: 0.96 MG/DL (ref 0.5–1.4)
D DIMER PPP IA.FEU-MCNC: 1 UG/ML (FEU) (ref 0–0.5)
EKG IMPRESSION: NORMAL
EOSINOPHIL # BLD AUTO: 0.23 K/UL (ref 0–0.51)
EOSINOPHIL NFR BLD: 1.9 % (ref 0–6.9)
ERYTHROCYTE [DISTWIDTH] IN BLOOD BY AUTOMATED COUNT: 46.3 FL (ref 35.9–50)
GFR SERPLBLD CREATININE-BSD FMLA CKD-EPI: 64 ML/MIN/1.73 M 2
GLOBULIN SER CALC-MCNC: 2.9 G/DL (ref 1.9–3.5)
GLUCOSE SERPL-MCNC: 134 MG/DL (ref 65–99)
HCT VFR BLD AUTO: 44.3 % (ref 37–47)
HGB BLD-MCNC: 14.4 G/DL (ref 12–16)
IMM GRANULOCYTES # BLD AUTO: 0.08 K/UL (ref 0–0.11)
IMM GRANULOCYTES NFR BLD AUTO: 0.6 % (ref 0–0.9)
LYMPHOCYTES # BLD AUTO: 2.3 K/UL (ref 1–4.8)
LYMPHOCYTES NFR BLD: 18.7 % (ref 22–41)
MCH RBC QN AUTO: 28.5 PG (ref 27–33)
MCHC RBC AUTO-ENTMCNC: 32.5 G/DL (ref 32.2–35.5)
MCV RBC AUTO: 87.5 FL (ref 81.4–97.8)
MONOCYTES # BLD AUTO: 0.97 K/UL (ref 0–0.85)
MONOCYTES NFR BLD AUTO: 7.9 % (ref 0–13.4)
NEUTROPHILS # BLD AUTO: 8.7 K/UL (ref 1.82–7.42)
NEUTROPHILS NFR BLD: 70.5 % (ref 44–72)
NRBC # BLD AUTO: 0 K/UL
NRBC BLD-RTO: 0 /100 WBC (ref 0–0.2)
NT-PROBNP SERPL IA-MCNC: 53 PG/ML (ref 0–125)
PLATELET # BLD AUTO: 306 K/UL (ref 164–446)
PMV BLD AUTO: 9.1 FL (ref 9–12.9)
POTASSIUM SERPL-SCNC: 3.9 MMOL/L (ref 3.6–5.5)
PROT SERPL-MCNC: 7.1 G/DL (ref 6–8.2)
RBC # BLD AUTO: 5.06 M/UL (ref 4.2–5.4)
SODIUM SERPL-SCNC: 141 MMOL/L (ref 135–145)
TROPONIN T SERPL-MCNC: 7 NG/L (ref 6–19)
TROPONIN T SERPL-MCNC: 8 NG/L (ref 6–19)
WBC # BLD AUTO: 12.3 K/UL (ref 4.8–10.8)

## 2025-02-10 PROCEDURE — 99284 EMERGENCY DEPT VISIT MOD MDM: CPT

## 2025-02-10 PROCEDURE — 71045 X-RAY EXAM CHEST 1 VIEW: CPT

## 2025-02-10 PROCEDURE — 85025 COMPLETE CBC W/AUTO DIFF WBC: CPT

## 2025-02-10 PROCEDURE — 93005 ELECTROCARDIOGRAM TRACING: CPT | Mod: TC

## 2025-02-10 PROCEDURE — 36415 COLL VENOUS BLD VENIPUNCTURE: CPT

## 2025-02-10 PROCEDURE — 93005 ELECTROCARDIOGRAM TRACING: CPT | Mod: TC | Performed by: EMERGENCY MEDICINE

## 2025-02-10 PROCEDURE — 80053 COMPREHEN METABOLIC PANEL: CPT

## 2025-02-10 PROCEDURE — 84484 ASSAY OF TROPONIN QUANT: CPT | Mod: 91

## 2025-02-10 PROCEDURE — 85379 FIBRIN DEGRADATION QUANT: CPT

## 2025-02-10 PROCEDURE — 83880 ASSAY OF NATRIURETIC PEPTIDE: CPT

## 2025-02-10 ASSESSMENT — HEART SCORE
AGE: 65+
RISK FACTORS: 1-2 RISK FACTORS
HISTORY: SLIGHTLY SUSPICIOUS
HEART SCORE: 3
TROPONIN: LESS THAN OR EQUAL TO NORMAL LIMIT
ECG: NORMAL

## 2025-02-10 ASSESSMENT — FIBROSIS 4 INDEX: FIB4 SCORE: 0.98

## 2025-02-10 NOTE — ED PROVIDER NOTES
ER Provider Note    Scribed for Joelle Roberson M.d. by Jyoti Castillo. 2/10/2025  2:44 PM    Primary Care Provider: Jose Rafael Josue III, M.D.    CHIEF COMPLAINT  Chief Complaint   Patient presents with    Chest Pain    Shortness of Breath    Dizziness     LIMITATION TO HISTORY   Select: : None    HPI/ROS  OUTSIDE HISTORIAN(S):  None    EXTERNAL RECORDS REVIEWED  Outpatient Notes outpatient pulmonary medicine note reviewed.    Robin Lynn Zielesch is a 70 y.o. female who presents to the ED for chest pain onset approximately 4 hours ago. The patient reports that she started to experience right sided chest pain around 10:30 AM this morning. She notes that she was moving furniture yesterday. Her pain is exacerbated with deep breath. She has associated shortness of breath and lightheadedness. Denies leg swelling or cough. She has a history of hypertension and COPD. She is currently taking omeprazole, amlodipine, and losartan. She denies currently smoking cigarettes. There are no known alleviating factors.      PAST MEDICAL HISTORY  Past Medical History:   Diagnosis Date    Anxiety     Arthritis     Cancer (HCC)     COPD (chronic obstructive pulmonary disease) (HCC)     Daytime sleepiness     GERD (gastroesophageal reflux disease)     Hypertension     Insomnia     Migraine     Thyroid disease        SURGICAL HISTORY  Past Surgical History:   Procedure Laterality Date    MT INJECTION,SACROILIAC JOINT Bilateral 10/16/2024    Procedure: RIGHT and LEFT sacroiliac joint injection with fluoroscopic guidance;  Surgeon: Kendal Jeffers M.D.;  [facility];  Service: Pain Management    MT INJ LUMBAR/SACRAL,W/ IMAGING Right 1/5/2024    Procedure: RIGHT Lumbar L2-3 interlaminar epidural steroid injection.  PT NEEDS TO HOLD ASA AND MELOXICAM PRIOR TO PROCEDURE.  PT PREFERS TO SCHEDULE ON FRIDAY IF POSS;  Surgeon: Kendal Jeffers M.D.;  Location: [facility];  Service: Pain Management     MS INJ CERV/THORAC,W/ IMAGING N/A 12/14/2023    Procedure: interlaminar cervical epidural steroid injection at T3-T4;  Surgeon: Kendal Jeffers M.D.;  Location: [facility];  Service: Pain Management    MS INJ LUMBAR/SACRAL,W/ IMAGING Right 03/07/2023    Procedure: RIGHT Lumbar L2-3 interlaminar epidural steroid injection;  Surgeon: Kendal Jeffers M.D.;  Location: [facility];  Service: Pain Management    MS INJ LUMBAR/SACRAL,W/ IMAGING Right 11/01/2022    Procedure: RIGHT lumbar L2-3 interlaminar epidural steroid injection;  Surgeon: Kendal Jeffers M.D.;  Location: [facility];  Service: Pain Management    ABDOMINAL HYSTERECTOMY TOTAL      ARTHROPLASTY      EYE SURGERY      FOOT SURGERY      HERNIA REPAIR      [id number]      L tka    LAMINOTOMY      LUMPECTOMY      PRIMARY C SECTION      THYROIDECTOMY TOTAL      2019  thyroid cancer       FAMILY HISTORY  Family History   Problem Relation Age of Onset    COPD Mother     Cancer Father         pancreatic    Hypertension Father     Hyperlipidemia Father     Alcohol abuse Father     Drug abuse Brother        SOCIAL HISTORY   reports that she quit smoking about 6 years ago. Her smoking use included cigarettes. She started smoking about 56 years ago. She has a 75 pack-year smoking history. She has never used smokeless tobacco. She reports that she does not currently use drugs after having used the following drugs: Marijuana and Methamphetamines. She reports that she does not drink alcohol.    CURRENT MEDICATIONS  Current Outpatient Medications   Medication Instructions    albuterol 108 (90 Base) MCG/ACT Aero Soln inhalation aerosol 2 Puffs, Inhalation, EVERY 6 HOURS PRN    amLODIPine (NORVASC) 10 mg, Oral, DAILY    aspirin (ASA) 81 mg, DAILY    buPROPion (WELLBUTRIN XL) 300 mg, Oral, EVERY MORNING    calcium citrate (CALCITRATE) 950 mg, DAILY    gabapentin (NEURONTIN) 900 mg, Oral, 3 TIMES DAILY    lidocaine  (LIDODERM) 5 % Patch 1 Patch, Transdermal, EVERY 24 HOURS, Apply to affected area, 24 hours on followed by 24 hours off.    LORazepam (ATIVAN) 0.5 mg, Oral, EVERY 8 HOURS PRN    losartan (COZAAR) 100 mg, Oral, DAILY    meloxicam (MOBIC) 15 mg, Oral, DAILY    methylPREDNISolone (MEDROL DOSEPAK) 4 MG Tablet Therapy Pack As directed on the packaging label.    Multiple Vitamin (MULTI-VITAMINS PO) Take  by mouth.    omeprazole (PRILOSEC) 40 mg, Oral, DAILY    potassium chloride SA (KDUR) 20 MEQ Tab CR 20 mEq, Oral, DAILY    Riboflavin (VITAMIN B-2 PO) Take  by mouth.    Synthroid 88 mcg, Oral, EACH MORNING ON EMPTY STOMACH    tizanidine (ZANAFLEX) 4 mg, Oral, EVERY 6 HOURS PRN        ALLERGIES  Ace inhibitors, Chlorhexidine, Flexeril [cyclobenzaprine], and Triamterene    PHYSICAL EXAM  /77   Pulse 80   Temp 36.2 °C (97.2 °F) (Temporal)   Resp 16   Ht 1.524 m (5')   Wt 75.8 kg (167 lb)   SpO2 91%   BMI 32.61 kg/m²   Constitutional: Alert in mild distress.  HENT: No signs of trauma, Bilateral external ears normal, Nose normal.   Eyes: Pupils are equal and reactive, Conjunctiva normal, Non-icteric.   Neck: No stridor.   Cardiovascular: Regular rate and rhythm, no murmurs. No lower extremity edema.   Thorax & Lungs: Normal breath sounds, No respiratory distress, No wheezing, No chest tenderness.   Abdomen: Bowel sounds normal, Soft, No tenderness, No masses, No peritoneal signs.  Skin: Warm, Dry, No erythema, No rash.   Musculoskeletal:  No major deformities noted.  Neurologic: Alert, moving all extremities without difficulty, no focal deficits.     DIAGNOSTIC STUDIES & PROCEDURES    Labs:   Results for orders placed or performed during the hospital encounter of 02/10/25   CBC with Differential    Collection Time: 02/10/25  1:43 PM   Result Value Ref Range    WBC 12.3 (H) 4.8 - 10.8 K/uL    RBC 5.06 4.20 - 5.40 M/uL    Hemoglobin 14.4 12.0 - 16.0 g/dL    Hematocrit 44.3 37.0 - 47.0 %    MCV 87.5 81.4 - 97.8 fL     MCH 28.5 27.0 - 33.0 pg    MCHC 32.5 32.2 - 35.5 g/dL    RDW 46.3 35.9 - 50.0 fL    Platelet Count 306 164 - 446 K/uL    MPV 9.1 9.0 - 12.9 fL    Neutrophils-Polys 70.50 44.00 - 72.00 %    Lymphocytes 18.70 (L) 22.00 - 41.00 %    Monocytes 7.90 0.00 - 13.40 %    Eosinophils 1.90 0.00 - 6.90 %    Basophils 0.40 0.00 - 1.80 %    Immature Granulocytes 0.60 0.00 - 0.90 %    Nucleated RBC 0.00 0.00 - 0.20 /100 WBC    Neutrophils (Absolute) 8.70 (H) 1.82 - 7.42 K/uL    Lymphs (Absolute) 2.30 1.00 - 4.80 K/uL    Monos (Absolute) 0.97 (H) 0.00 - 0.85 K/uL    Eos (Absolute) 0.23 0.00 - 0.51 K/uL    Baso (Absolute) 0.05 0.00 - 0.12 K/uL    Immature Granulocytes (abs) 0.08 0.00 - 0.11 K/uL    NRBC (Absolute) 0.00 K/uL   Complete Metabolic Panel (CMP)    Collection Time: 02/10/25  1:43 PM   Result Value Ref Range    Sodium 141 135 - 145 mmol/L    Potassium 3.9 3.6 - 5.5 mmol/L    Chloride 108 96 - 112 mmol/L    Co2 22 20 - 33 mmol/L    Anion Gap 11.0 7.0 - 16.0    Glucose 134 (H) 65 - 99 mg/dL    Bun 15 8 - 22 mg/dL    Creatinine 0.96 0.50 - 1.40 mg/dL    Calcium 9.2 8.5 - 10.5 mg/dL    Correct Calcium 9.0 8.5 - 10.5 mg/dL    AST(SGOT) 24 12 - 45 U/L    ALT(SGPT) 15 2 - 50 U/L    Alkaline Phosphatase 152 (H) 30 - 99 U/L    Total Bilirubin 0.2 0.1 - 1.5 mg/dL    Albumin 4.2 3.2 - 4.9 g/dL    Total Protein 7.1 6.0 - 8.2 g/dL    Globulin 2.9 1.9 - 3.5 g/dL    A-G Ratio 1.4 g/dL   proBrain Natriuretic Peptide, NT (BNP)    Collection Time: 02/10/25  1:43 PM   Result Value Ref Range    NT-proBNP 53 0 - 125 pg/mL   Troponins NOW    Collection Time: 02/10/25  1:43 PM   Result Value Ref Range    Troponin T 8 6 - 19 ng/L   ESTIMATED GFR    Collection Time: 02/10/25  1:43 PM   Result Value Ref Range    GFR (CKD-EPI) 64 >60 mL/min/1.73 m 2   D-DIMER    Collection Time: 02/10/25  1:43 PM   Result Value Ref Range    D-Dimer 1.00 (H) 0.00 - 0.50 ug/mL (FEU)   Troponins in two (2) hours    Collection Time: 02/10/25  3:34 PM   Result Value  Ref Range    Troponin T 7 6 - 19 ng/L   EKG    Collection Time: 02/10/25  9:54 PM   Result Value Ref Range    Report       [facility]    Test Date:  2025-02-10  Pt Name:    ROBIN ZIELESCH               Department: ER  MRN:        [id number]                      Room:  Gender:     Female                       Technician: [id number]  :        [id number]                   Requested By:ER TRIAGE PROTOCOL  Order #:    [id number]                    Reading MD: AMIRAH VILLAFANA    Measurements  Intervals                                Axis  Rate:       84                           P:          46  CO:         174                          QRS:        -56  QRSD:       81                           T:          58  QT:         370  QTc:        438    Interpretive Statements  Sinus rhythm  Left anterior fascicular block  No previous ECG available for comparison  Impression: Sinus rhythm no evidence of ischemia.  Electronically Signed On 02- 21:54:27 PST by AMIRAH VILLAFANA        All labs reviewed by me.    EKG:   I have independently interpreted this EKG as seen above.    Radiology:   The attending Emergency Physician has independently interpreted the diagnostic imaging associated with this visit and is awaiting the final reading from the radiologist, which will be displayed below.    Preliminary interpretation is a follows: Normal-appearing chest x-ray  Radiologist interpretation:   DX-CHEST-PORTABLE (1 VIEW)   Final Result      Mild bibasilar atelectasis.           COURSE & MEDICAL DECISION MAKING    ED Observation Status? No; Patient does not meet criteria for ED Observation.     2:44 PM - Patient seen and evaluated at bedside. Ordered CTA Chest-Pulmonary, DX-Chest, D-dimer, CBC with diff, CMP, BNP, Troponin, and EKG to evaluate. She understands and agrees to the plan of care. Differential diagnoses include but are not limited to: pneumonia, less likely ACS and PE.     INITIAL ASSESSMENT AND  PLAN  Care Narrative: This is a 70-year-old female that presents with right-sided chest discomfort.  Vital signs are normal.  Oxygen saturation is normal.  EKG does not show any evidence of ischemia.  CBC shows a mildly elevated white count without left shift.  Initial troponin is normal at 8.  BNP negative no evidence of fluid overload on her x-ray.  COPD is doing well.  Repeat troponin is 7.  Given this within normal EKG and less concern for ACS.    D-dimer was sent given some discomfort with taking a deep breath and therefore he was somewhat concerned for PE despite normal vital signs.  This was elevated.  Plan was to do a CT PE protocol to rule this out however patient did not want to stay and wait for it she referenced dogs at home that she needed to let out.  She therefore signed out AMA at this time.  She will follow-up with her primary care doctor tomorrow.        CHEST PAIN:   HEART Score for Major Cardiac Events  HEART Score     History: Slightly suspicious  ECG: Normal  Age: 65+  Risk Factors: 1-2 risk factors  Troponin: Less than or equal to normal limit    Heart Score: 3    Total Score   0-3 Points = Low Score, risk of MACE 0.9-1.7%.  4-6 Points = Moderate Score, risk of MACE 12-16.6%  7-10 Points = High Score, risk of MACE 50-65%    6:05 PM - Patient reevaluated at bedside. She would like to leave. I discussed with the patient the risks of their decision to leave without receiving the appropriate medical care. I discussed with the patient the risks of their decision to refuse or withhold consent to receive appropriate medical care. The patient has the capacity to understand the risks and benefits described above. The patient is not intoxicated clinically, the patient's is alert and oriented and able to make a good decision in my opinion. I discussed alternative treatments with the patient. The patient was given discharge instructions and a followup plan as documented in the medical record. I have asked  the patient to return at any time to the emergency department for any reason.                    DISPOSITION AND DISCUSSIONS  I have discussed management of the patient with the following physicians and KVNG's: None    Discussion of management with other QHP or appropriate source(s): None     Escalation of care considered, and ultimately not performed: diagnostic imaging.    Barriers to care at this time, including but not limited to:  None .     Decision tools and prescription drugs considered including, but not limited to:  none .    The patient is discharged AMA    FINAL IMPRESSION   1. Chest pain, unspecified type        I, Jyoti Castillo (Carla), am scribing for, and in the presence of, Joelle Roberson M.D..    Electronically signed by: Jyoti Castillo (Carla), 2/10/2025    I, Joelle Roberson M.D. personally performed the services described in this documentation, as scribed by Jyoti Castillo in my presence, and it is both accurate and complete.    The note accurately reflects work and decisions made by me.  Joelle Roberson M.D.  2/10/2025  9:57 PM

## 2025-02-10 NOTE — ED TRIAGE NOTES
Patient to ED with complaints of chest pain to right upper chest and into back and shoulder. +shortness of breath and dizziness. +cough. No fever or chills. Denies cardiac HX. +hx of COPD. EKG completed on arrival.     Pt educated on ED process and asked to wait in lobby. Patient educated on importance of alerting staff to new or worsening symptoms or concerns.

## 2025-02-14 ENCOUNTER — OFFICE VISIT (OUTPATIENT)
Dept: PHYSICAL MEDICINE AND REHAB | Facility: MEDICAL CENTER | Age: 70
End: 2025-02-14
Payer: MEDICARE

## 2025-02-14 VITALS
OXYGEN SATURATION: 99 % | HEART RATE: 95 BPM | WEIGHT: 162.7 LBS | DIASTOLIC BLOOD PRESSURE: 84 MMHG | TEMPERATURE: 98 F | HEIGHT: 60 IN | SYSTOLIC BLOOD PRESSURE: 124 MMHG | BODY MASS INDEX: 31.94 KG/M2

## 2025-02-14 DIAGNOSIS — M18.0 OSTEOARTHRITIS OF CARPOMETACARPAL (CMC) JOINTS OF BOTH THUMBS, UNSPECIFIED OSTEOARTHRITIS TYPE: ICD-10-CM

## 2025-02-14 DIAGNOSIS — M79.2 NEURALGIA: ICD-10-CM

## 2025-02-14 DIAGNOSIS — M48.02 NEUROFORAMINAL STENOSIS OF CERVICAL SPINE: ICD-10-CM

## 2025-02-14 DIAGNOSIS — M79.10 MYALGIA: ICD-10-CM

## 2025-02-14 DIAGNOSIS — R20.0 NUMBNESS AND TINGLING OF RIGHT LEG: ICD-10-CM

## 2025-02-14 DIAGNOSIS — Z98.1 HISTORY OF LUMBAR FUSION: ICD-10-CM

## 2025-02-14 DIAGNOSIS — G89.29 CHRONIC RIGHT-SIDED LOW BACK PAIN WITH RIGHT-SIDED SCIATICA: ICD-10-CM

## 2025-02-14 DIAGNOSIS — M53.3 SACROILIAC JOINT DYSFUNCTION OF BOTH SIDES: ICD-10-CM

## 2025-02-14 DIAGNOSIS — M54.41 CHRONIC RIGHT-SIDED LOW BACK PAIN WITH RIGHT-SIDED SCIATICA: ICD-10-CM

## 2025-02-14 DIAGNOSIS — M54.16 LUMBAR RADICULITIS: ICD-10-CM

## 2025-02-14 DIAGNOSIS — R07.81 RIB PAIN ON RIGHT SIDE: ICD-10-CM

## 2025-02-14 DIAGNOSIS — Z98.1 S/P CERVICAL SPINAL FUSION: ICD-10-CM

## 2025-02-14 DIAGNOSIS — M51.26 LUMBAR DISC HERNIATION: ICD-10-CM

## 2025-02-14 DIAGNOSIS — R20.2 NUMBNESS AND TINGLING OF RIGHT LEG: ICD-10-CM

## 2025-02-14 PROCEDURE — 3074F SYST BP LT 130 MM HG: CPT | Performed by: STUDENT IN AN ORGANIZED HEALTH CARE EDUCATION/TRAINING PROGRAM

## 2025-02-14 PROCEDURE — 3079F DIAST BP 80-89 MM HG: CPT | Performed by: STUDENT IN AN ORGANIZED HEALTH CARE EDUCATION/TRAINING PROGRAM

## 2025-02-14 PROCEDURE — 76882 US LMTD JT/FCL EVL NVASC XTR: CPT | Performed by: STUDENT IN AN ORGANIZED HEALTH CARE EDUCATION/TRAINING PROGRAM

## 2025-02-14 PROCEDURE — 99214 OFFICE O/P EST MOD 30 MIN: CPT | Performed by: STUDENT IN AN ORGANIZED HEALTH CARE EDUCATION/TRAINING PROGRAM

## 2025-02-14 PROCEDURE — 1125F AMNT PAIN NOTED PAIN PRSNT: CPT | Performed by: STUDENT IN AN ORGANIZED HEALTH CARE EDUCATION/TRAINING PROGRAM

## 2025-02-14 ASSESSMENT — PATIENT HEALTH QUESTIONNAIRE - PHQ9
CLINICAL INTERPRETATION OF PHQ2 SCORE: 1
5. POOR APPETITE OR OVEREATING: 0 - NOT AT ALL
SUM OF ALL RESPONSES TO PHQ QUESTIONS 1-9: 3

## 2025-02-14 ASSESSMENT — PAIN SCALES - GENERAL: PAINLEVEL_OUTOF10: 6=MODERATE PAIN

## 2025-02-14 ASSESSMENT — FIBROSIS 4 INDEX: FIB4 SCORE: 1.4

## 2025-02-14 NOTE — PROGRESS NOTES
Follow-up patient Note    Interventional Pain and Spine  Physiatry (Physical Medicine and Rehabilitation)     Patient Name: Robin Lynn Zielesch  : 1955  Date of service: 2025    Chief Complaint:   Chief Complaint   Patient presents with    Follow-Up     3m fv         HISTORY (2022):  Robin Lynn Zielesch is a 67 y.o. female who presents today with pain radiating from her right posterolateral glute down her right anterolateral and posterior thigh accompanied by numbness/tingling/weakness in this area. This started in Sep 2021 while recovering from a L2-pelvis posterior spinal fusion with TLIF/ PLIF on 21 with Dr. Bates ([facility] which she states she had done for similar radiating pain down her left leg.  Her radiating left leg pain resolved after surgery.     Her pain at its best-worse level during the course of the day is 5-9/10, respectively. Pain right now is 7/10 on the numeric pain scale. Pain worsens with sitting, standing, walking, bending backwards, side bending or twisting, walking upstairs, and walking downstairs and improves with nothing. Her pain interferes somewhat with ADLs. The patient otherwise denies new weakness, numbness, or bladder/bowel incontinence. States she has fallen a few times secondary to pain and possibly weakness. Moved from [facility] in May 2022.     The patient has done physical therapy for this problem with worsening pain.     Patient has tried the following medications with varied success (current meds in bold): Hayes back and body  Gabapentin 300mg TID - no relief. Used to help with left sided pain  Naproxen BID - significant relief     Therapeutic modalities and interventional therapies to date include:  -No injections     Medical history includes HTN, cervical laminectomy, depression, anxiety, thyroid cancer, BMI 30, L2-pelvis posterior spinal fusion with TLIF/ PLIF on 21    HPI  Today's visit   Robin Lynn Zielesch ( 1955) is a female with  Diagnoses of Lumbar radiculitis, Rib pain on right side, Myalgia, History of lumbar fusion, Sacroiliac joint dysfunction of both sides, S/P cervical spinal fusion, Neuroforaminal stenosis of cervical spine, Osteoarthritis of carpometacarpal (CMC) joints of both thumbs, unspecified osteoarthritis type, Chronic right-sided low back pain with right-sided sciatica, Numbness and tingling of right leg, Lumbar disc herniation, Neuralgia, and BMI 31.0-31.9,adult were pertinent to this visit.    Today Mathew reports worsened nerve pain with altered sensation in her right thigh.  This is the same pain that improved with an epidural steroid injection.  She is undergoing stress as her  recently passed away, and she lost her job.  She has been having some financial difficulty as her car is not working.  She is hoping to seek part-time employment.    She notes that she recently went to the emergency department for chest pain.  This spontaneously resolved.  She declined a CT scan.  She messaged her PCP regarding this.    Procedure history:  - 11/1/22 right L2-3 interlaminar epidural steroid injection - 90% improvement in pain, resolution of radiating pain.  - 11/28/22 bilateral CMC joint injections - 100% improvement in thumb pain bilaterally  - 3/7/23 right L2-3 interlaminar epidural steroid injection -100% improvement in back pain and radiating pain, ongoing tightness in her right thigh  - 06/27/23 trigger point injections   - 11/15/23 BILATERAL ultrasound-guided 1st carpometacarpal joint injection - 100% improvement in thumb pain bilaterally  - 12/15/23 trigger point injections-no relief  - 1/5/24 right L2-3 interlaminar epidural steroid injection-resolution of low back pain and pain radiating down right leg  - 10/16/24 right and left Sacroiliac joint injection-resolution of index pain    ROS:   Red Flags ROS:   Fever, Chills, Sweats: Denies  Involuntary Weight Loss: Denies  Bladder Incontinence: Denies  Bowel  Incontinence: denies  Saddle Anesthesia: Denies    All other systems reviewed and negative.     PMHx:   Past Medical History:   Diagnosis Date    Anxiety     Arthritis     Cancer (HCC)     COPD (chronic obstructive pulmonary disease) (HCC)     Daytime sleepiness     GERD (gastroesophageal reflux disease)     Hypertension     Insomnia     Migraine     Thyroid disease        PSHx:   Past Surgical History:   Procedure Laterality Date    AR INJECTION,SACROILIAC JOINT Bilateral 10/16/2024    Procedure: RIGHT and LEFT sacroiliac joint injection with fluoroscopic guidance;  Surgeon: Kendal Jeffers M.D.;  Location[facility];  Service: Pain Management    AR INJ LUMBAR/SACRAL,W/ IMAGING Right 1/5/2024    Procedure: RIGHT Lumbar L2-3 interlaminar epidural steroid injection.  PT NEEDS TO HOLD ASA AND MELOXICAM PRIOR TO PROCEDURE.  PT PREFERS TO SCHEDULE ON FRIDAY IF POSS;  Surgeon: Kendal Jeffers M.D.;  Location: [facility];  Service: Pain Management    AR INJ CERV/THORAC,W/ IMAGING N/A 12/14/2023    Procedure: interlaminar cervical epidural steroid injection at T3-T4;  Surgeon: Kendal Jeffers M.D.;  Location: [facility];  Service: Pain Management    AR INJ LUMBAR/SACRAL,W/ IMAGING Right 03/07/2023    Procedure: RIGHT Lumbar L2-3 interlaminar epidural steroid injection;  Surgeon: Kendal Jeffers M.D.;  Location: [facility];  Service: Pain Management    AR INJ LUMBAR/SACRAL,W/ IMAGING Right 11/01/2022    Procedure: RIGHT lumbar L2-3 interlaminar epidural steroid injection;  Surgeon: Kendal Jeffers M.D.;  Location: [facility];  Service: Pain Management    ABDOMINAL HYSTERECTOMY TOTAL      ARTHROPLASTY      EYE SURGERY      FOOT SURGERY      HERNIA REPAIR      [id number]      L tka    LAMINOTOMY      LUMPECTOMY      PRIMARY C SECTION      THYROIDECTOMY TOTAL      2019  thyroid cancer       Family Hx:   Family History   Problem  Relation Age of Onset    COPD Mother     Cancer Father         pancreatic    Hypertension Father     Hyperlipidemia Father     Alcohol abuse Father     Drug abuse Brother        Social Hx:  Social History     Socioeconomic History    Marital status:      Spouse name: Not on file    Number of children: Not on file    Years of education: Not on file    Highest education level: Some college, no degree   Occupational History    Not on file   Tobacco Use    Smoking status: Former     Current packs/day: 0.00     Average packs/day: 1.5 packs/day for 50.0 years (75.0 ttl pk-yrs)     Types: Cigarettes     Start date: 1968     Quit date: 2018     Years since quittin.5    Smokeless tobacco: Never   Vaping Use    Vaping status: Some Days    Substances: Nicotine, CBD, Flavoring    Devices: Pre-filled or refillable cartridge, Refillable tank   Substance and Sexual Activity    Alcohol use: Never    Drug use: Not Currently     Types: Marijuana, Methamphetamines     Comment: once in a while    Sexual activity: Yes     Partners: Male     Birth control/protection: Female Sterilization   Other Topics Concern    Not on file   Social History Narrative    Not on file     Social Drivers of Health     Financial Resource Strain: Medium Risk (7/10/2023)    Overall Financial Resource Strain (CARDIA)     Difficulty of Paying Living Expenses: Somewhat hard   Food Insecurity: No Food Insecurity (7/10/2023)    Hunger Vital Sign     Worried About Running Out of Food in the Last Year: Never true     Ran Out of Food in the Last Year: Never true   Transportation Needs: No Transportation Needs (7/10/2023)    PRAPARE - Transportation     Lack of Transportation (Medical): No     Lack of Transportation (Non-Medical): No   Physical Activity: Insufficiently Active (7/10/2023)    Exercise Vital Sign     Days of Exercise per Week: 5 days     Minutes of Exercise per Session: 20 min   Stress: No Stress Concern Present (7/10/2023)    [employer]  [place] [employer] - Occupational Stress Questionnaire     Feeling of Stress : Only a little   Social Connections: Moderately Isolated (7/10/2023)    Social Connection and Isolation Panel [NHANES]     Frequency of Communication with Friends and Family: Never     Frequency of Social Gatherings with Friends and Family: Never     Attends Anabaptism Services: Never     Active Member of Clubs or Organizations: Yes     Attends Club or Organization Meetings: Never     Marital Status:    Intimate Partner Violence: Not At Risk (2/7/2019)    [facility]    Humiliation, Afraid, Rape, and Kick questionnaire     Fear of Current or Ex-Partner: No     Emotionally Abused: No     Physically Abused: No     Sexually Abused: No   Housing Stability: Low Risk  (7/10/2023)    Housing Stability Vital Sign     Unable to Pay for Housing in the Last Year: No     Number of Places Lived in the Last Year: 1     Unstable Housing in the Last Year: No       Allergies:  Allergies   Allergen Reactions    Ace Inhibitors Cough    Chlorhexidine Rash    Flexeril [Cyclobenzaprine] Unspecified     Irritability     Triamterene      Other reaction(s): Nephrotoxicity       Medications: reviewed on epic.   Outpatient Medications Marked as Taking for the 2/14/25 encounter (Office Visit) with Kendal Jeffers M.D.   Medication Sig Dispense Refill    LORazepam (ATIVAN) 0.5 MG Tab Take 1 Tablet by mouth every 8 hours as needed for Anxiety for up to 30 days. 60 Tablet 0    potassium chloride SA (KDUR) 20 MEQ Tab CR Take 1 Tablet by mouth every day. 90 Tablet 3    gabapentin (NEURONTIN) 300 MG Cap Take 3 Capsules by mouth 3 times a day. 810 Capsule 1    lidocaine (LIDODERM) 5 % Patch Place 1 Patch on the skin every 24 hours. Apply to affected area, 24 hours on followed by 24 hours off. 20 Patch 0    tizanidine (ZANAFLEX) 4 MG Tab Take 1 Tablet by mouth every 6 hours as needed (arm pain). 30 Tablet 3    losartan  (COZAAR) 100 MG Tab Take 1 Tablet by mouth every day. 90 Tablet 3    buPROPion (WELLBUTRIN XL) 300 MG XL tablet Take 1 Tablet by mouth every morning. 90 Tablet 3    meloxicam (MOBIC) 15 MG tablet Take 1 Tablet by mouth every day. 90 Tablet 3    albuterol 108 (90 Base) MCG/ACT Aero Soln inhalation aerosol Inhale 2 Puffs every 6 hours as needed for Shortness of Breath. 8.5 g 3    SYNTHROID 88 MCG Tab Take 1 Tablet by mouth every morning on an empty stomach. 90 Tablet 3    amLODIPine (NORVASC) 10 MG Tab Take 1 Tablet by mouth every day. 90 Tablet 3    omeprazole (PRILOSEC) 40 MG delayed-release capsule Take 1 Capsule by mouth every day. 90 Capsule 3    aspirin (ASA) 81 MG Chew Tab chewable tablet Chew 81 mg every day.      calcium citrate (CALCITRATE) 950 (200 Ca) MG Tab Take 950 mg by mouth every day.      Multiple Vitamin (MULTI-VITAMINS PO) Take  by mouth.      Riboflavin (VITAMIN B-2 PO) Take  by mouth.          Current Outpatient Medications on File Prior to Visit   Medication Sig Dispense Refill    LORazepam (ATIVAN) 0.5 MG Tab Take 1 Tablet by mouth every 8 hours as needed for Anxiety for up to 30 days. 60 Tablet 0    potassium chloride SA (KDUR) 20 MEQ Tab CR Take 1 Tablet by mouth every day. 90 Tablet 3    gabapentin (NEURONTIN) 300 MG Cap Take 3 Capsules by mouth 3 times a day. 810 Capsule 1    lidocaine (LIDODERM) 5 % Patch Place 1 Patch on the skin every 24 hours. Apply to affected area, 24 hours on followed by 24 hours off. 20 Patch 0    tizanidine (ZANAFLEX) 4 MG Tab Take 1 Tablet by mouth every 6 hours as needed (arm pain). 30 Tablet 3    losartan (COZAAR) 100 MG Tab Take 1 Tablet by mouth every day. 90 Tablet 3    buPROPion (WELLBUTRIN XL) 300 MG XL tablet Take 1 Tablet by mouth every morning. 90 Tablet 3    meloxicam (MOBIC) 15 MG tablet Take 1 Tablet by mouth every day. 90 Tablet 3    albuterol 108 (90 Base) MCG/ACT Aero Soln inhalation aerosol Inhale 2 Puffs every 6 hours as needed for Shortness of  Breath. 8.5 g 3    SYNTHROID 88 MCG Tab Take 1 Tablet by mouth every morning on an empty stomach. 90 Tablet 3    amLODIPine (NORVASC) 10 MG Tab Take 1 Tablet by mouth every day. 90 Tablet 3    omeprazole (PRILOSEC) 40 MG delayed-release capsule Take 1 Capsule by mouth every day. 90 Capsule 3    aspirin (ASA) 81 MG Chew Tab chewable tablet Chew 81 mg every day.      calcium citrate (CALCITRATE) 950 (200 Ca) MG Tab Take 950 mg by mouth every day.      Multiple Vitamin (MULTI-VITAMINS PO) Take  by mouth.      Riboflavin (VITAMIN B-2 PO) Take  by mouth.      methylPREDNISolone (MEDROL DOSEPAK) 4 MG Tablet Therapy Pack As directed on the packaging label. 21 Tablet 0     No current facility-administered medications on file prior to visit.         EXAMINATION     Physical Exam:   /84 (BP Location: Left arm, Patient Position: Sitting, BP Cuff Size: Adult)   Pulse 95   Temp 36.7 °C (98 °F) (Temporal)   Ht 1.524 m (5')   Wt 73.8 kg (162 lb 11.2 oz)   SpO2 99%     Constitutional:   Body Habitus: Body mass index is 31.78 kg/m².  Cooperation: Fully cooperates with exam  Appearance: Well-groomed, well-nourished.    Eyes: No scleral icterus to suggest severe liver disease, no proptosis to suggest severe hyperthyroidism    ENT -no obvious auditory deficits, no noticeable facial droop     Skin -no rashes or lesions noted     Respiratory-  breathing comfortably on room air, no audible wheezing    Cardiovascular-distal extremities warm and well perfused.  No lower extremity edema is noted.     Gastrointestinal - no obvious abdominal masses, non-distended    Psychiatric- alert and oriented ×3. Normal affect.     Gait - normal gait, no use of ambulatory device, nonantalgic.     Musculoskeletal and Neuro -       Thoracic/Lumbar Spine/Sacral Spine/Hips   Palpation:   Slight tenderness to palpation at right low back.  No tenderness to palpation across bilateral upper glutes.  No tenderness to palpation in the low back/hips  including midline of lumbosacral spine, paraspinal muscles bilaterally, lumbar facets bilaterally, sacroiliac joints bilaterally, PSIS bilaterally, and greater trochanters bilaterally.    Inspection: No evidence of atrophy in bilateral lower extremities throughout      SLR, EBEN, FADIR, and femoral stretch test negative bilaterally    Key points for the international standards for neurological classification of spinal cord injury (ISNCSCI) to light touch.   Dermatome R L   L2 2 2   L3 2 2   L4 2 2   L5 2 2   S1 2 2   S2 2 2         Motor Exam Lower Extremities  ? Myotome R L   Hip flexion L2 5 5   Knee extension L3 5 5   Ankle dorsiflexion L4 5 5   Toe extension L5 5 5   Ankle plantarflexion S1 5 5          Cervical spine   Inspection: No deformities of the skin over the cervical spine. No rashes or lesions.    limited active range of motion in all directions    Spurling's sign  negative bilaterally  Cervical facet loading maneuver  negative bilaterally    Tenderness to palpation at paracervical muscles on left and upper trapezius on left. No tenderness to palpation elsewhere including midline of cervical spine and paracervical muscles on right.    Previous exam  Key points for the international standards for neurological classification of spinal cord injury (ISNCSCI) to light touch.     Dermatome R L   C4 2 2   C5 2 2   C6 2 2   C7 2 2   C8 2 2   T1 2 2   T2 2 2       Motor Exam Upper Extremities   ? Myotome R L   Shoulder abduction C5 5 5   Elbow flexion C5 5 5   Wrist extension C6 5 5   Elbow extension C7 5 5   Finger flexion C8 5 5   Finger abduction T1 5 5     Bilateral hands:   Inspection: No swelling, deformities, or rashes. Symmetric appearing thenar and hyperthenar regions bilaterally.  Palpation: no significant tenderness to palpation throughout the bilateral hands  Range of motion is within normal limits throughout bilateral hands, fingers and wrist.    Special tests:  Tinel's at the wrist over the  "median nerve positive on right, negative on left  Carpal tunnel compression: positive on right, negative on left  Phalen's test: positive on right, negative on left      Full AROM of bilateral shoulders  There is full active range of motion with lumbar extension     Facet loading maneuver negative bilaterally     Heel walking and toe walking intact.       Reflexes  ?   R L   Patella   2+ 2+   Achilles    2+ 2+      Clonus of the ankle negative bilaterally        MEDICAL DECISION MAKING     Medical records review: see under HPI section.       MEDICAL DECISION MAKING    Medical records review: see under HPI section.     DATA    Labs: No new labs available for review since last visit.    Lab Results   Component Value Date/Time    SODIUM 141 02/10/2025 01:43 PM    POTASSIUM 3.9 02/10/2025 01:43 PM    CHLORIDE 108 02/10/2025 01:43 PM    CO2 22 02/10/2025 01:43 PM    ANION 11.0 02/10/2025 01:43 PM    GLUCOSE 134 (H) 02/10/2025 01:43 PM    BUN 15 02/10/2025 01:43 PM    CREATININE 0.96 02/10/2025 01:43 PM    CALCIUM 9.2 02/10/2025 01:43 PM    ASTSGOT 24 02/10/2025 01:43 PM    ALTSGPT 15 02/10/2025 01:43 PM    TBILIRUBIN 0.2 02/10/2025 01:43 PM    ALBUMIN 4.2 02/10/2025 01:43 PM    TOTPROTEIN 7.1 02/10/2025 01:43 PM    GLOBULIN 2.9 02/10/2025 01:43 PM    AGRATIO 1.4 02/10/2025 01:43 PM       No results found for: \"PROTHROMBTM\", \"INR\"     Lab Results   Component Value Date/Time    WBC 12.3 (H) 02/10/2025 01:43 PM    RBC 5.06 02/10/2025 01:43 PM    HEMOGLOBIN 14.4 02/10/2025 01:43 PM    HEMATOCRIT 44.3 02/10/2025 01:43 PM    MCV 87.5 02/10/2025 01:43 PM    MCH 28.5 02/10/2025 01:43 PM    MCHC 32.5 02/10/2025 01:43 PM    MPV 9.1 02/10/2025 01:43 PM    NEUTSPOLYS 70.50 02/10/2025 01:43 PM    LYMPHOCYTES 18.70 (L) 02/10/2025 01:43 PM    MONOCYTES 7.90 02/10/2025 01:43 PM    EOSINOPHILS 1.90 02/10/2025 01:43 PM    BASOPHILS 0.40 02/10/2025 01:43 PM        Lab Results   Component Value Date/Time    HBA1C 5.8 (H) 04/28/2022 10:39 AM "      Limited diagnostic ultrasound right wrist performed 02/14/25 by Dr. Jeffers.   2/14/2025 I performed a limited diagnostic ultrasound for the patient's neuralgia ICD M79.2. I performed a limited diagnostic ultrasound of the RIGHT wrist including the median nerve to evaluate the cause of the patient's neuralgia. The median nerve was enlarged at the level of the carpal tunnel outlet compared to the level of the pronator quadratus. This is consistent with carpal tunnel syndrome.  The images were uploaded to the medical record.       Imaging:   I personally reviewed following images, these are my reads  MRI cervical spine 11/23/23  Fusion hardware at C5-C7.  At C7-T1 there is moderate-severe bilateral neuroforaminal stenosis.  At C6-C7 there is borderline left neuroforaminal stenosis.  At C5-6 there is mild left-sided neuroforaminal stenosis.  At C3-4 and C4-5 there is moderate right-sided neuroforaminal stenosis.See formal radiology report for further details.      MRI lumbar spine 9/2/2022  Evidence of lumbar laminectomy at L4-S1, interbody fusion and posterior fusion at L3-S1.  Broad-based disc bulge at L1-L2 resulting in severe central canal stenosis and compression of descending bilateral L2 nerve roots and possibly bilateral L3 nerve roots and mild impingement of exiting L1 nerve roots bilaterally.  Mild right neuroforaminal stenosis at T12-L1.  Possible mild bilateral neuroforaminal stenosis at L5-S1, quality of images impaired due to metallic artifact. Appearance of chronic postoperative seroma posterior to L4 and L5 vertebral bodies.    XR Right hand 11/22/22  Moderate to severe CMC joint OA. See formal radiology report for further details.    X-ray ribs bilateral 11/15/24  No acute abnormality    IMAGING radiology reads. I reviewed the following radiology reads   MRI cervical spine 11/23/23  FINDINGS:  Images are degraded by patient motion artifact. Alignment in the cervical spine shows mild degenerative  anterolisthesis of C7 on T1.     There is postoperative change with anterior cervical fusion hardware at C5-C6-C7. Expected hardware artifact.     Marrow signal in the vertebral bodies is otherwise unremarkable.     The prevertebral and paraspinous soft tissues are unremarkable.     There are no anomalies at the craniovertebral junction.  The cervical spinal cord is normal in caliber and signal throughout its course.     At C2-3, no significant disc bulge or protrusion. No central stenosis. Mild bilateral foraminal stenosis.     At C3-4, mild disc-osteophyte change. Small impression on the ventral subarachnoid space. No central stenosis. Moderate bilateral foraminal stenosis due to spondylotic change.     C4-C5, no significant disc bulge or protrusion. No central stenosis. The left neural foramen appears intact. Moderate right foraminal stenosis.     C5-C6 posterior bony ridging. Minimal ligamentum flavum hypertrophy. No candace central stenosis. The right neural foramen is intact. Mild left foraminal stenosis due to uncinate spondylosis.     C6-C7, no significant disc bulge or protrusion. Endplate hypertrophy contributing to mild central stenosis (T2 sagittal image 10, series 2, T2 axial image 12, series 8). Mild left-sided posterior endplate spurring and uncinate hypertrophy with borderline   left lateral recess stenosis and borderline left foraminal stenosis. The right neural foramen is intact.     C7-T1, moderate disc/osteophyte change accentuated by anterolisthesis. Partial effacement of ventral subarachnoid space. Thecal sac at the lower range of normal without candace central stenosis. Moderate-marked bilateral foraminal stenosis best seen on the   sagittal images.     IMPRESSION:     1.  Postoperative anterior cervical fusion with hardware fixation C5-C6-C7.  2.  Multilevel degenerative changes most notable for C6-C7 mild central stenosis at G4-E3-ddbniy bilateral foraminal stenosis.  3.  Details for each level  above in the body of the report.  4.  No myelopathic cord signal is appreciated.    Results for orders placed during the hospital encounter of 09/02/22    MR-LUMBAR SPINE-W/O    Impression  1.  L3-4 slight anterolisthesis and L5-S1 slight retrolisthesis.  2.  Postoperative changes with lumbar laminectomy L4-L5-S1, interbody fusion L3-L4, L4-L5, L5-S1, and posterior fusion with transpedicular screw fixation at L3-L4-L5-S1.  3.  Chronic postoperative seroma occupying the laminectomy interval without dorsal epidural mass effect.  4.  The study is most notable for L1-L2 large disc protrusion-extrusion resulting in severe central stenosis.  5.  Additional degenerative changes detailed for each level above in the body of report.        MRI lumbar spine 10/20/21  Lumbar spine:    Alignment: Grade 1 L3-L4 anterolisthesis. Previously seen L4-L5  anterolisthesis is improved compared to MRI prior to surgery.    Vertebral body heights and marrow: Postsurgical changes from L3-S1  posterior fusion. Multilevel lumbar spondylosis with osteophytes, facet  arthropathy, and endplate marrow degenerative changes are seen. Otherwise  the vertebral body heights and marrow signal are unremarkable.    Conus medullaris: Normal, terminating at L1/L2.    Soft tissues: There is a fluid collection in the paraspinal soft tissues  posterior to the L3-L5 laminectomy sites, likely postoperative seroma  measuring 63 x 28 mm (series 17, image 7). Small right renal cyst.    L1-L2: Persistent disc bulge with worsening superimposed central disc  extrusion. Bilateral facet arthropathy and dorsal epidural fat. The  constellation of findings produces moderate to severe spinal canal  stenosis. Mild to moderate left neural foraminal stenosis is improved  compared to prior.  Ligamentum flavum thickening. Facet hypertrophy, mild.    L2-L3: Disc bulge, ligamentum flavum thickening, facet hypertrophy  resulting in mild spinal canal stenosis. Mild left neural  foraminal  stenosis.    L3-L4: Partially uncovered disc. Mild to moderate right neural foraminal  stenosis. Limited evaluation of the left neural foramen. Laminectomy.    L4-L5: No spinal canal stenosis. Limited evaluation of neural foramina due  to spinal hardware. Laminectomy.    L5-S1: Disc bulge without spinal canal stenosis. Limited evaluation of  neural foramina due to spinal hardware.     IMPRESSION:    1. Worsening disc protrusion at L1-L2, resulting in worsening spinal  canal stenosis, now moderate to severe.   2. Multilevel degenerative changes of the cervical spine, similar  compared to prior.  3. Multilevel degenerative changes in thoracic spine, with up to mild  to moderate spinal canal stenosis at T8-T9 secondary to disc bulge.  4. Postsurgical changes . Small postoperative seroma in L3-L5  laminectomy sites.        X-ray left hand 3/19/2019  FINDINGS:   There is no acute fracture or dislocation.   Advanced osteoarthrosis of the first CMC joint, characterized by joint   space narrowing, subchondral sclerosis, osteophyte formation, and radial   subluxation. Mild osteoarthrosis of the STT joint. Osteoarthrosis of   scattered IP joints. No focal soft tissue swelling.     IMPRESSION:   Advanced osteoarthrosis of the first CMC joint.     XR Right hand 11/22/22  FINDINGS:     BONE MINERALIZATION: Normal.  JOINTS: Moderate to severe first carpometacarpal joint osteoarthrosis. Mild triscaphe joint osteoarthrosis. Mild multifocal osteoarthrosis otherwise. No erosions.  FRACTURE: None.  DISLOCATION: None.  SOFT TISSUES: No mass.     IMPRESSION:     1.  Moderate to severe first carpometacarpal joint osteoarthrosis.  2.  Mild multifocal osteoarthrosis otherwise.                                                  Diagnosis  Visit Diagnoses     ICD-10-CM   1. Lumbar radiculitis  M54.16   2. Rib pain on right side  R07.81   3. Myalgia  M79.10   4. History of lumbar fusion  Z98.1   5. Sacroiliac joint dysfunction of  both sides  M53.3   6. S/P cervical spinal fusion  Z98.1   7. Neuroforaminal stenosis of cervical spine  M48.02   8. Osteoarthritis of carpometacarpal (CMC) joints of both thumbs, unspecified osteoarthritis type  M18.0   9. Chronic right-sided low back pain with right-sided sciatica  M54.41    G89.29   10. Numbness and tingling of right leg  R20.0    R20.2   11. Lumbar disc herniation  M51.26   12. Neuralgia  M79.2   13. BMI 31.0-31.9,adult  Z68.31         ASSESSMENT AND PLAN:  Robin Lynn Zielesch (: 1955) is a female with history of HTN, cervical laminectomy, depression, anxiety, thyroid cancer, BMI 30, L3-pelvis posterior spinal fusion with TLIF/ PLIF on 21.      Mathew was seen today for follow-up.    Diagnoses and all orders for this visit:    Lumbar radiculitis  -     Referral to Pain Clinic    Rib pain on right side    Myalgia    History of lumbar fusion    Sacroiliac joint dysfunction of both sides    S/P cervical spinal fusion    Neuroforaminal stenosis of cervical spine    Osteoarthritis of carpometacarpal (CMC) joints of both thumbs, unspecified osteoarthritis type    Chronic right-sided low back pain with right-sided sciatica    Numbness and tingling of right leg    Lumbar disc herniation    Neuralgia    BMI 31.0-31.9,adult  -     Patient identified as having weight management issue.  Appropriate orders and counseling given.              Discussed that the numbness and tingling in her right hand is likely from carpal tunnel syndrome given today's physical exam and limited diagnostic ultrasound results, and less likely from cervical radiculopathy       PLAN  Physical Therapy: discussed referral to physical therapy for low back pain.  She declined a physical therapy referral as she has done extensive physical therapy in the past which worsened her symptoms     Diagnostic workup: Personally reviewed at today's visit:   X-ray ribs bilateral 11/15/24  - diagnostic ultrasound right wrist 25 by  Dr. Jeffers as noted above    Medications:   -I have discussed that she should continue gabapentin, Mobic as per PCP  -Discussed that she should not combine Mobic with any other NSAIDs  -Previously prescribed tramadol 25-50 mg every 6 hours as needed as above.  Discussed that she should take this as sparingly as possible    Last opioid risk scale: 8/19/2022  Last controlled substance agreement: 11/15/2024    reviewed: 2/14/2025   Naloxone prescribed: Discussed prescription and offered it.  The patient declined saying she has at home already       Opioid Risk Score: 8    Interpretation of Opioid Risk Score   Score 0-3 = Low risk of abuse. Do UDS at least once per year.  Score 4-7 = Moderate risk of abuse. Do UDS 1-4 times per year.  Score 8+ = High risk of abuse. Refer to specialist.     I reviewed the     In prescribing controlled substances to this patient, I certify that I have obtained and reviewed the medical history of Robin Lynn Zielesch. I have also made a good sidney effort to obtain applicable records from other providers who have treated the patient and records did not demonstrate any increased risk of substance abuse that would prevent me from prescribing controlled substances.     I have conducted a physical exam and documented it. I have reviewed Ms. Zielesch’s prescription history as maintained by the [place] Prescription Monitoring Program.     I have assessed the patient’s risk for abuse, dependency, and addiction using the validated Opioid Risk Tool available at https://www.mdcalc.com/nnkzzn-slbo-ufyw-ort-narcotic-abuse.     Given the above, I believe the benefits of controlled substance therapy outweigh the risks. The reasons for prescribing controlled substances include non-narcotic, oral analgesic alternatives have been inadequate for pain control. Accordingly, I have discussed the risk and benefits, treatment plan, and alternative therapies with the patient.     Interventions:   -Bilateral  sacroiliac joint injections under fluoroscopic guidance as needed  -S/p trial of trigger point injections targeting left neck area with no relief  - s/p attempted and aborted left cervical epidural steroid injection at T2-3 and T3-4 due to calcified ligament.  -Repeat right L2-3 interlaminar epidural steroid injection as needed given recurrence of pain which significantly improved with this procedure in the past  -Bilateral CMC joint steroid injections under ultrasound guidance PRN given significant improvement in pain with this procedure in the past.       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  Given her hx of significant improvement in pain after our previous epidural steroid injection and no neurologic deficits on exam today, I believe it is reasonable to defer a neurosurgery referral at this time  -Discussed that she could consider deep myofascial release techniques including a foam roller for her right thigh  -Continue follow-up with Dr. Hood at [facility]. Discussed that perhaps a referral to an orthotist could be considered    Follow-up: 4 weeks after injection    Orders Placed This Encounter    Referral to Pain Clinic    Patient identified as having weight management issue.  Appropriate orders and counseling given.       Kendal Jeffers MD  Interventional Pain and Spine  Physical Medicine and Rehabilitation  [facility]      The above note documents my personal evaluation of this patient. In addition, I have reviewed and confirmed with the patient and MA the supportive information documented in today's Patient Health Questionnaire and Office Note.     Please note that this dictation was created using voice recognition software. I have made every reasonable attempt to correct obvious errors, but I expect that there are errors of grammar and  possibly content that I did not discover before finalizing the note.

## 2025-02-14 NOTE — H&P (VIEW-ONLY)
Follow-up patient Note    Interventional Pain and Spine  Physiatry (Physical Medicine and Rehabilitation)     Patient Name: Robin Lynn Zielesch  : 1955  Date of service: 2025    Chief Complaint:   Chief Complaint   Patient presents with    Follow-Up     3m fv         HISTORY (2022):  Robin Lynn Zielesch is a 67 y.o. female who presents today with pain radiating from her right posterolateral glute down her right anterolateral and posterior thigh accompanied by numbness/tingling/weakness in this area. This started in Sep 2021 while recovering from a L2-pelvis posterior spinal fusion with TLIF/ PLIF on 21 with Dr. Bates ([facility] which she states she had done for similar radiating pain down her left leg.  Her radiating left leg pain resolved after surgery.     Her pain at its best-worse level during the course of the day is 5-9/10, respectively. Pain right now is 7/10 on the numeric pain scale. Pain worsens with sitting, standing, walking, bending backwards, side bending or twisting, walking upstairs, and walking downstairs and improves with nothing. Her pain interferes somewhat with ADLs. The patient otherwise denies new weakness, numbness, or bladder/bowel incontinence. States she has fallen a few times secondary to pain and possibly weakness. Moved from [facility] in May 2022.     The patient has done physical therapy for this problem with worsening pain.     Patient has tried the following medications with varied success (current meds in bold): Hayes back and body  Gabapentin 300mg TID - no relief. Used to help with left sided pain  Naproxen BID - significant relief     Therapeutic modalities and interventional therapies to date include:  -No injections     Medical history includes HTN, cervical laminectomy, depression, anxiety, thyroid cancer, BMI 30, L2-pelvis posterior spinal fusion with TLIF/ PLIF on 21    HPI  Today's visit   Robin Lynn Zielesch ( 1955) is a female with  Diagnoses of Lumbar radiculitis, Rib pain on right side, Myalgia, History of lumbar fusion, Sacroiliac joint dysfunction of both sides, S/P cervical spinal fusion, Neuroforaminal stenosis of cervical spine, Osteoarthritis of carpometacarpal (CMC) joints of both thumbs, unspecified osteoarthritis type, Chronic right-sided low back pain with right-sided sciatica, Numbness and tingling of right leg, Lumbar disc herniation, Neuralgia, and BMI 31.0-31.9,adult were pertinent to this visit.    Today Mathew reports worsened nerve pain with altered sensation in her right thigh.  This is the same pain that improved with an epidural steroid injection.  She is undergoing stress as her  recently passed away, and she lost her job.  She has been having some financial difficulty as her car is not working.  She is hoping to seek part-time employment.    She notes that she recently went to the emergency department for chest pain.  This spontaneously resolved.  She declined a CT scan.  She messaged her PCP regarding this.    Procedure history:  - 11/1/22 right L2-3 interlaminar epidural steroid injection - 90% improvement in pain, resolution of radiating pain.  - 11/28/22 bilateral CMC joint injections - 100% improvement in thumb pain bilaterally  - 3/7/23 right L2-3 interlaminar epidural steroid injection -100% improvement in back pain and radiating pain, ongoing tightness in her right thigh  - 06/27/23 trigger point injections   - 11/15/23 BILATERAL ultrasound-guided 1st carpometacarpal joint injection - 100% improvement in thumb pain bilaterally  - 12/15/23 trigger point injections-no relief  - 1/5/24 right L2-3 interlaminar epidural steroid injection-resolution of low back pain and pain radiating down right leg  - 10/16/24 right and left Sacroiliac joint injection-resolution of index pain    ROS:   Red Flags ROS:   Fever, Chills, Sweats: Denies  Involuntary Weight Loss: Denies  Bladder Incontinence: Denies  Bowel  Incontinence: denies  Saddle Anesthesia: Denies    All other systems reviewed and negative.     PMHx:   Past Medical History:   Diagnosis Date    Anxiety     Arthritis     Cancer (HCC)     COPD (chronic obstructive pulmonary disease) (HCC)     Daytime sleepiness     GERD (gastroesophageal reflux disease)     Hypertension     Insomnia     Migraine     Thyroid disease        PSHx:   Past Surgical History:   Procedure Laterality Date    KS INJECTION,SACROILIAC JOINT Bilateral 10/16/2024    Procedure: RIGHT and LEFT sacroiliac joint injection with fluoroscopic guidance;  Surgeon: Kendal Jeffers M.D.;  Location[facility];  Service: Pain Management    KS INJ LUMBAR/SACRAL,W/ IMAGING Right 1/5/2024    Procedure: RIGHT Lumbar L2-3 interlaminar epidural steroid injection.  PT NEEDS TO HOLD ASA AND MELOXICAM PRIOR TO PROCEDURE.  PT PREFERS TO SCHEDULE ON FRIDAY IF POSS;  Surgeon: Kendal Jeffers M.D.;  Location: [facility];  Service: Pain Management    KS INJ CERV/THORAC,W/ IMAGING N/A 12/14/2023    Procedure: interlaminar cervical epidural steroid injection at T3-T4;  Surgeon: Kendal Jeffers M.D.;  Location: [facility];  Service: Pain Management    KS INJ LUMBAR/SACRAL,W/ IMAGING Right 03/07/2023    Procedure: RIGHT Lumbar L2-3 interlaminar epidural steroid injection;  Surgeon: Kendal Jeffers M.D.;  Location: [facility];  Service: Pain Management    KS INJ LUMBAR/SACRAL,W/ IMAGING Right 11/01/2022    Procedure: RIGHT lumbar L2-3 interlaminar epidural steroid injection;  Surgeon: Kendal Jeffers M.D.;  Location: [facility];  Service: Pain Management    ABDOMINAL HYSTERECTOMY TOTAL      ARTHROPLASTY      EYE SURGERY      FOOT SURGERY      HERNIA REPAIR      [id number]      L tka    LAMINOTOMY      LUMPECTOMY      PRIMARY C SECTION      THYROIDECTOMY TOTAL      2019  thyroid cancer       Family Hx:   Family History   Problem  Relation Age of Onset    COPD Mother     Cancer Father         pancreatic    Hypertension Father     Hyperlipidemia Father     Alcohol abuse Father     Drug abuse Brother        Social Hx:  Social History     Socioeconomic History    Marital status:      Spouse name: Not on file    Number of children: Not on file    Years of education: Not on file    Highest education level: Some college, no degree   Occupational History    Not on file   Tobacco Use    Smoking status: Former     Current packs/day: 0.00     Average packs/day: 1.5 packs/day for 50.0 years (75.0 ttl pk-yrs)     Types: Cigarettes     Start date: 1968     Quit date: 2018     Years since quittin.5    Smokeless tobacco: Never   Vaping Use    Vaping status: Some Days    Substances: Nicotine, CBD, Flavoring    Devices: Pre-filled or refillable cartridge, Refillable tank   Substance and Sexual Activity    Alcohol use: Never    Drug use: Not Currently     Types: Marijuana, Methamphetamines     Comment: once in a while    Sexual activity: Yes     Partners: Male     Birth control/protection: Female Sterilization   Other Topics Concern    Not on file   Social History Narrative    Not on file     Social Drivers of Health     Financial Resource Strain: Medium Risk (7/10/2023)    Overall Financial Resource Strain (CARDIA)     Difficulty of Paying Living Expenses: Somewhat hard   Food Insecurity: No Food Insecurity (7/10/2023)    Hunger Vital Sign     Worried About Running Out of Food in the Last Year: Never true     Ran Out of Food in the Last Year: Never true   Transportation Needs: No Transportation Needs (7/10/2023)    PRAPARE - Transportation     Lack of Transportation (Medical): No     Lack of Transportation (Non-Medical): No   Physical Activity: Insufficiently Active (7/10/2023)    Exercise Vital Sign     Days of Exercise per Week: 5 days     Minutes of Exercise per Session: 20 min   Stress: No Stress Concern Present (7/10/2023)    Citizen of [place]  [place] of Occupational Health - Occupational Stress Questionnaire     Feeling of Stress : Only a little   Social Connections: Moderately Isolated (7/10/2023)    Social Connection and Isolation Panel [NHANES]     Frequency of Communication with Friends and Family: Never     Frequency of Social Gatherings with Friends and Family: Never     Attends Restoration Services: Never     Active Member of Clubs or Organizations: Yes     Attends Club or Organization Meetings: Never     Marital Status:    Intimate Partner Violence: Not At Risk (2/7/2019)    Received from [facility], [facility]    Humiliation, Afraid, Rape, and Kick questionnaire     Fear of Current or Ex-Partner: No     Emotionally Abused: No     Physically Abused: No     Sexually Abused: No   Housing Stability: Low Risk  (7/10/2023)    Housing Stability Vital Sign     Unable to Pay for Housing in the Last Year: No     Number of Places Lived in the Last Year: 1     Unstable Housing in the Last Year: No       Allergies:  Allergies   Allergen Reactions    Ace Inhibitors Cough    Chlorhexidine Rash    Flexeril [Cyclobenzaprine] Unspecified     Irritability     Triamterene      Other reaction(s): Nephrotoxicity       Medications: reviewed on epic.   Outpatient Medications Marked as Taking for the 2/14/25 encounter (Office Visit) with Kendal Jeffers M.D.   Medication Sig Dispense Refill    LORazepam (ATIVAN) 0.5 MG Tab Take 1 Tablet by mouth every 8 hours as needed for Anxiety for up to 30 days. 60 Tablet 0    potassium chloride SA (KDUR) 20 MEQ Tab CR Take 1 Tablet by mouth every day. 90 Tablet 3    gabapentin (NEURONTIN) 300 MG Cap Take 3 Capsules by mouth 3 times a day. 810 Capsule 1    lidocaine (LIDODERM) 5 % Patch Place 1 Patch on the skin every 24 hours. Apply to affected area, 24 hours on followed by 24 hours off. 20 Patch 0    tizanidine (ZANAFLEX) 4 MG Tab Take 1 Tablet by mouth every 6 hours as needed (arm pain). 30 Tablet 3    losartan  (COZAAR) 100 MG Tab Take 1 Tablet by mouth every day. 90 Tablet 3    buPROPion (WELLBUTRIN XL) 300 MG XL tablet Take 1 Tablet by mouth every morning. 90 Tablet 3    meloxicam (MOBIC) 15 MG tablet Take 1 Tablet by mouth every day. 90 Tablet 3    albuterol 108 (90 Base) MCG/ACT Aero Soln inhalation aerosol Inhale 2 Puffs every 6 hours as needed for Shortness of Breath. 8.5 g 3    SYNTHROID 88 MCG Tab Take 1 Tablet by mouth every morning on an empty stomach. 90 Tablet 3    amLODIPine (NORVASC) 10 MG Tab Take 1 Tablet by mouth every day. 90 Tablet 3    omeprazole (PRILOSEC) 40 MG delayed-release capsule Take 1 Capsule by mouth every day. 90 Capsule 3    aspirin (ASA) 81 MG Chew Tab chewable tablet Chew 81 mg every day.      calcium citrate (CALCITRATE) 950 (200 Ca) MG Tab Take 950 mg by mouth every day.      Multiple Vitamin (MULTI-VITAMINS PO) Take  by mouth.      Riboflavin (VITAMIN B-2 PO) Take  by mouth.          Current Outpatient Medications on File Prior to Visit   Medication Sig Dispense Refill    LORazepam (ATIVAN) 0.5 MG Tab Take 1 Tablet by mouth every 8 hours as needed for Anxiety for up to 30 days. 60 Tablet 0    potassium chloride SA (KDUR) 20 MEQ Tab CR Take 1 Tablet by mouth every day. 90 Tablet 3    gabapentin (NEURONTIN) 300 MG Cap Take 3 Capsules by mouth 3 times a day. 810 Capsule 1    lidocaine (LIDODERM) 5 % Patch Place 1 Patch on the skin every 24 hours. Apply to affected area, 24 hours on followed by 24 hours off. 20 Patch 0    tizanidine (ZANAFLEX) 4 MG Tab Take 1 Tablet by mouth every 6 hours as needed (arm pain). 30 Tablet 3    losartan (COZAAR) 100 MG Tab Take 1 Tablet by mouth every day. 90 Tablet 3    buPROPion (WELLBUTRIN XL) 300 MG XL tablet Take 1 Tablet by mouth every morning. 90 Tablet 3    meloxicam (MOBIC) 15 MG tablet Take 1 Tablet by mouth every day. 90 Tablet 3    albuterol 108 (90 Base) MCG/ACT Aero Soln inhalation aerosol Inhale 2 Puffs every 6 hours as needed for Shortness of  Breath. 8.5 g 3    SYNTHROID 88 MCG Tab Take 1 Tablet by mouth every morning on an empty stomach. 90 Tablet 3    amLODIPine (NORVASC) 10 MG Tab Take 1 Tablet by mouth every day. 90 Tablet 3    omeprazole (PRILOSEC) 40 MG delayed-release capsule Take 1 Capsule by mouth every day. 90 Capsule 3    aspirin (ASA) 81 MG Chew Tab chewable tablet Chew 81 mg every day.      calcium citrate (CALCITRATE) 950 (200 Ca) MG Tab Take 950 mg by mouth every day.      Multiple Vitamin (MULTI-VITAMINS PO) Take  by mouth.      Riboflavin (VITAMIN B-2 PO) Take  by mouth.      methylPREDNISolone (MEDROL DOSEPAK) 4 MG Tablet Therapy Pack As directed on the packaging label. 21 Tablet 0     No current facility-administered medications on file prior to visit.         EXAMINATION     Physical Exam:   /84 (BP Location: Left arm, Patient Position: Sitting, BP Cuff Size: Adult)   Pulse 95   Temp 36.7 °C (98 °F) (Temporal)   Ht 1.524 m (5')   Wt 73.8 kg (162 lb 11.2 oz)   SpO2 99%     Constitutional:   Body Habitus: Body mass index is 31.78 kg/m².  Cooperation: Fully cooperates with exam  Appearance: Well-groomed, well-nourished.    Eyes: No scleral icterus to suggest severe liver disease, no proptosis to suggest severe hyperthyroidism    ENT -no obvious auditory deficits, no noticeable facial droop     Skin -no rashes or lesions noted     Respiratory-  breathing comfortably on room air, no audible wheezing    Cardiovascular-distal extremities warm and well perfused.  No lower extremity edema is noted.     Gastrointestinal - no obvious abdominal masses, non-distended    Psychiatric- alert and oriented ×3. Normal affect.     Gait - normal gait, no use of ambulatory device, nonantalgic.     Musculoskeletal and Neuro -       Thoracic/Lumbar Spine/Sacral Spine/Hips   Palpation:   Slight tenderness to palpation at right low back.  No tenderness to palpation across bilateral upper glutes.  No tenderness to palpation in the low back/hips  including midline of lumbosacral spine, paraspinal muscles bilaterally, lumbar facets bilaterally, sacroiliac joints bilaterally, PSIS bilaterally, and greater trochanters bilaterally.    Inspection: No evidence of atrophy in bilateral lower extremities throughout      SLR, EBEN, FADIR, and femoral stretch test negative bilaterally    Key points for the international standards for neurological classification of spinal cord injury (ISNCSCI) to light touch.   Dermatome R L   L2 2 2   L3 2 2   L4 2 2   L5 2 2   S1 2 2   S2 2 2         Motor Exam Lower Extremities  ? Myotome R L   Hip flexion L2 5 5   Knee extension L3 5 5   Ankle dorsiflexion L4 5 5   Toe extension L5 5 5   Ankle plantarflexion S1 5 5          Cervical spine   Inspection: No deformities of the skin over the cervical spine. No rashes or lesions.    limited active range of motion in all directions    Spurling's sign  negative bilaterally  Cervical facet loading maneuver  negative bilaterally    Tenderness to palpation at paracervical muscles on left and upper trapezius on left. No tenderness to palpation elsewhere including midline of cervical spine and paracervical muscles on right.    Previous exam  Key points for the international standards for neurological classification of spinal cord injury (ISNCSCI) to light touch.     Dermatome R L   C4 2 2   C5 2 2   C6 2 2   C7 2 2   C8 2 2   T1 2 2   T2 2 2       Motor Exam Upper Extremities   ? Myotome R L   Shoulder abduction C5 5 5   Elbow flexion C5 5 5   Wrist extension C6 5 5   Elbow extension C7 5 5   Finger flexion C8 5 5   Finger abduction T1 5 5     Bilateral hands:   Inspection: No swelling, deformities, or rashes. Symmetric appearing thenar and hyperthenar regions bilaterally.  Palpation: no significant tenderness to palpation throughout the bilateral hands  Range of motion is within normal limits throughout bilateral hands, fingers and wrist.    Special tests:  Tinel's at the wrist over the  "median nerve positive on right, negative on left  Carpal tunnel compression: positive on right, negative on left  Phalen's test: positive on right, negative on left      Full AROM of bilateral shoulders  There is full active range of motion with lumbar extension     Facet loading maneuver negative bilaterally     Heel walking and toe walking intact.       Reflexes  ?   R L   Patella   2+ 2+   Achilles    2+ 2+      Clonus of the ankle negative bilaterally        MEDICAL DECISION MAKING     Medical records review: see under HPI section.       MEDICAL DECISION MAKING    Medical records review: see under HPI section.     DATA    Labs: No new labs available for review since last visit.    Lab Results   Component Value Date/Time    SODIUM 141 02/10/2025 01:43 PM    POTASSIUM 3.9 02/10/2025 01:43 PM    CHLORIDE 108 02/10/2025 01:43 PM    CO2 22 02/10/2025 01:43 PM    ANION 11.0 02/10/2025 01:43 PM    GLUCOSE 134 (H) 02/10/2025 01:43 PM    BUN 15 02/10/2025 01:43 PM    CREATININE 0.96 02/10/2025 01:43 PM    CALCIUM 9.2 02/10/2025 01:43 PM    ASTSGOT 24 02/10/2025 01:43 PM    ALTSGPT 15 02/10/2025 01:43 PM    TBILIRUBIN 0.2 02/10/2025 01:43 PM    ALBUMIN 4.2 02/10/2025 01:43 PM    TOTPROTEIN 7.1 02/10/2025 01:43 PM    GLOBULIN 2.9 02/10/2025 01:43 PM    AGRATIO 1.4 02/10/2025 01:43 PM       No results found for: \"PROTHROMBTM\", \"INR\"     Lab Results   Component Value Date/Time    WBC 12.3 (H) 02/10/2025 01:43 PM    RBC 5.06 02/10/2025 01:43 PM    HEMOGLOBIN 14.4 02/10/2025 01:43 PM    HEMATOCRIT 44.3 02/10/2025 01:43 PM    MCV 87.5 02/10/2025 01:43 PM    MCH 28.5 02/10/2025 01:43 PM    MCHC 32.5 02/10/2025 01:43 PM    MPV 9.1 02/10/2025 01:43 PM    NEUTSPOLYS 70.50 02/10/2025 01:43 PM    LYMPHOCYTES 18.70 (L) 02/10/2025 01:43 PM    MONOCYTES 7.90 02/10/2025 01:43 PM    EOSINOPHILS 1.90 02/10/2025 01:43 PM    BASOPHILS 0.40 02/10/2025 01:43 PM        Lab Results   Component Value Date/Time    HBA1C 5.8 (H) 04/28/2022 10:39 AM "      Limited diagnostic ultrasound right wrist performed 02/14/25 by Dr. Jeffers.   2/14/2025 I performed a limited diagnostic ultrasound for the patient's neuralgia ICD M79.2. I performed a limited diagnostic ultrasound of the RIGHT wrist including the median nerve to evaluate the cause of the patient's neuralgia. The median nerve was enlarged at the level of the carpal tunnel outlet compared to the level of the pronator quadratus. This is consistent with carpal tunnel syndrome.  The images were uploaded to the medical record.       Imaging:   I personally reviewed following images, these are my reads  MRI cervical spine 11/23/23  Fusion hardware at C5-C7.  At C7-T1 there is moderate-severe bilateral neuroforaminal stenosis.  At C6-C7 there is borderline left neuroforaminal stenosis.  At C5-6 there is mild left-sided neuroforaminal stenosis.  At C3-4 and C4-5 there is moderate right-sided neuroforaminal stenosis.See formal radiology report for further details.      MRI lumbar spine 9/2/2022  Evidence of lumbar laminectomy at L4-S1, interbody fusion and posterior fusion at L3-S1.  Broad-based disc bulge at L1-L2 resulting in severe central canal stenosis and compression of descending bilateral L2 nerve roots and possibly bilateral L3 nerve roots and mild impingement of exiting L1 nerve roots bilaterally.  Mild right neuroforaminal stenosis at T12-L1.  Possible mild bilateral neuroforaminal stenosis at L5-S1, quality of images impaired due to metallic artifact. Appearance of chronic postoperative seroma posterior to L4 and L5 vertebral bodies.    XR Right hand 11/22/22  Moderate to severe CMC joint OA. See formal radiology report for further details.    X-ray ribs bilateral 11/15/24  No acute abnormality    IMAGING radiology reads. I reviewed the following radiology reads   MRI cervical spine 11/23/23  FINDINGS:  Images are degraded by patient motion artifact. Alignment in the cervical spine shows mild degenerative  anterolisthesis of C7 on T1.     There is postoperative change with anterior cervical fusion hardware at C5-C6-C7. Expected hardware artifact.     Marrow signal in the vertebral bodies is otherwise unremarkable.     The prevertebral and paraspinous soft tissues are unremarkable.     There are no anomalies at the craniovertebral junction.  The cervical spinal cord is normal in caliber and signal throughout its course.     At C2-3, no significant disc bulge or protrusion. No central stenosis. Mild bilateral foraminal stenosis.     At C3-4, mild disc-osteophyte change. Small impression on the ventral subarachnoid space. No central stenosis. Moderate bilateral foraminal stenosis due to spondylotic change.     C4-C5, no significant disc bulge or protrusion. No central stenosis. The left neural foramen appears intact. Moderate right foraminal stenosis.     C5-C6 posterior bony ridging. Minimal ligamentum flavum hypertrophy. No candace central stenosis. The right neural foramen is intact. Mild left foraminal stenosis due to uncinate spondylosis.     C6-C7, no significant disc bulge or protrusion. Endplate hypertrophy contributing to mild central stenosis (T2 sagittal image 10, series 2, T2 axial image 12, series 8). Mild left-sided posterior endplate spurring and uncinate hypertrophy with borderline   left lateral recess stenosis and borderline left foraminal stenosis. The right neural foramen is intact.     C7-T1, moderate disc/osteophyte change accentuated by anterolisthesis. Partial effacement of ventral subarachnoid space. Thecal sac at the lower range of normal without candace central stenosis. Moderate-marked bilateral foraminal stenosis best seen on the   sagittal images.     IMPRESSION:     1.  Postoperative anterior cervical fusion with hardware fixation C5-C6-C7.  2.  Multilevel degenerative changes most notable for C6-C7 mild central stenosis at C7-C7-cojvce bilateral foraminal stenosis.  3.  Details for each level  above in the body of the report.  4.  No myelopathic cord signal is appreciated.    Results for orders placed during the hospital encounter of 09/02/22    MR-LUMBAR SPINE-W/O    Impression  1.  L3-4 slight anterolisthesis and L5-S1 slight retrolisthesis.  2.  Postoperative changes with lumbar laminectomy L4-L5-S1, interbody fusion L3-L4, L4-L5, L5-S1, and posterior fusion with transpedicular screw fixation at L3-L4-L5-S1.  3.  Chronic postoperative seroma occupying the laminectomy interval without dorsal epidural mass effect.  4.  The study is most notable for L1-L2 large disc protrusion-extrusion resulting in severe central stenosis.  5.  Additional degenerative changes detailed for each level above in the body of report.        MRI lumbar spine 10/20/21  Lumbar spine:    Alignment: Grade 1 L3-L4 anterolisthesis. Previously seen L4-L5  anterolisthesis is improved compared to MRI prior to surgery.    Vertebral body heights and marrow: Postsurgical changes from L3-S1  posterior fusion. Multilevel lumbar spondylosis with osteophytes, facet  arthropathy, and endplate marrow degenerative changes are seen. Otherwise  the vertebral body heights and marrow signal are unremarkable.    Conus medullaris: Normal, terminating at L1/L2.    Soft tissues: There is a fluid collection in the paraspinal soft tissues  posterior to the L3-L5 laminectomy sites, likely postoperative seroma  measuring 63 x 28 mm (series 17, image 7). Small right renal cyst.    L1-L2: Persistent disc bulge with worsening superimposed central disc  extrusion. Bilateral facet arthropathy and dorsal epidural fat. The  constellation of findings produces moderate to severe spinal canal  stenosis. Mild to moderate left neural foraminal stenosis is improved  compared to prior.  Ligamentum flavum thickening. Facet hypertrophy, mild.    L2-L3: Disc bulge, ligamentum flavum thickening, facet hypertrophy  resulting in mild spinal canal stenosis. Mild left neural  foraminal  stenosis.    L3-L4: Partially uncovered disc. Mild to moderate right neural foraminal  stenosis. Limited evaluation of the left neural foramen. Laminectomy.    L4-L5: No spinal canal stenosis. Limited evaluation of neural foramina due  to spinal hardware. Laminectomy.    L5-S1: Disc bulge without spinal canal stenosis. Limited evaluation of  neural foramina due to spinal hardware.     IMPRESSION:    1. Worsening disc protrusion at L1-L2, resulting in worsening spinal  canal stenosis, now moderate to severe.   2. Multilevel degenerative changes of the cervical spine, similar  compared to prior.  3. Multilevel degenerative changes in thoracic spine, with up to mild  to moderate spinal canal stenosis at T8-T9 secondary to disc bulge.  4. Postsurgical changes . Small postoperative seroma in L3-L5  laminectomy sites.        X-ray left hand 3/19/2019  FINDINGS:   There is no acute fracture or dislocation.   Advanced osteoarthrosis of the first CMC joint, characterized by joint   space narrowing, subchondral sclerosis, osteophyte formation, and radial   subluxation. Mild osteoarthrosis of the STT joint. Osteoarthrosis of   scattered IP joints. No focal soft tissue swelling.     IMPRESSION:   Advanced osteoarthrosis of the first CMC joint.     XR Right hand 11/22/22  FINDINGS:     BONE MINERALIZATION: Normal.  JOINTS: Moderate to severe first carpometacarpal joint osteoarthrosis. Mild triscaphe joint osteoarthrosis. Mild multifocal osteoarthrosis otherwise. No erosions.  FRACTURE: None.  DISLOCATION: None.  SOFT TISSUES: No mass.     IMPRESSION:     1.  Moderate to severe first carpometacarpal joint osteoarthrosis.  2.  Mild multifocal osteoarthrosis otherwise.                                                  Diagnosis  Visit Diagnoses     ICD-10-CM   1. Lumbar radiculitis  M54.16   2. Rib pain on right side  R07.81   3. Myalgia  M79.10   4. History of lumbar fusion  Z98.1   5. Sacroiliac joint dysfunction of  both sides  M53.3   6. S/P cervical spinal fusion  Z98.1   7. Neuroforaminal stenosis of cervical spine  M48.02   8. Osteoarthritis of carpometacarpal (CMC) joints of both thumbs, unspecified osteoarthritis type  M18.0   9. Chronic right-sided low back pain with right-sided sciatica  M54.41    G89.29   10. Numbness and tingling of right leg  R20.0    R20.2   11. Lumbar disc herniation  M51.26   12. Neuralgia  M79.2   13. BMI 31.0-31.9,adult  Z68.31         ASSESSMENT AND PLAN:  Robin Lynn Zielesch (: 1955) is a female with history of HTN, cervical laminectomy, depression, anxiety, thyroid cancer, BMI 30, L3-pelvis posterior spinal fusion with TLIF/ PLIF on 21.      Mathew was seen today for follow-up.    Diagnoses and all orders for this visit:    Lumbar radiculitis  -     Referral to Pain Clinic    Rib pain on right side    Myalgia    History of lumbar fusion    Sacroiliac joint dysfunction of both sides    S/P cervical spinal fusion    Neuroforaminal stenosis of cervical spine    Osteoarthritis of carpometacarpal (CMC) joints of both thumbs, unspecified osteoarthritis type    Chronic right-sided low back pain with right-sided sciatica    Numbness and tingling of right leg    Lumbar disc herniation    Neuralgia    BMI 31.0-31.9,adult  -     Patient identified as having weight management issue.  Appropriate orders and counseling given.              Discussed that the numbness and tingling in her right hand is likely from carpal tunnel syndrome given today's physical exam and limited diagnostic ultrasound results, and less likely from cervical radiculopathy       PLAN  Physical Therapy: discussed referral to physical therapy for low back pain.  She declined a physical therapy referral as she has done extensive physical therapy in the past which worsened her symptoms     Diagnostic workup: Personally reviewed at today's visit:   X-ray ribs bilateral 11/15/24  - diagnostic ultrasound right wrist 25 by  Dr. Jeffers as noted above    Medications:   -I have discussed that she should continue gabapentin, Mobic as per PCP  -Discussed that she should not combine Mobic with any other NSAIDs  -Previously prescribed tramadol 25-50 mg every 6 hours as needed as above.  Discussed that she should take this as sparingly as possible    Last opioid risk scale: 8/19/2022  Last controlled substance agreement: 11/15/2024    reviewed: 2/14/2025   Naloxone prescribed: Discussed prescription and offered it.  The patient declined saying she has at home already       Opioid Risk Score: 8    Interpretation of Opioid Risk Score   Score 0-3 = Low risk of abuse. Do UDS at least once per year.  Score 4-7 = Moderate risk of abuse. Do UDS 1-4 times per year.  Score 8+ = High risk of abuse. Refer to specialist.     I reviewed the     In prescribing controlled substances to this patient, I certify that I have obtained and reviewed the medical history of Robin Lynn Zielesch. I have also made a good sidney effort to obtain applicable records from other providers who have treated the patient and records did not demonstrate any increased risk of substance abuse that would prevent me from prescribing controlled substances.     I have conducted a physical exam and documented it. I have reviewed Ms. Zielesch’s prescription history as maintained by the [place] Prescription Monitoring Program.     I have assessed the patient’s risk for abuse, dependency, and addiction using the validated Opioid Risk Tool available at https://www.mdcalc.com/qmkhzw-cbvt-wqvk-ort-narcotic-abuse.     Given the above, I believe the benefits of controlled substance therapy outweigh the risks. The reasons for prescribing controlled substances include non-narcotic, oral analgesic alternatives have been inadequate for pain control. Accordingly, I have discussed the risk and benefits, treatment plan, and alternative therapies with the patient.     Interventions:   -Bilateral  sacroiliac joint injections under fluoroscopic guidance as needed  -S/p trial of trigger point injections targeting left neck area with no relief  - s/p attempted and aborted left cervical epidural steroid injection at T2-3 and T3-4 due to calcified ligament.  -Repeat right L2-3 interlaminar epidural steroid injection as needed given recurrence of pain which significantly improved with this procedure in the past  -Bilateral CMC joint steroid injections under ultrasound guidance PRN given significant improvement in pain with this procedure in the past.       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  Given her hx of significant improvement in pain after our previous epidural steroid injection and no neurologic deficits on exam today, I believe it is reasonable to defer a neurosurgery referral at this time  -Discussed that she could consider deep myofascial release techniques including a foam roller for her right thigh  -Continue follow-up with Dr. Hood at [facility]. Discussed that perhaps a referral to an orthotist could be considered    Follow-up: 4 weeks after injection    Orders Placed This Encounter    Referral to Pain Clinic    Patient identified as having weight management issue.  Appropriate orders and counseling given.       Kendal Jeffers MD  Interventional Pain and Spine  Physical Medicine and Rehabilitation  [facility]      The above note documents my personal evaluation of this patient. In addition, I have reviewed and confirmed with the patient and MA the supportive information documented in today's Patient Health Questionnaire and Office Note.     Please note that this dictation was created using voice recognition software. I have made every reasonable attempt to correct obvious errors, but I expect that there are errors of grammar and  possibly content that I did not discover before finalizing the note.

## 2025-02-20 PROCEDURE — RXMED WILLOW AMBULATORY MEDICATION CHARGE: Performed by: FAMILY MEDICINE

## 2025-02-20 NOTE — Clinical Note
REFERRAL APPROVAL NOTICE         Sent on February 20, 2025                   Robin Lynn Zielesch  [address]   [address]  [place] [place] [address]                   Dear Ms. Zielesch,    After a careful review of the medical information and benefit coverage, [facility] has processed your referral. See below for additional details.    If applicable, you must be actively enrolled with your insurance for coverage of the authorized service. If you have any questions regarding your coverage, please contact your insurance directly.    REFERRAL INFORMATION   Referral #:  [id number]  Referred-To Department    Referred-By Provider:  Physical Medicine and Rehab    Kendal Jeffers M.D.   Pain Management       [address]  [address]  [facility] [id number] [phone] [address] [facility] [id number] [phone]    Referral Start Date:  02/20/2025  Referral End Date:   02/20/2026             SCHEDULING  If you do not already have an appointment, please call [phone] to make an appointment.     MORE INFORMATION  If you do not already have a [employer] account, sign up at: [email or web address]  You can access your medical information, make appointments, see lab results, billing information, and more.  If you have questions regarding this referral, please contact  the [facility] Referrals department at:             [phone]. Monday - Friday 8:00AM - 5:00PM.     Sincerely,    [facility]

## 2025-02-21 ENCOUNTER — PATIENT MESSAGE (OUTPATIENT)
Dept: SLEEP MEDICINE | Facility: MEDICAL CENTER | Age: 70
End: 2025-02-21
Payer: MEDICARE

## 2025-02-22 ENCOUNTER — PHARMACY VISIT (OUTPATIENT)
Dept: PHARMACY | Facility: MEDICAL CENTER | Age: 70
End: 2025-02-22
Payer: COMMERCIAL

## 2025-02-23 ENCOUNTER — APPOINTMENT (OUTPATIENT)
Dept: SLEEP MEDICINE | Facility: MEDICAL CENTER | Age: 70
End: 2025-02-23
Attending: STUDENT IN AN ORGANIZED HEALTH CARE EDUCATION/TRAINING PROGRAM
Payer: MEDICARE

## 2025-02-27 ENCOUNTER — HOSPITAL ENCOUNTER (OUTPATIENT)
Facility: REHABILITATION | Age: 70
End: 2025-02-27
Attending: STUDENT IN AN ORGANIZED HEALTH CARE EDUCATION/TRAINING PROGRAM | Admitting: STUDENT IN AN ORGANIZED HEALTH CARE EDUCATION/TRAINING PROGRAM
Payer: MEDICARE

## 2025-02-27 ENCOUNTER — APPOINTMENT (OUTPATIENT)
Dept: RADIOLOGY | Facility: REHABILITATION | Age: 70
End: 2025-02-27
Attending: STUDENT IN AN ORGANIZED HEALTH CARE EDUCATION/TRAINING PROGRAM
Payer: MEDICARE

## 2025-02-27 VITALS
OXYGEN SATURATION: 96 % | BODY MASS INDEX: 31.55 KG/M2 | WEIGHT: 160.72 LBS | TEMPERATURE: 97.7 F | HEART RATE: 85 BPM | DIASTOLIC BLOOD PRESSURE: 85 MMHG | RESPIRATION RATE: 17 BRPM | SYSTOLIC BLOOD PRESSURE: 125 MMHG | HEIGHT: 60 IN

## 2025-02-27 PROCEDURE — 700111 HCHG RX REV CODE 636 W/ 250 OVERRIDE (IP): Mod: JZ

## 2025-02-27 PROCEDURE — 62323 NJX INTERLAMINAR LMBR/SAC: CPT

## 2025-02-27 PROCEDURE — 700117 HCHG RX CONTRAST REV CODE 255

## 2025-02-27 RX ORDER — LIDOCAINE HYDROCHLORIDE 10 MG/ML
INJECTION, SOLUTION EPIDURAL; INFILTRATION; INTRACAUDAL; PERINEURAL
Status: COMPLETED
Start: 2025-02-27 | End: 2025-02-27

## 2025-02-27 RX ORDER — DEXAMETHASONE SODIUM PHOSPHATE 10 MG/ML
INJECTION, SOLUTION INTRAMUSCULAR; INTRAVENOUS
Status: COMPLETED
Start: 2025-02-27 | End: 2025-02-27

## 2025-02-27 RX ADMIN — LIDOCAINE HYDROCHLORIDE 10 ML: 10 INJECTION, SOLUTION EPIDURAL; INFILTRATION; INTRACAUDAL; PERINEURAL at 08:23

## 2025-02-27 RX ADMIN — IOHEXOL 3 ML: 240 INJECTION, SOLUTION INTRATHECAL; INTRAVASCULAR; INTRAVENOUS; ORAL at 08:23

## 2025-02-27 RX ADMIN — DEXAMETHASONE SODIUM PHOSPHATE 10 MG: 10 INJECTION, SOLUTION INTRAMUSCULAR; INTRAVENOUS at 08:23

## 2025-02-27 ASSESSMENT — FIBROSIS 4 INDEX: FIB4 SCORE: 1.4

## 2025-02-27 ASSESSMENT — PAIN DESCRIPTION - PAIN TYPE: TYPE: CHRONIC PAIN

## 2025-02-27 NOTE — INTERVAL H&P NOTE
Consented Procedure: RIGHT Lumbar L2-3 interlaminar epidural steroid injection….PT NEEDS TO HOLD ASA AND MELOXICAM PRIOR TO PROCEDURE  I have examined the patient, provided the risks, benefits, and alternatives to the procedure(s) indicated on the signed consent form, and the patient wishes to proceed.    H&P reviewed. The patient was examined and there are no changes to the H&P      Kendal Jeffers M.D.  02/27/25 8:06 AM

## 2025-02-27 NOTE — OP REPORT
Date of Service: 02/27/25    Patient: Robin Lynn Zielesch 70 y.o. female     MRN: [id number]     Physician/s: Kendal Jeffers MD    Pre-operative Diagnosis: Lumbar radiculopathy    Post-operative Diagnosis: Lumbar radiculopathy    Procedure: interlaminar Lumbar Epidural Steroid Injection at the right L2-L3 level.     Description of procedure:    The risks, benefits, and alternatives of the procedure were reviewed and discussed with the patient.  Written informed consent was freely obtained. A pre-procedural time-out was conducted by the physician verifying patient’s identity, procedure to be performed, procedure site and side, and allergy verification. Appropriate equipment was determined to be in place for the procedure.     The patient's vital signs were carefully monitored before, throughout, and after the procedure.     The patient was placed in the prone position, and fluoroscopy was used to identify the L2-L3 level.  For annotation purposes, the L2-3 level was defined as the L2-L3 level noted on MRI which had also noted transpedicular screws at L3, L4, L5, and S1.     The lumbar area was prepped with iodine solution and draped with sterile drape.  Sterile technique was used throughout.  At the needle entry point, the skin and subcutaneous tissues were infiltrated with 1% lidocaine.  An 18-gauge Tuohy needle was advanced to the epidural space, using the loss of resistance technique in a contralateral oblique fluoroscopic view with the needle tip well visualized. Due to copious amounts of firm scar tissue, extra time and effort was required to be able to safely advance the needle towards the epidural space.    In the AP and lateral views, contrast dye was injected which first appeared posterior to the epidural space. The needle was slowly advanced. Then a subtle loss of resistance was encountered. Subsequent injection of contrast dye was used to highlight the epidural space spread while the fluoroscope was running  live. With the needle in the epidural space, aspiration was negative for blood or other fluid.  Dexamethasone, 10 mg., lidocaine, 1mg, and 1cc of 0.9% normal saline (total volume 3 ml.) were injected through the Tuohy needle.  The injected local anesthetic and steroid resulted in dispersion of the previously injected contrast. The needle was removed intact. The patient's back was covered with a 4x4 gauze, the area was cleansed with sterile normal saline, and a dressing was applied. There were no complications noted.     The procedure was well tolerated, and there were no apparent complications.      The patient was then evaluated post-procedure, and was hemodynamically stable prior to leaving the post-operative care unit.     Follow-up as scheduled    Kendal Jeffers MD  Interventional Pain and Spine  Physical Medicine and Rehabilitation  [facility]      CPT codes  Interlaminar epidural injection - lumbar or sacral (caudal): 14807 - 22      Pre-procedure pain score: 8/10 on NRS  Post-procedure pain score: 4/10 on NRS

## 2025-03-05 ENCOUNTER — TELEPHONE (OUTPATIENT)
Dept: PHYSICAL MEDICINE AND REHAB | Facility: MEDICAL CENTER | Age: 70
End: 2025-03-05
Payer: MEDICARE

## 2025-03-05 NOTE — TELEPHONE ENCOUNTER
Called for post-sp check-up. Pt reported the following regarding the procedure site: RIGHT Lumbar L2-3 interlaminar epidural steroid injection     Change in pain?: LVM     Concerns?: LVM     Confirmed [facility] appt?: LVM

## 2025-03-10 PROCEDURE — RXMED WILLOW AMBULATORY MEDICATION CHARGE: Performed by: FAMILY MEDICINE

## 2025-03-11 PROCEDURE — RXMED WILLOW AMBULATORY MEDICATION CHARGE: Performed by: FAMILY MEDICINE

## 2025-03-13 ENCOUNTER — PHARMACY VISIT (OUTPATIENT)
Dept: PHARMACY | Facility: MEDICAL CENTER | Age: 70
End: 2025-03-13
Payer: COMMERCIAL

## 2025-03-26 ENCOUNTER — PATIENT MESSAGE (OUTPATIENT)
Dept: PHYSICAL MEDICINE AND REHAB | Facility: MEDICAL CENTER | Age: 70
End: 2025-03-26
Payer: MEDICARE

## 2025-03-27 ENCOUNTER — APPOINTMENT (OUTPATIENT)
Dept: PHYSICAL MEDICINE AND REHAB | Facility: MEDICAL CENTER | Age: 70
End: 2025-03-27
Payer: MEDICARE

## 2025-04-01 ENCOUNTER — APPOINTMENT (OUTPATIENT)
Dept: PHYSICAL MEDICINE AND REHAB | Facility: MEDICAL CENTER | Age: 70
End: 2025-04-01
Payer: MEDICARE

## 2025-04-01 PROCEDURE — RXMED WILLOW AMBULATORY MEDICATION CHARGE: Performed by: FAMILY MEDICINE

## 2025-04-06 ENCOUNTER — PHARMACY VISIT (OUTPATIENT)
Dept: PHARMACY | Facility: MEDICAL CENTER | Age: 70
End: 2025-04-06
Payer: COMMERCIAL

## 2025-04-18 ENCOUNTER — PATIENT MESSAGE (OUTPATIENT)
Dept: SLEEP MEDICINE | Facility: MEDICAL CENTER | Age: 70
End: 2025-04-18
Payer: MEDICARE

## 2025-04-20 ENCOUNTER — SLEEP STUDY (OUTPATIENT)
Dept: SLEEP MEDICINE | Facility: MEDICAL CENTER | Age: 70
End: 2025-04-20
Attending: STUDENT IN AN ORGANIZED HEALTH CARE EDUCATION/TRAINING PROGRAM
Payer: MEDICARE

## 2025-04-20 DIAGNOSIS — G47.34 NOCTURNAL HYPOXEMIA: ICD-10-CM

## 2025-04-20 DIAGNOSIS — G47.33 OSA (OBSTRUCTIVE SLEEP APNEA): ICD-10-CM

## 2025-04-20 PROCEDURE — 95810 POLYSOM 6/> YRS 4/> PARAM: CPT | Performed by: STUDENT IN AN ORGANIZED HEALTH CARE EDUCATION/TRAINING PROGRAM

## 2025-04-21 NOTE — PROCEDURES
Patient: ZIELESCH, ROBIN  ID: [id number] Date: 4/20/2025 Exam No.:   MONTAGE: Standard  STUDY TYPE: Diagnostic  RECORDING TECHNIQUE:   After the scalp was prepared, gold plated electrodes were applied to the scalp according to the International 10-20 System. EEG (electroencephalogram) was continuously monitored from the O1-M2, O2-M1, C3-M2, C4-M1, F3-M2, and F4-M1. EOGs (electrooculograms) were monitored by electrodes placed at the left and right outer canthi. Chin EMG (electromyogram) was monitored by electrodes placed on the mentalis and sub-mentalis muscles. Nasal and oral airflow were monitored using a triple port thermocouple as well as oronasal pressure transducer. Respiratory effort was measured by inductive plethysmography technology employing abdominal and thoracic belts. Blood oxygen saturation and pulse were monitored by pulse oximetry. Heart rhythm was monitored by surface electrocardiogram. Leg EMG was studied using surface electrodes placed on left and right anterior tibialis. A microphone was used to monitor tracheal sounds and snoring. Body position was monitored and documented by technician observation.   SCORING CRITERIA:   A modification of the AASM manual for scoring of sleep and associated events was used. Obstructive apneas were scored by cessation of airflow for at least 10 seconds with continuing respiratory effort. Central apneas were scored by cessation of airflow for at least 10 seconds with no respiratory effort. Hypopneas were scored by a 30% or more reduction in airflow for at least 10 seconds accompanied by arterial oxygen desaturation of 3% or an arousal. For CMS (Medicare) patients, per AASM rule 1B, hypopneas are scored by 30% with mild reduction in airflow for at least 10 seconds accompanied by arterial saturation decreased at 4%.  Study start time was 10:01:05 PM. Diagnostic recording time was 6h 50.0m with a total sleep time of 2h 52.0m resulting in a sleep efficiency of 41.95%%.  Sleep latency from the start of the study was 41 minutes and the latency from sleep to REM was 00 minutes. In total,39 arousals were scored for an arousal index of 13.6.  Respiratory:  There were a total of 5 apneas consisting of 0 obstructive apneas, 0 mixed apneas, and 5 central apneas. A total of 146 hypopneas were scored. The apnea index was 1.74 per hour and the hypopnea index was 50.93 per hour resulting in an overall AHI of 52.67. AHI during REM was 0.0 and AHI while supine was 0.00.  Oximetry:  There was a mean oxygen saturation of 81.0%. The minimum oxygen saturation in NREM was 69.0 % and in REM was --%. The patient spent 157.8 minutes of TST with SaO2 <88%.  Cardiac:  The highest heart rate seen while awake was 121 BPM while the highest heart rate during sleep was 96 BPM with an average sleeping heart rate of 88 BPM.  Limb Movements:  There were a total of 0 PLMs during sleep which resulted in a PLMS index of 0.0. Of these, 1 were associated with arousals which resulted in a PLMS arousal index of 0.3.  Assessment:   ***Obstructive Sleep Apnea Hypopnea - AHI*** ***Nocturnal desaturation - mehdi saturation ***% - saturations <88% below for *** minutes of TST.  Recommendation:    study

## 2025-05-04 PROCEDURE — RXMED WILLOW AMBULATORY MEDICATION CHARGE: Performed by: FAMILY MEDICINE

## 2025-05-08 ENCOUNTER — PHARMACY VISIT (OUTPATIENT)
Dept: PHARMACY | Facility: MEDICAL CENTER | Age: 70
End: 2025-05-08
Payer: COMMERCIAL

## 2025-05-12 DIAGNOSIS — G47.33 OSA (OBSTRUCTIVE SLEEP APNEA): ICD-10-CM

## 2025-05-12 DIAGNOSIS — G47.34 NOCTURNAL HYPOXEMIA: ICD-10-CM

## 2025-05-15 NOTE — Clinical Note
REFERRAL APPROVAL NOTICE         Sent on May 15, 2025                   Mathew Nava Tyronchanotez  [address]   [address]  [place] [address]                   Dear Ms. Zielesch,    After a careful review of the medical information and benefit coverage, [facility] has processed your referral. See below for additional details.    If applicable, you must be actively enrolled with your insurance for coverage of the authorized service. If you have any questions regarding your coverage, please contact your insurance directly.    REFERRAL INFORMATION   Referral #:  [id number]  Referred-To Department    Referred-By Provider:  Pulmonary and Sleep Medicine    Beatriz Abrams M.D.   [facility]      [address] [facility] [place] [address]  [phone] [address] [facility] [address]  [place] [id number] [phone]    Referral Start Date:  05/12/2025  Referral End Date:   05/12/2026             SCHEDULING  If you do not already have an appointment, please call [phone] to make an appointment.     MORE INFORMATION  If you do not already have a [employer] account, sign up at: [email or web address]  You can access your medical information, make appointments, see lab results, billing information, and more.  If you have questions regarding this referral, please contact  the [facility] Referrals department at:             [phone]. Monday - Friday 8:00AM - 5:00PM.     Sincerely,    [facility]

## 2025-05-16 ENCOUNTER — APPOINTMENT (OUTPATIENT)
Dept: PHYSICAL MEDICINE AND REHAB | Facility: MEDICAL CENTER | Age: 70
End: 2025-05-16
Payer: MEDICARE

## 2025-05-16 VITALS
WEIGHT: 147.93 LBS | HEART RATE: 94 BPM | SYSTOLIC BLOOD PRESSURE: 125 MMHG | BODY MASS INDEX: 29.04 KG/M2 | TEMPERATURE: 97.6 F | HEIGHT: 60 IN | DIASTOLIC BLOOD PRESSURE: 85 MMHG | OXYGEN SATURATION: 95 %

## 2025-05-16 DIAGNOSIS — G58.8 CLUNEAL NEUROPATHY: ICD-10-CM

## 2025-05-16 DIAGNOSIS — R20.0 NUMBNESS AND TINGLING OF RIGHT LEG: ICD-10-CM

## 2025-05-16 DIAGNOSIS — M24.20 CALCIFICATION OF LIGAMENT: ICD-10-CM

## 2025-05-16 DIAGNOSIS — R52 ACUTE PAIN: ICD-10-CM

## 2025-05-16 DIAGNOSIS — M54.41 CHRONIC RIGHT-SIDED LOW BACK PAIN WITH RIGHT-SIDED SCIATICA: ICD-10-CM

## 2025-05-16 DIAGNOSIS — M54.16 LUMBAR RADICULITIS: Primary | ICD-10-CM

## 2025-05-16 DIAGNOSIS — M48.02 NEUROFORAMINAL STENOSIS OF CERVICAL SPINE: ICD-10-CM

## 2025-05-16 DIAGNOSIS — M51.26 LUMBAR DISC HERNIATION: ICD-10-CM

## 2025-05-16 DIAGNOSIS — M79.10 MYALGIA: ICD-10-CM

## 2025-05-16 DIAGNOSIS — M54.12 CERVICAL RADICULOPATHY: ICD-10-CM

## 2025-05-16 DIAGNOSIS — Z98.1 S/P CERVICAL SPINAL FUSION: ICD-10-CM

## 2025-05-16 DIAGNOSIS — R20.2 NUMBNESS AND TINGLING OF RIGHT LEG: ICD-10-CM

## 2025-05-16 DIAGNOSIS — Z98.1 HISTORY OF LUMBAR FUSION: ICD-10-CM

## 2025-05-16 DIAGNOSIS — G89.29 CHRONIC RIGHT-SIDED LOW BACK PAIN WITH RIGHT-SIDED SCIATICA: ICD-10-CM

## 2025-05-16 DIAGNOSIS — M18.0 OSTEOARTHRITIS OF CARPOMETACARPAL (CMC) JOINTS OF BOTH THUMBS, UNSPECIFIED OSTEOARTHRITIS TYPE: ICD-10-CM

## 2025-05-16 DIAGNOSIS — R07.81 RIB PAIN ON RIGHT SIDE: ICD-10-CM

## 2025-05-16 DIAGNOSIS — M53.3 SACROILIAC JOINT DYSFUNCTION OF BOTH SIDES: ICD-10-CM

## 2025-05-16 PROCEDURE — 3079F DIAST BP 80-89 MM HG: CPT | Performed by: STUDENT IN AN ORGANIZED HEALTH CARE EDUCATION/TRAINING PROGRAM

## 2025-05-16 PROCEDURE — 99999 PR NO CHARGE: CPT | Performed by: STUDENT IN AN ORGANIZED HEALTH CARE EDUCATION/TRAINING PROGRAM

## 2025-05-16 PROCEDURE — 3074F SYST BP LT 130 MM HG: CPT | Performed by: STUDENT IN AN ORGANIZED HEALTH CARE EDUCATION/TRAINING PROGRAM

## 2025-05-16 PROCEDURE — RXMED WILLOW AMBULATORY MEDICATION CHARGE: Performed by: FAMILY MEDICINE

## 2025-05-16 PROCEDURE — 99214 OFFICE O/P EST MOD 30 MIN: CPT | Mod: 25 | Performed by: STUDENT IN AN ORGANIZED HEALTH CARE EDUCATION/TRAINING PROGRAM

## 2025-05-16 PROCEDURE — 1125F AMNT PAIN NOTED PAIN PRSNT: CPT | Performed by: STUDENT IN AN ORGANIZED HEALTH CARE EDUCATION/TRAINING PROGRAM

## 2025-05-16 PROCEDURE — 76942 ECHO GUIDE FOR BIOPSY: CPT | Performed by: STUDENT IN AN ORGANIZED HEALTH CARE EDUCATION/TRAINING PROGRAM

## 2025-05-16 PROCEDURE — 64450 NJX AA&/STRD OTHER PN/BRANCH: CPT | Mod: 50 | Performed by: STUDENT IN AN ORGANIZED HEALTH CARE EDUCATION/TRAINING PROGRAM

## 2025-05-16 RX ORDER — DEXAMETHASONE SODIUM PHOSPHATE 4 MG/ML
4 INJECTION, SOLUTION INTRA-ARTICULAR; INTRALESIONAL; INTRAMUSCULAR; INTRAVENOUS; SOFT TISSUE ONCE
Status: COMPLETED | OUTPATIENT
Start: 2025-05-16 | End: 2025-05-16

## 2025-05-16 RX ADMIN — DEXAMETHASONE SODIUM PHOSPHATE 4 MG: 4 INJECTION, SOLUTION INTRA-ARTICULAR; INTRALESIONAL; INTRAMUSCULAR; INTRAVENOUS; SOFT TISSUE at 09:30

## 2025-05-16 RX ADMIN — Medication 10 ML: at 09:29

## 2025-05-16 ASSESSMENT — PATIENT HEALTH QUESTIONNAIRE - PHQ9
5. POOR APPETITE OR OVEREATING: 0 - NOT AT ALL
SUM OF ALL RESPONSES TO PHQ QUESTIONS 1-9: 1
CLINICAL INTERPRETATION OF PHQ2 SCORE: 1

## 2025-05-16 ASSESSMENT — FIBROSIS 4 INDEX: FIB4 SCORE: 1.4

## 2025-05-16 ASSESSMENT — PAIN SCALES - GENERAL: PAINLEVEL_OUTOF10: 7=MODERATE-SEVERE PAIN

## 2025-05-16 NOTE — PROGRESS NOTES
Verbal consent was acquired by the patient to use [employer] ambient listening note generation during this visit Yes     Follow-up patient Note    Interventional Pain and Spine  Physiatry (Physical Medicine and Rehabilitation)     Patient Name: Robin Lynn Zielesch  : 1955  Date of service: 2025    Chief Complaint:   Chief Complaint   Patient presents with    Follow-Up     Back pain, SP fv         HISTORY (2022):  Robin Lynn Zielesch is a 67 y.o. female who presents today with pain radiating from her right posterolateral glute down her right anterolateral and posterior thigh accompanied by numbness/tingling/weakness in this area. This started in Sep 2021 while recovering from a L2-pelvis posterior spinal fusion with TLIF/ PLIF on 21 with Dr. Bates ([facility] which she states she had done for similar radiating pain down her left leg.  Her radiating left leg pain resolved after surgery.     Her pain at its best-worse level during the course of the day is 5-9/10, respectively. Pain right now is 7/10 on the numeric pain scale. Pain worsens with sitting, standing, walking, bending backwards, side bending or twisting, walking upstairs, and walking downstairs and improves with nothing. Her pain interferes somewhat with ADLs. The patient otherwise denies new weakness, numbness, or bladder/bowel incontinence. States she has fallen a few times secondary to pain and possibly weakness. Moved from [facility] in May 2022.     The patient has done physical therapy for this problem with worsening pain.     Patient has tried the following medications with varied success (current meds in bold): Hayes back and body  Gabapentin 300mg TID - no relief. Used to help with left sided pain  Naproxen BID - significant relief     Therapeutic modalities and interventional therapies to date include:  -No injections     Medical history includes HTN, cervical laminectomy, depression, anxiety, thyroid cancer, BMI 30,  L2-pelvis posterior spinal fusion with TLIF/ PLIF on 21    HPI  Today's visit   Robin Lynn Zielesch ( 1955) is a female with The primary encounter diagnosis was Lumbar radiculitis. Diagnoses of Rib pain on right side, Myalgia, History of lumbar fusion, Sacroiliac joint dysfunction of both sides, Numbness and tingling of right leg, Chronic right-sided low back pain with right-sided sciatica, Osteoarthritis of carpometacarpal (CMC) joints of both thumbs, unspecified osteoarthritis type, Neuroforaminal stenosis of cervical spine, S/P cervical spinal fusion, Lumbar disc herniation, Acute pain, Cervical radiculopathy, Calcification of upper thoracic ligamentum flavum, and Cluneal neuropathy were also pertinent to this visit.    Today Mathew presents after  25 interlaminar Lumbar Epidural Steroid Injection at the right L2-L3 level    History of Present Illness  The patient presents for evaluation of back pain and cervical radiculopathy.    Back Pain  - She reports back pain radiating across her waist to her hip, distinct from previous leg pain.  Radicular symptoms have resolved.  - Prolonged standing or walking exacerbates the pain, necessitating position changes.  - Occasional balance issues occur when transitioning from standing to walking.  - She has lost weight by avoiding sweets and working out.  - She previously underwent physical therapy for her foot without improvement and is hesitant to pursue it again.    Cervical Radiculopathy  - She experiences right hand numbness and tingling when her head is turned to the right for extended periods, without associated arm pain or weakness.  - She has a history of carpal tunnel syndrome and cervical surgery, during which she experienced a limp arm when her head was turned to the right.  - Previous epidural steroid injection for her neck cannot be completed due to calcification.      Procedure history:  - 22 right L2-3 interlaminar epidural steroid  injection - 90% improvement in pain, resolution of radiating pain.  - 11/28/22 bilateral CMC joint injections - 100% improvement in thumb pain bilaterally  - 3/7/23 right L2-3 interlaminar epidural steroid injection -100% improvement in back pain and radiating pain, ongoing tightness in her right thigh  - 06/27/23 trigger point injections   - 11/15/23 BILATERAL ultrasound-guided 1st carpometacarpal joint injection - 100% improvement in thumb pain bilaterally  - 12/15/23 trigger point injections-no relief  - 1/5/24 right L2-3 interlaminar epidural steroid injection-resolution of low back pain and pain radiating down right leg  - 10/16/24 right and left Sacroiliac joint injection-resolution of index pain  - 2/27/25 interlaminar Lumbar Epidural Steroid Injection at the right L2-L3 level. Resolution of index pain    ROS:   Red Flags ROS:   Fever, Chills, Sweats: Denies  Involuntary Weight Loss: Denies  Bladder Incontinence: Denies  Bowel Incontinence: denies  Saddle Anesthesia: Denies    All other systems reviewed and negative.     PMHx:   Past Medical History:   Diagnosis Date    Anxiety     Arthritis     Cancer (HCC)     COPD (chronic obstructive pulmonary disease) (HCC)     Daytime sleepiness     GERD (gastroesophageal reflux disease)     Hypertension     Insomnia     Migraine     Thyroid disease        PSHx:   Past Surgical History:   Procedure Laterality Date    VA INJ LUMBAR/SACRAL,W/ IMAGING Right 2/27/2025    Procedure: RIGHT Lumbar L2-3 interlaminar epidural steroid injection….PT NEEDS TO HOLD ASA AND MELOXICAM PRIOR TO PROCEDURE;  Surgeon: Kendal Jeffers M.D.;  [facility];  Service: Pain Management    VA INJECTION,SACROILIAC JOINT Bilateral 10/16/2024    Procedure: RIGHT and LEFT sacroiliac joint injection with fluoroscopic guidance;  Surgeon: Kendal Jeffers M.D.;  Location: [facility];  Service: Pain Management    VA INJ LUMBAR/SACRAL,W/ IMAGING Right 1/5/2024     Procedure: RIGHT Lumbar L2-3 interlaminar epidural steroid injection.  PT NEEDS TO HOLD ASA AND MELOXICAM PRIOR TO PROCEDURE.  PT PREFERS TO SCHEDULE ON FRIDAY IF POSS;  Surgeon: Kendal Jeffers M.D.;  Location: [facility];  Service: Pain Management    MO INJ CERV/THORAC,W/ IMAGING N/A 2023    Procedure: interlaminar cervical epidural steroid injection at T3-T4;  Surgeon: Kendal Jeffers M.D.;  Location: [facility];  Service: Pain Management    MO INJ LUMBAR/SACRAL,W/ IMAGING Right 2023    Procedure: RIGHT Lumbar L2-3 interlaminar epidural steroid injection;  Surgeon: Kendal Jeffers M.D.;  Location: [facility];  Service: Pain Management    MO INJ LUMBAR/SACRAL,W/ IMAGING Right 2022    Procedure: RIGHT lumbar L2-3 interlaminar epidural steroid injection;  Surgeon: Kendal Jeffers M.D.;  Location: [facility];  Service: Pain Management    ABDOMINAL HYSTERECTOMY TOTAL      ARTHROPLASTY      EYE SURGERY      FOOT SURGERY      HERNIA REPAIR      [id number]      L tka    LAMINOTOMY      LUMPECTOMY      PRIMARY C SECTION      THYROIDECTOMY TOTAL      2019  thyroid cancer       Family Hx:   Family History   Problem Relation Age of Onset    COPD Mother     Cancer Father         pancreatic    Hypertension Father     Hyperlipidemia Father     Alcohol abuse Father     Drug abuse Brother        Social Hx:  Social History     Socioeconomic History    Marital status:      Spouse name: Not on file    Number of children: Not on file    Years of education: Not on file    Highest education level: Some college, no degree   Occupational History    Not on file   Tobacco Use    Smoking status: Former     Current packs/day: 0.00     Average packs/day: 1.5 packs/day for 50.0 years (75.0 ttl pk-yrs)     Types: Cigarettes     Start date: 1968     Quit date: 2018     Years since quittin.7    Smokeless tobacco: Never   Vaping Use     Vaping status: Some Days    Substances: Nicotine, CBD, Flavoring    Devices: Pre-filled or refillable cartridge, Refillable tank   Substance and Sexual Activity    Alcohol use: Never    Drug use: Not Currently     Types: Marijuana, Methamphetamines     Comment: once in a while    Sexual activity: Yes     Partners: Male     Birth control/protection: Female Sterilization   Other Topics Concern    Not on file   Social History Narrative    Not on file     Social Drivers of Health     Financial Resource Strain: Medium Risk (7/10/2023)    Overall Financial Resource Strain (CARDIA)     Difficulty of Paying Living Expenses: Somewhat hard   Food Insecurity: No Food Insecurity (7/10/2023)    Hunger Vital Sign     Worried About Running Out of Food in the Last Year: Never true     Ran Out of Food in the Last Year: Never true   Transportation Needs: No Transportation Needs (7/10/2023)    PRAPARE - Transportation     Lack of Transportation (Medical): No     Lack of Transportation (Non-Medical): No   Physical Activity: Insufficiently Active (7/10/2023)    Exercise Vital Sign     Days of Exercise per Week: 5 days     Minutes of Exercise per Session: 20 min   Stress: No Stress Concern Present (7/10/2023)    [employer] - Occupational Stress Questionnaire     Feeling of Stress : Only a little   Social Connections: Moderately Isolated (7/10/2023)    Social Connection and Isolation Panel [NHANES]     Frequency of Communication with Friends and Family: Never     Frequency of Social Gatherings with Friends and Family: Never     Attends Jainism Services: Never     Active Member of Clubs or Organizations: Yes     Attends Club or Organization Meetings: Never     Marital Status:    Intimate Partner Violence: Not At Risk (2/7/2019)    [facility]    Humiliation, Afraid, Rape, and Kick questionnaire     Fear of Current or Ex-Partner: No     Emotionally Abused: No     Physically Abused:  No     Sexually Abused: No   Housing Stability: Low Risk  (7/10/2023)    Housing Stability Vital Sign     Unable to Pay for Housing in the Last Year: No     Number of Places Lived in the Last Year: 1     Unstable Housing in the Last Year: No       Allergies:  Allergies   Allergen Reactions    Ace Inhibitors Cough    Chlorhexidine Rash    Flexeril [Cyclobenzaprine] Unspecified     Irritability     Triamterene      Other reaction(s): Nephrotoxicity       Medications: reviewed on epic.   Outpatient Medications Marked as Taking for the 5/16/25 encounter (Office Visit) with Kendal Jeffers M.D.   Medication Sig Dispense Refill    potassium chloride SA (KDUR) 20 MEQ Tab CR Take 1 Tablet by mouth every day. 90 Tablet 3    gabapentin (NEURONTIN) 300 MG Cap Take 3 Capsules by mouth 3 times a day. 810 Capsule 1    lidocaine (LIDODERM) 5 % Patch Place 1 Patch on the skin every 24 hours. Apply to affected area, 24 hours on followed by 24 hours off. 20 Patch 0    tizanidine (ZANAFLEX) 4 MG Tab Take 1 Tablet by mouth every 6 hours as needed (arm pain). 30 Tablet 3    losartan (COZAAR) 100 MG Tab Take 1 Tablet by mouth every day. 90 Tablet 3    buPROPion (WELLBUTRIN XL) 300 MG XL tablet Take 1 Tablet by mouth every morning. 90 Tablet 3    meloxicam (MOBIC) 15 MG tablet Take 1 Tablet by mouth every day. 90 Tablet 3    albuterol 108 (90 Base) MCG/ACT Aero Soln inhalation aerosol Inhale 2 Puffs every 6 hours as needed for Shortness of Breath. 8.5 g 3    SYNTHROID 88 MCG Tab Take 1 Tablet by mouth every morning on an empty stomach. 90 Tablet 3    amLODIPine (NORVASC) 10 MG Tab Take 1 Tablet by mouth every day. 90 Tablet 3    omeprazole (PRILOSEC) 40 MG delayed-release capsule Take 1 Capsule by mouth every day. 90 Capsule 3    aspirin (ASA) 81 MG Chew Tab chewable tablet Chew 81 mg every day.      calcium citrate (CALCITRATE) 950 (200 Ca) MG Tab Take 950 mg by mouth every day.      Multiple Vitamin (MULTI-VITAMINS PO) Take  by mouth.       Riboflavin (VITAMIN B-2 PO) Take  by mouth.       Current Facility-Administered Medications for the 5/16/25 encounter (Office Visit) with Kendal Jeffers M.D.   Medication Dose Route Frequency Provider Last Rate Last Admin    dexamethasone (Decadron) injection 4 mg  4 mg Other Once         dexamethasone (Decadron) injection 4 mg  4 mg Other Once         lidocaine (Xylocaine) 1 % injection 10 mL  10 mL Injection Once             Current Outpatient Medications on File Prior to Visit   Medication Sig Dispense Refill    potassium chloride SA (KDUR) 20 MEQ Tab CR Take 1 Tablet by mouth every day. 90 Tablet 3    gabapentin (NEURONTIN) 300 MG Cap Take 3 Capsules by mouth 3 times a day. 810 Capsule 1    lidocaine (LIDODERM) 5 % Patch Place 1 Patch on the skin every 24 hours. Apply to affected area, 24 hours on followed by 24 hours off. 20 Patch 0    tizanidine (ZANAFLEX) 4 MG Tab Take 1 Tablet by mouth every 6 hours as needed (arm pain). 30 Tablet 3    losartan (COZAAR) 100 MG Tab Take 1 Tablet by mouth every day. 90 Tablet 3    buPROPion (WELLBUTRIN XL) 300 MG XL tablet Take 1 Tablet by mouth every morning. 90 Tablet 3    meloxicam (MOBIC) 15 MG tablet Take 1 Tablet by mouth every day. 90 Tablet 3    albuterol 108 (90 Base) MCG/ACT Aero Soln inhalation aerosol Inhale 2 Puffs every 6 hours as needed for Shortness of Breath. 8.5 g 3    SYNTHROID 88 MCG Tab Take 1 Tablet by mouth every morning on an empty stomach. 90 Tablet 3    amLODIPine (NORVASC) 10 MG Tab Take 1 Tablet by mouth every day. 90 Tablet 3    omeprazole (PRILOSEC) 40 MG delayed-release capsule Take 1 Capsule by mouth every day. 90 Capsule 3    aspirin (ASA) 81 MG Chew Tab chewable tablet Chew 81 mg every day.      calcium citrate (CALCITRATE) 950 (200 Ca) MG Tab Take 950 mg by mouth every day.      Multiple Vitamin (MULTI-VITAMINS PO) Take  by mouth.      Riboflavin (VITAMIN B-2 PO) Take  by mouth.       No current facility-administered medications on file  prior to visit.         EXAMINATION     Physical Exam:   /85   Pulse 94   Temp 36.4 °C (97.6 °F) (Temporal)   Ht 1.524 m (5')   Wt 67.1 kg (147 lb 14.9 oz)   SpO2 95%     Constitutional:   Body Habitus: Body mass index is 28.89 kg/m².  Cooperation: Fully cooperates with exam  Appearance: Well-groomed, well-nourished.    Eyes: No scleral icterus to suggest severe liver disease, no proptosis to suggest severe hyperthyroidism    ENT -no obvious auditory deficits, no noticeable facial droop     Skin -no rashes or lesions noted     Respiratory-  breathing comfortably on room air, no audible wheezing    Cardiovascular-distal extremities warm and well perfused.  No lower extremity edema is noted.     Gastrointestinal - no obvious abdominal masses, non-distended    Psychiatric- alert and oriented ×3. Normal affect.     Gait - normal gait, no use of ambulatory device, nonantalgic.     Musculoskeletal and Neuro -     Cervical spine   Inspection: No deformities of the skin over the cervical spine. No rashes or lesions.    Spurling's sign positive on the right, negative on the left  Cervical facet loading maneuver  negative bilaterally    No tenderness to palpation at cervical spine    No signs of muscular atrophy in bilateral upper extremities     Key points for the international standards for neurological classification of spinal cord injury (ISNCSCI) to light touch.     Dermatome R L   C4 2 2   C5 2 2   C6 2 2   C7 2 2   C8 2 2   T1 2 2   T2 2 2       Motor Exam Upper Extremities   ? Myotome R L   Shoulder abduction C5 5 5   Elbow flexion C5 5 5   Wrist extension C6 5 5   Elbow extension C7 5 5   Finger flexion C8 5 5   Finger abduction T1 5 5        Thoracic/Lumbar Spine/Sacral Spine/Hips   Palpation:   Slight tenderness to palpation at right and left upper glutes overlying bilateral superior cluneal nerves.  No tenderness to palpation across low back.  Slight tenderness to palpation over right gluteus medius  muscle and right greater trochanter.  No tenderness to palpation in the low back/hips including midline of lumbosacral spine, paraspinal muscles bilaterally, lumbar facets bilaterally, sacroiliac joints bilaterally, PSIS bilaterally, and greater trochanters bilaterally.    Inspection: No evidence of atrophy in bilateral lower extremities throughout      SLR, EBEN, FADIR negative bilaterally    Key points for the international standards for neurological classification of spinal cord injury (ISNCSCI) to light touch.   Dermatome R L   L2 2 2   L3 2 2   L4 2 2   L5 2 2   S1 2 2   S2 2 2         Motor Exam Lower Extremities  ? Myotome R L   Hip flexion L2 5 5   Knee extension L3 5 5   Ankle dorsiflexion L4 5 5   Toe extension L5 5 5   Ankle plantarflexion S1 5 5          Previous exam     bilateral hands:   Inspection: No swelling, deformities, or rashes. Symmetric appearing thenar and hyperthenar regions bilaterally.  Palpation: no significant tenderness to palpation throughout the bilateral hands  Range of motion is within normal limits throughout bilateral hands, fingers and wrist.    Special tests:  Tinel's at the wrist over the median nerve positive on right, negative on left  Carpal tunnel compression: positive on right, negative on left  Phalen's test: positive on right, negative on left      Full AROM of bilateral shoulders  There is full active range of motion with lumbar extension     Facet loading maneuver negative bilaterally     Heel walking and toe walking intact.       Reflexes  ?   R L   Patella   2+ 2+   Achilles    2+ 2+      Clonus of the ankle negative bilaterally        MEDICAL DECISION MAKING     Medical records review: see under HPI section.       MEDICAL DECISION MAKING    Medical records review: see under HPI section.     DATA    Labs: No new labs available for review since last visit.    Lab Results   Component Value Date/Time    SODIUM 141 02/10/2025 01:43 PM    POTASSIUM 3.9 02/10/2025 01:43  "PM    CHLORIDE 108 02/10/2025 01:43 PM    CO2 22 02/10/2025 01:43 PM    ANION 11.0 02/10/2025 01:43 PM    GLUCOSE 134 (H) 02/10/2025 01:43 PM    BUN 15 02/10/2025 01:43 PM    CREATININE 0.96 02/10/2025 01:43 PM    CALCIUM 9.2 02/10/2025 01:43 PM    ASTSGOT 24 02/10/2025 01:43 PM    ALTSGPT 15 02/10/2025 01:43 PM    TBILIRUBIN 0.2 02/10/2025 01:43 PM    ALBUMIN 4.2 02/10/2025 01:43 PM    TOTPROTEIN 7.1 02/10/2025 01:43 PM    GLOBULIN 2.9 02/10/2025 01:43 PM    AGRATIO 1.4 02/10/2025 01:43 PM       No results found for: \"PROTHROMBTM\", \"INR\"     Lab Results   Component Value Date/Time    WBC 12.3 (H) 02/10/2025 01:43 PM    RBC 5.06 02/10/2025 01:43 PM    HEMOGLOBIN 14.4 02/10/2025 01:43 PM    HEMATOCRIT 44.3 02/10/2025 01:43 PM    MCV 87.5 02/10/2025 01:43 PM    MCH 28.5 02/10/2025 01:43 PM    MCHC 32.5 02/10/2025 01:43 PM    MPV 9.1 02/10/2025 01:43 PM    NEUTSPOLYS 70.50 02/10/2025 01:43 PM    LYMPHOCYTES 18.70 (L) 02/10/2025 01:43 PM    MONOCYTES 7.90 02/10/2025 01:43 PM    EOSINOPHILS 1.90 02/10/2025 01:43 PM    BASOPHILS 0.40 02/10/2025 01:43 PM        Lab Results   Component Value Date/Time    HBA1C 5.8 (H) 04/28/2022 10:39 AM      Limited diagnostic ultrasound right wrist performed 02/14/25 by Dr. Jeffers.   2/14/2025 I performed a limited diagnostic ultrasound for the patient's neuralgia ICD M79.2. I performed a limited diagnostic ultrasound of the RIGHT wrist including the median nerve to evaluate the cause of the patient's neuralgia. The median nerve was enlarged at the level of the carpal tunnel outlet compared to the level of the pronator quadratus. This is consistent with carpal tunnel syndrome.  The images were uploaded to the medical record.       Imaging:   I personally reviewed following images, these are my reads  MRI cervical spine 11/23/23  Fusion hardware at C5-C7.  At C7-T1 there is moderate-severe bilateral neuroforaminal stenosis.  At C6-C7 there is borderline left neuroforaminal stenosis.  At C5-6 " there is mild left-sided neuroforaminal stenosis.  At C3-4 and C4-5 there is moderate right-sided neuroforaminal stenosis.See formal radiology report for further details.      MRI lumbar spine 9/2/2022  Evidence of lumbar laminectomy at L4-S1, interbody fusion and posterior fusion at L3-S1.  Broad-based disc bulge at L1-L2 resulting in severe central canal stenosis and compression of descending bilateral L2 nerve roots and possibly bilateral L3 nerve roots and mild impingement of exiting L1 nerve roots bilaterally.  Mild right neuroforaminal stenosis at T12-L1.  Possible mild bilateral neuroforaminal stenosis at L5-S1, quality of images impaired due to metallic artifact. Appearance of chronic postoperative seroma posterior to L4 and L5 vertebral bodies.    XR Right hand 11/22/22  Moderate to severe CMC joint OA. See formal radiology report for further details.    X-ray ribs bilateral 11/15/24  No acute abnormality    IMAGING radiology reads. I reviewed the following radiology reads   MRI cervical spine 11/23/23  FINDINGS:  Images are degraded by patient motion artifact. Alignment in the cervical spine shows mild degenerative anterolisthesis of C7 on T1.     There is postoperative change with anterior cervical fusion hardware at C5-C6-C7. Expected hardware artifact.     Marrow signal in the vertebral bodies is otherwise unremarkable.     The prevertebral and paraspinous soft tissues are unremarkable.     There are no anomalies at the craniovertebral junction.  The cervical spinal cord is normal in caliber and signal throughout its course.     At C2-3, no significant disc bulge or protrusion. No central stenosis. Mild bilateral foraminal stenosis.     At C3-4, mild disc-osteophyte change. Small impression on the ventral subarachnoid space. No central stenosis. Moderate bilateral foraminal stenosis due to spondylotic change.     C4-C5, no significant disc bulge or protrusion. No central stenosis. The left neural  foramen appears intact. Moderate right foraminal stenosis.     C5-C6 posterior bony ridging. Minimal ligamentum flavum hypertrophy. No candace central stenosis. The right neural foramen is intact. Mild left foraminal stenosis due to uncinate spondylosis.     C6-C7, no significant disc bulge or protrusion. Endplate hypertrophy contributing to mild central stenosis (T2 sagittal image 10, series 2, T2 axial image 12, series 8). Mild left-sided posterior endplate spurring and uncinate hypertrophy with borderline   left lateral recess stenosis and borderline left foraminal stenosis. The right neural foramen is intact.     C7-T1, moderate disc/osteophyte change accentuated by anterolisthesis. Partial effacement of ventral subarachnoid space. Thecal sac at the lower range of normal without candace central stenosis. Moderate-marked bilateral foraminal stenosis best seen on the   sagittal images.     IMPRESSION:     1.  Postoperative anterior cervical fusion with hardware fixation C5-C6-C7.  2.  Multilevel degenerative changes most notable for C6-C7 mild central stenosis at [id number] bilateral foraminal stenosis.  3.  Details for each level above in the body of the report.  4.  No myelopathic cord signal is appreciated.    Results for orders placed during the hospital encounter of 09/02/22    MR-LUMBAR SPINE-W/O    Impression  1.  L3-4 slight anterolisthesis and L5-S1 slight retrolisthesis.  2.  Postoperative changes with lumbar laminectomy L4-L5-S1, interbody fusion L3-L4, L4-L5, L5-S1, and posterior fusion with transpedicular screw fixation at L3-L4-L5-S1.  3.  Chronic postoperative seroma occupying the laminectomy interval without dorsal epidural mass effect.  4.  The study is most notable for L1-L2 large disc protrusion-extrusion resulting in severe central stenosis.  5.  Additional degenerative changes detailed for each level above in the body of report.        MRI lumbar spine 10/20/21  Lumbar spine:    Alignment: Grade  1 L3-L4 anterolisthesis. Previously seen L4-L5  anterolisthesis is improved compared to MRI prior to surgery.    Vertebral body heights and marrow: Postsurgical changes from L3-S1  posterior fusion. Multilevel lumbar spondylosis with osteophytes, facet  arthropathy, and endplate marrow degenerative changes are seen. Otherwise  the vertebral body heights and marrow signal are unremarkable.    Conus medullaris: Normal, terminating at L1/L2.    Soft tissues: There is a fluid collection in the paraspinal soft tissues  posterior to the L3-L5 laminectomy sites, likely postoperative seroma  measuring 63 x 28 mm (series 17, image 7). Small right renal cyst.    L1-L2: Persistent disc bulge with worsening superimposed central disc  extrusion. Bilateral facet arthropathy and dorsal epidural fat. The  constellation of findings produces moderate to severe spinal canal  stenosis. Mild to moderate left neural foraminal stenosis is improved  compared to prior.  Ligamentum flavum thickening. Facet hypertrophy, mild.    L2-L3: Disc bulge, ligamentum flavum thickening, facet hypertrophy  resulting in mild spinal canal stenosis. Mild left neural foraminal  stenosis.    L3-L4: Partially uncovered disc. Mild to moderate right neural foraminal  stenosis. Limited evaluation of the left neural foramen. Laminectomy.    L4-L5: No spinal canal stenosis. Limited evaluation of neural foramina due  to spinal hardware. Laminectomy.    L5-S1: Disc bulge without spinal canal stenosis. Limited evaluation of  neural foramina due to spinal hardware.     IMPRESSION:    1. Worsening disc protrusion at L1-L2, resulting in worsening spinal  canal stenosis, now moderate to severe.   2. Multilevel degenerative changes of the cervical spine, similar  compared to prior.  3. Multilevel degenerative changes in thoracic spine, with up to mild  to moderate spinal canal stenosis at T8-T9 secondary to disc bulge.  4. Postsurgical changes . Small postoperative seroma  in L3-L5  laminectomy sites.        X-ray left hand 3/19/2019  FINDINGS:   There is no acute fracture or dislocation.   Advanced osteoarthrosis of the first CMC joint, characterized by joint   space narrowing, subchondral sclerosis, osteophyte formation, and radial   subluxation. Mild osteoarthrosis of the STT joint. Osteoarthrosis of   scattered IP joints. No focal soft tissue swelling.     IMPRESSION:   Advanced osteoarthrosis of the first CMC joint.     XR Right hand 22  FINDINGS:     BONE MINERALIZATION: Normal.  JOINTS: Moderate to severe first carpometacarpal joint osteoarthrosis. Mild triscaphe joint osteoarthrosis. Mild multifocal osteoarthrosis otherwise. No erosions.  FRACTURE: None.  DISLOCATION: None.  SOFT TISSUES: No mass.     IMPRESSION:     1.  Moderate to severe first carpometacarpal joint osteoarthrosis.  2.  Mild multifocal osteoarthrosis otherwise.                                                  Diagnosis  Visit Diagnoses     ICD-10-CM   1. Lumbar radiculitis  M54.16   2. Rib pain on right side  R07.81   3. Myalgia  M79.10   4. History of lumbar fusion  Z98.1   5. Sacroiliac joint dysfunction of both sides  M53.3   6. Numbness and tingling of right leg  R20.0    R20.2   7. Chronic right-sided low back pain with right-sided sciatica  M54.41    G89.29   8. Osteoarthritis of carpometacarpal (CMC) joints of both thumbs, unspecified osteoarthritis type  M18.0   9. Neuroforaminal stenosis of cervical spine  M48.02   10. S/P cervical spinal fusion  Z98.1   11. Lumbar disc herniation  M51.26   12. Acute pain  R52   13. Cervical radiculopathy  M54.12   14. Calcification of upper thoracic ligamentum flavum  M24.20   15. 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    Also with worsened right  cervical radiculitis.  She also has right carpal tunnel syndrome.  Cervical/upper thoracic epidural steroid injection could not be completed due to presence of calcified ligament.    Mathew was seen today for follow-up.    Diagnoses and all orders for this visit:    Lumbar radiculitis    Rib pain on right side    Myalgia    History of lumbar fusion    Sacroiliac joint dysfunction of both sides    Numbness and tingling of right leg    Chronic right-sided low back pain with right-sided sciatica    Osteoarthritis of carpometacarpal (CMC) joints of both thumbs, unspecified osteoarthritis type    Neuroforaminal stenosis of cervical spine    S/P cervical spinal fusion    Lumbar disc herniation    Acute pain    Cervical radiculopathy    Calcification of upper thoracic ligamentum flavum    Cluneal neuropathy  -     Consent for all Surgical, Special Diagnostic or Therapeutic Procedures    Other orders  -     dexamethasone (Decadron) injection 4 mg  -     dexamethasone (Decadron) injection 4 mg  -     lidocaine (Xylocaine) 1 % injection 10 mL          Discussed that the numbness and tingling in her right hand is likely from carpal tunnel syndrome given today's physical exam and limited diagnostic ultrasound results, and less likely from cervical radiculopathy       PLAN  Physical Therapy: discussed referral to physical therapy for low back pain and neck pain.  She declined a physical therapy referral as she has done extensive physical therapy in the past which worsened her symptoms     Diagnostic workup: Personally reviewed at today's visit:   X-ray ribs bilateral 11/15/24  - diagnostic ultrasound right wrist 02/14/25 by Dr. Jeffers as noted above    Medications:   -I have discussed that she should continue gabapentin, Mobic as per PCP  -Discussed that she should not combine Mobic with any other NSAIDs  -Previously prescribed tramadol 25-50 mg every 6 hours as needed as above.  Discussed that she should take this as sparingly as  possible    Last opioid risk scale: 8/19/2022  Last controlled substance agreement: 11/15/2024    reviewed: 5/16/2025   Naloxone prescribed: Discussed prescription and offered it.  The patient declined saying she has at home already       Opioid Risk Score: 8    Interpretation of Opioid Risk Score   Score 0-3 = Low risk of abuse. Do UDS at least once per year.  Score 4-7 = Moderate risk of abuse. Do UDS 1-4 times per year.  Score 8+ = High risk of abuse. Refer to specialist.     I reviewed the     In prescribing controlled substances to this patient, I certify that I have obtained and reviewed the medical history of Robin Lynn Zielesch. I have also made a good sidney effort to obtain applicable records from other providers who have treated the patient and records did not demonstrate any increased risk of substance abuse that would prevent me from prescribing controlled substances.     I have conducted a physical exam and documented it. I have reviewed Ms. Zielesch’s prescription history as maintained by the [place] Prescription Monitoring Program.     I have assessed the patient’s risk for abuse, dependency, and addiction using the validated Opioid Risk Tool available at https://www.mdcalc.com/qdmxak-nfqq-ilud-ort-narcotic-abuse.     Given the above, I believe the benefits of controlled substance therapy outweigh the risks. The reasons for prescribing controlled substances include non-narcotic, oral analgesic alternatives have been inadequate for pain control. Accordingly, I have discussed the risk and benefits, treatment plan, and alternative therapies with the patient.     Interventions:   - right and left superior cluneal nerve block under ultrasound guidance. The risks, benefits, and alternatives to this procedure were discussed and the patient wishes to proceed with the procedure. Risks include but are not limited to damage to surrounding structures, infection, bleeding, worsening of pain which can be  permanent, and weakness which can be permanent. Benefits include pain relief and improved function. Alternatives include not doing the procedure.    -Bilateral sacroiliac joint injections under fluoroscopic guidance as needed  -S/p trial of trigger point injections targeting left neck area with no relief  - s/p attempted and aborted left cervical epidural steroid injection at T2-3 and T3-4 due to calcified ligament.   -Repeat right L2-3 interlaminar epidural steroid injection as needed given recurrence of pain which significantly improved with this procedure in the past  -Bilateral CMC joint steroid injections under ultrasound guidance PRN given significant improvement in pain with this procedure in the past.       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  Given her hx of significant improvement in pain after our previous epidural steroid injection and no neurologic deficits on exam today, I believe it is reasonable to defer a neurosurgery referral at this time  -Discussed that she could consider deep myofascial release techniques including a foam roller for her right thigh  -Continue follow-up with Dr. Hood at [facility]. Discussed that perhaps a referral to an orthotist could be considered    Follow-up: 4 weeks to assess progress from today's injection    Orders Placed This Encounter    dexamethasone (Decadron) injection 4 mg    dexamethasone (Decadron) injection 4 mg    lidocaine (Xylocaine) 1 % injection 10 mL    Consent for all Surgical, Special Diagnostic or Therapeutic Procedures       Kendal Jeffers MD  Interventional Pain and Spine  Physical Medicine and Rehabilitation  [facility]      The above note documents my personal evaluation of this patient. In addition, I have reviewed and confirmed with the patient and MA the supportive information  documented in today's Patient Health Questionnaire and Office Note.     Please note that this dictation was created using voice recognition software. I have made every reasonable attempt to correct obvious errors, but I expect that there are errors of grammar and possibly content that I did not discover before finalizing the note.

## 2025-05-16 NOTE — PROCEDURES
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  Risks include but are not limited to damage his running structures, infection, bleeding, pneumothorax, injury to the lung itself. Written informed consent was freely obtained. A pre-procedural time-out was conducted by the physician verifying patient’s identity, procedure to be performed, procedure site and side, and allergy verification. Appropriate equipment was determined to be in place for the procedure.      No sedation was used for this procedure.     In the office suite the patient was placed in prone  position with the iliac crest exposed. The ultrasound probe was placed over the BILATERAL superior cluneal nerve over the top of the iliac crest. A mixture of 1 mL of 4 mg/mL of dexamethasone and 5 mL of 1% lidocaine was prepared. The skin was prepped and draped in the usual sterile fashion. A 27-gauge 1.5 inch needle was placed into the skin and advanced under ultrasound guidance at the level of the superior aspect of the iliac crest.  Following negative aspiration, 1 mL of the above solution was injected at six locations along the superior aspect of iliac crest where the superior cluneal nerve runs. The needle was then removed. The patient's low back was wiped with a 4x4 gauze, the area was cleansed with alcohol prep, and a bandaid was applied. There were no complications noted.     Preprocedural pain: 7/10           Postprocedural pain: 5/10        Follow up was scheduled.     Kendal Jeffers MD  Interventional Pain and Spine  Physical Medicine and  [facility]

## 2025-05-17 ENCOUNTER — PHARMACY VISIT (OUTPATIENT)
Dept: PHARMACY | Facility: MEDICAL CENTER | Age: 70
End: 2025-05-17
Payer: COMMERCIAL

## 2025-06-01 PROCEDURE — RXMED WILLOW AMBULATORY MEDICATION CHARGE: Performed by: FAMILY MEDICINE

## 2025-06-05 ENCOUNTER — PHARMACY VISIT (OUTPATIENT)
Dept: PHARMACY | Facility: MEDICAL CENTER | Age: 70
End: 2025-06-05
Payer: COMMERCIAL

## 2025-06-11 ENCOUNTER — TELEPHONE (OUTPATIENT)
Dept: HEALTH INFORMATION MANAGEMENT | Facility: OTHER | Age: 70
End: 2025-06-11
Payer: MEDICARE

## 2025-06-16 ENCOUNTER — PATIENT MESSAGE (OUTPATIENT)
Dept: SCHEDULING | Facility: IMAGING CENTER | Age: 70
End: 2025-06-16

## 2025-06-16 ENCOUNTER — OFFICE VISIT (OUTPATIENT)
Dept: SLEEP MEDICINE | Facility: MEDICAL CENTER | Age: 70
End: 2025-06-16
Attending: STUDENT IN AN ORGANIZED HEALTH CARE EDUCATION/TRAINING PROGRAM
Payer: MEDICARE

## 2025-06-16 VITALS
BODY MASS INDEX: 28.31 KG/M2 | OXYGEN SATURATION: 94 % | HEART RATE: 83 BPM | HEIGHT: 60 IN | SYSTOLIC BLOOD PRESSURE: 106 MMHG | WEIGHT: 144.2 LBS | DIASTOLIC BLOOD PRESSURE: 60 MMHG | RESPIRATION RATE: 14 BRPM

## 2025-06-16 DIAGNOSIS — Z78.9 INTOLERANCE OF CONTINUOUS POSITIVE AIRWAY PRESSURE (CPAP) VENTILATION: ICD-10-CM

## 2025-06-16 DIAGNOSIS — G47.33 OSA (OBSTRUCTIVE SLEEP APNEA): Primary | ICD-10-CM

## 2025-06-16 PROCEDURE — 99213 OFFICE O/P EST LOW 20 MIN: CPT | Performed by: STUDENT IN AN ORGANIZED HEALTH CARE EDUCATION/TRAINING PROGRAM

## 2025-06-16 PROCEDURE — 3078F DIAST BP <80 MM HG: CPT | Performed by: STUDENT IN AN ORGANIZED HEALTH CARE EDUCATION/TRAINING PROGRAM

## 2025-06-16 PROCEDURE — 99214 OFFICE O/P EST MOD 30 MIN: CPT | Performed by: STUDENT IN AN ORGANIZED HEALTH CARE EDUCATION/TRAINING PROGRAM

## 2025-06-16 PROCEDURE — 3074F SYST BP LT 130 MM HG: CPT | Performed by: STUDENT IN AN ORGANIZED HEALTH CARE EDUCATION/TRAINING PROGRAM

## 2025-06-16 ASSESSMENT — FIBROSIS 4 INDEX: FIB4 SCORE: 1.4

## 2025-06-16 NOTE — PROGRESS NOTES
[facility] Follow-up Visit    CC: BI follow up      HPI:  Robin Lynn Zielesch is a 70 y.o.female  with  former tobacco smoker  (75-pack-year history) with HTN, GERD, MDD, anxiety, thyroid cancer, hypothyroidism, CVA, severe BI with severe nocturnal hypoxemia who presents to [facility] Clinic for BI followup.    Last seen by me 1/27/2025 for initial consultation for sleep disordered breathing given symptoms of snoring, nonrestorative sleep, fragmented sleep. She was previously dx'd with BI at [facility] for which CPAP was started and she could not tolerate it due to pressure intolerance, claustrophobia.     sleep aids: sometimes lorazepam prn for panic attacks  Gabapentin TID     Sleep schedule  Bedtime: 8 to 9 PM  Sleep onset latency 30 minutes  Fragmented sleep from nocturia  Wake up 4 AM    She states since some time has passed since her  passed away her sleep is now improved where she is only sleeping throughout the entire night.  We discussed the severity of her obstructive sleep apnea with severe hypoxemia and discussed next step would be a CPAP/BiPAP titration study.  Unfortunately patient is intolerant to PAP therapy when she initially tried it a few years back in [facility]. She is willing to explore other options including inspire implantation.    Sleep History  4/20/2025 diagnostic PSG -severe BI, AHI(4%) 52.67 to severe nocturnal desaturation, mehdi SpO2 69%, 157.8 minutes of TST less than 88%.  No REM or supine sleep seen during study    Patient Active Problem List    Diagnosis Date Noted    Nocturnal hypoxemia 05/12/2025    BI (obstructive sleep apnea) 05/12/2025    Grief 01/02/2025    Excessive daytime sleepiness 12/16/2024    Small vessel disease, cerebrovascular 11/13/2024    Wellness examination 08/02/2024    Itching 10/24/2023    Acquired hypothyroidism 07/13/2023    Osteoarthritis 12/05/2022    Cervical pain 06/28/2022    Obesity (BMI 30.0-34.9) 06/28/2022    Essential hypertension  2022    Gastroesophageal reflux disease without esophagitis 2022    Episode of recurrent major depressive disorder (HCC) 2022    Anxiety 2022    History of thyroid cancer 2022    Chronic midline low back pain with sciatica 2022    Impingement syndrome of right shoulder 2021    Osteopenia 2021    Baker's cyst 2016    Knee osteoarthritis 2014    Allergic rhinitis 10/21/2005       Past Medical History[1]     Past Surgical History[2]    Family History   Problem Relation Age of Onset    COPD Mother     Cancer Father         pancreatic    Hypertension Father     Hyperlipidemia Father     Alcohol abuse Father     Drug abuse Brother        Social History     Socioeconomic History    Marital status:      Spouse name: Not on file    Number of children: Not on file    Years of education: Not on file    Highest education level: Some college, no degree   Occupational History    Not on file   Tobacco Use    Smoking status: Some Days     Current packs/day: 0.00     Average packs/day: 1.5 packs/day for 50.0 years (75.0 ttl pk-yrs)     Types: Cigarettes     Start date: 1968     Last attempt to quit: 2018     Years since quittin.8    Smokeless tobacco: Never   Vaping Use    Vaping status: Some Days    Substances: Nicotine, CBD, Flavoring    Devices: Pre-filled or refillable cartridge, Refillable tank   Substance and Sexual Activity    Alcohol use: Never    Drug use: Not Currently     Types: Marijuana, Methamphetamines     Comment: once in a while    Sexual activity: Yes     Partners: Male     Birth control/protection: Female Sterilization   Other Topics Concern    Not on file   Social History Narrative    Not on file     Social Drivers of Health     Financial Resource Strain: Medium Risk (7/10/2023)    Overall Financial Resource Strain (CARDIA)     Difficulty of Paying Living Expenses: Somewhat hard   Food Insecurity: No Food Insecurity (7/10/2023)     Hunger Vital Sign     Worried About Running Out of Food in the Last Year: Never true     Ran Out of Food in the Last Year: Never true   Transportation Needs: No Transportation Needs (7/10/2023)    PRAPARE - Transportation     Lack of Transportation (Medical): No     Lack of Transportation (Non-Medical): No   Physical Activity: Insufficiently Active (7/10/2023)    Exercise Vital Sign     Days of Exercise per Week: 5 days     Minutes of Exercise per Session: 20 min   Stress: No Stress Concern Present (7/10/2023)    [employer] [place] [employer] - Occupational Stress Questionnaire     Feeling of Stress : Only a little   Social Connections: Moderately Isolated (7/10/2023)    Social Connection and Isolation Panel [NHANES]     Frequency of Communication with Friends and Family: Never     Frequency of Social Gatherings with Friends and Family: Never     Attends Cheondoism Services: Never     Active Member of Clubs or Organizations: Yes     Attends Club or Organization Meetings: Never     Marital Status:    Intimate Partner Violence: Not At Risk (2/7/2019)    Received from [facility]    Humiliation, Afraid, Rape, and Kick questionnaire     Fear of Current or Ex-Partner: No     Emotionally Abused: No     Physically Abused: No     Sexually Abused: No   Housing Stability: Low Risk  (7/10/2023)    Housing Stability Vital Sign     Unable to Pay for Housing in the Last Year: No     Number of Places Lived in the Last Year: 1     Unstable Housing in the Last Year: No       Current Medications[3]     ALLERGIES: Ace inhibitors, Chlorhexidine, Flexeril [cyclobenzaprine], and Triamterene    ROS  Constitutional: Denies fevers, Denies weight changes  Ears/Nose/Throat/Mouth: Denies nasal congestion or sore throat   Cardiovascular: Denies chest pain  Respiratory: Denies shortness of breath, Denies cough  Gastrointestinal/Hepatic: Denies nausea, vomiting  Sleep: see HPI      PHYSICAL EXAM  /60 (BP Location: Left  arm, Patient Position: Sitting, BP Cuff Size: Adult)   Pulse 83   Resp 14   Ht 1.524 m (5')   Wt 65.4 kg (144 lb 3.2 oz)   SpO2 94%   BMI 28.16 kg/m²   Appearance: Well-nourished, well-developed, no acute distress  Musculoskeletal:  Grossly normal; gait and station normal; digits and nails normal  Skin:  No rashes, petechiae, cyanosis  Neurologic: without focal signs; oriented to person, time, place, and purpose; judgement intact      Medical Decision Making   Assessment and Plan  70 y.o.female  with  former tobacco smoker  (75-pack-year history) with HTN, GERD, MDD, anxiety, thyroid cancer, hypothyroidism, CVA, severe BI with severe nocturnal hypoxemia who presents to Sleep Clinic for BI follow up    We discussed next options including CPAP initiation versus CPAP/BiPAP titration.  Unfortunately given patient is intolerant to CPAP she is not willing to undergo another trial of this.  We discussed inspire candidacy and she is open to ENT referral    PLAN:   -ENT referral placed for inspire candidacy  -Advised to reach out via [email or web address] with questions     Patients with BI are at increased risk of cardiovascular disease including coronary artery disease, systemic arterial hypertension, pulmonary arterial hypertension, cardiac arrythmias, and stroke. The patient was advised to avoid driving a motor vehicle when drowsy.      Have advised the patient to follow up with the appropriate healthcare practitioners for all other medical problems and issues.    Return in about 6 months (around 12/16/2025).      Please note portions of this record was created using voice recognition software. I have made every reasonable attempt to correct obvious errors, but I expect that there are errors of grammar and possibly content I did not discover before finalizing the note.           [1]   Past Medical History:  Diagnosis Date    Anxiety     Arthritis     Cancer (HCC)     COPD (chronic obstructive pulmonary disease) (HCC)      Daytime sleepiness     GERD (gastroesophageal reflux disease)     Hypertension     Insomnia     Migraine     Thyroid disease    [2]   Past Surgical History:  Procedure Laterality Date    OK INJ LUMBAR/SACRAL,W/ IMAGING Right 2/27/2025    Procedure: RIGHT Lumbar L2-3 interlaminar epidural steroid injection….PT NEEDS TO HOLD ASA AND MELOXICAM PRIOR TO PROCEDURE;  Surgeon: Kendal Jeffers M.D.;  [facility];  Service: Pain Management    OK INJECTION,SACROILIAC JOINT Bilateral 10/16/2024    Procedure: RIGHT and LEFT sacroiliac joint injection with fluoroscopic guidance;  Surgeon: Kendal Jeffers M.D.;  Location[facility];  Service: Pain Management    OK INJ LUMBAR/SACRAL,W/ IMAGING Right 1/5/2024    Procedure: RIGHT Lumbar L2-3 interlaminar epidural steroid injection.  PT NEEDS TO HOLD ASA AND MELOXICAM PRIOR TO PROCEDURE.  PT PREFERS TO SCHEDULE ON FRIDAY IF POSS;  Surgeon: Kendal Jeffers M.D.;  Location: [facility];  Service: Pain Management    OK INJ CERV/THORAC,W/ IMAGING N/A 12/14/2023    Procedure: interlaminar cervical epidural steroid injection at T3-T4;  Surgeon: Kendal Jeffers M.D.;  Location: [facility];  Service: Pain Management    OK INJ LUMBAR/SACRAL,W/ IMAGING Right 03/07/2023    Procedure: RIGHT Lumbar L2-3 interlaminar epidural steroid injection;  Surgeon: Kendal Jeffers M.D.;  Location: [facility];  Service: Pain Management    OK INJ LUMBAR/SACRAL,W/ IMAGING Right 11/01/2022    Procedure: RIGHT lumbar L2-3 interlaminar epidural steroid injection;  Surgeon: Kendal Jeffers M.D.;  Location: [facility];  Service: Pain Management    ABDOMINAL HYSTERECTOMY TOTAL      ARTHROPLASTY      EYE SURGERY      FOOT SURGERY      HERNIA REPAIR      [id number]      L tka    LAMINOTOMY      LUMPECTOMY      PRIMARY C SECTION      THYROIDECTOMY TOTAL      2019  thyroid cancer   [3]   Current  Outpatient Medications   Medication Sig Dispense Refill    potassium chloride SA (KDUR) 20 MEQ Tab CR Take 1 Tablet by mouth every day. 90 Tablet 3    gabapentin (NEURONTIN) 300 MG Cap Take 3 Capsules by mouth 3 times a day. 810 Capsule 1    lidocaine (LIDODERM) 5 % Patch Place 1 Patch on the skin every 24 hours. Apply to affected area, 24 hours on followed by 24 hours off. 20 Patch 0    losartan (COZAAR) 100 MG Tab Take 1 Tablet by mouth every day. 90 Tablet 3    buPROPion (WELLBUTRIN XL) 300 MG XL tablet Take 1 Tablet by mouth every morning. 90 Tablet 3    meloxicam (MOBIC) 15 MG tablet Take 1 Tablet by mouth every day. 90 Tablet 3    albuterol 108 (90 Base) MCG/ACT Aero Soln inhalation aerosol Inhale 2 Puffs every 6 hours as needed for Shortness of Breath. 8.5 g 3    SYNTHROID 88 MCG Tab Take 1 Tablet by mouth every morning on an empty stomach. 90 Tablet 3    amLODIPine (NORVASC) 10 MG Tab Take 1 Tablet by mouth every day. 90 Tablet 3    omeprazole (PRILOSEC) 40 MG delayed-release capsule Take 1 Capsule by mouth every day. 90 Capsule 3    aspirin (ASA) 81 MG Chew Tab chewable tablet Chew 81 mg every day.      calcium citrate (CALCITRATE) 950 (200 Ca) MG Tab Take 950 mg by mouth every day.      Multiple Vitamin (MULTI-VITAMINS PO) Take  by mouth.      Riboflavin (VITAMIN B-2 PO) Take  by mouth.      tizanidine (ZANAFLEX) 4 MG Tab Take 1 Tablet by mouth every 6 hours as needed (arm pain). (Patient not taking: Reported on 6/16/2025) 30 Tablet 3     No current facility-administered medications for this visit.

## 2025-06-17 ENCOUNTER — APPOINTMENT (OUTPATIENT)
Dept: PHYSICAL MEDICINE AND REHAB | Facility: MEDICAL CENTER | Age: 70
End: 2025-06-17
Payer: MEDICARE

## 2025-06-18 ENCOUNTER — APPOINTMENT (OUTPATIENT)
Dept: PHYSICAL MEDICINE AND REHAB | Facility: MEDICAL CENTER | Age: 70
End: 2025-06-18
Payer: MEDICARE

## 2025-06-18 NOTE — Clinical Note
REFERRAL APPROVAL NOTICE         Sent on June 18, 2025                   Mathew Brandy Zielesch  [address]  [place] [address]                   Dear Ms. Zielesch,    After a careful review of the medical information and benefit coverage, [facility] has processed your referral. See below for additional details.    If applicable, you must be actively enrolled with your insurance for coverage of the authorized service. If you have any questions regarding your coverage, please contact your insurance directly.    REFERRAL INFORMATION   Referral #:  [id number]  Referred-To Provider    Referred-By Provider:  Otolaryngology    AKILA Matute ANDREW      [address] [facility] [address]  [phone] [address] [facility] [address]  [phone]    Referral Start Date:  06/16/2025  Referral End Date:   06/16/2026             SCHEDULING  If you do not already have an appointment, please call [phone] to make an appointment.     MORE INFORMATION  If you do not already have a [employer] account, sign up at: [email or web address]  You can access your medical information, make appointments, see lab results, billing information, and more.  If you have questions regarding this referral, please contact  the [facility] Referrals department at:             [phone]. Monday - Friday 8:00AM - 5:00PM.     Sincerely,    [facility]

## 2025-06-24 ENCOUNTER — OFFICE VISIT (OUTPATIENT)
Dept: PHYSICAL MEDICINE AND REHAB | Facility: MEDICAL CENTER | Age: 70
End: 2025-06-24
Payer: MEDICARE

## 2025-06-24 VITALS
TEMPERATURE: 98.2 F | DIASTOLIC BLOOD PRESSURE: 85 MMHG | HEIGHT: 60 IN | HEART RATE: 83 BPM | BODY MASS INDEX: 28.57 KG/M2 | OXYGEN SATURATION: 97 % | SYSTOLIC BLOOD PRESSURE: 141 MMHG | WEIGHT: 145.5 LBS

## 2025-06-24 DIAGNOSIS — M54.16 LUMBAR RADICULITIS: Primary | ICD-10-CM

## 2025-06-24 DIAGNOSIS — G58.8 CLUNEAL NEUROPATHY: ICD-10-CM

## 2025-06-24 DIAGNOSIS — G89.29 CHRONIC RIGHT-SIDED LOW BACK PAIN WITH RIGHT-SIDED SCIATICA: ICD-10-CM

## 2025-06-24 DIAGNOSIS — M54.12 CERVICAL RADICULOPATHY: ICD-10-CM

## 2025-06-24 DIAGNOSIS — M96.1 POST LAMINECTOMY SYNDROME: ICD-10-CM

## 2025-06-24 DIAGNOSIS — M54.41 CHRONIC RIGHT-SIDED LOW BACK PAIN WITH RIGHT-SIDED SCIATICA: ICD-10-CM

## 2025-06-24 DIAGNOSIS — M48.02 NEUROFORAMINAL STENOSIS OF CERVICAL SPINE: ICD-10-CM

## 2025-06-24 DIAGNOSIS — M51.26 LUMBAR DISC HERNIATION: ICD-10-CM

## 2025-06-24 DIAGNOSIS — Z98.1 HISTORY OF LUMBAR FUSION: ICD-10-CM

## 2025-06-24 DIAGNOSIS — R20.2 NUMBNESS AND TINGLING OF RIGHT LEG: ICD-10-CM

## 2025-06-24 DIAGNOSIS — Z98.1 S/P CERVICAL SPINAL FUSION: ICD-10-CM

## 2025-06-24 DIAGNOSIS — R20.0 NUMBNESS AND TINGLING OF RIGHT LEG: ICD-10-CM

## 2025-06-24 DIAGNOSIS — M53.3 SACROILIAC JOINT DYSFUNCTION OF BOTH SIDES: ICD-10-CM

## 2025-06-24 DIAGNOSIS — M79.10 MYALGIA: ICD-10-CM

## 2025-06-24 DIAGNOSIS — M18.0 OSTEOARTHRITIS OF CARPOMETACARPAL (CMC) JOINTS OF BOTH THUMBS, UNSPECIFIED OSTEOARTHRITIS TYPE: ICD-10-CM

## 2025-06-24 PROCEDURE — 1125F AMNT PAIN NOTED PAIN PRSNT: CPT | Performed by: STUDENT IN AN ORGANIZED HEALTH CARE EDUCATION/TRAINING PROGRAM

## 2025-06-24 PROCEDURE — 3079F DIAST BP 80-89 MM HG: CPT | Performed by: STUDENT IN AN ORGANIZED HEALTH CARE EDUCATION/TRAINING PROGRAM

## 2025-06-24 PROCEDURE — 99214 OFFICE O/P EST MOD 30 MIN: CPT | Performed by: STUDENT IN AN ORGANIZED HEALTH CARE EDUCATION/TRAINING PROGRAM

## 2025-06-24 PROCEDURE — 3077F SYST BP >= 140 MM HG: CPT | Performed by: STUDENT IN AN ORGANIZED HEALTH CARE EDUCATION/TRAINING PROGRAM

## 2025-06-24 ASSESSMENT — PATIENT HEALTH QUESTIONNAIRE - PHQ9
CLINICAL INTERPRETATION OF PHQ2 SCORE: 2
SUM OF ALL RESPONSES TO PHQ QUESTIONS 1-9: 3
5. POOR APPETITE OR OVEREATING: 0 - NOT AT ALL

## 2025-06-24 ASSESSMENT — PAIN SCALES - GENERAL: PAINLEVEL_OUTOF10: 7=MODERATE-SEVERE PAIN

## 2025-06-24 ASSESSMENT — FIBROSIS 4 INDEX: FIB4 SCORE: 1.4

## 2025-06-24 NOTE — H&P (VIEW-ONLY)
Verbal consent was acquired by the patient to use Onavo ambient listening note generation during this visit Yes     Follow-up patient Note    Interventional Pain and Spine  Physiatry (Physical Medicine and Rehabilitation)     Patient Name: Robin Lynn Zielesch  : 1955  Date of service: 2025    Chief Complaint:   Chief Complaint   Patient presents with    Follow-Up     Back pain         HISTORY (2022):  Robin Lynn Zielesch is a 67 y.o. female who presents today with pain radiating from her right posterolateral glute down her right anterolateral and posterior thigh accompanied by numbness/tingling/weakness in this area. This started in Sep 2021 while recovering from a L2-pelvis posterior spinal fusion with TLIF/ PLIF on 21 with Dr. Bates ([facility] which she states she had done for similar radiating pain down her left leg.  Her radiating left leg pain resolved after surgery.     Her pain at its best-worse level during the course of the day is 5-9/10, respectively. Pain right now is 7/10 on the numeric pain scale. Pain worsens with sitting, standing, walking, bending backwards, side bending or twisting, walking upstairs, and walking downstairs and improves with nothing. Her pain interferes somewhat with ADLs. The patient otherwise denies new weakness, numbness, or bladder/bowel incontinence. States she has fallen a few times secondary to pain and possibly weakness. Moved from [facility] in May 2022.     The patient has done physical therapy for this problem with worsening pain.     Patient has tried the following medications with varied success (current meds in bold): Hayes back and body  Gabapentin 300mg TID - no relief. Used to help with left sided pain  Naproxen BID - significant relief     Therapeutic modalities and interventional therapies to date include:  -No injections     Medical history includes HTN, cervical laminectomy, depression, anxiety, thyroid cancer, BMI 30, L2-pelvis  posterior spinal fusion with TLIF/ PLIF on 21    HPI  Today's visit   Robin Lynn Zielesch ( 1955) is a female with The primary encounter diagnosis was Lumbar radiculitis. Diagnoses of Myalgia, Sacroiliac joint dysfunction of both sides, S/P cervical spinal fusion, Neuroforaminal stenosis of cervical spine, Osteoarthritis of carpometacarpal (CMC) joints of both thumbs, unspecified osteoarthritis type, Chronic right-sided low back pain with right-sided sciatica, Numbness and tingling of right leg, Lumbar disc herniation, Cervical radiculopathy, Cluneal neuropathy, Post laminectomy syndrome, and History of lumbar fusion were also pertinent to this visit.    History of Present Illness    The patient presents for evaluation of low back pain and sleep apnea.    Bilateral low Back Pain  - Persistent lower back pain across the waistline, not radiating down the thigh.  - Over-the-counter medications (Tylenol, Advil, Excedrin, ibuprofen) were ineffective.  - Tramadol prescribed after a fall last year provided some relief without drowsiness.  - Prefers non-drowsy pain management.  - Prolonged sitting exacerbates the pain, limiting her to 1.5-2 hours.  - Sacroiliac joint injections were beneficial.  - Tries to stay busy despite the pain.    Sleep Apnea  - Diagnosed with sleep apnea with significant oxygen desaturation during sleep.  - Cannot use a CPAP machine due to discomfort and drooling.  - Awaiting ENT referral.  - Considering an Inspire device.    Supplemental information: She feels slightly depressed for the past few days, taking short walks around her apartment complex. She has never consulted a psychologist. Needs lorazepam refill, last filled 2025.        Procedure history:  - 22 right L2-3 interlaminar epidural steroid injection - 90% improvement in pain, resolution of radiating pain.  - 22 bilateral CMC joint injections - 100% improvement in thumb pain bilaterally  - 3/7/23 right L2-3  interlaminar epidural steroid injection -100% improvement in back pain and radiating pain, ongoing tightness in her right thigh  - 06/27/23 trigger point injections   - 11/15/23 BILATERAL ultrasound-guided 1st carpometacarpal joint injection - 100% improvement in thumb pain bilaterally  - 12/15/23 trigger point injections-no relief  - 1/5/24 right L2-3 interlaminar epidural steroid injection-resolution of low back pain and pain radiating down right leg  - 10/16/24 right and left Sacroiliac joint injection-resolution of index pain  - 2/27/25 interlaminar Lumbar Epidural Steroid Injection at the right L2-L3 level. Resolution of index pain  - 5/16/2025 right and left superior cluneal nerve block with ultrasound guidance  -no relief    ROS:   Red Flags ROS:   Fever, Chills, Sweats: Denies  Involuntary Weight Loss: Denies  Bladder Incontinence: Denies  Bowel Incontinence: denies  Saddle Anesthesia: Denies    All other systems reviewed and negative.     PMHx:   Past Medical History:   Diagnosis Date    Anxiety     Arthritis     Cancer (HCC)     COPD (chronic obstructive pulmonary disease) (HCC)     Daytime sleepiness     GERD (gastroesophageal reflux disease)     Hypertension     Insomnia     Migraine     Thyroid disease        PSHx:   Past Surgical History:   Procedure Laterality Date    CO INJ LUMBAR/SACRAL,W/ IMAGING Right 2/27/2025    Procedure: RIGHT Lumbar L2-3 interlaminar epidural steroid injection….PT NEEDS TO HOLD ASA AND MELOXICAM PRIOR TO PROCEDURE;  Surgeon: Kendal Jeffers M.D.;  [facility];  Service: Pain Management    CO INJECTION,SACROILIAC JOINT Bilateral 10/16/2024    Procedure: RIGHT and LEFT sacroiliac joint injection with fluoroscopic guidance;  Surgeon: Kendal Jeffers M.D.;  Location: [facility];  Service: Pain Management    CO INJ LUMBAR/SACRAL,W/ IMAGING Right 1/5/2024    Procedure: RIGHT Lumbar L2-3 interlaminar epidural steroid injection.  PT  NEEDS TO HOLD ASA AND MELOXICAM PRIOR TO PROCEDURE.  PT PREFERS TO SCHEDULE ON FRIDAY IF POSS;  Surgeon: Kendal Jeffers M.D.;  Location: [facility];  Service: Pain Management    AR INJ CERV/THORAC,W/ IMAGING N/A 2023    Procedure: interlaminar cervical epidural steroid injection at T3-T4;  Surgeon: Kendal Jeffers M.D.;  Location: [facility];  Service: Pain Management    AR INJ LUMBAR/SACRAL,W/ IMAGING Right 2023    Procedure: RIGHT Lumbar L2-3 interlaminar epidural steroid injection;  Surgeon: Kendal Jeffers M.D.;  Location: [facility];  Service: Pain Management    AR INJ LUMBAR/SACRAL,W/ IMAGING Right 2022    Procedure: RIGHT lumbar L2-3 interlaminar epidural steroid injection;  Surgeon: Kendal Jeffers M.D.;  Location: [facility];  Service: Pain Management    ABDOMINAL HYSTERECTOMY TOTAL      ARTHROPLASTY      EYE SURGERY      FOOT SURGERY      HERNIA REPAIR      [id number]      L tka    LAMINOTOMY      LUMPECTOMY      PRIMARY C SECTION      THYROIDECTOMY TOTAL      2019  thyroid cancer       Family Hx:   Family History   Problem Relation Age of Onset    COPD Mother     Cancer Father         pancreatic    Hypertension Father     Hyperlipidemia Father     Alcohol abuse Father     Drug abuse Brother        Social Hx:  Social History     Socioeconomic History    Marital status:      Spouse name: Not on file    Number of children: Not on file    Years of education: Not on file    Highest education level: Some college, no degree   Occupational History    Not on file   Tobacco Use    Smoking status: Some Days     Current packs/day: 0.00     Average packs/day: 1.5 packs/day for 50.0 years (75.0 ttl pk-yrs)     Types: Cigarettes     Start date: 1968     Last attempt to quit: 2018     Years since quittin.8    Smokeless tobacco: Never   Vaping Use    Vaping status: Some Days    Substances: Nicotine, CBD,  Flavoring    Devices: Pre-filled or refillable cartridge, Refillable tank   Substance and Sexual Activity    Alcohol use: Never    Drug use: Not Currently     Types: Marijuana, Methamphetamines     Comment: once in a while    Sexual activity: Yes     Partners: Male     Birth control/protection: Female Sterilization   Other Topics Concern    Not on file   Social History Narrative    Not on file     Social Drivers of Health     Financial Resource Strain: Medium Risk (7/10/2023)    Overall Financial Resource Strain (CARDIA)     Difficulty of Paying Living Expenses: Somewhat hard   Food Insecurity: No Food Insecurity (7/10/2023)    Hunger Vital Sign     Worried About Running Out of Food in the Last Year: Never true     Ran Out of Food in the Last Year: Never true   Transportation Needs: No Transportation Needs (7/10/2023)    PRAPARE - Transportation     Lack of Transportation (Medical): No     Lack of Transportation (Non-Medical): No   Physical Activity: Insufficiently Active (7/10/2023)    Exercise Vital Sign     Days of Exercise per Week: 5 days     Minutes of Exercise per Session: 20 min   Stress: No Stress Concern Present (7/10/2023)    [employer] [place] [employer] - Occupational Stress Questionnaire     Feeling of Stress : Only a little   Social Connections: Moderately Isolated (7/10/2023)    Social Connection and Isolation Panel [NHANES]     Frequency of Communication with Friends and Family: Never     Frequency of Social Gatherings with Friends and Family: Never     Attends Sikhism Services: Never     Active Member of Clubs or Organizations: Yes     Attends Club or Organization Meetings: Never     Marital Status:    Intimate Partner Violence: Not At Risk (2/7/2019)    [facility]    Humiliation, Afraid, Rape, and Kick questionnaire     Fear of Current or Ex-Partner: No     Emotionally Abused: No     Physically Abused: No     Sexually Abused: No   Housing Stability: Low Risk   (7/10/2023)    Housing Stability Vital Sign     Unable to Pay for Housing in the Last Year: No     Number of Places Lived in the Last Year: 1     Unstable Housing in the Last Year: No       Allergies:  Allergies   Allergen Reactions    Ace Inhibitors Cough    Chlorhexidine Rash    Flexeril [Cyclobenzaprine] Unspecified     Irritability     Triamterene      Other reaction(s): Nephrotoxicity       Medications: reviewed on epic.   Outpatient Medications Marked as Taking for the 6/24/25 encounter (Office Visit) with Kendal Jeffers M.D.   Medication Sig Dispense Refill    potassium chloride SA (KDUR) 20 MEQ Tab CR Take 1 Tablet by mouth every day. 90 Tablet 3    gabapentin (NEURONTIN) 300 MG Cap Take 3 Capsules by mouth 3 times a day. 810 Capsule 1    lidocaine (LIDODERM) 5 % Patch Place 1 Patch on the skin every 24 hours. Apply to affected area, 24 hours on followed by 24 hours off. 20 Patch 0    losartan (COZAAR) 100 MG Tab Take 1 Tablet by mouth every day. 90 Tablet 3    buPROPion (WELLBUTRIN XL) 300 MG XL tablet Take 1 Tablet by mouth every morning. 90 Tablet 3    meloxicam (MOBIC) 15 MG tablet Take 1 Tablet by mouth every day. 90 Tablet 3    albuterol 108 (90 Base) MCG/ACT Aero Soln inhalation aerosol Inhale 2 Puffs every 6 hours as needed for Shortness of Breath. 8.5 g 3    SYNTHROID 88 MCG Tab Take 1 Tablet by mouth every morning on an empty stomach. 90 Tablet 3    amLODIPine (NORVASC) 10 MG Tab Take 1 Tablet by mouth every day. 90 Tablet 3    omeprazole (PRILOSEC) 40 MG delayed-release capsule Take 1 Capsule by mouth every day. 90 Capsule 3    aspirin (ASA) 81 MG Chew Tab chewable tablet Chew 81 mg every day.      calcium citrate (CALCITRATE) 950 (200 Ca) MG Tab Take 950 mg by mouth every day.      Multiple Vitamin (MULTI-VITAMINS PO) Take  by mouth.      Riboflavin (VITAMIN B-2 PO) Take  by mouth.          Current Outpatient Medications on File Prior to Visit   Medication Sig Dispense Refill    potassium  chloride SA (KDUR) 20 MEQ Tab CR Take 1 Tablet by mouth every day. 90 Tablet 3    gabapentin (NEURONTIN) 300 MG Cap Take 3 Capsules by mouth 3 times a day. 810 Capsule 1    lidocaine (LIDODERM) 5 % Patch Place 1 Patch on the skin every 24 hours. Apply to affected area, 24 hours on followed by 24 hours off. 20 Patch 0    losartan (COZAAR) 100 MG Tab Take 1 Tablet by mouth every day. 90 Tablet 3    buPROPion (WELLBUTRIN XL) 300 MG XL tablet Take 1 Tablet by mouth every morning. 90 Tablet 3    meloxicam (MOBIC) 15 MG tablet Take 1 Tablet by mouth every day. 90 Tablet 3    albuterol 108 (90 Base) MCG/ACT Aero Soln inhalation aerosol Inhale 2 Puffs every 6 hours as needed for Shortness of Breath. 8.5 g 3    SYNTHROID 88 MCG Tab Take 1 Tablet by mouth every morning on an empty stomach. 90 Tablet 3    amLODIPine (NORVASC) 10 MG Tab Take 1 Tablet by mouth every day. 90 Tablet 3    omeprazole (PRILOSEC) 40 MG delayed-release capsule Take 1 Capsule by mouth every day. 90 Capsule 3    aspirin (ASA) 81 MG Chew Tab chewable tablet Chew 81 mg every day.      calcium citrate (CALCITRATE) 950 (200 Ca) MG Tab Take 950 mg by mouth every day.      Multiple Vitamin (MULTI-VITAMINS PO) Take  by mouth.      Riboflavin (VITAMIN B-2 PO) Take  by mouth.      tizanidine (ZANAFLEX) 4 MG Tab Take 1 Tablet by mouth every 6 hours as needed (arm pain). (Patient not taking: Reported on 6/24/2025) 30 Tablet 3     No current facility-administered medications on file prior to visit.         EXAMINATION     Physical Exam:   BP (!) 141/85   Pulse 83   Temp 36.8 °C (98.2 °F) (Temporal)   Ht 1.524 m (5')   Wt 66 kg (145 lb 8.1 oz)   SpO2 97%     Constitutional:   Body Habitus: Body mass index is 28.42 kg/m².  Cooperation: Fully cooperates with exam  Appearance: Well-groomed, well-nourished.    Eyes: No scleral icterus to suggest severe liver disease, no proptosis to suggest severe hyperthyroidism    ENT -no obvious auditory deficits, no noticeable  facial droop     Skin -no rashes or lesions noted     Respiratory-  breathing comfortably on room air, no audible wheezing    Cardiovascular-distal extremities warm and well perfused.  No lower extremity edema is noted.     Gastrointestinal - no obvious abdominal masses, non-distended    Psychiatric- alert and oriented ×3. Normal affect.     Gait - normal gait, no use of ambulatory device, nonantalgic.     Musculoskeletal and Neuro -        Thoracic/Lumbar Spine/Sacral Spine/Hips   Palpation:   Slight tenderness to palpation at right and left upper glutes overlying bilateral superior cluneal nerves.  Tenderness palpation of bilateral sacroiliac joints.  No tenderness to palpation across low back.  Slight tenderness to palpation over right gluteus medius muscle and right greater trochanter.  No tenderness to palpation in the low back/hips including midline of lumbosacral spine, paraspinal muscles bilaterally, lumbar facets bilaterally,   PSIS bilaterally, and greater trochanters bilaterally.    Inspection: No evidence of atrophy in bilateral lower extremities throughout      SLR, FADIR negative bilaterally    SI joint tests  EBEN test negative bilaterally  SI joint compression negative bilaterally  SI joint distraction negative bilaterally  Sacral thrust test positive bilaterally  Thigh thrust test positive bilaterally  Gaenslens maneuver positive bilaterally      Key points for the international standards for neurological classification of spinal cord injury (ISNCSCI) to light touch.   Dermatome R L   L2 2 2   L3 2 2   L4 2 2   L5 2 2   S1 2 2   S2 2 2         Motor Exam Lower Extremities  ? Myotome R L   Hip flexion L2 5 5   Knee extension L3 5 5   Ankle dorsiflexion L4 5 5   Toe extension L5 5 5   Ankle plantarflexion S1 5 5          Previous exam     Cervical spine   Inspection: No deformities of the skin over the cervical spine. No rashes or lesions.    Spurling's sign positive on the right, negative on the  left  Cervical facet loading maneuver  negative bilaterally    No tenderness to palpation at cervical spine    No signs of muscular atrophy in bilateral upper extremities     Key points for the international standards for neurological classification of spinal cord injury (ISNCSCI) to light touch.     Dermatome R L   C4 2 2   C5 2 2   C6 2 2   C7 2 2   C8 2 2   T1 2 2   T2 2 2       Motor Exam Upper Extremities   ? Myotome R L   Shoulder abduction C5 5 5   Elbow flexion C5 5 5   Wrist extension C6 5 5   Elbow extension C7 5 5   Finger flexion C8 5 5   Finger abduction T1 5 5       bilateral hands:   Inspection: No swelling, deformities, or rashes. Symmetric appearing thenar and hyperthenar regions bilaterally.  Palpation: no significant tenderness to palpation throughout the bilateral hands  Range of motion is within normal limits throughout bilateral hands, fingers and wrist.    Special tests:  Tinel's at the wrist over the median nerve positive on right, negative on left  Carpal tunnel compression: positive on right, negative on left  Phalen's test: positive on right, negative on left      Full AROM of bilateral shoulders  There is full active range of motion with lumbar extension     Facet loading maneuver negative bilaterally     Heel walking and toe walking intact.       Reflexes  ?   R L   Patella   2+ 2+   Achilles    2+ 2+      Clonus of the ankle negative bilaterally        MEDICAL DECISION MAKING     Medical records review: see under HPI section.       MEDICAL DECISION MAKING    Medical records review: see under HPI section.     DATA    Labs: No new labs available for review since last visit.    Lab Results   Component Value Date/Time    SODIUM 141 02/10/2025 01:43 PM    POTASSIUM 3.9 02/10/2025 01:43 PM    CHLORIDE 108 02/10/2025 01:43 PM    CO2 22 02/10/2025 01:43 PM    ANION 11.0 02/10/2025 01:43 PM    GLUCOSE 134 (H) 02/10/2025 01:43 PM    BUN 15 02/10/2025 01:43 PM    CREATININE 0.96 02/10/2025 01:43  "PM    CALCIUM 9.2 02/10/2025 01:43 PM    ASTSGOT 24 02/10/2025 01:43 PM    ALTSGPT 15 02/10/2025 01:43 PM    TBILIRUBIN 0.2 02/10/2025 01:43 PM    ALBUMIN 4.2 02/10/2025 01:43 PM    TOTPROTEIN 7.1 02/10/2025 01:43 PM    GLOBULIN 2.9 02/10/2025 01:43 PM    AGRATIO 1.4 02/10/2025 01:43 PM       No results found for: \"PROTHROMBTM\", \"INR\"     Lab Results   Component Value Date/Time    WBC 12.3 (H) 02/10/2025 01:43 PM    RBC 5.06 02/10/2025 01:43 PM    HEMOGLOBIN 14.4 02/10/2025 01:43 PM    HEMATOCRIT 44.3 02/10/2025 01:43 PM    MCV 87.5 02/10/2025 01:43 PM    MCH 28.5 02/10/2025 01:43 PM    MCHC 32.5 02/10/2025 01:43 PM    MPV 9.1 02/10/2025 01:43 PM    NEUTSPOLYS 70.50 02/10/2025 01:43 PM    LYMPHOCYTES 18.70 (L) 02/10/2025 01:43 PM    MONOCYTES 7.90 02/10/2025 01:43 PM    EOSINOPHILS 1.90 02/10/2025 01:43 PM    BASOPHILS 0.40 02/10/2025 01:43 PM        Lab Results   Component Value Date/Time    HBA1C 5.8 (H) 04/28/2022 10:39 AM      Limited diagnostic ultrasound right wrist performed 02/14/25 by Dr. Jeffers.   2/14/2025 I performed a limited diagnostic ultrasound for the patient's neuralgia ICD M79.2. I performed a limited diagnostic ultrasound of the RIGHT wrist including the median nerve to evaluate the cause of the patient's neuralgia. The median nerve was enlarged at the level of the carpal tunnel outlet compared to the level of the pronator quadratus. This is consistent with carpal tunnel syndrome.  The images were uploaded to the medical record.       Imaging:   I personally reviewed following images, these are my reads  MRI cervical spine 11/23/23  Fusion hardware at C5-C7.  At C7-T1 there is moderate-severe bilateral neuroforaminal stenosis.  At C6-C7 there is borderline left neuroforaminal stenosis.  At C5-6 there is mild left-sided neuroforaminal stenosis.  At C3-4 and C4-5 there is moderate right-sided neuroforaminal stenosis.See formal radiology report for further details.      MRI lumbar spine " 9/2/2022  Evidence of lumbar laminectomy at L4-S1, interbody fusion and posterior fusion at L3-S1.  Broad-based disc bulge at L1-L2 resulting in severe central canal stenosis and compression of descending bilateral L2 nerve roots and possibly bilateral L3 nerve roots and mild impingement of exiting L1 nerve roots bilaterally.  Mild right neuroforaminal stenosis at T12-L1.  Possible mild bilateral neuroforaminal stenosis at L5-S1, quality of images impaired due to metallic artifact. Appearance of chronic postoperative seroma posterior to L4 and L5 vertebral bodies.    XR Right hand 11/22/22  Moderate to severe CMC joint OA. See formal radiology report for further details.    X-ray ribs bilateral 11/15/24  No acute abnormality    IMAGING radiology reads. I reviewed the following radiology reads   MRI cervical spine 11/23/23  FINDINGS:  Images are degraded by patient motion artifact. Alignment in the cervical spine shows mild degenerative anterolisthesis of C7 on T1.     There is postoperative change with anterior cervical fusion hardware at C5-C6-C7. Expected hardware artifact.     Marrow signal in the vertebral bodies is otherwise unremarkable.     The prevertebral and paraspinous soft tissues are unremarkable.     There are no anomalies at the craniovertebral junction.  The cervical spinal cord is normal in caliber and signal throughout its course.     At C2-3, no significant disc bulge or protrusion. No central stenosis. Mild bilateral foraminal stenosis.     At C3-4, mild disc-osteophyte change. Small impression on the ventral subarachnoid space. No central stenosis. Moderate bilateral foraminal stenosis due to spondylotic change.     C4-C5, no significant disc bulge or protrusion. No central stenosis. The left neural foramen appears intact. Moderate right foraminal stenosis.     C5-C6 posterior bony ridging. Minimal ligamentum flavum hypertrophy. No candace central stenosis. The right neural foramen is intact. Mild  left foraminal stenosis due to uncinate spondylosis.     C6-C7, no significant disc bulge or protrusion. Endplate hypertrophy contributing to mild central stenosis (T2 sagittal image 10, series 2, T2 axial image 12, series 8). Mild left-sided posterior endplate spurring and uncinate hypertrophy with borderline   left lateral recess stenosis and borderline left foraminal stenosis. The right neural foramen is intact.     C7-T1, moderate disc/osteophyte change accentuated by anterolisthesis. Partial effacement of ventral subarachnoid space. Thecal sac at the lower range of normal without candace central stenosis. Moderate-marked bilateral foraminal stenosis best seen on the   sagittal images.     IMPRESSION:     1.  Postoperative anterior cervical fusion with hardware fixation C5-C6-C7.  2.  Multilevel degenerative changes most notable for C6-C7 mild central stenosis at R9-C0-vbzhbv bilateral foraminal stenosis.  3.  Details for each level above in the body of the report.  4.  No myelopathic cord signal is appreciated.    Results for orders placed during the hospital encounter of 09/02/22    MR-LUMBAR SPINE-W/O    Impression  1.  L3-4 slight anterolisthesis and L5-S1 slight retrolisthesis.  2.  Postoperative changes with lumbar laminectomy L4-L5-S1, interbody fusion L3-L4, L4-L5, L5-S1, and posterior fusion with transpedicular screw fixation at L3-L4-L5-S1.  3.  Chronic postoperative seroma occupying the laminectomy interval without dorsal epidural mass effect.  4.  The study is most notable for L1-L2 large disc protrusion-extrusion resulting in severe central stenosis.  5.  Additional degenerative changes detailed for each level above in the body of report.        MRI lumbar spine 10/20/21  Lumbar spine:    Alignment: Grade 1 L3-L4 anterolisthesis. Previously seen L4-L5  anterolisthesis is improved compared to MRI prior to surgery.    Vertebral body heights and marrow: Postsurgical changes from L3-S1  posterior fusion.  Multilevel lumbar spondylosis with osteophytes, facet  arthropathy, and endplate marrow degenerative changes are seen. Otherwise  the vertebral body heights and marrow signal are unremarkable.    Conus medullaris: Normal, terminating at L1/L2.    Soft tissues: There is a fluid collection in the paraspinal soft tissues  posterior to the L3-L5 laminectomy sites, likely postoperative seroma  measuring 63 x 28 mm (series 17, image 7). Small right renal cyst.    L1-L2: Persistent disc bulge with worsening superimposed central disc  extrusion. Bilateral facet arthropathy and dorsal epidural fat. The  constellation of findings produces moderate to severe spinal canal  stenosis. Mild to moderate left neural foraminal stenosis is improved  compared to prior.  Ligamentum flavum thickening. Facet hypertrophy, mild.    L2-L3: Disc bulge, ligamentum flavum thickening, facet hypertrophy  resulting in mild spinal canal stenosis. Mild left neural foraminal  stenosis.    L3-L4: Partially uncovered disc. Mild to moderate right neural foraminal  stenosis. Limited evaluation of the left neural foramen. Laminectomy.    L4-L5: No spinal canal stenosis. Limited evaluation of neural foramina due  to spinal hardware. Laminectomy.    L5-S1: Disc bulge without spinal canal stenosis. Limited evaluation of  neural foramina due to spinal hardware.     IMPRESSION:    1. Worsening disc protrusion at L1-L2, resulting in worsening spinal  canal stenosis, now moderate to severe.   2. Multilevel degenerative changes of the cervical spine, similar  compared to prior.  3. Multilevel degenerative changes in thoracic spine, with up to mild  to moderate spinal canal stenosis at T8-T9 secondary to disc bulge.  4. Postsurgical changes . Small postoperative seroma in L3-L5  laminectomy sites.        X-ray left hand 3/19/2019  FINDINGS:   There is no acute fracture or dislocation.   Advanced osteoarthrosis of the first CMC joint, characterized by joint   space  narrowing, subchondral sclerosis, osteophyte formation, and radial   subluxation. Mild osteoarthrosis of the STT joint. Osteoarthrosis of   scattered IP joints. No focal soft tissue swelling.     IMPRESSION:   Advanced osteoarthrosis of the first CMC joint.     XR Right hand 22  FINDINGS:     BONE MINERALIZATION: Normal.  JOINTS: Moderate to severe first carpometacarpal joint osteoarthrosis. Mild triscaphe joint osteoarthrosis. Mild multifocal osteoarthrosis otherwise. No erosions.  FRACTURE: None.  DISLOCATION: None.  SOFT TISSUES: No mass.     IMPRESSION:     1.  Moderate to severe first carpometacarpal joint osteoarthrosis.  2.  Mild multifocal osteoarthrosis otherwise.                                                  Diagnosis  Visit Diagnoses     ICD-10-CM   1. Lumbar radiculitis  M54.16   2. Myalgia  M79.10   3. Sacroiliac joint dysfunction of both sides  M53.3   4. S/P cervical spinal fusion  Z98.1   5. Neuroforaminal stenosis of cervical spine  M48.02   6. Osteoarthritis of carpometacarpal (CMC) joints of both thumbs, unspecified osteoarthritis type  M18.0   7. Chronic right-sided low back pain with right-sided sciatica  M54.41    G89.29   8. Numbness and tingling of right leg  R20.0    R20.2   9. Lumbar disc herniation  M51.26   10. Cervical radiculopathy  M54.12   11. 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 Post laminectomy syndrome  M96.1   13. History of lumbar fusion  Z98.1             ASSESSMENT AND PLAN:  Robin Lynn Zielesch (: 1955) is a female with history of HTN, cervical laminectomy, depression, anxiety, thyroid cancer, BMI 30, L3-pelvis posterior spinal fusion with TLIF/ PLIF on 21.    Also with worsened right cervical radiculitis.  She also has right carpal tunnel syndrome.  Cervical/upper thoracic epidural steroid injection could not be completed due to presence of calcified ligament.    Mathew was seen today for follow-up.    Diagnoses and all orders for this visit:    Lumbar  radiculitis    Myalgia    Sacroiliac joint dysfunction of both sides  -     [facility] Procedure; Future    S/P cervical spinal fusion    Neuroforaminal stenosis of cervical spine    Osteoarthritis of carpometacarpal (CMC) joints of both thumbs, unspecified osteoarthritis type    Chronic right-sided low back pain with right-sided sciatica    Numbness and tingling of right leg    Lumbar disc herniation    Cervical radiculopathy    Cluneal neuropathy    Post laminectomy syndrome  -     Referral to Behavioral Health    History of lumbar fusion            I have discussed that the numbness and tingling in her right hand is likely from carpal tunnel syndrome given her physical exam and limited diagnostic ultrasound results, and less likely from cervical radiculopathy       PLAN  Physical Therapy: discussed referral to physical therapy for low back pain and neck pain.  She declined a physical therapy referral as she has done extensive physical therapy in the past which worsened her symptoms     Diagnostic workup: Personally reviewed at today's visit:   X-ray ribs bilateral 11/15/24  - diagnostic ultrasound right wrist 02/14/25 by Dr. Jeffers as noted above    Medications:   -I have discussed that she should continue gabapentin, Mobic as per PCP  -Discussed that she should not combine Mobic with any other NSAIDs  -Previously prescribed tramadol 25-50 mg every 6 hours as needed as above.  Discussed that she should take this as sparingly as possible    Last opioid risk scale: 8/19/2022  Last controlled substance agreement: 11/15/2024    reviewed: 6/24/2025   Naloxone prescribed: Discussed prescription and offered it.  The patient declined saying she has at home already       Opioid Risk Score: 8    Interpretation of Opioid Risk Score   Score 0-3 = Low risk of abuse. Do UDS at least once per year.  Score 4-7 = Moderate risk of abuse. Do UDS 1-4 times per year.  Score 8+ = High risk of abuse. Refer to specialist.     I  reviewed the     In prescribing controlled substances to this patient, I certify that I have obtained and reviewed the medical history of Robin Lynn Zielesch. I have also made a good sidney effort to obtain applicable records from other providers who have treated the patient and records did not demonstrate any increased risk of substance abuse that would prevent me from prescribing controlled substances.     I have conducted a physical exam and documented it. I have reviewed Ms. Zielesch’s prescription history as maintained by the [place] Prescription Monitoring Program.     I have assessed the patient’s risk for abuse, dependency, and addiction using the validated Opioid Risk Tool available at https://www.mdcalc.com/mokuuc-wjjj-oocv-ort-narcotic-abuse.     Given the above, I believe the benefits of controlled substance therapy outweigh the risks. The reasons for prescribing controlled substances include non-narcotic, oral analgesic alternatives have been inadequate for pain control. Accordingly, I have discussed the risk and benefits, treatment plan, and alternative therapies with the patient.     Interventions:   - s/p right and left superior cluneal nerve block under ultrasound guidance with no relief  - Repeat bilateral sacroiliac joint injections under fluoroscopic guidance given recurrence of pain which previously improved with this injection  -S/p trial of trigger point injections targeting left neck area with no relief  - s/p attempted and aborted left cervical epidural steroid injection at T2-3 and T3-4 due to calcified ligament.   -Repeat right L2-3 interlaminar epidural steroid injection as needed given recurrence of pain which significantly improved with this procedure in the past  -Bilateral CMC joint steroid injections under ultrasound guidance PRN given significant improvement in pain with this procedure in the past.     Other  -Discussed consideration of spinal cord stimulator.  Referral to  psychology placed today for clearance.    -I previously discussed neurosurgical referral for evaluation and management of disc herniation at L1-2 that appears to be causing severe central canal stenosis and symptoms of lumbar radiculopathy.  Given her hx of significant improvement in pain after our previous epidural steroid injection and no neurologic deficits on exam today, I believe it is reasonable to defer a neurosurgery referral at this time  -Discussed that she could consider deep myofascial release techniques including a foam roller for her right thigh  -Continue follow-up with Dr. Hood at [facility]. Discussed that perhaps a referral to an orthotist could be considered    Follow-up: 4 weeks to assess progress from today's injection    Orders Placed This [facility] Procedure    Referral to Behavioral Health       Kendal Jeffers MD  Interventional Pain and Spine  Physical Medicine and Rehabilitation  [facility]      The above note documents my personal evaluation of this patient. In addition, I have reviewed and confirmed with the patient and MA the supportive information documented in today's Patient Health Questionnaire and Office Note.     Please note that this dictation was created using voice recognition software. I have made every reasonable attempt to correct obvious errors, but I expect that there are errors of grammar and possibly content that I did not discover before finalizing the note.

## 2025-06-24 NOTE — PROGRESS NOTES
Verbal consent was acquired by the patient to use [employer] ambient listening note generation during this visit Yes     Follow-up patient Note    Interventional Pain and Spine  Physiatry (Physical Medicine and Rehabilitation)     Patient Name: Robin Lynn Zielesch  : 1955  Date of service: 2025    Chief Complaint:   Chief Complaint   Patient presents with    Follow-Up     Back pain         HISTORY (2022):  Robin Lynn Zielesch is a 67 y.o. female who presents today with pain radiating from her right posterolateral glute down her right anterolateral and posterior thigh accompanied by numbness/tingling/weakness in this area. This started in Sep 2021 while recovering from a L2-pelvis posterior spinal fusion with TLIF/ PLIF on 21 with Dr. Bates ([facility] which she states she had done for similar radiating pain down her left leg.  Her radiating left leg pain resolved after surgery.     Her pain at its best-worse level during the course of the day is 5-9/10, respectively. Pain right now is 7/10 on the numeric pain scale. Pain worsens with sitting, standing, walking, bending backwards, side bending or twisting, walking upstairs, and walking downstairs and improves with nothing. Her pain interferes somewhat with ADLs. The patient otherwise denies new weakness, numbness, or bladder/bowel incontinence. States she has fallen a few times secondary to pain and possibly weakness. Moved from [facility] in May 2022.     The patient has done physical therapy for this problem with worsening pain.     Patient has tried the following medications with varied success (current meds in bold): Hayes back and body  Gabapentin 300mg TID - no relief. Used to help with left sided pain  Naproxen BID - significant relief     Therapeutic modalities and interventional therapies to date include:  -No injections     Medical history includes HTN, cervical laminectomy, depression, anxiety, thyroid cancer, BMI 30, L2-pelvis  posterior spinal fusion with TLIF/ PLIF on 21    HPI  Today's visit   Robin Lynn Zielesch ( 1955) is a female with The primary encounter diagnosis was Lumbar radiculitis. Diagnoses of Myalgia, Sacroiliac joint dysfunction of both sides, S/P cervical spinal fusion, Neuroforaminal stenosis of cervical spine, Osteoarthritis of carpometacarpal (CMC) joints of both thumbs, unspecified osteoarthritis type, Chronic right-sided low back pain with right-sided sciatica, Numbness and tingling of right leg, Lumbar disc herniation, Cervical radiculopathy, Cluneal neuropathy, Post laminectomy syndrome, and History of lumbar fusion were also pertinent to this visit.    History of Present Illness    The patient presents for evaluation of low back pain and sleep apnea.    Bilateral low Back Pain  - Persistent lower back pain across the waistline, not radiating down the thigh.  - Over-the-counter medications (Tylenol, Advil, Excedrin, ibuprofen) were ineffective.  - Tramadol prescribed after a fall last year provided some relief without drowsiness.  - Prefers non-drowsy pain management.  - Prolonged sitting exacerbates the pain, limiting her to 1.5-2 hours.  - Sacroiliac joint injections were beneficial.  - Tries to stay busy despite the pain.    Sleep Apnea  - Diagnosed with sleep apnea with significant oxygen desaturation during sleep.  - Cannot use a CPAP machine due to discomfort and drooling.  - Awaiting ENT referral.  - Considering an Inspire device.    Supplemental information: She feels slightly depressed for the past few days, taking short walks around her apartment complex. She has never consulted a psychologist. Needs lorazepam refill, last filled 2025.        Procedure history:  - 22 right L2-3 interlaminar epidural steroid injection - 90% improvement in pain, resolution of radiating pain.  - 22 bilateral CMC joint injections - 100% improvement in thumb pain bilaterally  - 3/7/23 right L2-3  interlaminar epidural steroid injection -100% improvement in back pain and radiating pain, ongoing tightness in her right thigh  - 06/27/23 trigger point injections   - 11/15/23 BILATERAL ultrasound-guided 1st carpometacarpal joint injection - 100% improvement in thumb pain bilaterally  - 12/15/23 trigger point injections-no relief  - 1/5/24 right L2-3 interlaminar epidural steroid injection-resolution of low back pain and pain radiating down right leg  - 10/16/24 right and left Sacroiliac joint injection-resolution of index pain  - 2/27/25 interlaminar Lumbar Epidural Steroid Injection at the right L2-L3 level. Resolution of index pain  - 5/16/2025 right and left superior cluneal nerve block with ultrasound guidance  -no relief    ROS:   Red Flags ROS:   Fever, Chills, Sweats: Denies  Involuntary Weight Loss: Denies  Bladder Incontinence: Denies  Bowel Incontinence: denies  Saddle Anesthesia: Denies    All other systems reviewed and negative.     PMHx:   Past Medical History:   Diagnosis Date    Anxiety     Arthritis     Cancer (HCC)     COPD (chronic obstructive pulmonary disease) (HCC)     Daytime sleepiness     GERD (gastroesophageal reflux disease)     Hypertension     Insomnia     Migraine     Thyroid disease        PSHx:   Past Surgical History:   Procedure Laterality Date    AZ INJ LUMBAR/SACRAL,W/ IMAGING Right 2/27/2025    Procedure: RIGHT Lumbar L2-3 interlaminar epidural steroid injection….PT NEEDS TO HOLD ASA AND MELOXICAM PRIOR TO PROCEDURE;  Surgeon: Kendal Jeffers M.D.;  [facility];  Service: Pain Management    AZ INJECTION,SACROILIAC JOINT Bilateral 10/16/2024    Procedure: RIGHT and LEFT sacroiliac joint injection with fluoroscopic guidance;  Surgeon: Kendal Jeffers M.D.;  Location: [facility];  Service: Pain Management    AZ INJ LUMBAR/SACRAL,W/ IMAGING Right 1/5/2024    Procedure: RIGHT Lumbar L2-3 interlaminar epidural steroid injection.  PT  NEEDS TO HOLD ASA AND MELOXICAM PRIOR TO PROCEDURE.  PT PREFERS TO SCHEDULE ON FRIDAY IF POSS;  Surgeon: Kendal Jeffers M.D.;  Location: [facility];  Service: Pain Management    MD INJ CERV/THORAC,W/ IMAGING N/A 2023    Procedure: interlaminar cervical epidural steroid injection at T3-T4;  Surgeon: Kendal Jeffers M.D.;  Location: [facility];  Service: Pain Management    MD INJ LUMBAR/SACRAL,W/ IMAGING Right 2023    Procedure: RIGHT Lumbar L2-3 interlaminar epidural steroid injection;  Surgeon: Kendal Jeffers M.D.;  Location: [facility];  Service: Pain Management    MD INJ LUMBAR/SACRAL,W/ IMAGING Right 2022    Procedure: RIGHT lumbar L2-3 interlaminar epidural steroid injection;  Surgeon: Kendal Jeffers M.D.;  Location: [facility];  Service: Pain Management    ABDOMINAL HYSTERECTOMY TOTAL      ARTHROPLASTY      EYE SURGERY      FOOT SURGERY      HERNIA REPAIR      [id number]      L tka    LAMINOTOMY      LUMPECTOMY      PRIMARY C SECTION      THYROIDECTOMY TOTAL      2019  thyroid cancer       Family Hx:   Family History   Problem Relation Age of Onset    COPD Mother     Cancer Father         pancreatic    Hypertension Father     Hyperlipidemia Father     Alcohol abuse Father     Drug abuse Brother        Social Hx:  Social History     Socioeconomic History    Marital status:      Spouse name: Not on file    Number of children: Not on file    Years of education: Not on file    Highest education level: Some college, no degree   Occupational History    Not on file   Tobacco Use    Smoking status: Some Days     Current packs/day: 0.00     Average packs/day: 1.5 packs/day for 50.0 years (75.0 ttl pk-yrs)     Types: Cigarettes     Start date: 1968     Last attempt to quit: 2018     Years since quittin.8    Smokeless tobacco: Never   Vaping Use    Vaping status: Some Days    Substances: Nicotine, CBD,  Flavoring    Devices: Pre-filled or refillable cartridge, Refillable tank   Substance and Sexual Activity    Alcohol use: Never    Drug use: Not Currently     Types: Marijuana, Methamphetamines     Comment: once in a while    Sexual activity: Yes     Partners: Male     Birth control/protection: Female Sterilization   Other Topics Concern    Not on file   Social History Narrative    Not on file     Social Drivers of Health     Financial Resource Strain: Medium Risk (7/10/2023)    Overall Financial Resource Strain (CARDIA)     Difficulty of Paying Living Expenses: Somewhat hard   Food Insecurity: No Food Insecurity (7/10/2023)    Hunger Vital Sign     Worried About Running Out of Food in the Last Year: Never true     Ran Out of Food in the Last Year: Never true   Transportation Needs: No Transportation Needs (7/10/2023)    PRAPARE - Transportation     Lack of Transportation (Medical): No     Lack of Transportation (Non-Medical): No   Physical Activity: Insufficiently Active (7/10/2023)    Exercise Vital Sign     Days of Exercise per Week: 5 days     Minutes of Exercise per Session: 20 min   Stress: No Stress Concern Present (7/10/2023)    [employer] - Occupational Stress Questionnaire     Feeling of Stress : Only a little   Social Connections: Moderately Isolated (7/10/2023)    Social Connection and Isolation Panel [NHANES]     Frequency of Communication with Friends and Family: Never     Frequency of Social Gatherings with Friends and Family: Never     Attends Anabaptism Services: Never     Active Member of Clubs or Organizations: Yes     Attends Club or Organization Meetings: Never     Marital Status:    Intimate Partner Violence: Not At Risk (2/7/2019)    Received from [facility]    Humiliation, Afraid, Rape, and Kick questionnaire     Fear of Current or Ex-Partner: No     Emotionally Abused: No     Physically Abused: No     Sexually Abused: No   Housing Stability: Low Risk   (7/10/2023)    Housing Stability Vital Sign     Unable to Pay for Housing in the Last Year: No     Number of Places Lived in the Last Year: 1     Unstable Housing in the Last Year: No       Allergies:  Allergies   Allergen Reactions    Ace Inhibitors Cough    Chlorhexidine Rash    Flexeril [Cyclobenzaprine] Unspecified     Irritability     Triamterene      Other reaction(s): Nephrotoxicity       Medications: reviewed on epic.   Outpatient Medications Marked as Taking for the 6/24/25 encounter (Office Visit) with Kendal Jeffres M.D.   Medication Sig Dispense Refill    potassium chloride SA (KDUR) 20 MEQ Tab CR Take 1 Tablet by mouth every day. 90 Tablet 3    gabapentin (NEURONTIN) 300 MG Cap Take 3 Capsules by mouth 3 times a day. 810 Capsule 1    lidocaine (LIDODERM) 5 % Patch Place 1 Patch on the skin every 24 hours. Apply to affected area, 24 hours on followed by 24 hours off. 20 Patch 0    losartan (COZAAR) 100 MG Tab Take 1 Tablet by mouth every day. 90 Tablet 3    buPROPion (WELLBUTRIN XL) 300 MG XL tablet Take 1 Tablet by mouth every morning. 90 Tablet 3    meloxicam (MOBIC) 15 MG tablet Take 1 Tablet by mouth every day. 90 Tablet 3    albuterol 108 (90 Base) MCG/ACT Aero Soln inhalation aerosol Inhale 2 Puffs every 6 hours as needed for Shortness of Breath. 8.5 g 3    SYNTHROID 88 MCG Tab Take 1 Tablet by mouth every morning on an empty stomach. 90 Tablet 3    amLODIPine (NORVASC) 10 MG Tab Take 1 Tablet by mouth every day. 90 Tablet 3    omeprazole (PRILOSEC) 40 MG delayed-release capsule Take 1 Capsule by mouth every day. 90 Capsule 3    aspirin (ASA) 81 MG Chew Tab chewable tablet Chew 81 mg every day.      calcium citrate (CALCITRATE) 950 (200 Ca) MG Tab Take 950 mg by mouth every day.      Multiple Vitamin (MULTI-VITAMINS PO) Take  by mouth.      Riboflavin (VITAMIN B-2 PO) Take  by mouth.          Current Outpatient Medications on File Prior to Visit   Medication Sig Dispense Refill    potassium  chloride SA (KDUR) 20 MEQ Tab CR Take 1 Tablet by mouth every day. 90 Tablet 3    gabapentin (NEURONTIN) 300 MG Cap Take 3 Capsules by mouth 3 times a day. 810 Capsule 1    lidocaine (LIDODERM) 5 % Patch Place 1 Patch on the skin every 24 hours. Apply to affected area, 24 hours on followed by 24 hours off. 20 Patch 0    losartan (COZAAR) 100 MG Tab Take 1 Tablet by mouth every day. 90 Tablet 3    buPROPion (WELLBUTRIN XL) 300 MG XL tablet Take 1 Tablet by mouth every morning. 90 Tablet 3    meloxicam (MOBIC) 15 MG tablet Take 1 Tablet by mouth every day. 90 Tablet 3    albuterol 108 (90 Base) MCG/ACT Aero Soln inhalation aerosol Inhale 2 Puffs every 6 hours as needed for Shortness of Breath. 8.5 g 3    SYNTHROID 88 MCG Tab Take 1 Tablet by mouth every morning on an empty stomach. 90 Tablet 3    amLODIPine (NORVASC) 10 MG Tab Take 1 Tablet by mouth every day. 90 Tablet 3    omeprazole (PRILOSEC) 40 MG delayed-release capsule Take 1 Capsule by mouth every day. 90 Capsule 3    aspirin (ASA) 81 MG Chew Tab chewable tablet Chew 81 mg every day.      calcium citrate (CALCITRATE) 950 (200 Ca) MG Tab Take 950 mg by mouth every day.      Multiple Vitamin (MULTI-VITAMINS PO) Take  by mouth.      Riboflavin (VITAMIN B-2 PO) Take  by mouth.      tizanidine (ZANAFLEX) 4 MG Tab Take 1 Tablet by mouth every 6 hours as needed (arm pain). (Patient not taking: Reported on 6/24/2025) 30 Tablet 3     No current facility-administered medications on file prior to visit.         EXAMINATION     Physical Exam:   BP (!) 141/85   Pulse 83   Temp 36.8 °C (98.2 °F) (Temporal)   Ht 1.524 m (5')   Wt 66 kg (145 lb 8.1 oz)   SpO2 97%     Constitutional:   Body Habitus: Body mass index is 28.42 kg/m².  Cooperation: Fully cooperates with exam  Appearance: Well-groomed, well-nourished.    Eyes: No scleral icterus to suggest severe liver disease, no proptosis to suggest severe hyperthyroidism    ENT -no obvious auditory deficits, no noticeable  facial droop     Skin -no rashes or lesions noted     Respiratory-  breathing comfortably on room air, no audible wheezing    Cardiovascular-distal extremities warm and well perfused.  No lower extremity edema is noted.     Gastrointestinal - no obvious abdominal masses, non-distended    Psychiatric- alert and oriented ×3. Normal affect.     Gait - normal gait, no use of ambulatory device, nonantalgic.     Musculoskeletal and Neuro -        Thoracic/Lumbar Spine/Sacral Spine/Hips   Palpation:   Slight tenderness to palpation at right and left upper glutes overlying bilateral superior cluneal nerves.  Tenderness palpation of bilateral sacroiliac joints.  No tenderness to palpation across low back.  Slight tenderness to palpation over right gluteus medius muscle and right greater trochanter.  No tenderness to palpation in the low back/hips including midline of lumbosacral spine, paraspinal muscles bilaterally, lumbar facets bilaterally,   PSIS bilaterally, and greater trochanters bilaterally.    Inspection: No evidence of atrophy in bilateral lower extremities throughout      SLR, FADIR negative bilaterally    SI joint tests  EBEN test negative bilaterally  SI joint compression negative bilaterally  SI joint distraction negative bilaterally  Sacral thrust test positive bilaterally  Thigh thrust test positive bilaterally  Gaenslens maneuver positive bilaterally      Key points for the international standards for neurological classification of spinal cord injury (ISNCSCI) to light touch.   Dermatome R L   L2 2 2   L3 2 2   L4 2 2   L5 2 2   S1 2 2   S2 2 2         Motor Exam Lower Extremities  ? Myotome R L   Hip flexion L2 5 5   Knee extension L3 5 5   Ankle dorsiflexion L4 5 5   Toe extension L5 5 5   Ankle plantarflexion S1 5 5          Previous exam     Cervical spine   Inspection: No deformities of the skin over the cervical spine. No rashes or lesions.    Spurling's sign positive on the right, negative on the  left  Cervical facet loading maneuver  negative bilaterally    No tenderness to palpation at cervical spine    No signs of muscular atrophy in bilateral upper extremities     Key points for the international standards for neurological classification of spinal cord injury (ISNCSCI) to light touch.     Dermatome R L   C4 2 2   C5 2 2   C6 2 2   C7 2 2   C8 2 2   T1 2 2   T2 2 2       Motor Exam Upper Extremities   ? Myotome R L   Shoulder abduction C5 5 5   Elbow flexion C5 5 5   Wrist extension C6 5 5   Elbow extension C7 5 5   Finger flexion C8 5 5   Finger abduction T1 5 5       bilateral hands:   Inspection: No swelling, deformities, or rashes. Symmetric appearing thenar and hyperthenar regions bilaterally.  Palpation: no significant tenderness to palpation throughout the bilateral hands  Range of motion is within normal limits throughout bilateral hands, fingers and wrist.    Special tests:  Tinel's at the wrist over the median nerve positive on right, negative on left  Carpal tunnel compression: positive on right, negative on left  Phalen's test: positive on right, negative on left      Full AROM of bilateral shoulders  There is full active range of motion with lumbar extension     Facet loading maneuver negative bilaterally     Heel walking and toe walking intact.       Reflexes  ?   R L   Patella   2+ 2+   Achilles    2+ 2+      Clonus of the ankle negative bilaterally        MEDICAL DECISION MAKING     Medical records review: see under HPI section.       MEDICAL DECISION MAKING    Medical records review: see under HPI section.     DATA    Labs: No new labs available for review since last visit.    Lab Results   Component Value Date/Time    SODIUM 141 02/10/2025 01:43 PM    POTASSIUM 3.9 02/10/2025 01:43 PM    CHLORIDE 108 02/10/2025 01:43 PM    CO2 22 02/10/2025 01:43 PM    ANION 11.0 02/10/2025 01:43 PM    GLUCOSE 134 (H) 02/10/2025 01:43 PM    BUN 15 02/10/2025 01:43 PM    CREATININE 0.96 02/10/2025 01:43  "PM    CALCIUM 9.2 02/10/2025 01:43 PM    ASTSGOT 24 02/10/2025 01:43 PM    ALTSGPT 15 02/10/2025 01:43 PM    TBILIRUBIN 0.2 02/10/2025 01:43 PM    ALBUMIN 4.2 02/10/2025 01:43 PM    TOTPROTEIN 7.1 02/10/2025 01:43 PM    GLOBULIN 2.9 02/10/2025 01:43 PM    AGRATIO 1.4 02/10/2025 01:43 PM       No results found for: \"PROTHROMBTM\", \"INR\"     Lab Results   Component Value Date/Time    WBC 12.3 (H) 02/10/2025 01:43 PM    RBC 5.06 02/10/2025 01:43 PM    HEMOGLOBIN 14.4 02/10/2025 01:43 PM    HEMATOCRIT 44.3 02/10/2025 01:43 PM    MCV 87.5 02/10/2025 01:43 PM    MCH 28.5 02/10/2025 01:43 PM    MCHC 32.5 02/10/2025 01:43 PM    MPV 9.1 02/10/2025 01:43 PM    NEUTSPOLYS 70.50 02/10/2025 01:43 PM    LYMPHOCYTES 18.70 (L) 02/10/2025 01:43 PM    MONOCYTES 7.90 02/10/2025 01:43 PM    EOSINOPHILS 1.90 02/10/2025 01:43 PM    BASOPHILS 0.40 02/10/2025 01:43 PM        Lab Results   Component Value Date/Time    HBA1C 5.8 (H) 04/28/2022 10:39 AM      Limited diagnostic ultrasound right wrist performed 02/14/25 by Dr. Jeffers.   2/14/2025 I performed a limited diagnostic ultrasound for the patient's neuralgia ICD M79.2. I performed a limited diagnostic ultrasound of the RIGHT wrist including the median nerve to evaluate the cause of the patient's neuralgia. The median nerve was enlarged at the level of the carpal tunnel outlet compared to the level of the pronator quadratus. This is consistent with carpal tunnel syndrome.  The images were uploaded to the medical record.       Imaging:   I personally reviewed following images, these are my reads  MRI cervical spine 11/23/23  Fusion hardware at C5-C7.  At C7-T1 there is moderate-severe bilateral neuroforaminal stenosis.  At C6-C7 there is borderline left neuroforaminal stenosis.  At C5-6 there is mild left-sided neuroforaminal stenosis.  At C3-4 and C4-5 there is moderate right-sided neuroforaminal stenosis.See formal radiology report for further details.      MRI lumbar spine " 9/2/2022  Evidence of lumbar laminectomy at L4-S1, interbody fusion and posterior fusion at L3-S1.  Broad-based disc bulge at L1-L2 resulting in severe central canal stenosis and compression of descending bilateral L2 nerve roots and possibly bilateral L3 nerve roots and mild impingement of exiting L1 nerve roots bilaterally.  Mild right neuroforaminal stenosis at T12-L1.  Possible mild bilateral neuroforaminal stenosis at L5-S1, quality of images impaired due to metallic artifact. Appearance of chronic postoperative seroma posterior to L4 and L5 vertebral bodies.    XR Right hand 11/22/22  Moderate to severe CMC joint OA. See formal radiology report for further details.    X-ray ribs bilateral 11/15/24  No acute abnormality    IMAGING radiology reads. I reviewed the following radiology reads   MRI cervical spine 11/23/23  FINDINGS:  Images are degraded by patient motion artifact. Alignment in the cervical spine shows mild degenerative anterolisthesis of C7 on T1.     There is postoperative change with anterior cervical fusion hardware at C5-C6-C7. Expected hardware artifact.     Marrow signal in the vertebral bodies is otherwise unremarkable.     The prevertebral and paraspinous soft tissues are unremarkable.     There are no anomalies at the craniovertebral junction.  The cervical spinal cord is normal in caliber and signal throughout its course.     At C2-3, no significant disc bulge or protrusion. No central stenosis. Mild bilateral foraminal stenosis.     At C3-4, mild disc-osteophyte change. Small impression on the ventral subarachnoid space. No central stenosis. Moderate bilateral foraminal stenosis due to spondylotic change.     C4-C5, no significant disc bulge or protrusion. No central stenosis. The left neural foramen appears intact. Moderate right foraminal stenosis.     C5-C6 posterior bony ridging. Minimal ligamentum flavum hypertrophy. No candace central stenosis. The right neural foramen is intact. Mild  left foraminal stenosis due to uncinate spondylosis.     C6-C7, no significant disc bulge or protrusion. Endplate hypertrophy contributing to mild central stenosis (T2 sagittal image 10, series 2, T2 axial image 12, series 8). Mild left-sided posterior endplate spurring and uncinate hypertrophy with borderline   left lateral recess stenosis and borderline left foraminal stenosis. The right neural foramen is intact.     C7-T1, moderate disc/osteophyte change accentuated by anterolisthesis. Partial effacement of ventral subarachnoid space. Thecal sac at the lower range of normal without candace central stenosis. Moderate-marked bilateral foraminal stenosis best seen on the   sagittal images.     IMPRESSION:     1.  Postoperative anterior cervical fusion with hardware fixation C5-C6-C7.  2.  Multilevel degenerative changes most notable for C6-C7 mild central stenosis at [id number] bilateral foraminal stenosis.  3.  Details for each level above in the body of the report.  4.  No myelopathic cord signal is appreciated.    Results for orders placed during the hospital encounter of 09/02/22    MR-LUMBAR SPINE-W/O    Impression  1.  L3-4 slight anterolisthesis and L5-S1 slight retrolisthesis.  2.  Postoperative changes with lumbar laminectomy L4-L5-S1, interbody fusion L3-L4, L4-L5, L5-S1, and posterior fusion with transpedicular screw fixation at L3-L4-L5-S1.  3.  Chronic postoperative seroma occupying the laminectomy interval without dorsal epidural mass effect.  4.  The study is most notable for L1-L2 large disc protrusion-extrusion resulting in severe central stenosis.  5.  Additional degenerative changes detailed for each level above in the body of report.        MRI lumbar spine 10/20/21  Lumbar spine:    Alignment: Grade 1 L3-L4 anterolisthesis. Previously seen L4-L5  anterolisthesis is improved compared to MRI prior to surgery.    Vertebral body heights and marrow: Postsurgical changes from L3-S1  posterior fusion.  Multilevel lumbar spondylosis with osteophytes, facet  arthropathy, and endplate marrow degenerative changes are seen. Otherwise  the vertebral body heights and marrow signal are unremarkable.    Conus medullaris: Normal, terminating at L1/L2.    Soft tissues: There is a fluid collection in the paraspinal soft tissues  posterior to the L3-L5 laminectomy sites, likely postoperative seroma  measuring 63 x 28 mm (series 17, image 7). Small right renal cyst.    L1-L2: Persistent disc bulge with worsening superimposed central disc  extrusion. Bilateral facet arthropathy and dorsal epidural fat. The  constellation of findings produces moderate to severe spinal canal  stenosis. Mild to moderate left neural foraminal stenosis is improved  compared to prior.  Ligamentum flavum thickening. Facet hypertrophy, mild.    L2-L3: Disc bulge, ligamentum flavum thickening, facet hypertrophy  resulting in mild spinal canal stenosis. Mild left neural foraminal  stenosis.    L3-L4: Partially uncovered disc. Mild to moderate right neural foraminal  stenosis. Limited evaluation of the left neural foramen. Laminectomy.    L4-L5: No spinal canal stenosis. Limited evaluation of neural foramina due  to spinal hardware. Laminectomy.    L5-S1: Disc bulge without spinal canal stenosis. Limited evaluation of  neural foramina due to spinal hardware.     IMPRESSION:    1. Worsening disc protrusion at L1-L2, resulting in worsening spinal  canal stenosis, now moderate to severe.   2. Multilevel degenerative changes of the cervical spine, similar  compared to prior.  3. Multilevel degenerative changes in thoracic spine, with up to mild  to moderate spinal canal stenosis at T8-T9 secondary to disc bulge.  4. Postsurgical changes . Small postoperative seroma in L3-L5  laminectomy sites.        X-ray left hand 3/19/2019  FINDINGS:   There is no acute fracture or dislocation.   Advanced osteoarthrosis of the first CMC joint, characterized by joint   space  narrowing, subchondral sclerosis, osteophyte formation, and radial   subluxation. Mild osteoarthrosis of the STT joint. Osteoarthrosis of   scattered IP joints. No focal soft tissue swelling.     IMPRESSION:   Advanced osteoarthrosis of the first CMC joint.     XR Right hand 22  FINDINGS:     BONE MINERALIZATION: Normal.  JOINTS: Moderate to severe first carpometacarpal joint osteoarthrosis. Mild triscaphe joint osteoarthrosis. Mild multifocal osteoarthrosis otherwise. No erosions.  FRACTURE: None.  DISLOCATION: None.  SOFT TISSUES: No mass.     IMPRESSION:     1.  Moderate to severe first carpometacarpal joint osteoarthrosis.  2.  Mild multifocal osteoarthrosis otherwise.                                                  Diagnosis  Visit Diagnoses     ICD-10-CM   1. Lumbar radiculitis  M54.16   2. Myalgia  M79.10   3. Sacroiliac joint dysfunction of both sides  M53.3   4. S/P cervical spinal fusion  Z98.1   5. Neuroforaminal stenosis of cervical spine  M48.02   6. Osteoarthritis of carpometacarpal (CMC) joints of both thumbs, unspecified osteoarthritis type  M18.0   7. Chronic right-sided low back pain with right-sided sciatica  M54.41    G89.29   8. Numbness and tingling of right leg  R20.0    R20.2   9. Lumbar disc herniation  M51.26   10. Cervical radiculopathy  M54.12   11. 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 Post laminectomy syndrome  M96.1   13. History of lumbar fusion  Z98.1             ASSESSMENT AND PLAN:  Robin Lynn Zielesch (: 1955) is a female with history of HTN, cervical laminectomy, depression, anxiety, thyroid cancer, BMI 30, L3-pelvis posterior spinal fusion with TLIF/ PLIF on 21.    Also with worsened right cervical radiculitis.  She also has right carpal tunnel syndrome.  Cervical/upper thoracic epidural steroid injection could not be completed due to presence of calcified ligament.    Mathew was seen today for follow-up.    Diagnoses and all orders for this visit:    Lumbar  radiculitis    Myalgia    Sacroiliac joint dysfunction of both sides  -     [facility] Procedure; Future    S/P cervical spinal fusion    Neuroforaminal stenosis of cervical spine    Osteoarthritis of carpometacarpal (CMC) joints of both thumbs, unspecified osteoarthritis type    Chronic right-sided low back pain with right-sided sciatica    Numbness and tingling of right leg    Lumbar disc herniation    Cervical radiculopathy    Cluneal neuropathy    Post laminectomy syndrome  -     Referral to Behavioral Health    History of lumbar fusion            I have discussed that the numbness and tingling in her right hand is likely from carpal tunnel syndrome given her physical exam and limited diagnostic ultrasound results, and less likely from cervical radiculopathy       PLAN  Physical Therapy: discussed referral to physical therapy for low back pain and neck pain.  She declined a physical therapy referral as she has done extensive physical therapy in the past which worsened her symptoms     Diagnostic workup: Personally reviewed at today's visit:   X-ray ribs bilateral 11/15/24  - diagnostic ultrasound right wrist 02/14/25 by Dr. Jeffers as noted above    Medications:   -I have discussed that she should continue gabapentin, Mobic as per PCP  -Discussed that she should not combine Mobic with any other NSAIDs  -Previously prescribed tramadol 25-50 mg every 6 hours as needed as above.  Discussed that she should take this as sparingly as possible    Last opioid risk scale: 8/19/2022  Last controlled substance agreement: 11/15/2024    reviewed: 6/24/2025   Naloxone prescribed: Discussed prescription and offered it.  The patient declined saying she has at home already       Opioid Risk Score: 8    Interpretation of Opioid Risk Score   Score 0-3 = Low risk of abuse. Do UDS at least once per year.  Score 4-7 = Moderate risk of abuse. Do UDS 1-4 times per year.  Score 8+ = High risk of abuse. Refer to specialist.     I  reviewed the     In prescribing controlled substances to this patient, I certify that I have obtained and reviewed the medical history of Robin Lynn Zielesch. I have also made a good sidney effort to obtain applicable records from other providers who have treated the patient and records did not demonstrate any increased risk of substance abuse that would prevent me from prescribing controlled substances.     I have conducted a physical exam and documented it. I have reviewed Ms. Zielesch’s prescription history as maintained by the [place] Prescription Monitoring Program.     I have assessed the patient’s risk for abuse, dependency, and addiction using the validated Opioid Risk Tool available at https://www.mdcalc.com/tobyvo-zzgs-adsz-ort-narcotic-abuse.     Given the above, I believe the benefits of controlled substance therapy outweigh the risks. The reasons for prescribing controlled substances include non-narcotic, oral analgesic alternatives have been inadequate for pain control. Accordingly, I have discussed the risk and benefits, treatment plan, and alternative therapies with the patient.     Interventions:   - s/p right and left superior cluneal nerve block under ultrasound guidance with no relief  - Repeat bilateral sacroiliac joint injections under fluoroscopic guidance given recurrence of pain which previously improved with this injection  -S/p trial of trigger point injections targeting left neck area with no relief  - s/p attempted and aborted left cervical epidural steroid injection at T2-3 and T3-4 due to calcified ligament.   -Repeat right L2-3 interlaminar epidural steroid injection as needed given recurrence of pain which significantly improved with this procedure in the past  -Bilateral CMC joint steroid injections under ultrasound guidance PRN given significant improvement in pain with this procedure in the past.     Other  -Discussed consideration of spinal cord stimulator.  Referral to  psychology placed today for clearance.    -I previously discussed neurosurgical referral for evaluation and management of disc herniation at L1-2 that appears to be causing severe central canal stenosis and symptoms of lumbar radiculopathy.  Given her hx of significant improvement in pain after our previous epidural steroid injection and no neurologic deficits on exam today, I believe it is reasonable to defer a neurosurgery referral at this time  -Discussed that she could consider deep myofascial release techniques including a foam roller for her right thigh  -Continue follow-up with Dr[facility] Hood at [facility]. Discussed that perhaps a referral to an orthotist could be considered    Follow-up: 4 weeks to assess progress from today's injection    Orders Placed This [facility] Procedure    Referral to Behavioral Health       Kendal Jeffers MD  Interventional Pain and Spine  Physical Medicine and Rehabilitation  [facility]      The above note documents my personal evaluation of this patient. In addition, I have reviewed and confirmed with the patient and MA the supportive information documented in today's Patient Health Questionnaire and Office Note.     Please note that this dictation was created using voice recognition software. I have made every reasonable attempt to correct obvious errors, but I expect that there are errors of grammar and possibly content that I did not discover before finalizing the note.

## 2025-06-25 NOTE — Clinical Note
REFERRAL APPROVAL NOTICE         Sent on June 25, 2025                   Robin Lynn Zielesch  [address]  [facility] [place] [facility] [address]                   Dear Ms. Zielesch,    After a careful review of the medical information and benefit coverage, [facility] has processed your referral. See below for additional details.    If applicable, you must be actively enrolled with your insurance for coverage of the authorized service. If you have any questions regarding your coverage, please contact your insurance directly.    REFERRAL INFORMATION   Referral #:  [id number]  Referred-To Department    Referred-By Provider:  Pain Management    Kendal Jeffers M.D.   Pain Management       [address]  [address]  [place] [address]  [phone] [address] [facility] [address] [phone]    Referral Start Date:  06/24/2025  Referral End Date:   09/25/2025             SCHEDULING  If you do not already have an appointment, please call [phone] to make an appointment.     MORE INFORMATION  If you do not already have a [employer] account, sign up at: [email or web address]  You can access your medical information, make appointments, see lab results, billing information, and more.  If you have questions regarding this referral, please contact  the [facility] Referrals department at:             [phone]. Monday - Friday 8:00AM - 5:00PM.     Sincerely,    [facility]

## 2025-06-25 NOTE — Clinical Note
REFERRAL APPROVAL NOTICE         Sent on June 25, 2025                   Mathew Davistez  [address]  [place] [address]                   Dear Ms. Zielesch,    After a careful review of the medical information and benefit coverage, [facility] has processed your referral. See below for additional details.    If applicable, you must be actively enrolled with your insurance for coverage of the authorized service. If you have any questions regarding your coverage, please contact your insurance directly.    REFERRAL INFORMATION   Referral #:  [id number]  Referred-To Department    Referred-By Provider:  Behavioral Health    Kendal Jeffers M.D.   Behavioral Health Outpatient      [address]  [address]  [place] [id number] [phone] [address] [facility] [address]  [place] [id number] [phone]    Referral Start Date:  06/24/2025  Referral End Date:   06/24/2026             SCHEDULING  If you do not already have an appointment, please call [phone] to make an appointment.     MORE INFORMATION  If you do not already have a [employer] account, sign up at: [email or web address]  You can access your medical information, make appointments, see lab results, billing information, and more.  If you have questions regarding this referral, please contact  the [facility] Referrals department at:             [phone]. Monday - Friday 8:00AM - 5:00PM.     Sincerely,    [facility]

## 2025-06-30 PROCEDURE — RXMED WILLOW AMBULATORY MEDICATION CHARGE: Performed by: FAMILY MEDICINE

## 2025-07-05 ENCOUNTER — PHARMACY VISIT (OUTPATIENT)
Dept: PHARMACY | Facility: MEDICAL CENTER | Age: 70
End: 2025-07-05
Payer: COMMERCIAL

## 2025-07-09 ENCOUNTER — HOSPITAL ENCOUNTER (OUTPATIENT)
Facility: REHABILITATION | Age: 70
End: 2025-07-09
Attending: STUDENT IN AN ORGANIZED HEALTH CARE EDUCATION/TRAINING PROGRAM | Admitting: STUDENT IN AN ORGANIZED HEALTH CARE EDUCATION/TRAINING PROGRAM
Payer: MEDICARE

## 2025-07-09 ENCOUNTER — APPOINTMENT (OUTPATIENT)
Dept: RADIOLOGY | Facility: REHABILITATION | Age: 70
End: 2025-07-09
Attending: STUDENT IN AN ORGANIZED HEALTH CARE EDUCATION/TRAINING PROGRAM
Payer: MEDICARE

## 2025-07-09 VITALS
WEIGHT: 143.08 LBS | OXYGEN SATURATION: 93 % | RESPIRATION RATE: 16 BRPM | HEART RATE: 88 BPM | TEMPERATURE: 98.6 F | HEIGHT: 60 IN | DIASTOLIC BLOOD PRESSURE: 83 MMHG | SYSTOLIC BLOOD PRESSURE: 111 MMHG | BODY MASS INDEX: 28.09 KG/M2

## 2025-07-09 PROCEDURE — 77002 NEEDLE LOCALIZATION BY XRAY: CPT

## 2025-07-09 PROCEDURE — 700117 HCHG RX CONTRAST REV CODE 255

## 2025-07-09 PROCEDURE — G0260 INJ FOR SACROILIAC JT ANESTH: HCPCS

## 2025-07-09 PROCEDURE — 700111 HCHG RX REV CODE 636 W/ 250 OVERRIDE (IP): Mod: JZ

## 2025-07-09 RX ORDER — DEXAMETHASONE SODIUM PHOSPHATE 10 MG/ML
INJECTION, SOLUTION INTRAMUSCULAR; INTRAVENOUS
Status: COMPLETED
Start: 2025-07-09 | End: 2025-07-09

## 2025-07-09 RX ORDER — LIDOCAINE HYDROCHLORIDE 10 MG/ML
INJECTION, SOLUTION EPIDURAL; INFILTRATION; INTRACAUDAL; PERINEURAL
Status: COMPLETED
Start: 2025-07-09 | End: 2025-07-09

## 2025-07-09 RX ADMIN — DEXAMETHASONE SODIUM PHOSPHATE 10 MG: 10 INJECTION, SOLUTION INTRAMUSCULAR; INTRAVENOUS at 13:35

## 2025-07-09 RX ADMIN — IOHEXOL 5 ML: 240 INJECTION, SOLUTION INTRATHECAL; INTRAVASCULAR; INTRAVENOUS; ORAL at 13:34

## 2025-07-09 RX ADMIN — LIDOCAINE HYDROCHLORIDE 10 ML: 10 INJECTION, SOLUTION EPIDURAL; INFILTRATION; INTRACAUDAL; PERINEURAL at 13:34

## 2025-07-09 ASSESSMENT — PAIN DESCRIPTION - PAIN TYPE
TYPE: CHRONIC PAIN
TYPE: CHRONIC PAIN

## 2025-07-09 ASSESSMENT — FIBROSIS 4 INDEX: FIB4 SCORE: 1.4

## 2025-07-09 NOTE — OP REPORT
Date of Service: 07/09/25    Patient: Robin Lynn Zielesch 70 y.o. female     MRN: [id number]     Physician/s: Kendal Jeffers MD    Pre-operative Diagnosis: right and left Sacroiliac dysfunction    Post-operative Diagnosis: right and left Sacroiliac dysfunction    Procedure: right and left Sacroiliac joint injection    Description of procedure:    The risks, benefits, and alternatives of the procedure were reviewed and discussed with the patient.  Written informed consent was freely obtained. A pre-procedural time-out was conducted by the physician verifying patient’s identity, procedure to be performed, procedure site and side, and allergy verification. Appropriate equipment was determined to be in place for the procedure.     In the fluoroscopy suite the patient was placed in a prone position and the skin was prepped and draped in the usual sterile fashion. The fluoroscope was placed over the right and left sacroiliac joint at the appropriate angle for joint entrance, and the target for injection was marked. A 27g needle was placed into each of the marked level(s), and approx 2cc of 1% Lidocaine was injected subcutaneously into the epidermal and dermal layers. The needle was removed. On the right and left side, a 25g 3.5 inch needle was then placed down to the level of bone at the inferior/distal aspect of the joint. The needle was then retracted and carefully advanced into the joint capsule. The needle tip was then verified by a lateral view. In the AP view, contrast dye was used to highlight the joint line while the fluoroscope was running live. Following negative aspiration, approx 1mL of 1% lidocaine with 0.5mL of 10 mg/mL dexamethasone was then injected.  The needle was removed intact after being restyleted. The patient's low back was covered with a 4x4 gauze, the area was cleansed with sterile normal saline, and a dressing was applied. There were no complications noted.     Follow-up as scheduled    Kendal Jeffers  MD  Interventional Pain Management  Physical Medicine and Rehabilitation  [facility]         CPT  sacroiliac joint with fluoroscopy [id number] - 10

## 2025-07-09 NOTE — INTERVAL H&P NOTE
Consented Procedure: RIGHT and LEFT sacroiliac joint injection with fluoroscopic guidance  I have examined the patient, provided the risks, benefits, and alternatives to the procedure(s) indicated on the signed consent form, and the patient wishes to proceed.    H&P reviewed. The patient was examined and there are no changes to the H&P      Kendal Jeffers M.D.  07/09/25 1:18 PM

## 2025-07-09 NOTE — PROGRESS NOTES
1234 Pt received to pre procedure area. ID band and allergies verified. Vital signs taken and stable. Verified that patient has not taken NSAIDS, anticoagulants or blood thinners in past 5 days. Pt's history reviewed. Reviewed post op instructions with patient, questions answered, verbalized understanding. Pt seen by Dr. Jeffers  pre procedure discussed, questions answered.     1342  Pt received to recovery area, report received from procedure RN Milvia . Vitals taken and stable. Patient tolerated po fluids and snack without difficulty. Dressing clean, dry and intact. Ice pack applied over dressing.     1355 Pt seen by Dr. Jeffers post procedure, orders received for discharge. Patient ambulatory without difficulty. Pt discharged to designated .

## 2025-07-14 ENCOUNTER — OFFICE VISIT (OUTPATIENT)
Dept: MEDICAL GROUP | Facility: PHYSICIAN GROUP | Age: 70
End: 2025-07-14
Payer: MEDICARE

## 2025-07-14 VITALS
TEMPERATURE: 97.9 F | SYSTOLIC BLOOD PRESSURE: 114 MMHG | OXYGEN SATURATION: 96 % | HEART RATE: 90 BPM | RESPIRATION RATE: 16 BRPM | DIASTOLIC BLOOD PRESSURE: 70 MMHG | HEIGHT: 60 IN | WEIGHT: 142 LBS | BODY MASS INDEX: 27.88 KG/M2

## 2025-07-14 DIAGNOSIS — E03.9 ACQUIRED HYPOTHYROIDISM: ICD-10-CM

## 2025-07-14 DIAGNOSIS — I10 ESSENTIAL HYPERTENSION: Primary | Chronic | ICD-10-CM

## 2025-07-14 DIAGNOSIS — F41.9 ANXIETY: ICD-10-CM

## 2025-07-14 PROBLEM — Z00.00 WELLNESS EXAMINATION: Status: RESOLVED | Noted: 2024-08-02 | Resolved: 2025-07-14

## 2025-07-14 PROBLEM — E66.811 OBESITY (BMI 30.0-34.9): Status: RESOLVED | Noted: 2022-06-28 | Resolved: 2025-07-14

## 2025-07-14 PROBLEM — G47.19 EXCESSIVE DAYTIME SLEEPINESS: Status: RESOLVED | Noted: 2024-12-16 | Resolved: 2025-07-14

## 2025-07-14 PROCEDURE — 99214 OFFICE O/P EST MOD 30 MIN: CPT | Performed by: FAMILY MEDICINE

## 2025-07-14 PROCEDURE — 3074F SYST BP LT 130 MM HG: CPT | Performed by: FAMILY MEDICINE

## 2025-07-14 PROCEDURE — RXMED WILLOW AMBULATORY MEDICATION CHARGE: Performed by: FAMILY MEDICINE

## 2025-07-14 PROCEDURE — 3078F DIAST BP <80 MM HG: CPT | Performed by: FAMILY MEDICINE

## 2025-07-14 RX ORDER — LORAZEPAM 0.5 MG/1
0.5 TABLET ORAL EVERY 4 HOURS PRN
Qty: 60 TABLET | Refills: 0 | Status: SHIPPED | OUTPATIENT
Start: 2025-07-14 | End: 2025-08-20

## 2025-07-14 ASSESSMENT — FIBROSIS 4 INDEX: FIB4 SCORE: 1.4

## 2025-07-14 NOTE — PROGRESS NOTES
Subjective:     CC: Here for medication refill and to discuss a couple of other issues.    HPI:   Mathew presents today with the following medical concerns:    Anxiety  This is a chronic problem.  Patient is here to get a refill on her lorazepam.  She does not use it sparingly.  She is also due to sign a new contract.  She has dealt with her 's passing very well.  She is still considering moving back to [place] so should be closer to family.    Acquired hypothyroidism  This is a chronic problem.  She is due for TSH and that will be ordered today.    Essential hypertension  This is a chronic stable condition.  Blood pressure well-controlled on current medications.    Past Medical History[1]    Social History[2]    Current Medications and Prescriptions Ordered in Epic[3]    Allergies:  Ace inhibitors, Chlorhexidine, Flexeril [cyclobenzaprine], and Triamterene    Health Maintenance: Completed    ROS:  Gen: no fevers/chills, no changes in weight  Eyes: no changes in vision  ENT: no sore throat, no hearing loss, no bloody nose  Pulm: no sob, no cough  CV: no chest pain, no palpitations  GI: no nausea/vomiting, no diarrhea  : no dysuria  MSk: no myalgias  Skin: no rash  Neuro: no headaches, no numbness/tingling  Heme/Lymph: no easy bruising      Objective:       Exam:  /70 (BP Location: Right arm, Patient Position: Sitting, BP Cuff Size: Adult)   Pulse 90   Temp 36.6 °C (97.9 °F) (Temporal)   Resp 16   Ht 1.524 m (5')   Wt 64.4 kg (142 lb)   SpO2 96%   BMI 27.73 kg/m²  Body mass index is 27.73 kg/m².    Gen: Alert and oriented, No apparent distress.  Neck: Neck is supple without lymphadenopathy.  Lungs: Normal effort, CTA bilaterally, no wheezes, rhonchi, or rales  CV: Regular rate and rhythm. No murmurs, rubs, or gallops.    Abdomen: Benign  Ext: No clubbing, cyanosis, edema.  Neuro: Cranial nerves II through VIII are grossly intact.  No lateralized signs are seen.  Gait is normal.  Psych: Patient  is alert and cooperative.  No unusual thought was expressed.  Insight and judgment is good.  Does not appear to be depressed on today's visit.  She is mildly anxious.    Labs: Ordered.  Previous labs done earlier this year reviewed.    Assessment & Plan:     70 y.o. female with the following -     1. Anxiety  This is a chronic issue.  Agreement signed.  Medication renewed.  Follow-up in 90 days see if refill is needed.  - Controlled Substance Treatment Agreement  - LORazepam (ATIVAN) 0.5 MG Tab; Take 1 Tablet by mouth every four hours as needed for Anxiety for up to 30 days.  Dispense: 60 Tablet; Refill: 0    2. Acquired hypothyroidism  This is a chronic problem.  Lab ordered.  - TSH WITH REFLEX TO FT4; Future    3. Essential hypertension (Primary)  This is a chronic stable condition.  Continue current medications.    32 minutes spent with the patient discussing all her issues and concerns.  Return if symptoms worsen or fail to improve.    Please note that this dictation was created using voice recognition software. I have made every reasonable attempt to correct obvious errors, but I expect that there are errors of grammar and possibly content that I did not discover before finalizing the note.             [1]   Past Medical History:  Diagnosis Date    Anxiety     Arthritis     Cancer (HCC)     COPD (chronic obstructive pulmonary disease) (HCC)     Daytime sleepiness     GERD (gastroesophageal reflux disease)     Hypertension     Insomnia     Migraine     Thyroid disease    [2]   Social History  Tobacco Use    Smoking status: Some Days     Current packs/day: 0.00     Average packs/day: 1.5 packs/day for 50.0 years (75.0 ttl pk-yrs)     Types: Cigarettes     Start date: 1968     Last attempt to quit: 2018     Years since quittin.9    Smokeless tobacco: Never   Vaping Use    Vaping status: Some Days    Substances: Nicotine, CBD, Flavoring    Devices: Pre-filled or refillable cartridge, Refillable tank    Substance Use Topics    Alcohol use: Never    Drug use: Not Currently     Types: Marijuana, Methamphetamines     Comment: once in a while   [3]   Current Outpatient Medications Ordered in Epic   Medication Sig Dispense Refill    LORazepam (ATIVAN) 0.5 MG Tab Take 1 Tablet by mouth every four hours as needed for Anxiety for up to 30 days. 60 Tablet 0    potassium chloride SA (KDUR) 20 MEQ Tab CR Take 1 Tablet by mouth every day. 90 Tablet 3    gabapentin (NEURONTIN) 300 MG Cap Take 3 Capsules by mouth 3 times a day. 810 Capsule 1    losartan (COZAAR) 100 MG Tab Take 1 Tablet by mouth every day. 90 Tablet 3    buPROPion (WELLBUTRIN XL) 300 MG XL tablet Take 1 Tablet by mouth every morning. 90 Tablet 3    meloxicam (MOBIC) 15 MG tablet Take 1 Tablet by mouth every day. 90 Tablet 3    SYNTHROID 88 MCG Tab Take 1 Tablet by mouth every morning on an empty stomach. 90 Tablet 3    amLODIPine (NORVASC) 10 MG Tab Take 1 Tablet by mouth every day. 90 Tablet 3    omeprazole (PRILOSEC) 40 MG delayed-release capsule Take 1 Capsule by mouth every day. 90 Capsule 3    Riboflavin (VITAMIN B-2 PO) Take  by mouth.      lidocaine (LIDODERM) 5 % Patch Place 1 Patch on the skin every 24 hours. Apply to affected area, 24 hours on followed by 24 hours off. 20 Patch 0    tizanidine (ZANAFLEX) 4 MG Tab Take 1 Tablet by mouth every 6 hours as needed (arm pain). (Patient not taking: Reported on 7/14/2025) 30 Tablet 3    albuterol 108 (90 Base) MCG/ACT Aero Soln inhalation aerosol Inhale 2 Puffs every 6 hours as needed for Shortness of Breath. 8.5 g 3    aspirin (ASA) 81 MG Chew Tab chewable tablet Chew 81 mg every day.      calcium citrate (CALCITRATE) 950 (200 Ca) MG Tab Take 950 mg by mouth every day.      Multiple Vitamin (MULTI-VITAMINS PO) Take  by mouth.       No current Epic-ordered facility-administered medications on file.

## 2025-07-14 NOTE — ASSESSMENT & PLAN NOTE
This is a chronic problem.  Patient is here to get a refill on her lorazepam.  She does not use it sparingly.  She is also due to sign a new contract.  She has dealt with her 's passing very well.  She is still considering moving back to [place] so should be closer to family.   No

## 2025-07-15 PROCEDURE — RXMED WILLOW AMBULATORY MEDICATION CHARGE: Performed by: FAMILY MEDICINE

## 2025-07-21 ENCOUNTER — PHARMACY VISIT (OUTPATIENT)
Dept: PHARMACY | Facility: MEDICAL CENTER | Age: 70
End: 2025-07-21
Payer: COMMERCIAL

## 2025-08-03 PROCEDURE — RXMED WILLOW AMBULATORY MEDICATION CHARGE: Performed by: FAMILY MEDICINE

## 2025-08-04 PROCEDURE — RXMED WILLOW AMBULATORY MEDICATION CHARGE: Performed by: FAMILY MEDICINE

## 2025-08-04 RX ORDER — OMEPRAZOLE 40 MG/1
40 CAPSULE, DELAYED RELEASE ORAL DAILY
Qty: 90 CAPSULE | Refills: 3 | Status: SHIPPED | OUTPATIENT
Start: 2025-08-04

## 2025-08-06 ENCOUNTER — APPOINTMENT (OUTPATIENT)
Dept: PHYSICAL MEDICINE AND REHAB | Facility: MEDICAL CENTER | Age: 70
End: 2025-08-06
Payer: MEDICARE

## 2025-08-06 ENCOUNTER — HOSPITAL ENCOUNTER (OUTPATIENT)
Facility: MEDICAL CENTER | Age: 70
End: 2025-08-06
Attending: FAMILY MEDICINE
Payer: MEDICARE

## 2025-08-06 DIAGNOSIS — E03.9 ACQUIRED HYPOTHYROIDISM: ICD-10-CM

## 2025-08-06 PROBLEM — Z91.81 RISK FOR FALLS: Status: ACTIVE | Noted: 2025-08-06

## 2025-08-06 LAB
T4 FREE SERPL-MCNC: 1.39 NG/DL (ref 0.93–1.7)
TSH SERPL DL<=0.005 MIU/L-ACNC: 0.27 UIU/ML (ref 0.38–5.33)

## 2025-08-06 PROCEDURE — 36415 COLL VENOUS BLD VENIPUNCTURE: CPT

## 2025-08-06 PROCEDURE — 84439 ASSAY OF FREE THYROXINE: CPT

## 2025-08-06 PROCEDURE — 84443 ASSAY THYROID STIM HORMONE: CPT

## 2025-08-06 ASSESSMENT — PATIENT HEALTH QUESTIONNAIRE - PHQ9
CLINICAL INTERPRETATION OF PHQ2 SCORE: 1
SUM OF ALL RESPONSES TO PHQ QUESTIONS 1-9: 3
5. POOR APPETITE OR OVEREATING: 0 - NOT AT ALL

## 2025-08-06 ASSESSMENT — FIBROSIS 4 INDEX: FIB4 SCORE: 1.4

## 2025-08-06 ASSESSMENT — PAIN SCALES - GENERAL: PAINLEVEL_OUTOF10: 5=MODERATE PAIN

## 2025-08-07 ENCOUNTER — PHARMACY VISIT (OUTPATIENT)
Dept: PHARMACY | Facility: MEDICAL CENTER | Age: 70
End: 2025-08-07
Payer: COMMERCIAL

## 2025-08-17 PROCEDURE — RXMED WILLOW AMBULATORY MEDICATION CHARGE: Performed by: FAMILY MEDICINE

## 2025-08-18 PROCEDURE — RXMED WILLOW AMBULATORY MEDICATION CHARGE: Performed by: FAMILY MEDICINE

## 2025-08-18 RX ORDER — AMLODIPINE BESYLATE 10 MG/1
10 TABLET ORAL DAILY
Qty: 90 TABLET | Refills: 3 | Status: SHIPPED | OUTPATIENT
Start: 2025-08-18

## 2025-08-19 ENCOUNTER — PHARMACY VISIT (OUTPATIENT)
Dept: PHARMACY | Facility: MEDICAL CENTER | Age: 70
End: 2025-08-19
Payer: COMMERCIAL